# Patient Record
Sex: FEMALE | Race: BLACK OR AFRICAN AMERICAN | NOT HISPANIC OR LATINO | Employment: OTHER | URBAN - METROPOLITAN AREA
[De-identification: names, ages, dates, MRNs, and addresses within clinical notes are randomized per-mention and may not be internally consistent; named-entity substitution may affect disease eponyms.]

---

## 2017-01-13 DIAGNOSIS — I10 ESSENTIAL HYPERTENSION: ICD-10-CM

## 2017-01-13 RX ORDER — CARVEDILOL 12.5 MG/1
TABLET ORAL
Qty: 60 TAB | Refills: 6 | Status: SHIPPED | OUTPATIENT
Start: 2017-01-13 | End: 2017-07-17 | Stop reason: SDUPTHER

## 2017-01-16 ENCOUNTER — OFFICE VISIT (OUTPATIENT)
Dept: MEDICAL GROUP | Facility: CLINIC | Age: 82
End: 2017-01-16
Payer: MEDICARE

## 2017-01-16 VITALS
HEIGHT: 67 IN | HEART RATE: 68 BPM | OXYGEN SATURATION: 98 % | BODY MASS INDEX: 24.01 KG/M2 | DIASTOLIC BLOOD PRESSURE: 70 MMHG | TEMPERATURE: 98.2 F | WEIGHT: 153 LBS | RESPIRATION RATE: 20 BRPM | SYSTOLIC BLOOD PRESSURE: 108 MMHG

## 2017-01-16 DIAGNOSIS — Z79.891 CHRONIC USE OF OPIATE FOR THERAPEUTIC PURPOSE: ICD-10-CM

## 2017-01-16 DIAGNOSIS — M47.817 LUMBAR AND SACRAL ARTHRITIS: ICD-10-CM

## 2017-01-16 DIAGNOSIS — M19.021 ARTHRITIS OF RIGHT ELBOW: ICD-10-CM

## 2017-01-16 PROCEDURE — 99213 OFFICE O/P EST LOW 20 MIN: CPT | Performed by: FAMILY MEDICINE

## 2017-01-16 RX ORDER — HYDROCODONE BITARTRATE AND ACETAMINOPHEN 7.5; 325 MG/1; MG/1
1 TABLET ORAL EVERY 6 HOURS PRN
Qty: 100 TAB | Refills: 0 | Status: SHIPPED | OUTPATIENT
Start: 2017-01-16 | End: 2017-01-16 | Stop reason: SDUPTHER

## 2017-01-16 RX ORDER — HYDROCODONE BITARTRATE AND ACETAMINOPHEN 7.5; 325 MG/1; MG/1
1 TABLET ORAL EVERY 6 HOURS PRN
Qty: 100 TAB | Refills: 0 | Status: SHIPPED | OUTPATIENT
Start: 2017-01-16 | End: 2017-03-17 | Stop reason: SDUPTHER

## 2017-01-16 NOTE — PROGRESS NOTES
CC: Chronic back pain, arthritis     HPI:   Ricky presents today with the following.    1. Lumbar and sacral arthritis  She has chronic lower and sacral back pain. This is under better control overall with her current medication regimen and the assistance of someone who comes to help cleaning. She states the hydrocodone continues to be helpful without somnolence or confusion. She is trying to take no more than 3 per day. She states this takes the pain from 5 or 6 down to 3 which allows her to complete her ADLs if she paces herself.    2. Arthritis of right elbow  The right elbow arthritis has also been under control with this regimen. She cannot take anti-inflammatory medication due to her hypertension    3. Chronic use of opiate for therapeutic purpose  No aberrant behavior with the medications or addictive behavior has been noted. She has actually been using less than as prescribed.      Patient Active Problem List    Diagnosis Date Noted   • Pulmonary hypertension (CMS-HCC) 09/20/2014     Priority: Medium   • Right renal mass 06/12/2014     Priority: Medium   • Bipolar depression (CMS-HCC) 06/08/2014     Priority: Medium   • Idiopathic hypothyroidism 02/22/2013     Priority: Medium   • Essential hypertension 03/28/2012     Priority: Medium   • Anemia, iron deficiency 04/06/2013     Priority: Low   • Chronic use of opiate for therapeutic purpose 09/14/2016   • Cervical spine arthritis (CMS-HCC) 07/24/2015   • Osteoarthritis of left knee    • MEDICAL HOME 02/10/2015   • Hyponatremia 02/05/2015   • Insomnia 12/08/2014   • Low HDL (under 40)    • Lumbar and sacral arthritis        Current Outpatient Prescriptions   Medication Sig Dispense Refill   • hydrocodone-acetaminophen (NORCO) 7.5-325 MG per tablet Take 1 Tab by mouth every 6 hours as needed (arthritis pain, max four per day). Seen 1/16/17, renewal of chronic medication, do not fill until 1/26/17 100 Tab 0   • carvedilol (COREG) 12.5 MG Tab TAKE ONE [1] TABLET  "BY MOUTH TWO [2] TIMES A DAY. WITH MEALS  60 Tab 6   • SYNTHROID 175 MCG Tab Take 1 Tab by mouth Every morning on an empty stomach. 30 Tab 6   • zolpidem (AMBIEN) 5 MG Tab Take 1 Tab by mouth every bedtime. 30 Tab 3   • divalproex (DEPAKOTE) 500 MG Tablet Delayed Response Take 1 Tab by mouth every bedtime. 90 Tab 3   • losartan (COZAAR) 100 MG Tab TAKE ONE [1] TABLET BY MOUTH EVERY DAY FOR BLOOD PRESSURE 30 Tab 11   • quetiapine (SEROQUEL) 100 MG Tab TAKE ONE TABLET BY MOUTH EVERY NIGHT AT BEDTIME.  30 Tab 11   • polyethylene glycol 3350 (MIRALAX) Powder Take 17 g by mouth every day. 527 g 3     No current facility-administered medications for this visit.         Allergies as of 01/16/2017   • (No Known Allergies)        ROS: As per HPI.    /70 mmHg  Pulse 68  Temp(Src) 36.8 °C (98.2 °F)  Resp 20  Ht 1.702 m (5' 7.01\")  Wt 69.4 kg (153 lb)  BMI 23.96 kg/m2  SpO2 98%  Breastfeeding? No    Physical Exam:  Gen:         Alert and oriented, No apparent distress.  Neck:        No Lymphadenopathy or Bruits.  Lungs:     Clear to auscultation bilaterally  CV:          Regular rate and rhythm. No murmurs, rubs or gallops.               Ext:          No clubbing, cyanosis, edema.      Assessment and Plan.   85 y.o. female with the following issues.    1. Lumbar and sacral arthritis  She is doing very well with the reduced quantity of her medications. The medication continues to be helpful and well tolerated and is renewed.  - hydrocodone-acetaminophen (NORCO) 7.5-325 MG per tablet; Take 1 Tab by mouth every 6 hours as needed (arthritis pain, max four per day). Seen 1/16/17, renewal of chronic medication, do not fill until 1/26/17  Dispense: 100 Tab; Refill: 0    2. Arthritis of right elbow  The medication regimen is helpful and well tolerated and is renewed.  - hydrocodone-acetaminophen (NORCO) 7.5-325 MG per tablet; Take 1 Tab by mouth every 6 hours as needed (arthritis pain, max four per day). Seen 1/16/17, " renewal of chronic medication, do not fill until 1/26/17  Dispense: 100 Tab; Refill: 0    3. Chronic use of opiate for therapeutic purpose  [facility] Board of pharmacy interface is without inconsistencies. This regimen has been helpful in controlling her arthritis pain from multiple areas.        Chronic pain recheck:   Last dose of controlled substance: this am,  drove her here  Chronic pain treated with hydrocodone taken 2-3 times a day  Current alcohol or substance use: no    Consequences of Chronic Opiate therapy:  (5 A's)  Analgesia:  improved  Activity:  improved  Adverse Events:  none noted  Aberrant Behaviors: no inappropriate refills requested, lost or stolen meds reported.   Affect/Mood: good grooming, full facial expressions, normal speech pattern and content, normal reasoning    Nonnarcotic treatments that are being used: cane, ice/heat to back    Pain management agreement initiated/updated and signed on: 9/2016.  Last UDS 2/2015, consistent with prescriptions    I have reviewed the medical records, the Prescription Monitoring Program and I have determined that controlled substance treatment is medically indicated.  [facility] board of pharmacy interface is without inconsistencies.  Her current average and active levels are within CDC guidelines.

## 2017-01-16 NOTE — MR AVS SNAPSHOT
"        Ricky GR Vernon   2017 11:20 AM   Office Visit   MRN: [id number]    Department:  [facility]   Dept Phone:  [phone]    Description:  Female : 1931   Provider:  Tatyana Escobar M.D.           Reason for Visit     Back Pain     Arthritis           Allergies as of 2017     No Known Allergies      You were diagnosed with     Lumbar and sacral arthritis   [627214]       Arthritis of right elbow   [708041]       Chronic use of opiate for therapeutic purpose   [2871577]         Vital Signs     Blood Pressure Pulse Temperature Respirations Height Weight    108/70 mmHg 68 36.8 °C (98.2 °F) 20 1.702 m (5' 7.01\") 69.4 kg (153 lb)    Body Mass Index Oxygen Saturation Breastfeeding? Smoking Status          23.96 kg/m2 98% No Never Smoker         Basic Information     Date Of Birth Sex Race Ethnicity Preferred Language    1931 Female Black or  Non- English      Your appointments     Mar 17, 2017 11:20 AM   Established Patient with Tatyana Escobar M.D.   [address] [id number] [phone]           You will be receiving a confirmation call a few days before your appointment from our automated call confirmation system.              Problem List              ICD-10-CM Priority Class Noted - Resolved    Essential hypertension I10 Medium  3/28/2012 - Present    Idiopathic hypothyroidism E03.9 Medium  2013 - Present    Anemia, iron deficiency D50.9 Low  2013 - Present    Bipolar depression (CMS-HCC) F31.30 Medium  2014 - Present    Right renal mass N28.89 Medium  2014 - Present    Pulmonary hypertension (CMS-[facility]) I27.2 Medium  2014 - Present    Low HDL (under 40) E78.6   Unknown - Present    Lumbar and sacral arthritis M48.9   Unknown - Present    Insomnia G47.00   2014 - Present    Hyponatremia E87.1   2015 - Present    MEDICAL HOME    2/10/2015 - Present    Osteoarthritis of " left knee M17.9   Unknown - Present    Cervical spine arthritis (CMS-[facility]) M46.92   7/24/2015 - Present    Chronic use of opiate for therapeutic purpose Z79.899   9/14/2016 - Present      Health Maintenance        Date Due Completion Dates    BONE DENSITY 6/8/1996 ---    IMM DTaP/Tdap/Td Vaccine (1 - Tdap) 11/7/2019 (Originally 6/8/1950) ---    IMM ZOSTER VACCINE 2/1/2020 (Originally 6/8/1991) ---    COLONOSCOPY 3/23/2017 3/23/2007 (Prv Comp)    Override on 3/23/2007: Previously completed ([facility], normal)            Current Immunizations     13-VALENT PCV PREVNAR 9/14/2016    Influenza TIV (IM) 10/11/2012    Influenza Vaccine Adult HD 11/16/2016, 11/30/2015, 9/21/2014  5:13 AM    Pneumococcal polysaccharide vaccine (PPSV-23) 2/25/2013      Below and/or attached are the medications your provider expects you to take. Review all of your home medications and newly ordered medications with your provider and/or pharmacist. Follow medication instructions as directed by your provider and/or pharmacist. Please keep your medication list with you and share with your provider. Update the information when medications are discontinued, doses are changed, or new medications (including over-the-counter products) are added; and carry medication information at all times in the event of emergency situations     Allergies:  No Known Allergies          Medications  Valid as of: January 16, 2017 - 12:05 PM    Generic Name Brand Name Tablet Size Instructions for use    Carvedilol (Tab) COREG 12.5 MG TAKE ONE [1] TABLET BY MOUTH TWO [2] TIMES A DAY. WITH MEALS         Divalproex Sodium (Tablet Delayed Response) DEPAKOTE 500 MG Take 1 Tab by mouth every bedtime.        Hydrocodone-Acetaminophen (Tab) NORCO 7.5-325 MG Take 1 Tab by mouth every 6 hours as needed (arthritis pain, max four per day). Seen 1/16/17, renewal of chronic medication, do not fill until 2/24/17        Levothyroxine Sodium (Tab) SYNTHROID 175 MCG Take 1 Tab by  mouth Every morning on an empty stomach.        Losartan Potassium (Tab) COZAAR 100 MG TAKE ONE [1] TABLET BY MOUTH EVERY DAY FOR BLOOD PRESSURE        Polyethylene Glycol 3350 (Powder) MIRALAX  Take 17 g by mouth every day.        QUEtiapine Fumarate (Tab) SEROQUEL 100 MG TAKE ONE TABLET BY MOUTH EVERY NIGHT AT BEDTIME.         Zolpidem Tartrate (Tab) AMBIEN 5 MG Take 1 Tab by mouth every bedtime.        .                 Medicines prescribed today were sent to:     [id number] - [place], [place] - [address] [facility]    [address] [place] [address]    Phone: [phone] Fax: [phone]    Open 24 Hours?: No      Medication refill instructions:       If your prescription bottle indicates you have medication refills left, it is not necessary to call your provider’s office. Please contact your pharmacy and they will refill your medication.    If your prescription bottle indicates you do not have any refills left, you may request refills at any time through one of the following ways: The online [employer] system (except Urgent Care), by calling your provider’s office, or by asking your pharmacy to contact your provider’s office with a refill request. Medication refills are processed only during regular business hours and may not be available until the next business day. Your provider may request additional information or to have a follow-up visit with you prior to refilling your medication.   *Please Note: Medication refills are assigned a new Rx number when refilled electronically. Your pharmacy may indicate that no refills were authorized even though a new prescription for the same medication is available at the pharmacy. Please request the medicine by name with the pharmacy before contacting your provider for a refill.        Other Notes About Your Plan     Patient is enrolled in Medical Home with Dr. Escobar.  EPS Case Open: Teri Fung [phone]           Process [employer] Access Code: [email or web address]  Expires: 2/15/2017  11:55 AM    [employer]  A secure, online tool to manage your health information     [employer]’s [employer]® is a secure, online tool that connects you to your personalized health information from the privacy of your home -- day or night - making it very easy for you to manage your healthcare. Once the activation process is completed, you can even access your medical information using the [employer] lauren, which is available for free in the Apple Lauren store or Google Play store.     [employer] provides the following levels of access (as shown below):   My Chart Features   [facility] Doctor [facility]  Specialists [facility]  Urgent  Care Non-[facility]  Primary Care  Doctor   Email your healthcare team securely and privately 24/7 X X X    Manage appointments: schedule your next appointment; view details of past/upcoming appointments X      Request prescription refills. X      View recent personal medical records, including lab and immunizations X X X X   View health record, including health history, allergies, medications X X X X   Read reports about your outpatient visits, procedures, consult and ER notes X X X X   See your discharge summary, which is a recap of your hospital and/or ER visit that includes your diagnosis, lab results, and care plan. X X       How to register for [employer]:  1. Go to  [email or web address].  2. Click on the Sign Up Now box, which takes you to the New Member Sign Up page. You will need to provide the following information:  a. Enter your [employer] Access Code exactly as it appears at the top of this page. (You will not need to use this code after you’ve completed the sign-up process. If you do not sign up before the expiration date, you must request a new code.)   b. Enter your date of birth.   c. Enter your home email address.   d. Click Submit, and follow the next screen’s instructions.  3. Create a [employer] ID. This will be your [employer] login ID and cannot be changed, so think of one that is  secure and easy to remember.  4. Create a [employer] password. You can change your password at any time.  5. Enter your Password Reset Question and Answer. This can be used at a later time if you forget your password.   6. Enter your e-mail address. This allows you to receive e-mail notifications when new information is available in [employer].  7. Click Sign Up. You can now view your health information.    For assistance activating your [employer] account, call [phone]

## 2017-03-17 ENCOUNTER — OFFICE VISIT (OUTPATIENT)
Dept: MEDICAL GROUP | Facility: CLINIC | Age: 82
End: 2017-03-17
Payer: MEDICARE

## 2017-03-17 VITALS
SYSTOLIC BLOOD PRESSURE: 112 MMHG | WEIGHT: 153 LBS | BODY MASS INDEX: 24.01 KG/M2 | HEART RATE: 60 BPM | DIASTOLIC BLOOD PRESSURE: 78 MMHG | TEMPERATURE: 96.8 F | OXYGEN SATURATION: 97 % | HEIGHT: 67 IN | RESPIRATION RATE: 16 BRPM

## 2017-03-17 DIAGNOSIS — K59.01 SLOW TRANSIT CONSTIPATION: ICD-10-CM

## 2017-03-17 DIAGNOSIS — F51.01 PRIMARY INSOMNIA: ICD-10-CM

## 2017-03-17 DIAGNOSIS — M47.817 LUMBAR AND SACRAL ARTHRITIS: ICD-10-CM

## 2017-03-17 DIAGNOSIS — M19.021 ARTHRITIS OF RIGHT ELBOW: ICD-10-CM

## 2017-03-17 DIAGNOSIS — F31.9 BIPOLAR DEPRESSION (HCC): ICD-10-CM

## 2017-03-17 DIAGNOSIS — Z79.891 CHRONIC USE OF OPIATE FOR THERAPEUTIC PURPOSE: ICD-10-CM

## 2017-03-17 DIAGNOSIS — M17.12 PRIMARY OSTEOARTHRITIS OF LEFT KNEE: ICD-10-CM

## 2017-03-17 DIAGNOSIS — M47.812 CERVICAL SPINE ARTHRITIS: ICD-10-CM

## 2017-03-17 PROCEDURE — 1101F PT FALLS ASSESS-DOCD LE1/YR: CPT | Performed by: FAMILY MEDICINE

## 2017-03-17 PROCEDURE — G8432 DEP SCR NOT DOC, RNG: HCPCS | Performed by: FAMILY MEDICINE

## 2017-03-17 PROCEDURE — 99213 OFFICE O/P EST LOW 20 MIN: CPT | Performed by: FAMILY MEDICINE

## 2017-03-17 PROCEDURE — G8482 FLU IMMUNIZE ORDER/ADMIN: HCPCS | Performed by: FAMILY MEDICINE

## 2017-03-17 PROCEDURE — 1036F TOBACCO NON-USER: CPT | Performed by: FAMILY MEDICINE

## 2017-03-17 PROCEDURE — G8420 CALC BMI NORM PARAMETERS: HCPCS | Performed by: FAMILY MEDICINE

## 2017-03-17 PROCEDURE — 4040F PNEUMOC VAC/ADMIN/RCVD: CPT | Performed by: FAMILY MEDICINE

## 2017-03-17 RX ORDER — ZOLPIDEM TARTRATE 5 MG/1
5 TABLET ORAL
Qty: 30 TAB | Refills: 4 | Status: SHIPPED | OUTPATIENT
Start: 2017-03-17 | End: 2017-07-17 | Stop reason: SDUPTHER

## 2017-03-17 RX ORDER — HYDROCODONE BITARTRATE AND ACETAMINOPHEN 7.5; 325 MG/1; MG/1
1 TABLET ORAL EVERY 6 HOURS PRN
Qty: 100 TAB | Refills: 0 | Status: SHIPPED | OUTPATIENT
Start: 2017-03-17 | End: 2017-03-17 | Stop reason: SDUPTHER

## 2017-03-17 RX ORDER — POLYETHYLENE GLYCOL 3350 17 G/17G
17 POWDER, FOR SOLUTION ORAL DAILY
Qty: 527 G | Refills: 3 | Status: SHIPPED | OUTPATIENT
Start: 2017-03-17 | End: 2017-10-04 | Stop reason: SDUPTHER

## 2017-03-17 RX ORDER — DIVALPROEX SODIUM 500 MG/1
500 TABLET, DELAYED RELEASE ORAL
Qty: 90 TAB | Refills: 3 | Status: SHIPPED | OUTPATIENT
Start: 2017-03-17 | End: 2018-02-21 | Stop reason: SDUPTHER

## 2017-03-17 RX ORDER — HYDROCODONE BITARTRATE AND ACETAMINOPHEN 7.5; 325 MG/1; MG/1
1 TABLET ORAL EVERY 6 HOURS PRN
Qty: 100 TAB | Refills: 0 | Status: SHIPPED | OUTPATIENT
Start: 2017-03-17 | End: 2017-05-19 | Stop reason: SDUPTHER

## 2017-03-17 ASSESSMENT — ENCOUNTER SYMPTOMS: DEPRESSION: 1

## 2017-03-17 ASSESSMENT — LIFESTYLE VARIABLES: HISTORY_ALCOHOL_USE: 0

## 2017-03-17 NOTE — PROGRESS NOTES
CC: Chronic arthritis, medication renewal, constipation, depression and insomnia    HPI:   Ricky presents today with the following.    1. Primary osteoarthritis of left knee/cervical spine arthritis (EMS-HCC)/lumbar and sacral arthritis/arthritis of right elbow  With the cold and wet weather we have had recently she has had increased joint pain. She denies any swelling. She denies any erythema or heat. She has felt much more achy.  She feels the hydrocodone continues to be helpful. She is using 2-3 per day. She denies any somnolence, increased difficulty with balance or confusion. She feels the regimen keeps her overall pain level around a 4 or 5.  She cannot use anti-inflammatory medications due to a history of chronic blood loss anemia from prior use. She does use topical agents on the knee and elbow in particular with some help.    2. Chronic use of opiate for therapeutic purpose  No aberrant or addictive use of medications has been observed.  Patient counseled to not add Tylenol to current regimen.  Patient counseled to keep medications locked door under personal control.    3. Slow transit constipation  This is a chronic problem which she controls well with MiraLAX. She takes this daily and states she has a daily stool and no further issues. Denies visible blood or mucus in the stool or abdominal pain.    4. Bipolar depression (CMS-[facility])  This is a long-standing serious problem with history of darin. She feels she has been doing well on Depakote. She denies any problems with the medication. Her weight has remained very stable on this medication. She states with this dose of medication she is able to sleep nightly. She takes the Seroquel as well and feels the combination has been very helpful. Denies any agitation, suicidal ideation or thoughts of self-harm. Denies any persistent or intrusive thoughts.    5. Primary insomnia  The Seroquel continues to be very helpful for this along with lower dose Ambien. Denies  any sleepwalking or sleep eating.  She is careful to take the medication right as she is going to bed.      Patient Active Problem List    Diagnosis Date Noted   • Pulmonary hypertension (CMS-HCC) 09/20/2014     Priority: Medium   • Right renal mass 06/12/2014     Priority: Medium   • Bipolar depression (CMS-HCC) 06/08/2014     Priority: Medium   • Idiopathic hypothyroidism 02/22/2013     Priority: Medium   • Essential hypertension 03/28/2012     Priority: Medium   • Anemia, iron deficiency 04/06/2013     Priority: Low   • Chronic use of opiate for therapeutic purpose 09/14/2016   • Cervical spine arthritis (CMS-HCC) 07/24/2015   • Osteoarthritis of left knee    • MEDICAL HOME 02/10/2015   • Hyponatremia 02/05/2015   • Insomnia 12/08/2014   • Low HDL (under 40)    • Lumbar and sacral arthritis        Current Outpatient Prescriptions   Medication Sig Dispense Refill   • polyethylene glycol 3350 (MIRALAX) Powder Take 17 g by mouth every day. 527 g 3   • zolpidem (AMBIEN) 5 MG Tab Take 1 Tab by mouth every bedtime. 30 Tab 4   • hydrocodone-acetaminophen (NORCO) 7.5-325 MG per tablet Take 1 Tab by mouth every 6 hours as needed (arthritis pain, max four per day). Seen 3/17/17, renewal of chronic medication, do not fill until 3/21/17 100 Tab 0   • divalproex (DEPAKOTE) 500 MG Tablet Delayed Response Take 1 Tab by mouth every bedtime. 90 Tab 3   • carvedilol (COREG) 12.5 MG Tab TAKE ONE [1] TABLET BY MOUTH TWO [2] TIMES A DAY. WITH MEALS  60 Tab 6   • SYNTHROID 175 MCG Tab Take 1 Tab by mouth Every morning on an empty stomach. 30 Tab 6   • losartan (COZAAR) 100 MG Tab TAKE ONE [1] TABLET BY MOUTH EVERY DAY FOR BLOOD PRESSURE 30 Tab 11   • quetiapine (SEROQUEL) 100 MG Tab TAKE ONE TABLET BY MOUTH EVERY NIGHT AT BEDTIME.  30 Tab 11     No current facility-administered medications for this visit.         Allergies as of 03/17/2017   • (No Known Allergies)        ROS: As per HPI.    /78 mmHg  Pulse 60  Temp(Src) 36 °C  "(96.8 °F)  Resp 16  Ht 1.702 m (5' 7.01\")  Wt 69.4 kg (153 lb)  BMI 23.96 kg/m2  SpO2 97%    Physical Exam:  Gen:         Alert and oriented, No apparent distress.  Lucid and fluent.  HEENT:   EOMI, PERRLA.   Oropharynx is well hydrated with normal soft palate motion. No exudate or ulcerations noted.   Neck:       Full range of motion, moderate posterior cervical spasm. No JVD or carotid bruits appreciated. No cervical adenopathy appreciated. No thyromegaly or neck masses appreciated.  No retractions appreciated.   Lungs      clear to auscultation A&P with good air movement. .  CV:          Regular rate and rhythm. No murmurs, rubs or gallops.               Ext:          No clubbing, cyanosis, edema.  Left knee mild effusion with crepitus, no erythema or heat appreciated.  Skin:        shows no rashes, pigmented lesions or ulcerations.      Assessment and Plan.   85 y.o. female with the following issues.    1. Primary osteoarthritis of left knee  The regimen is helpful and well tolerated and is renewed. Discussed using BenGay or salon pas patches. She will also continue the heating pad which she feels is helpful.     3. Lumbar and sacral arthritis/cervical spine arthritis  The medication is helpful and well tolerated and is renewed  - hydrocodone-acetaminophen (NORCO) 7.5-325 MG per tablet; Take 1 Tab by mouth every 6 hours as needed (arthritis pain, max four per day). Seen 3/17/17, renewal of chronic medication, do not fill until 3/21/17  Dispense: 100 Tab; Refill: 0    4. Arthritis of right elbow  Medication is helpful and well-tolerated and is renewed. Discussed that hopefully with the warmer weather she will start to feel a little better.  - hydrocodone-acetaminophen (NORCO) 7.5-325 MG per tablet; Take 1 Tab by mouth every 6 hours as needed (arthritis pain, max four per day). Seen 3/17/17, renewal of chronic medication, do not fill until 3/21/17  Dispense: 100 Tab; Refill: 0    5. Chronic use of opiate for " therapeutic purpose  [facility] board of pharmacy interface is reviewed.  No inconsistencies are found.  her average MME is 26, active is 30, max active is 30.  This is within CDC guideline for chronic pain prescribing by primary care.  Drug screen using buccal swab collected today.  - [facility] PAIN MANAGEMENT SCREEN; Future    6. Slow transit constipation  The medication is helpful and well-tolerated and is noted  - polyethylene glycol 3350 (MIRALAX) Powder; Take 17 g by mouth every day.  Dispense: 527 g; Refill: 3    7. Bipolar depression (CMS-HCC)  The medication is helpful and well-tolerated and is renewed. She will continue the regimen. No increased depressive symptoms or darin observed.  - divalproex (DEPAKOTE) 500 MG Tablet Delayed Response; Take 1 Tab by mouth every bedtime.  Dispense: 90 Tab; Refill: 3    8. Primary insomnia  The medication is helpful and well-tolerated and is renewed.  - zolpidem (AMBIEN) 5 MG Tab; Take 1 Tab by mouth every bedtime.  Dispense: 30 Tab; Refill: 4        Chronic pain recheck:   Last dose of controlled substance: Last p.m.  Chronic pain treated with hydrocodone/APAP taken 3-4 times a day    She  reports that she does not drink alcohol.  She  reports that she does not use illicit drugs.    Consequences of Chronic Opiate therapy:  (5 A's)  Analgesia: Compared to no treatment or prior treatment, pain is currently improved  Activity: not changed  More arthritis aching  Adverse Events: She denies dry mouth, itchy skin, nausea, sedation and has chronic treated constipation  Aberrant Behaviors: She reports she is taking medication as prescribed and is not veering from agreed treatment regimen. There have been no inappropriate refills or lost/stolen meds reported.   Affect/Mood: Pain is not impacting patient's mood.  Patient reports chronic stable depression/anxiety.    Nonnarcotic treatments that are being used: uses OTC topicals and heating pad.   Treatment goals discussed.    Opioid  Risk Score: 3    Interpretation of Opioid Risk Score   Score 0-3 = Low risk of abuse. Do UDS at least once per year.  Score 4-7 = Moderate risk of abuse. Do UDS 1-4 times per year.  Score 8+ = High risk of abuse. Refer to specialist.    Last order of CONTROLLED SUBSTANCE TREATMENT AGREEMENT was found on 9/14/2016 from Office Visit on 9/14/2016     UDS Summary                URINE DRUG SCREEN Overdue 2/4/2016      Done 2/9/2015 URINE DRUG SCREEN     Patient has more history with this topic...        Most recent UDS reviewed today and is consistent with prescribed medications.  Collected today.    I have reviewed the medical records, the Prescription Monitoring Program and I have determined that controlled substance treatment is medically indicated.

## 2017-03-17 NOTE — MR AVS SNAPSHOT
"        Ricky GR Vernon   3/17/2017 11:20 AM   Office Visit   MRN: [id number]    Department:  [facility]   Dept Phone:  [phone]    Description:  Female : 1931   Provider:  Tatyana Escobar M.D.           Reason for Visit     Arthritis     Back Pain     Knee Pain           Allergies as of 3/17/2017     No Known Allergies      You were diagnosed with     Primary osteoarthritis of left knee   [041214]       Cervical spine arthritis (CMS-HCC)   [220039]       Lumbar and sacral arthritis   [774396]       Arthritis of right elbow   [941637]       Chronic use of opiate for therapeutic purpose   [4087212]       Slow transit constipation   [564.01.ICD-9-CM]       Bipolar depression (CMS-HCC)   [136998]       Primary insomnia   [576296]         Vital Signs     Blood Pressure Pulse Temperature Respirations Height Weight    112/78 mmHg 60 36 °C (96.8 °F) 16 1.702 m (5' 7.01\") 69.4 kg (153 lb)    Body Mass Index Oxygen Saturation Smoking Status             23.96 kg/m2 97% Never Smoker          Basic Information     Date Of Birth Sex Race Ethnicity Preferred Language    1931 Female Black or  Non- English      Your appointments     May 19, 2017 10:00 AM   Established Patient with Tatyana Escobar M.D.   [address] [id number] [phone]           You will be receiving a confirmation call a few days before your appointment from our automated call confirmation system.              Problem List              ICD-10-CM Priority Class Noted - Resolved    Essential hypertension I10 Medium  3/28/2012 - Present    Idiopathic hypothyroidism E03.9 Medium  2013 - Present    Anemia, iron deficiency D50.9 Low  2013 - Present    Bipolar depression (CMS-HCC) F31.30 Medium  2014 - Present    Right renal mass N28.89 Medium  2014 - Present    Pulmonary hypertension (CMS-HCC) I27.2 Medium  2014 - Present    Low HDL " (under 40) E78.6   Unknown - Present    Lumbar and sacral arthritis M48.9   Unknown - Present    Insomnia G47.00   12/8/2014 - Present    Hyponatremia E87.1   2/5/2015 - Present    MEDICAL HOME    2/10/2015 - Present    Osteoarthritis of left knee M17.9   Unknown - Present    Cervical spine arthritis (CMS-HCC) M46.92   7/24/2015 - Present    Chronic use of opiate for therapeutic purpose Z79.899   9/14/2016 - Present      Health Maintenance        Date Due Completion Dates    IMM DTaP/Tdap/Td Vaccine (1 - Tdap) 11/7/2019 (Originally 6/8/1950) ---    IMM ZOSTER VACCINE 2/1/2020 (Originally 6/8/1991) ---    COLONOSCOPY 3/23/2017 3/23/2007 (Prv Comp)    Override on 3/23/2007: Previously completed ([facility], normal)            Current Immunizations     13-VALENT PCV PREVNAR 9/14/2016    Influenza TIV (IM) 10/11/2012    Influenza Vaccine Adult HD 11/16/2016, 11/30/2015, 9/21/2014  5:13 AM    Pneumococcal polysaccharide vaccine (PPSV-23) 2/25/2013      Below and/or attached are the medications your provider expects you to take. Review all of your home medications and newly ordered medications with your provider and/or pharmacist. Follow medication instructions as directed by your provider and/or pharmacist. Please keep your medication list with you and share with your provider. Update the information when medications are discontinued, doses are changed, or new medications (including over-the-counter products) are added; and carry medication information at all times in the event of emergency situations     Allergies:  No Known Allergies          Medications  Valid as of: March 17, 2017 - 12:17 PM    Generic Name Brand Name Tablet Size Instructions for use    Carvedilol (Tab) COREG 12.5 MG TAKE ONE [1] TABLET BY MOUTH TWO [2] TIMES A DAY. WITH MEALS         Divalproex Sodium (Tablet Delayed Response) DEPAKOTE 500 MG Take 1 Tab by mouth every bedtime.        Hydrocodone-Acetaminophen (Tab) NORCO 7.5-325 MG Take 1 Tab  by mouth every 6 hours as needed (arthritis pain, max four per day). Seen 3/17/17, renewal of chronic medication, do not fill until 4/19/17        Levothyroxine Sodium (Tab) SYNTHROID 175 MCG Take 1 Tab by mouth Every morning on an empty stomach.        Losartan Potassium (Tab) COZAAR 100 MG TAKE ONE [1] TABLET BY MOUTH EVERY DAY FOR BLOOD PRESSURE        Polyethylene Glycol 3350 (Powder) MIRALAX  Take 17 g by mouth every day.        QUEtiapine Fumarate (Tab) SEROQUEL 100 MG TAKE ONE TABLET BY MOUTH EVERY NIGHT AT BEDTIME.         Zolpidem Tartrate (Tab) AMBIEN 5 MG Take 1 Tab by mouth every bedtime.        .                 Medicines prescribed today were sent to:     [id number] - [place], [place] - [address]    [address] [address]    Phone: [phone] Fax: [phone]    Open 24 Hours?: No      Medication refill instructions:       If your prescription bottle indicates you have medication refills left, it is not necessary to call your provider’s office. Please contact your pharmacy and they will refill your medication.    If your prescription bottle indicates you do not have any refills left, you may request refills at any time through one of the following ways: The online [employer] system (except Urgent Care), by calling your provider’s office, or by asking your pharmacy to contact your provider’s office with a refill request. Medication refills are processed only during regular business hours and may not be available until the next business day. Your provider may request additional information or to have a follow-up visit with you prior to refilling your medication.   *Please Note: Medication refills are assigned a new Rx number when refilled electronically. Your pharmacy may indicate that no refills were authorized even though a new prescription for the same medication is available at the pharmacy. Please request the medicine by name with the pharmacy before contacting your provider for a  refill.        Your To Do List     Future Labs/Procedures Complete By Expires    [facility]  As directed 3/17/2018    Comments:    Current Meds (name, sig, last dose):   Current outpatient prescriptions:   •  hydrocodone-acetaminophen, 1 Tab, Oral, Q6HRS PRN  •  carvedilol, TAKE ONE [1] TABLET BY MOUTH TWO [2] TIMES A DAY. WITH MEALS   •  SYNTHROID, 175 mcg, Oral, AM ES  •  zolpidem, 5 mg, Oral, QHS  •  divalproex, 500 mg, Oral, QHS  •  losartan, TAKE ONE [1] TABLET BY MOUTH EVERY DAY FOR BLOOD PRESSURE  •  quetiapine, TAKE ONE TABLET BY MOUTH EVERY NIGHT AT BEDTIME.   •  polyethylene glycol 3350, 17 g, Oral, DAILY      Other Notes About Your Plan     Patient is enrolled in Medical Home with Dr. Escobar.  EPS Case Open: Teri Tourelli [phone]           [facility] Status: Patient Declined

## 2017-05-19 ENCOUNTER — OFFICE VISIT (OUTPATIENT)
Dept: MEDICAL GROUP | Facility: CLINIC | Age: 82
End: 2017-05-19
Payer: MEDICARE

## 2017-05-19 VITALS
SYSTOLIC BLOOD PRESSURE: 120 MMHG | WEIGHT: 153 LBS | TEMPERATURE: 97.2 F | BODY MASS INDEX: 24.01 KG/M2 | RESPIRATION RATE: 16 BRPM | OXYGEN SATURATION: 92 % | HEIGHT: 67 IN | HEART RATE: 62 BPM | DIASTOLIC BLOOD PRESSURE: 70 MMHG

## 2017-05-19 DIAGNOSIS — M17.12 PRIMARY OSTEOARTHRITIS OF LEFT KNEE: ICD-10-CM

## 2017-05-19 DIAGNOSIS — Z79.891 CHRONIC USE OF OPIATE FOR THERAPEUTIC PURPOSE: ICD-10-CM

## 2017-05-19 DIAGNOSIS — M19.021 ARTHRITIS OF RIGHT ELBOW: ICD-10-CM

## 2017-05-19 DIAGNOSIS — M47.817 LUMBAR AND SACRAL ARTHRITIS: ICD-10-CM

## 2017-05-19 PROCEDURE — 1036F TOBACCO NON-USER: CPT | Performed by: FAMILY MEDICINE

## 2017-05-19 PROCEDURE — G8432 DEP SCR NOT DOC, RNG: HCPCS | Performed by: FAMILY MEDICINE

## 2017-05-19 PROCEDURE — 4040F PNEUMOC VAC/ADMIN/RCVD: CPT | Performed by: FAMILY MEDICINE

## 2017-05-19 PROCEDURE — 99213 OFFICE O/P EST LOW 20 MIN: CPT | Performed by: FAMILY MEDICINE

## 2017-05-19 PROCEDURE — 1101F PT FALLS ASSESS-DOCD LE1/YR: CPT | Performed by: FAMILY MEDICINE

## 2017-05-19 PROCEDURE — G8420 CALC BMI NORM PARAMETERS: HCPCS | Performed by: FAMILY MEDICINE

## 2017-05-19 RX ORDER — HYDROCODONE BITARTRATE AND ACETAMINOPHEN 7.5; 325 MG/1; MG/1
1 TABLET ORAL EVERY 6 HOURS PRN
Qty: 100 TAB | Refills: 0 | Status: SHIPPED | OUTPATIENT
Start: 2017-05-19 | End: 2017-05-19 | Stop reason: SDUPTHER

## 2017-05-19 RX ORDER — HYDROCODONE BITARTRATE AND ACETAMINOPHEN 7.5; 325 MG/1; MG/1
1 TABLET ORAL EVERY 6 HOURS PRN
Qty: 100 TAB | Refills: 0 | Status: SHIPPED | OUTPATIENT
Start: 2017-05-19 | End: 2017-07-17 | Stop reason: SDUPTHER

## 2017-05-19 NOTE — MR AVS SNAPSHOT
"        Ricky Junior   2017 10:00 AM   Office Visit   MRN: [id number]    Department:  [facility]   Dept Phone:  [phone]    Description:  Female : 1931   Provider:  Tatyana Escobar M.D.           Reason for Visit     Back Pain     Knee Pain           Allergies as of 2017     No Known Allergies      You were diagnosed with     Lumbar and sacral arthritis   [099847]       Arthritis of right elbow   [088035]       Primary osteoarthritis of left knee   [088743]       Chronic use of opiate for therapeutic purpose   [9365731]         Vital Signs     Blood Pressure Pulse Temperature Respirations Height Weight    120/70 mmHg 62 36.2 °C (97.2 °F) 16 1.702 m (5' 7.01\") 69.4 kg (153 lb)    Body Mass Index Oxygen Saturation Smoking Status             23.96 kg/m2 92% Never Smoker          Basic Information     Date Of Birth Sex Race Ethnicity Preferred Language    1931 Female Black or  Non- English      Problem List              ICD-10-CM Priority Class Noted - Resolved    Essential hypertension I10 Medium  3/28/2012 - Present    Idiopathic hypothyroidism E03.9 Medium  2013 - Present    Anemia, iron deficiency D50.9 Low  2013 - Present    Bipolar depression (CMS-HCC) F31.30 Medium  2014 - Present    Right renal mass N28.89 Medium  2014 - Present    Pulmonary hypertension (CMS-[facility]) I27.2 Medium  2014 - Present    Low HDL (under 40) E78.6   Unknown - Present    Lumbar and sacral arthritis M48.9   Unknown - Present    Insomnia G47.00   2014 - Present    Hyponatremia E87.1   2015 - Present    MEDICAL HOME    2/10/2015 - Present    Osteoarthritis of left knee M17.12   Unknown - Present    Cervical spine arthritis M46.92   2015 - Present    Chronic use of opiate for therapeutic purpose Z79.891   2016 - Present      Health Maintenance        Date Due Completion Dates    IMM DTaP/Tdap/Td Vaccine (1 - Tdap) 2019 (Originally 1950) " ---    IMM ZOSTER VACCINE 2/1/2020 (Originally 6/8/1991) ---            Current Immunizations     13-VALENT PCV PREVNAR 9/14/2016    Influenza TIV (IM) 10/11/2012    Influenza Vaccine Adult HD 11/16/2016, 11/30/2015, 9/21/2014  5:13 AM    Pneumococcal polysaccharide vaccine (PPSV-23) 2/25/2013      Below and/or attached are the medications your provider expects you to take. Review all of your home medications and newly ordered medications with your provider and/or pharmacist. Follow medication instructions as directed by your provider and/or pharmacist. Please keep your medication list with you and share with your provider. Update the information when medications are discontinued, doses are changed, or new medications (including over-the-counter products) are added; and carry medication information at all times in the event of emergency situations     Allergies:  No Known Allergies          Medications  Valid as of: May 19, 2017 - 10:24 AM    Generic Name Brand Name Tablet Size Instructions for use    Carvedilol (Tab) COREG 12.5 MG TAKE ONE [1] TABLET BY MOUTH TWO [2] TIMES A DAY. WITH MEALS         Divalproex Sodium (Tablet Delayed Response) DEPAKOTE 500 MG Take 1 Tab by mouth every bedtime.        Hydrocodone-Acetaminophen (Tab) NORCO 7.5-325 MG Take 1 Tab by mouth every 6 hours as needed (arthritis pain, max four per day). Seen 5/19/17, renewal of chronic medication, do not fill until 6/17/17        Levothyroxine Sodium (Tab) SYNTHROID 175 MCG Take 1 Tab by mouth Every morning on an empty stomach.        Losartan Potassium (Tab) COZAAR 100 MG TAKE ONE [1] TABLET BY MOUTH EVERY DAY FOR BLOOD PRESSURE        Polyethylene Glycol 3350 (Powder) MIRALAX  Take 17 g by mouth every day.        QUEtiapine Fumarate (Tab) SEROQUEL 100 MG TAKE ONE TABLET BY MOUTH EVERY NIGHT AT BEDTIME.         Zolpidem Tartrate (Tab) AMBIEN 5 MG Take 1 Tab by mouth every bedtime.        .                 Medicines prescribed today were sent  to:     [id number] - [place], [place] - [address]    [address] [place] [address]    Phone: [phone] Fax: [phone]    Open 24 Hours?: No      Medication refill instructions:       If your prescription bottle indicates you have medication refills left, it is not necessary to call your provider’s office. Please contact your pharmacy and they will refill your medication.    If your prescription bottle indicates you do not have any refills left, you may request refills at any time through one of the following ways: The online [employer] system (except Urgent Care), by calling your provider’s office, or by asking your pharmacy to contact your provider’s office with a refill request. Medication refills are processed only during regular business hours and may not be available until the next business day. Your provider may request additional information or to have a follow-up visit with you prior to refilling your medication.   *Please Note: Medication refills are assigned a new Rx number when refilled electronically. Your pharmacy may indicate that no refills were authorized even though a new prescription for the same medication is available at the pharmacy. Please request the medicine by name with the pharmacy before contacting your provider for a refill.        Other Notes About Your Plan     Patient is enrolled in Medical Home with Dr. Escobar.  EPS Case Open: Teri Fung [phone]           [facility] Status: Patient Declined

## 2017-05-19 NOTE — PROGRESS NOTES
CC: chronic back pain, elbow pain and knee pain    HPI:   Ricky presents today with the following.    1. Lumbar and sacral arthritis  She has arthritis in multiple levels in the lumbar spine and sacral region. She finds the pain medication helpful bringing the pain from a 7 or 8 down to 3 or 4. She denies balance problems, somnolence or confusion from the medication.    2. Arthritis of right elbow  She continues to have arthritic pain and stiffness in the right elbow. However, she found an old tube of diclofenac gel which is working very well in the elbow region. She tried it in the knee and on the back as well and is not noticing any significant difference there. The narcotic pain medication does help when the arthritis of the elbow is too severe.    3. Primary osteoarthritis of left knee  She continues to have left knee osteoarthritis which is quite advanced. She is not a good candidate for surgery or knee replacement.  She does use a walker at home. Discussed pros and cons of knee injection. She would prefer not to do that at this time. She cannot use much of the anti-inflammatories due to her history of anemia and her hypertension.    4. Chronic use of opiate for therapeutic purpose  No aberrant or addictive use of medications has been observed.  Patient counseled to not add Tylenol to current regimen.  Patient counseled to keep medications locked up or under personal control.      Patient Active Problem List    Diagnosis Date Noted   • Pulmonary hypertension (CMS-HCC) 09/20/2014     Priority: Medium   • Right renal mass 06/12/2014     Priority: Medium   • Bipolar depression (CMS-HCC) 06/08/2014     Priority: Medium   • Idiopathic hypothyroidism 02/22/2013     Priority: Medium   • Essential hypertension 03/28/2012     Priority: Medium   • Anemia, iron deficiency 04/06/2013     Priority: Low   • Chronic use of opiate for therapeutic purpose 09/14/2016   • Cervical spine arthritis 07/24/2015   • Osteoarthritis of  "left knee    • MEDICAL HOME 02/10/2015   • Hyponatremia 02/05/2015   • Insomnia 12/08/2014   • Low HDL (under 40)    • Lumbar and sacral arthritis        Current Outpatient Prescriptions   Medication Sig Dispense Refill   • hydrocodone-acetaminophen (NORCO) 7.5-325 MG per tablet Take 1 Tab by mouth every 6 hours as needed (arthritis pain, max four per day). Seen 5/19/17, renewal of chronic medication, do not fill until 6/17/17 100 Tab 0   • polyethylene glycol 3350 (MIRALAX) Powder Take 17 g by mouth every day. 527 g 3   • zolpidem (AMBIEN) 5 MG Tab Take 1 Tab by mouth every bedtime. 30 Tab 4   • divalproex (DEPAKOTE) 500 MG Tablet Delayed Response Take 1 Tab by mouth every bedtime. 90 Tab 3   • carvedilol (COREG) 12.5 MG Tab TAKE ONE [1] TABLET BY MOUTH TWO [2] TIMES A DAY. WITH MEALS  60 Tab 6   • SYNTHROID 175 MCG Tab Take 1 Tab by mouth Every morning on an empty stomach. 30 Tab 6   • losartan (COZAAR) 100 MG Tab TAKE ONE [1] TABLET BY MOUTH EVERY DAY FOR BLOOD PRESSURE 30 Tab 11   • quetiapine (SEROQUEL) 100 MG Tab TAKE ONE TABLET BY MOUTH EVERY NIGHT AT BEDTIME.  30 Tab 11     No current facility-administered medications for this visit.         Allergies as of 05/19/2017   • (No Known Allergies)        ROS: As per HPI.    /70 mmHg  Pulse 62  Temp(Src) 36.2 °C (97.2 °F)  Resp 16  Ht 1.702 m (5' 7.01\")  Wt 69.4 kg (153 lb)  BMI 23.96 kg/m2  SpO2 92%    Physical Exam:  Gen:         Alert and oriented, No apparent distress.  Neck:        No Lymphadenopathy or Bruits.  Lungs:     Clear to auscultation bilaterally  CV:          Regular rate and rhythm. No murmurs, rubs or gallops.               Ext:          No clubbing, cyanosis, edema.      Assessment and Plan.   85 y.o. female with the following issues.    1. Lumbar and sacral arthritis  The medication is helpful and well tolerated and is renewed  - hydrocodone-acetaminophen (NORCO) 7.5-325 MG per tablet; Take 1 Tab by mouth every 6 hours as needed " (arthritis pain, max four per day). Seen 5/19/17, renewal of chronic medication, do not fill until 6/17/17  Dispense: 100 Tab; Refill: 0    2. Arthritis of right elbow  The medication is helpful and well tolerated and is renewed. The topical diclofenac gel seems to be very helpful for the elbow and she will continue this.  - hydrocodone-acetaminophen (NORCO) 7.5-325 MG per tablet; Take 1 Tab by mouth every 6 hours as needed (arthritis pain, max four per day). Seen 5/19/17, renewal of chronic medication, do not fill until 6/17/17  Dispense: 100 Tab; Refill: 0    3. Primary osteoarthritis of left knee  The medication is helpful and well tolerated and is renewed. Unfortunately the diclofenac gel is not helping for this but the pain medication is.  - hydrocodone-acetaminophen (NORCO) 7.5-325 MG per tablet; Take 1 Tab by mouth every 6 hours as needed (arthritis pain, max four per day). Seen 5/19/17, renewal of chronic medication, do not fill until 6/17/17  Dispense: 100 Tab; Refill: 0    4. Chronic use of opiate for therapeutic purpose  [facility] board of pharmacy interface is reviewed.  No inconsistencies are found.  Her average MME is 32, active is 30, max active is 30.  This is within CDC guideline for chronic pain prescribing by primary care.      Chronic pain recheck:   Last dose of controlled substance: this am,  drives  Chronic pain treated with hydrocodone/apap taken 3-4 times a day    She  reports that she does not drink alcohol.  She  reports that she does not use illicit drugs.    Consequences of Chronic Opiate therapy:  (5 A's)  Analgesia: Compared to no treatment or prior treatment, pain is currently improved  Activity: improved  Adverse Events: She denies constipation, itchy skin, nausea and sedation  Aberrant Behaviors: She reports she is taking medication as prescribed and is not veering from agreed treatment regimen. There have been no inappropriate refills or lost/stolen meds reported.   Affect/Mood:  Pain is impacting patient's mood.  Patient reports stable depression/anxiety.    Nonnarcotic treatments that are being used: cannot take NSAIDS due to cardiovascular history.   Treatment goals discussed.    Opioid Risk Score: 3    Interpretation of Opioid Risk Score   Score 0-3 = Low risk of abuse. Do UDS at least once per year.  Score 4-7 = Moderate risk of abuse. Do UDS 1-4 times per year.  Score 8+ = High risk of abuse. Refer to specialist.    Last order of CONTROLLED SUBSTANCE TREATMENT AGREEMENT was found on 9/14/2016 from Office Visit on 9/14/2016     UDS Summary                URINE DRUG SCREEN Next Due 3/17/2018      Done 3/22/2017 [employer] [facility] PAIN MANAGEMENT SCREEN     Patient has more history with this topic...        Most recent UDS reviewed today and is consistent with prescribed medications.    I have reviewed the medical records, the Prescription Monitoring Program and I have determined that controlled substance treatment is medically indicated.

## 2017-05-26 ENCOUNTER — PATIENT OUTREACH (OUTPATIENT)
Dept: HEALTH INFORMATION MANAGEMENT | Facility: OTHER | Age: 82
End: 2017-05-26

## 2017-05-26 NOTE — PROGRESS NOTES
Attempt #:1    WebIZ Checked & Epic Updated: yes  HealthConnect Verified: no  Verify PCP: yes    Communication Preference Obtained: yes     Review Care Team: yes    Annual Wellness Visit Scheduling  1. Scheduling Status:Scheduled       Care Gap Scheduling (Attempt to Schedule EACH Overdue Care Gap!)     Health Maintenance Due   Topic Date Due   • Annual Wellness Visit  Scheduled          MyChart Activation: declined  Y[employer]orner[employer] Lauren: no  Virtual Visits: no  Opt In to Text Messages: no

## 2017-07-06 RX ORDER — LEVOTHYROXINE SODIUM 175 MCG
TABLET ORAL
Qty: 30 TAB | Refills: 5 | Status: SHIPPED | OUTPATIENT
Start: 2017-07-06 | End: 2018-01-05 | Stop reason: SDUPTHER

## 2017-07-17 ENCOUNTER — OFFICE VISIT (OUTPATIENT)
Dept: MEDICAL GROUP | Facility: CLINIC | Age: 82
End: 2017-07-17
Payer: MEDICARE

## 2017-07-17 VITALS
HEART RATE: 60 BPM | SYSTOLIC BLOOD PRESSURE: 120 MMHG | RESPIRATION RATE: 16 BRPM | WEIGHT: 153 LBS | BODY MASS INDEX: 24.01 KG/M2 | DIASTOLIC BLOOD PRESSURE: 66 MMHG | HEIGHT: 67 IN | OXYGEN SATURATION: 96 % | TEMPERATURE: 97.9 F

## 2017-07-17 DIAGNOSIS — M17.12 PRIMARY OSTEOARTHRITIS OF LEFT KNEE: ICD-10-CM

## 2017-07-17 DIAGNOSIS — F51.01 PRIMARY INSOMNIA: ICD-10-CM

## 2017-07-17 DIAGNOSIS — M19.021 ARTHRITIS OF RIGHT ELBOW: ICD-10-CM

## 2017-07-17 DIAGNOSIS — I10 ESSENTIAL HYPERTENSION: ICD-10-CM

## 2017-07-17 DIAGNOSIS — Z79.891 CHRONIC USE OF OPIATE FOR THERAPEUTIC PURPOSE: ICD-10-CM

## 2017-07-17 DIAGNOSIS — M47.817 LUMBAR AND SACRAL ARTHRITIS: ICD-10-CM

## 2017-07-17 PROCEDURE — 99214 OFFICE O/P EST MOD 30 MIN: CPT | Performed by: FAMILY MEDICINE

## 2017-07-17 RX ORDER — HYDROCODONE BITARTRATE AND ACETAMINOPHEN 7.5; 325 MG/1; MG/1
1 TABLET ORAL EVERY 6 HOURS PRN
Qty: 100 TAB | Refills: 0 | Status: SHIPPED | OUTPATIENT
Start: 2017-07-17 | End: 2017-09-05 | Stop reason: SDUPTHER

## 2017-07-17 RX ORDER — HYDROCODONE BITARTRATE AND ACETAMINOPHEN 7.5; 325 MG/1; MG/1
1 TABLET ORAL EVERY 6 HOURS PRN
Qty: 100 TAB | Refills: 0 | Status: SHIPPED | OUTPATIENT
Start: 2017-07-17 | End: 2017-07-17 | Stop reason: SDUPTHER

## 2017-07-17 RX ORDER — CARVEDILOL 12.5 MG/1
12.5 TABLET ORAL 2 TIMES DAILY WITH MEALS
Qty: 60 TAB | Refills: 6 | Status: SHIPPED | OUTPATIENT
Start: 2017-07-17 | End: 2018-02-21 | Stop reason: SDUPTHER

## 2017-07-17 RX ORDER — ZOLPIDEM TARTRATE 5 MG/1
5 TABLET ORAL
Qty: 30 TAB | Refills: 5 | Status: SHIPPED | OUTPATIENT
Start: 2017-07-17 | End: 2018-02-02 | Stop reason: SDUPTHER

## 2017-07-17 ASSESSMENT — ENCOUNTER SYMPTOMS
BACK PAIN: 1
HALLUCINATIONS: 0
INSOMNIA: 1
HEARTBURN: 0
MYALGIAS: 1
BLURRED VISION: 0
DIZZINESS: 1
SPUTUM PRODUCTION: 0
DOUBLE VISION: 0
SHORTNESS OF BREATH: 0
DIAPHORESIS: 0
HEADACHES: 0
WHEEZING: 0
LOSS OF CONSCIOUSNESS: 0
BRUISES/BLEEDS EASILY: 0
NERVOUS/ANXIOUS: 1
FEVER: 0
FOCAL WEAKNESS: 0
CHILLS: 0
ABDOMINAL PAIN: 0
SEIZURES: 0
COUGH: 1
DEPRESSION: 1

## 2017-07-17 ASSESSMENT — LIFESTYLE VARIABLES: SUBSTANCE_ABUSE: 0

## 2017-07-17 NOTE — MR AVS SNAPSHOT
"        Ricky GR Vernon   2017 11:00 AM   Office Visit   MRN: [id number]    Department:  [facility]   Dept Phone:  [phone]    Description:  Female : 1931   Provider:  Tatyana Ecsobar M.D.           Reason for Visit     Back Pain     Dizziness     Cough           Allergies as of 2017     No Known Allergies      You were diagnosed with     Lumbar and sacral arthritis   [885268]       Arthritis of right elbow   [452497]       Primary osteoarthritis of left knee   [899452]       Chronic use of opiate for therapeutic purpose   [1075780]       Essential hypertension   [2186586]       Primary insomnia   [111500]         Vital Signs     Blood Pressure Pulse Temperature Respirations Height Weight    120/66 mmHg 60 36.6 °C (97.9 °F) 16 1.702 m (5' 7.01\") 69.4 kg (153 lb)    Body Mass Index Oxygen Saturation Smoking Status             23.96 kg/m2 96% Never Smoker          Basic Information     Date Of Birth Sex Race Ethnicity Preferred Language    1931 Female Black or  Non- English      Your appointments     Sep 18, 2017 11:00 AM   Established Patient with Tatyana Escobar M.D.   [address]  [facility] [id number] [phone]           You will be receiving a confirmation call a few days before your appointment from our automated call confirmation system.            2017 11:00 AM   Established Patient with Tatyana Escobar M.D.   [address]  [facility] [id number] [phone]           You will be receiving a confirmation call a few days before your appointment from our automated call confirmation system.              Problem List              ICD-10-CM Priority Class Noted - Resolved    Essential hypertension I10 Medium  3/28/2012 - Present    Idiopathic hypothyroidism E03.9 Medium  2013 - Present    Anemia, iron deficiency D50.9 Low  2013 - Present   " Bipolar depression (CMS-HCC) F31.30 Medium  6/8/2014 - Present    Right renal mass N28.89 Medium  6/12/2014 - Present    Pulmonary hypertension (CMS-HCC) I27.2 Medium  9/20/2014 - Present    Low HDL (under 40) E78.6   Unknown - Present    Lumbar and sacral arthritis M48.9   Unknown - Present    Insomnia G47.00   12/8/2014 - Present    Hyponatremia E87.1   2/5/2015 - Present    MEDICAL HOME    2/10/2015 - Present    Osteoarthritis of left knee M17.12   Unknown - Present    Cervical spine arthritis M46.92   7/24/2015 - Present    Chronic use of opiate for therapeutic purpose Z79.891   9/14/2016 - Present      Health Maintenance        Date Due Completion Dates    IMM DTaP/Tdap/Td Vaccine (1 - Tdap) 11/7/2019 (Originally 6/8/1950) ---    IMM ZOSTER VACCINE 2/1/2020 (Originally 6/8/1991) ---    IMM INFLUENZA (1) 9/1/2017 11/16/2016, 11/30/2015, 9/21/2014, 10/11/2012            Current Immunizations     13-VALENT PCV PREVNAR 9/14/2016    Influenza TIV (IM) 10/11/2012    Influenza Vaccine Adult HD 11/16/2016, 11/30/2015, 9/21/2014  5:13 AM    Pneumococcal polysaccharide vaccine (PPSV-23) 2/25/2013      Below and/or attached are the medications your provider expects you to take. Review all of your home medications and newly ordered medications with your provider and/or pharmacist. Follow medication instructions as directed by your provider and/or pharmacist. Please keep your medication list with you and share with your provider. Update the information when medications are discontinued, doses are changed, or new medications (including over-the-counter products) are added; and carry medication information at all times in the event of emergency situations     Allergies:  No Known Allergies          Medications  Valid as of: July 17, 2017 -  1:24 PM    Generic Name Brand Name Tablet Size Instructions for use    Carvedilol (Tab) COREG 12.5 MG Take 1 Tab by mouth 2 times a day, with meals.        Divalproex Sodium (Tablet  Delayed Response) DEPAKOTE 500 MG Take 1 Tab by mouth every bedtime.        Hydrocodone-Acetaminophen (Tab) NORCO 7.5-325 MG Take 1 Tab by mouth every 6 hours as needed (arthritis pain, max four per day).        Levothyroxine Sodium (Tab) SYNTHROID 175 MCG TAKE ONE (1) TABLET BY MOUTH EVERY MORNING         Losartan Potassium (Tab) COZAAR 100 MG TAKE ONE [1] TABLET BY MOUTH EVERY DAY FOR BLOOD PRESSURE        Polyethylene Glycol 3350 (Powder) MIRALAX  Take 17 g by mouth every day.        QUEtiapine Fumarate (Tab) SEROQUEL 100 MG TAKE ONE TABLET BY MOUTH EVERY NIGHT AT BEDTIME.         Zolpidem Tartrate (Tab) AMBIEN 5 MG Take 1 Tab by mouth every bedtime.        .                 Medicines prescribed today were sent to:     [id number] - [place], [place] - [address]    [address] [address]    Phone: [phone] Fax: [phone]    Open 24 Hours?: No      Medication refill instructions:       If your prescription bottle indicates you have medication refills left, it is not necessary to call your provider’s office. Please contact your pharmacy and they will refill your medication.    If your prescription bottle indicates you do not have any refills left, you may request refills at any time through one of the following ways: The online [employer] system (except Urgent Care), by calling your provider’s office, or by asking your pharmacy to contact your provider’s office with a refill request. Medication refills are processed only during regular business hours and may not be available until the next business day. Your provider may request additional information or to have a follow-up visit with you prior to refilling your medication.   *Please Note: Medication refills are assigned a new Rx number when refilled electronically. Your pharmacy may indicate that no refills were authorized even though a new prescription for the same medication is available at the pharmacy. Please request the medicine by name with the  pharmacy before contacting your provider for a refill.        Other Notes About Your Plan     Patient is enrolled in Medical Home with Dr. Escobar.  EPS Case Open: Teri Fung [phone]           MyChart Status: Patient Declined

## 2017-07-17 NOTE — PROGRESS NOTES
"Subjective:      Ricky Junior is a 86 y.o. female who presents with Back Pain; Dizziness; and Cough        Had a severe cold.  Feeling better, feels it is going but still hoarse.  She was dizzy due to her cold, she feels.  She does use a cane.  Here she uses a wheelchair.    Back Pain  Pertinent negatives include no abdominal pain, fever or headaches.   Dizziness  Associated symptoms include coughing and myalgias. Pertinent negatives include no abdominal pain, chills, diaphoresis, fever or headaches.   Cough  Associated symptoms include myalgias. Pertinent negatives include no chills, fever, headaches, heartburn, shortness of breath or wheezing. There is no history of environmental allergies.       Review of Systems   Constitutional: Negative for fever, chills, malaise/fatigue and diaphoresis.   HENT: Negative for hearing loss.    Eyes: Negative for blurred vision and double vision.   Respiratory: Positive for cough. Negative for sputum production, shortness of breath and wheezing.    Gastrointestinal: Negative for heartburn and abdominal pain.   Musculoskeletal: Positive for myalgias, back pain and joint pain.   Neurological: Positive for dizziness. Negative for focal weakness, seizures, loss of consciousness and headaches.   Endo/Heme/Allergies: Negative for environmental allergies. Does not bruise/bleed easily.   Psychiatric/Behavioral: Positive for depression. Negative for suicidal ideas, hallucinations and substance abuse. The patient is nervous/anxious and has insomnia.         Chronic medication changes          Objective:     /66 mmHg  Pulse 60  Temp(Src) 36.6 °C (97.9 °F)  Resp 16  Ht 1.702 m (5' 7.01\")  Wt 69.4 kg (153 lb)  BMI 23.96 kg/m2  SpO2 96%     Physical Exam   Constitutional: She is oriented to person, place, and time. She appears well-developed and well-nourished.   In wheelchair   HENT:   Head: Normocephalic and atraumatic.   Mouth/Throat: Oropharynx is clear and moist.   Eyes: " EOM are normal. Pupils are equal, round, and reactive to light. Left eye exhibits no discharge.   Neck: Normal range of motion. Neck supple. No JVD present. No thyromegaly present.   Cardiovascular: Normal rate, regular rhythm and normal heart sounds.    No murmur heard.  Pulmonary/Chest: Effort normal and breath sounds normal. No respiratory distress. She has no wheezes. She has no rales.   Abdominal: Soft. Bowel sounds are normal. She exhibits no distension. There is no tenderness.   Musculoskeletal: Normal range of motion. She exhibits no edema.   Lymphadenopathy:     She has no cervical adenopathy.   Neurological: She is alert and oriented to person, place, and time. Coordination normal.   Skin: Skin is warm and dry. No rash noted. No erythema.   Psychiatric: She has a normal mood and affect. Her behavior is normal.   Vitals reviewed.              Assessment/Plan:     1. Lumbar and sacral arthritis  The medication continues to be helpful and well-tolerated. She denies somnolence or balance problems from the medication. No recent falls. Her  assists her with keeping track of the medication. She fills a pill reminder every Tuesday and he double checks for her. Since she started that she has had no further hospitalizations.  - hydrocodone-acetaminophen (NORCO) 7.5-325 MG per tablet; Take 1 Tab by mouth every 6 hours as needed (arthritis pain, max four per day).  Dispense: 100 Tab; Refill: 0    2. Arthritis of right elbow  The medication continues to keep the pain around a 4 or 5 sometimes better. She has noted no increased swelling or heat from the elbow.  - hydrocodone-acetaminophen (NORCO) 7.5-325 MG per tablet; Take 1 Tab by mouth every 6 hours as needed (arthritis pain, max four per day).  Dispense: 100 Tab; Refill: 0    3. Primary osteoarthritis of left knee  Her knee does feel tight and stiff a fair amount of time but she feels the pain medication brings the pain down to around a 4 and allows her to  walk at home with a cane or walker.  - hydrocodone-acetaminophen (NORCO) 7.5-325 MG per tablet; Take 1 Tab by mouth every 6 hours as needed (arthritis pain, max four per day).  Dispense: 100 Tab; Refill: 0    4. Chronic use of opiate for therapeutic purpose  No aberrant or addictive use of medications has been observed.  Patient counseled to not add Tylenol to current regimen.  Patient counseled to keep medications locked up or under personal control.  [facility] pharmacy interface is reviewed.  No inconsistencies are found.  Her average MME is 32, active is 40, max active is 40.  This is within CDC guideline for chronic pain prescribing by primary care.    5. Essential hypertension  Patient reports she is compliant with blood pressure medication. The medication is carvedilol. Reports she monitors Blood pressures only at the doctor's office. She has results of 120s over 70s. Very good control. Patient denies shortness of breath, depression or cough from the medications. Denies symptoms of chest pain or pressure, shortness of breath, orthopnea or lower extremity edema. Pertinent information as follows:  Exercise very limited, working in the home with assistance.  Alcohol consumption zero drinks per week.    - carvedilol (COREG) 12.5 MG Tab; Take 1 Tab by mouth 2 times a day, with meals.  Dispense: 60 Tab; Refill: 6    6. Primary insomnia  The medication continues to be helpful and well-tolerated. She cannot sleep without it she states. No sleepwalking has been observed. She denies any aberrant behavior.  - zolpidem (AMBIEN) 5 MG Tab; Take 1 Tab by mouth every bedtime.  Dispense: 30 Tab; Refill: 5      Chronic pain recheck:   Last dose of controlled substance: this am  Chronic pain treated with hydrocodone/apap taken 3-4 times a day    She  reports that she does not drink alcohol.  She  reports that she does not use illicit drugs.    Consequences of Chronic Opiate therapy:  (5 A's)  Analgesia: Compared to no  treatment or prior treatment, pain is currently improved  Activity: improved  Adverse Events: She denies constipation, itchy skin, nausea and sedation  Aberrant Behaviors: She reports she is taking medication as prescribed and is not veering from agreed treatment regimen. There have been no inappropriate refills or lost/stolen meds reported.   Affect/Mood: Pain is not impacting patient's mood.  Patient reports stable depression/anxiety.    Nonnarcotic treatments that are being used: topical agents and stretching and ice and heat.   Treatment goals discussed.    Opioid Risk Score: 3    Interpretation of Opioid Risk Score   Score 0-3 = Low risk of abuse. Do UDS at least once per year.  Score 4-7 = Moderate risk of abuse. Do UDS 1-4 times per year.  Score 8+ = High risk of abuse. Refer to specialist.    Last order of CONTROLLED SUBSTANCE TREATMENT AGREEMENT was found on 9/14/2016 from Office Visit on 9/14/2016     UDS Summary                URINE DRUG SCREEN Next Due 3/17/2018      Done 3/22/2017 [facility] PAIN MANAGEMENT SCREEN     Patient has more history with this topic...        Most recent UDS reviewed today and is consistent with prescribed medications.    I have reviewed the medical records, the Prescription Monitoring Program and I have determined that controlled substance treatment is medically indicated.

## 2017-09-05 ENCOUNTER — TELEPHONE (OUTPATIENT)
Dept: MEDICAL GROUP | Facility: CLINIC | Age: 82
End: 2017-09-05

## 2017-09-05 DIAGNOSIS — M19.021 ARTHRITIS OF RIGHT ELBOW: ICD-10-CM

## 2017-09-05 DIAGNOSIS — M47.817 LUMBAR AND SACRAL ARTHRITIS: ICD-10-CM

## 2017-09-05 DIAGNOSIS — M17.12 PRIMARY OSTEOARTHRITIS OF LEFT KNEE: ICD-10-CM

## 2017-09-05 RX ORDER — HYDROCODONE BITARTRATE AND ACETAMINOPHEN 7.5; 325 MG/1; MG/1
1 TABLET ORAL EVERY 6 HOURS PRN
Qty: 52 TAB | Refills: 0 | Status: SHIPPED | OUTPATIENT
Start: 2017-09-05 | End: 2017-09-18 | Stop reason: SDUPTHER

## 2017-09-05 NOTE — TELEPHONE ENCOUNTER
Ricky Junior [phone] (home)     Pt requesting a refill of her Norco, advised she must be seen in office to receive rx.     Ricky states that her appt is scheduled but not until 9/18/17, hoping  will fill rx, please advise

## 2017-09-05 NOTE — TELEPHONE ENCOUNTER
Please find out from her how many she has left. She should still have enough to last until the appointment.

## 2017-09-18 ENCOUNTER — OFFICE VISIT (OUTPATIENT)
Dept: MEDICAL GROUP | Facility: CLINIC | Age: 82
End: 2017-09-18
Payer: MEDICARE

## 2017-09-18 VITALS
DIASTOLIC BLOOD PRESSURE: 60 MMHG | RESPIRATION RATE: 16 BRPM | TEMPERATURE: 97.2 F | WEIGHT: 153 LBS | HEIGHT: 67 IN | BODY MASS INDEX: 24.01 KG/M2 | SYSTOLIC BLOOD PRESSURE: 120 MMHG | HEART RATE: 60 BPM | OXYGEN SATURATION: 99 %

## 2017-09-18 DIAGNOSIS — M17.12 PRIMARY OSTEOARTHRITIS OF LEFT KNEE: ICD-10-CM

## 2017-09-18 DIAGNOSIS — Z23 NEED FOR IMMUNIZATION AGAINST INFLUENZA: ICD-10-CM

## 2017-09-18 DIAGNOSIS — I10 ESSENTIAL HYPERTENSION: ICD-10-CM

## 2017-09-18 DIAGNOSIS — Z79.891 CHRONIC USE OF OPIATE FOR THERAPEUTIC PURPOSE: ICD-10-CM

## 2017-09-18 DIAGNOSIS — F31.9 BIPOLAR DEPRESSION (HCC): ICD-10-CM

## 2017-09-18 DIAGNOSIS — M19.021 ARTHRITIS OF RIGHT ELBOW: ICD-10-CM

## 2017-09-18 DIAGNOSIS — M47.817 LUMBAR AND SACRAL ARTHRITIS: ICD-10-CM

## 2017-09-18 DIAGNOSIS — I27.20 PULMONARY HYPERTENSION (HCC): ICD-10-CM

## 2017-09-18 PROCEDURE — G0008 ADMIN INFLUENZA VIRUS VAC: HCPCS | Performed by: FAMILY MEDICINE

## 2017-09-18 PROCEDURE — 99214 OFFICE O/P EST MOD 30 MIN: CPT | Mod: 25 | Performed by: FAMILY MEDICINE

## 2017-09-18 PROCEDURE — 90662 IIV NO PRSV INCREASED AG IM: CPT | Performed by: FAMILY MEDICINE

## 2017-09-18 RX ORDER — HYDROCODONE BITARTRATE AND ACETAMINOPHEN 7.5; 325 MG/1; MG/1
1 TABLET ORAL EVERY 6 HOURS PRN
Qty: 100 TAB | Refills: 0 | Status: SHIPPED | OUTPATIENT
Start: 2017-09-18 | End: 2017-11-20 | Stop reason: SDUPTHER

## 2017-09-18 RX ORDER — QUETIAPINE FUMARATE 100 MG/1
100 TABLET, FILM COATED ORAL
Qty: 30 TAB | Refills: 11 | Status: SHIPPED | OUTPATIENT
Start: 2017-09-18 | End: 2018-12-03 | Stop reason: SDUPTHER

## 2017-09-18 RX ORDER — LOSARTAN POTASSIUM 100 MG/1
100 TABLET ORAL DAILY
Qty: 30 TAB | Refills: 11 | Status: SHIPPED | OUTPATIENT
Start: 2017-09-18 | End: 2018-12-03 | Stop reason: SDUPTHER

## 2017-09-18 RX ORDER — HYDROCODONE BITARTRATE AND ACETAMINOPHEN 7.5; 325 MG/1; MG/1
1 TABLET ORAL EVERY 6 HOURS PRN
Qty: 100 TAB | Refills: 0 | Status: SHIPPED | OUTPATIENT
Start: 2017-09-18 | End: 2017-09-18 | Stop reason: SDUPTHER

## 2017-09-18 NOTE — PROGRESS NOTES
Chief Complaint   Patient presents with   • Knee Pain   • Arm Pain       Subjective:     HPI:   Ricky Junior presents today with the followin. Essential hypertension  HTN - Chronic condition stable. Currently taking all meds as directed.   She is taking baby aspirin daily.   She is not monitoring BP at home.   Denies symptoms low BP: light-headed, tunnel-vision, unusual fatigue.   Denies symptoms high BP:pounding headache, visual changes, palpitations, flushed face.   Denies medicine side effects: unusual fatigue, slow heartbeat, foot/leg swelling, cough.    2. Bipolar depression (CMS-[facility])  Here for: bipolar disorder.    Current symptoms include: none, none, patient states she is feeling fine on her current regimen  Since last OV, symptoms are better, very stable  She is taking medicine daily as directed. Side effects: Dry mouth.  Mood currently does not affect: work, relationships, social activities.  Denies agoraphobia, panic attacks, SI/HI. (Denies wishing to be dead, thinking about death, intent to commit suicide, previous suicide attempt)     Review Of Systems  Taking supplements to help mood or symptoms: no  Using alcohol or other substances to help mood or symptoms: no  No polydipsia, polyuria, temperature intolerance, bowel changes  No visual or auditory hallucinations. Denies symptoms of darin: grandiosity, euphoria, need for little or no sleep, rapid pressured speech, spending sprees, reckless or risky behavior, hypersexual behavior.   Pertinent  ROS findings as above. All other systems reviewed and are negative.    3. Lumbar and sacral arthritis/arthritis of right elbow/primary osteoarthritis of left knee  She has multiple areas of arthritis as a source of chronic pain. With the weather change this last month she had to take the medication a little more frequently. Denies somnolence or confusion medication. Ambulates in her house with a walker. Uses the wheelchair when she has to walk farther.  Discussed avoidance of alcohol. She states she strictly avoids.    4. Need for immunization against influenza  She is due, administered today    5. Chronic use of opiate for therapeutic purpose  No aberrant or addictive use of medications has been observed.  Patient counseled to not add Tylenol to current regimen.  Patient counseled to keep medications locked up or under personal control. [facility] board of pharmacy interface is reviewed.  No inconsistencies are found.  Her average MME is 54, active is 30, max active is 30.  This is within CDC guideline for chronic pain prescribing by primary care.    6. Pulmonary hypertension (CMS-HCC)  This is not clinically been a problem. Her oxygen levels during the day are doing well. She denies any chest pain or chest pressure. Denies any history of sleep apnea. Denies any daytime somnolence.        Patient Active Problem List    Diagnosis Date Noted   • Pulmonary hypertension (CMS-HCC) 09/20/2014     Priority: Medium   • Right renal mass 06/12/2014     Priority: Medium   • Bipolar depression (CMS-HCC) 06/08/2014     Priority: Medium   • Idiopathic hypothyroidism 02/22/2013     Priority: Medium   • Essential hypertension 03/28/2012     Priority: Medium   • Anemia, iron deficiency 04/06/2013     Priority: Low   • Chronic use of opiate for therapeutic purpose 09/14/2016   • Cervical spine arthritis 07/24/2015   • Osteoarthritis of left knee    • MEDICAL HOME 02/10/2015   • Hyponatremia 02/05/2015   • Insomnia 12/08/2014   • Low HDL (under 40)    • Lumbar and sacral arthritis        Current medicines (including changes today)  Current Outpatient Prescriptions   Medication Sig Dispense Refill   • losartan (COZAAR) 100 MG Tab Take 1 Tab by mouth every day. 30 Tab 11   • quetiapine (SEROQUEL) 100 MG Tab Take 1 Tab by mouth every bedtime. 30 Tab 11   • aspirin EC (ECOTRIN) 81 MG Tablet Delayed Response Take 1 Tab by mouth every day. 30 Tab 0   • hydrocodone-acetaminophen (NORCO) 7.5-325  "MG per tablet Take 1 Tab by mouth every 6 hours as needed (arthritis pain, max four per day). 100 Tab 0   • carvedilol (COREG) 12.5 MG Tab Take 1 Tab by mouth 2 times a day, with meals. 60 Tab 6   • zolpidem (AMBIEN) 5 MG Tab Take 1 Tab by mouth every bedtime. 30 Tab 5   • SYNTHROID 175 MCG Tab TAKE ONE (1) TABLET BY MOUTH EVERY MORNING  30 Tab 5   • polyethylene glycol 3350 (MIRALAX) Powder Take 17 g by mouth every day. 527 g 3   • divalproex (DEPAKOTE) 500 MG Tablet Delayed Response Take 1 Tab by mouth every bedtime. 90 Tab 3     No current facility-administered medications for this visit.        No Known Allergies    ROS: As per HPI       Objective:     Blood pressure 120/60, pulse 60, temperature 36.2 °C (97.2 °F), resp. rate 16, height 1.702 m (5' 7.01\"), weight 69.4 kg (153 lb), SpO2 99 %. Body mass index is 23.96 kg/m².    Physical Exam:  Constitutional: Well-developed and well-nourished. Not diaphoretic. No distress. Lucid and fluent.  Skin: Skin is warm and dry. No rash noted.  Head: Atraumatic without lesions.  Eyes: Conjunctivae and extraocular motions are normal. Pupils are equal, round, and reactive to light. No scleral icterus.   Nose: Nares patent. Mucosa without edema or erythema. No discharge. No facial tenderness.  Mouth/Throat: Tongue normal. Oropharynx is clear and moist. Posterior pharynx without erythema or exudates.  Neck: Supple, trachea midline. No thyromegaly present. No cervical or supraclavicular lymphadenopathy. No JVD or carotid bruits appreciated  Cardiovascular: Regular rate and rhythm.  Normal S1, S2 without murmur appreciated.  Chest: Effort normal. Clear to auscultation throughout. No adventitious sounds.   Abdomen: Soft, non tender, and without distention. Active bowel sounds in all four quadrants. No rebound, guarding, masses or hepatosplenomegaly.  Extremities: No cyanosis, clubbing, erythema, nor edema.   Neurological: Alert and oriented x 3. DTRs 2+/3 and symmetric. No cranial " nerve deficit. She is in a wheelchair, gait and balance are not assessed  Psychiatric:  Behavior, mood, and affect are appropriate.       Assessment and Plan:     86 y.o. female with the following issues:    1. Essential hypertension  losartan (COZAAR) 100 MG Tab    aspirin EC (ECOTRIN) 81 MG Tablet Delayed Response   2. Bipolar depression (CMS-[facility])  quetiapine (SEROQUEL) 100 MG Tab   3. Lumbar and sacral arthritis  hydrocodone-acetaminophen (NORCO) 7.5-325 MG per tablet    DISCONTINUED: hydrocodone-acetaminophen (NORCO) 7.5-325 MG per tablet   4. Arthritis of right elbow  hydrocodone-acetaminophen (NORCO) 7.5-325 MG per tablet    DISCONTINUED: hydrocodone-acetaminophen (NORCO) 7.5-325 MG per tablet   5. Primary osteoarthritis of left knee  hydrocodone-acetaminophen (NORCO) 7.5-325 MG per tablet    DISCONTINUED: hydrocodone-acetaminophen (NORCO) 7.5-325 MG per tablet   6. Need for immunization against influenza  INFLUENZA VACCINE, HIGH DOSE (65+ ONLY)   7. Chronic use of opiate for therapeutic purpose  CONTROLLED SUBSTANCE TREATMENT AGREEMENT   8. Pulmonary hypertension (CMS-[facility])           Followup: Return in about 6 weeks (around 10/30/2017), or if symptoms worsen or fail to improve.

## 2017-10-04 DIAGNOSIS — K59.01 SLOW TRANSIT CONSTIPATION: ICD-10-CM

## 2017-10-04 RX ORDER — POLYETHYLENE GLYCOL 3350 17 G/17G
POWDER, FOR SOLUTION ORAL
Qty: 527 G | Refills: 2 | Status: SHIPPED | OUTPATIENT
Start: 2017-10-04 | End: 2018-05-23 | Stop reason: SDUPTHER

## 2017-11-20 ENCOUNTER — OFFICE VISIT (OUTPATIENT)
Dept: MEDICAL GROUP | Facility: CLINIC | Age: 82
End: 2017-11-20
Payer: MEDICARE

## 2017-11-20 VITALS
RESPIRATION RATE: 14 BRPM | HEART RATE: 68 BPM | OXYGEN SATURATION: 100 % | BODY MASS INDEX: 23.81 KG/M2 | SYSTOLIC BLOOD PRESSURE: 116 MMHG | TEMPERATURE: 97.7 F | HEIGHT: 67 IN | WEIGHT: 151.7 LBS | DIASTOLIC BLOOD PRESSURE: 68 MMHG

## 2017-11-20 DIAGNOSIS — M47.817 LUMBAR AND SACRAL ARTHRITIS: ICD-10-CM

## 2017-11-20 DIAGNOSIS — M17.12 PRIMARY OSTEOARTHRITIS OF LEFT KNEE: ICD-10-CM

## 2017-11-20 DIAGNOSIS — Z79.891 CHRONIC USE OF OPIATE FOR THERAPEUTIC PURPOSE: ICD-10-CM

## 2017-11-20 DIAGNOSIS — M19.021 ARTHRITIS OF RIGHT ELBOW: ICD-10-CM

## 2017-11-20 PROCEDURE — 99213 OFFICE O/P EST LOW 20 MIN: CPT | Performed by: FAMILY MEDICINE

## 2017-11-20 RX ORDER — HYDROCODONE BITARTRATE AND ACETAMINOPHEN 7.5; 325 MG/1; MG/1
1 TABLET ORAL EVERY 6 HOURS PRN
Qty: 100 TAB | Refills: 0 | Status: SHIPPED | OUTPATIENT
Start: 2017-11-20 | End: 2017-11-20 | Stop reason: SDUPTHER

## 2017-11-20 RX ORDER — HYDROCODONE BITARTRATE AND ACETAMINOPHEN 7.5; 325 MG/1; MG/1
1 TABLET ORAL EVERY 6 HOURS PRN
Qty: 100 TAB | Refills: 0 | Status: SHIPPED | OUTPATIENT
Start: 2017-11-20 | End: 2018-02-21 | Stop reason: SDUPTHER

## 2017-11-20 ASSESSMENT — PATIENT HEALTH QUESTIONNAIRE - PHQ9: CLINICAL INTERPRETATION OF PHQ2 SCORE: 0

## 2017-11-20 NOTE — PROGRESS NOTES
Chief Complaint   Patient presents with   • Arthritis       Subjective:     HPI:   Ricky Junior presents today with the followin. Lumbar and sacral arthritis  She has multiple level lumbar and sacral arthritis. At home she uses a cane or walker. She likes using her walker because she can transport things on it as it is a walker with a seat. Denies confusion from the medication. States it brings her pain from an 8 to around a 5. States that this allows her to complete her ADLs with assistance.  She is unable to take anti-inflammatories due to her history of iron deficiency anemia. She has history consistent with GI bleed.    2. Arthritis of right elbow  She feels currently her right elbow arthritis is doing well on this regimen. Her overall medication regimen is complex. She is on multiple psychiatric medications.  relates no significant confusion or difficulty with the medication regimen. He assists her with the regimen.    3. Primary osteoarthritis of left knee  She has tricompartment arthritis of left knee. This is osteoarthritis. She is a poor candidate for surgery as she becomes disoriented and paranoid at the hospital. We are trying to avoid that.  She is also at increased risk for anesthesia due to age.    4. Chronic use of opiate for therapeutic purpose  No aberrant or addictive use of medications has been observed.  Patient counseled to not add Tylenol to current regimen.  Patient counseled to keep medications locked up or under personal control.  [facility] board of pharmacy interface is reviewed.  No inconsistencies are found.  Her average MME is 20, active is 0, max active is 30.  This is within CDC guideline for chronic pain prescribing by primary care.        Patient Active Problem List    Diagnosis Date Noted   • Pulmonary hypertension 2014     Priority: Medium   • Right renal mass 2014     Priority: Medium   • Bipolar depression (CMS-[facility]) 2014     Priority: Medium   •  "Idiopathic hypothyroidism 02/22/2013     Priority: Medium   • Essential hypertension 03/28/2012     Priority: Medium   • Anemia, iron deficiency 04/06/2013     Priority: Low   • Chronic use of opiate for therapeutic purpose 09/14/2016   • Cervical spine arthritis (CMS-HCC) 07/24/2015   • Osteoarthritis of left knee    • MEDICAL HOME 02/10/2015   • Hyponatremia 02/05/2015   • Insomnia 12/08/2014   • Low HDL (under 40)    • Lumbar and sacral arthritis        Current medicines (including changes today)  Current Outpatient Prescriptions   Medication Sig Dispense Refill   • hydrocodone-acetaminophen (NORCO) 7.5-325 MG per tablet Take 1 Tab by mouth every 6 hours as needed (arthritis pain, max four per day). 100 Tab 0   • polyethylene glycol 3350 (MIRALAX) Powder MIX 17 GRAMS WITH 8 OUNCES OF WATER AND DRINK BY MOUTH DAILY 527 g 2   • losartan (COZAAR) 100 MG Tab Take 1 Tab by mouth every day. 30 Tab 11   • quetiapine (SEROQUEL) 100 MG Tab Take 1 Tab by mouth every bedtime. 30 Tab 11   • aspirin EC (ECOTRIN) 81 MG Tablet Delayed Response Take 1 Tab by mouth every day. 30 Tab 0   • carvedilol (COREG) 12.5 MG Tab Take 1 Tab by mouth 2 times a day, with meals. 60 Tab 6   • zolpidem (AMBIEN) 5 MG Tab Take 1 Tab by mouth every bedtime. 30 Tab 5   • SYNTHROID 175 MCG Tab TAKE ONE (1) TABLET BY MOUTH EVERY MORNING  30 Tab 5   • divalproex (DEPAKOTE) 500 MG Tablet Delayed Response Take 1 Tab by mouth every bedtime. 90 Tab 3     No current facility-administered medications for this visit.        No Known Allergies    ROS: As per HPI       Objective:     Blood pressure 116/68, pulse 68, temperature 36.5 °C (97.7 °F), resp. rate 14, height 1.702 m (5' 7\"), weight 68.8 kg (151 lb 11.2 oz), SpO2 100 %. Body mass index is 23.76 kg/m².    Physical Exam:  Constitutional: Well-developed and well-nourished. Not diaphoretic. No distress. Lucid and fluent.  In wheelchair but commonly uses a cane.  In chair here due to long corridor.  Skin: " Skin is warm and dry. No rash noted.  Head: Atraumatic without lesions.  Eyes: Conjunctivae and extraocular motions are normal. Pupils are equal, round, and reactive to light. No scleral icterus.   Ears:  External ears unremarkable. Tympanic membranes clear and intact.  Nose: Nares patent. Mucosa without edema or erythema. No discharge. No facial tenderness.  Mouth/Throat: Tongue normal. Oropharynx is clear and moist. Posterior pharynx without erythema or exudates.  Neck: Supple, trachea midline. No thyromegaly present. No cervical or supraclavicular lymphadenopathy. No JVD or carotid bruits appreciated  Cardiovascular: Regular rate and rhythm.  Normal S1, S2 without murmur appreciated.  Chest: Effort normal. Clear to auscultation throughout. No adventitious sounds.   Abdomen: Soft, non tender, and without distention. Active bowel sounds in all four quadrants. No rebound, guarding, masses or hepatosplenomegaly.  Extremities: No cyanosis, clubbing, erythema, nor edema.   Neurological: Alert and oriented x 3.  No cranial nerve deficit.   Psychiatric:  Behavior, mood, and affect are appropriate.       Assessment and Plan:     86 y.o. female with the following issues:    1. Lumbar and sacral arthritis  hydrocodone-acetaminophen (NORCO) 7.5-325 MG per tablet    DISCONTINUED: hydrocodone-acetaminophen (NORCO) 7.5-325 MG per tablet    DISCONTINUED: hydrocodone-acetaminophen (NORCO) 7.5-325 MG per tablet   2. Arthritis of right elbow  hydrocodone-acetaminophen (NORCO) 7.5-325 MG per tablet    DISCONTINUED: hydrocodone-acetaminophen (NORCO) 7.5-325 MG per tablet    DISCONTINUED: hydrocodone-acetaminophen (NORCO) 7.5-325 MG per tablet   3. Primary osteoarthritis of left knee  hydrocodone-acetaminophen (NORCO) 7.5-325 MG per tablet    DISCONTINUED: hydrocodone-acetaminophen (NORCO) 7.5-325 MG per tablet    DISCONTINUED: hydrocodone-acetaminophen (NORCO) 7.5-325 MG per tablet   4. Chronic use of opiate for therapeutic purpose        Chronic pain recheck:   Last dose of controlled substance: yesterday am  Chronic pain treated with hydrocodone/apap taken 3-4 times a day    She  reports that she does not drink alcohol.  She  reports that she does not use drugs.    Consequences of Chronic Opiate therapy:  (5 A's)  Analgesia: Compared to no treatment or prior treatment, pain is currently improved  Activity: improved  Adverse Events: She denies constipation, itchy skin, nausea and sedation  Aberrant Behaviors: She reports she is taking medication as prescribed and is not veering from agreed treatment regimen. There have been no inappropriate refills or lost/stolen meds reported.   Affect/Mood: Pain is not impacting patient's mood.  Patient reports stable depression/anxiety.    Nonnarcotic treatments that are being used: SSRI/SNRI.   Treatment goals discussed.    Opioid Risk Score: 3    Interpretation of Opioid Risk Score   Score 0-3 = Low risk of abuse. Do UDS at least once per year.  Score 4-7 = Moderate risk of abuse. Do UDS 1-4 times per year.  Score 8+ = High risk of abuse. Refer to specialist.    Last order of CONTROLLED SUBSTANCE TREATMENT AGREEMENT was found on 9/18/2017 from Office Visit on 9/18/2017     UDS Summary                URINE DRUG SCREEN Next Due 3/17/2018      Done 3/22/2017 [facility] PAIN MANAGEMENT SCREEN     Patient has more history with this topic...        Most recent UDS reviewed today and is consistent with prescribed medications.    I have reviewed the medical records, the Prescription Monitoring Program and I have determined that controlled substance treatment is medically indicated.        Followup: Return in about 3 months (around 2/20/2018), or if symptoms worsen or fail to improve.

## 2017-12-05 ENCOUNTER — TELEPHONE (OUTPATIENT)
Dept: MEDICAL GROUP | Facility: CLINIC | Age: 82
End: 2017-12-05

## 2017-12-05 NOTE — TELEPHONE ENCOUNTER
Was the patient seen in the last year in this department? Yes     Does patient have an active prescription for medications requested? Yes     Received Request Via: Pharmacy     Pharmacy states patient is 30 tabs short and the refill request they have says not to fill until 12/14/17.  Pharmacy is asking if we can refill the medication early.  Thank You.

## 2018-01-05 RX ORDER — LEVOTHYROXINE SODIUM 175 MCG
TABLET ORAL
Qty: 30 TAB | Refills: 4 | Status: SHIPPED | OUTPATIENT
Start: 2018-01-05 | End: 2018-05-05

## 2018-02-02 DIAGNOSIS — F51.01 PRIMARY INSOMNIA: ICD-10-CM

## 2018-02-02 RX ORDER — ZOLPIDEM TARTRATE 5 MG/1
5 TABLET ORAL
Qty: 30 TAB | Refills: 0 | Status: SHIPPED
Start: 2018-02-02 | End: 2018-02-21 | Stop reason: SDUPTHER

## 2018-02-02 RX ORDER — ZOLPIDEM TARTRATE 5 MG/1
TABLET ORAL
Refills: 4 | OUTPATIENT
Start: 2018-02-02

## 2018-02-03 DIAGNOSIS — F51.01 PRIMARY INSOMNIA: ICD-10-CM

## 2018-02-03 RX ORDER — ZOLPIDEM TARTRATE 5 MG/1
TABLET ORAL
Refills: 4 | OUTPATIENT
Start: 2018-02-03

## 2018-02-21 ENCOUNTER — OFFICE VISIT (OUTPATIENT)
Dept: MEDICAL GROUP | Facility: CLINIC | Age: 83
End: 2018-02-21
Payer: MEDICARE

## 2018-02-21 VITALS
RESPIRATION RATE: 16 BRPM | WEIGHT: 151 LBS | OXYGEN SATURATION: 98 % | BODY MASS INDEX: 23.7 KG/M2 | HEIGHT: 67 IN | SYSTOLIC BLOOD PRESSURE: 114 MMHG | DIASTOLIC BLOOD PRESSURE: 62 MMHG | HEART RATE: 53 BPM | TEMPERATURE: 96.8 F

## 2018-02-21 DIAGNOSIS — F31.9 BIPOLAR DEPRESSION (HCC): ICD-10-CM

## 2018-02-21 DIAGNOSIS — M47.817 LUMBAR AND SACRAL ARTHRITIS: ICD-10-CM

## 2018-02-21 DIAGNOSIS — Z79.891 CHRONIC USE OF OPIATE FOR THERAPEUTIC PURPOSE: ICD-10-CM

## 2018-02-21 DIAGNOSIS — M19.021 ARTHRITIS OF RIGHT ELBOW: ICD-10-CM

## 2018-02-21 DIAGNOSIS — I10 ESSENTIAL HYPERTENSION: ICD-10-CM

## 2018-02-21 DIAGNOSIS — M17.12 PRIMARY OSTEOARTHRITIS OF LEFT KNEE: ICD-10-CM

## 2018-02-21 DIAGNOSIS — I27.20 PULMONARY HYPERTENSION (HCC): ICD-10-CM

## 2018-02-21 DIAGNOSIS — F51.01 PRIMARY INSOMNIA: ICD-10-CM

## 2018-02-21 DIAGNOSIS — M47.812 CERVICAL SPINE ARTHRITIS: ICD-10-CM

## 2018-02-21 PROCEDURE — 99213 OFFICE O/P EST LOW 20 MIN: CPT | Performed by: FAMILY MEDICINE

## 2018-02-21 RX ORDER — CARVEDILOL 12.5 MG/1
12.5 TABLET ORAL 2 TIMES DAILY WITH MEALS
Qty: 60 TAB | Refills: 6 | Status: SHIPPED | OUTPATIENT
Start: 2018-02-21 | End: 2018-10-05 | Stop reason: SDUPTHER

## 2018-02-21 RX ORDER — HYDROCODONE BITARTRATE AND ACETAMINOPHEN 7.5; 325 MG/1; MG/1
1 TABLET ORAL EVERY 6 HOURS PRN
Qty: 100 TAB | Refills: 0 | Status: SHIPPED | OUTPATIENT
Start: 2018-03-23 | End: 2018-02-21 | Stop reason: SDUPTHER

## 2018-02-21 RX ORDER — HYDROCODONE BITARTRATE AND ACETAMINOPHEN 7.5; 325 MG/1; MG/1
1 TABLET ORAL EVERY 6 HOURS PRN
Qty: 100 TAB | Refills: 0 | Status: SHIPPED | OUTPATIENT
Start: 2018-04-22 | End: 2018-05-23 | Stop reason: SDUPTHER

## 2018-02-21 RX ORDER — ZOLPIDEM TARTRATE 5 MG/1
5 TABLET ORAL
Qty: 30 TAB | Refills: 2 | Status: SHIPPED | OUTPATIENT
Start: 2018-02-21 | End: 2018-05-23 | Stop reason: SDUPTHER

## 2018-02-21 RX ORDER — HYDROCODONE BITARTRATE AND ACETAMINOPHEN 7.5; 325 MG/1; MG/1
1 TABLET ORAL EVERY 6 HOURS PRN
Qty: 100 TAB | Refills: 0 | Status: SHIPPED | OUTPATIENT
Start: 2018-02-21 | End: 2018-02-21 | Stop reason: SDUPTHER

## 2018-02-21 RX ORDER — DIVALPROEX SODIUM 500 MG/1
500 TABLET, DELAYED RELEASE ORAL
Qty: 90 TAB | Refills: 3 | Status: SHIPPED | OUTPATIENT
Start: 2018-02-21 | End: 2019-03-04 | Stop reason: SDUPTHER

## 2018-02-21 ASSESSMENT — PAIN SCALES - GENERAL: PAINLEVEL: 8=MODERATE-SEVERE PAIN
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
## 2018-04-28 ENCOUNTER — HOSPITAL ENCOUNTER (EMERGENCY)
Facility: MEDICAL CENTER | Age: 83
End: 2018-04-28
Attending: EMERGENCY MEDICINE
Payer: MEDICARE

## 2018-04-28 ENCOUNTER — APPOINTMENT (OUTPATIENT)
Dept: RADIOLOGY | Facility: MEDICAL CENTER | Age: 83
End: 2018-04-28
Attending: EMERGENCY MEDICINE
Payer: MEDICARE

## 2018-04-28 VITALS
OXYGEN SATURATION: 95 % | TEMPERATURE: 97.5 F | BODY MASS INDEX: 23.86 KG/M2 | RESPIRATION RATE: 18 BRPM | HEART RATE: 55 BPM | DIASTOLIC BLOOD PRESSURE: 63 MMHG | HEIGHT: 67 IN | SYSTOLIC BLOOD PRESSURE: 143 MMHG | WEIGHT: 152 LBS

## 2018-04-28 DIAGNOSIS — M25.511 ACUTE PAIN OF RIGHT SHOULDER: ICD-10-CM

## 2018-04-28 DIAGNOSIS — H61.23: ICD-10-CM

## 2018-04-28 LAB
ALBUMIN SERPL BCP-MCNC: 3.7 G/DL (ref 3.2–4.9)
ALBUMIN/GLOB SERPL: 1.1 G/DL
ALP SERPL-CCNC: 59 U/L (ref 30–99)
ALT SERPL-CCNC: 8 U/L (ref 2–50)
ANION GAP SERPL CALC-SCNC: 4 MMOL/L (ref 0–11.9)
APTT PPP: 30.2 SEC (ref 24.7–36)
AST SERPL-CCNC: 16 U/L (ref 12–45)
BASOPHILS # BLD AUTO: 0.5 % (ref 0–1.8)
BASOPHILS # BLD: 0.03 K/UL (ref 0–0.12)
BILIRUB SERPL-MCNC: 0.4 MG/DL (ref 0.1–1.5)
BNP SERPL-MCNC: 83 PG/ML (ref 0–100)
BUN SERPL-MCNC: 16 MG/DL (ref 8–22)
CALCIUM SERPL-MCNC: 9.1 MG/DL (ref 8.5–10.5)
CHLORIDE SERPL-SCNC: 102 MMOL/L (ref 96–112)
CO2 SERPL-SCNC: 26 MMOL/L (ref 20–33)
CREAT SERPL-MCNC: 0.86 MG/DL (ref 0.5–1.4)
EKG IMPRESSION: NORMAL
EOSINOPHIL # BLD AUTO: 0.15 K/UL (ref 0–0.51)
EOSINOPHIL NFR BLD: 2.7 % (ref 0–6.9)
ERYTHROCYTE [DISTWIDTH] IN BLOOD BY AUTOMATED COUNT: 40.6 FL (ref 35.9–50)
GLOBULIN SER CALC-MCNC: 3.5 G/DL (ref 1.9–3.5)
GLUCOSE SERPL-MCNC: 79 MG/DL (ref 65–99)
HCT VFR BLD AUTO: 37.8 % (ref 37–47)
HGB BLD-MCNC: 12.2 G/DL (ref 12–16)
IMM GRANULOCYTES # BLD AUTO: 0.02 K/UL (ref 0–0.11)
IMM GRANULOCYTES NFR BLD AUTO: 0.4 % (ref 0–0.9)
INR PPP: 1.22 (ref 0.87–1.13)
LYMPHOCYTES # BLD AUTO: 1.47 K/UL (ref 1–4.8)
LYMPHOCYTES NFR BLD: 26.8 % (ref 22–41)
MCH RBC QN AUTO: 31.1 PG (ref 27–33)
MCHC RBC AUTO-ENTMCNC: 32.3 G/DL (ref 33.6–35)
MCV RBC AUTO: 96.4 FL (ref 81.4–97.8)
MONOCYTES # BLD AUTO: 0.7 K/UL (ref 0–0.85)
MONOCYTES NFR BLD AUTO: 12.8 % (ref 0–13.4)
NEUTROPHILS # BLD AUTO: 3.11 K/UL (ref 2–7.15)
NEUTROPHILS NFR BLD: 56.8 % (ref 44–72)
NRBC # BLD AUTO: 0 K/UL
NRBC BLD-RTO: 0 /100 WBC
PLATELET # BLD AUTO: 177 K/UL (ref 164–446)
PMV BLD AUTO: 11.4 FL (ref 9–12.9)
POTASSIUM SERPL-SCNC: 4.6 MMOL/L (ref 3.6–5.5)
PROT SERPL-MCNC: 7.2 G/DL (ref 6–8.2)
PROTHROMBIN TIME: 15.1 SEC (ref 12–14.6)
RBC # BLD AUTO: 3.92 M/UL (ref 4.2–5.4)
SODIUM SERPL-SCNC: 132 MMOL/L (ref 135–145)
TROPONIN I SERPL-MCNC: <0.01 NG/ML (ref 0–0.04)
WBC # BLD AUTO: 5.5 K/UL (ref 4.8–10.8)

## 2018-04-28 PROCEDURE — 93005 ELECTROCARDIOGRAM TRACING: CPT | Performed by: EMERGENCY MEDICINE

## 2018-04-28 PROCEDURE — 73030 X-RAY EXAM OF SHOULDER: CPT | Mod: RT

## 2018-04-28 PROCEDURE — 96374 THER/PROPH/DIAG INJ IV PUSH: CPT

## 2018-04-28 PROCEDURE — 85025 COMPLETE CBC W/AUTO DIFF WBC: CPT

## 2018-04-28 PROCEDURE — 85610 PROTHROMBIN TIME: CPT

## 2018-04-28 PROCEDURE — 99284 EMERGENCY DEPT VISIT MOD MDM: CPT

## 2018-04-28 PROCEDURE — 700111 HCHG RX REV CODE 636 W/ 250 OVERRIDE (IP): Performed by: EMERGENCY MEDICINE

## 2018-04-28 PROCEDURE — 71045 X-RAY EXAM CHEST 1 VIEW: CPT

## 2018-04-28 PROCEDURE — 85730 THROMBOPLASTIN TIME PARTIAL: CPT

## 2018-04-28 PROCEDURE — 700112 HCHG RX REV CODE 229: Performed by: EMERGENCY MEDICINE

## 2018-04-28 PROCEDURE — A9270 NON-COVERED ITEM OR SERVICE: HCPCS | Performed by: EMERGENCY MEDICINE

## 2018-04-28 PROCEDURE — 83880 ASSAY OF NATRIURETIC PEPTIDE: CPT

## 2018-04-28 PROCEDURE — 94760 N-INVAS EAR/PLS OXIMETRY 1: CPT

## 2018-04-28 PROCEDURE — 84484 ASSAY OF TROPONIN QUANT: CPT

## 2018-04-28 PROCEDURE — 80053 COMPREHEN METABOLIC PANEL: CPT

## 2018-04-28 PROCEDURE — 96375 TX/PRO/DX INJ NEW DRUG ADDON: CPT

## 2018-04-28 RX ORDER — MORPHINE SULFATE 4 MG/ML
4 INJECTION, SOLUTION INTRAMUSCULAR; INTRAVENOUS ONCE
Status: COMPLETED | OUTPATIENT
Start: 2018-04-28 | End: 2018-04-28

## 2018-04-28 RX ORDER — ONDANSETRON 2 MG/ML
4 INJECTION INTRAMUSCULAR; INTRAVENOUS ONCE
Status: COMPLETED | OUTPATIENT
Start: 2018-04-28 | End: 2018-04-28

## 2018-04-28 RX ORDER — DOCUSATE SODIUM 50 MG/5ML
100 LIQUID ORAL ONCE
Status: COMPLETED | OUTPATIENT
Start: 2018-04-28 | End: 2018-04-28

## 2018-04-28 RX ADMIN — ONDANSETRON 4 MG: 2 INJECTION, SOLUTION INTRAMUSCULAR; INTRAVENOUS at 11:20

## 2018-04-28 RX ADMIN — MORPHINE SULFATE 4 MG: 4 INJECTION INTRAVENOUS at 11:20

## 2018-04-28 RX ADMIN — DOCUSATE SODIUM 100 MG: 50 LIQUID ORAL at 14:15

## 2018-04-28 ASSESSMENT — PAIN SCALES - GENERAL: PAINLEVEL_OUTOF10: 9

## 2018-04-28 NOTE — ED NOTES
[place] 1 Fall Risk Assessment Tool    Present to ED because of fall  NO  (Syncope, seizure, or ALOC)  Age>70   YES  Altered Mental Status:  (Intoxicated with Alcohol or Substance Confusion,  Inability to follow instructions, disorientation)   NO  Impaired Mobility:  Ambulates or transfers with assistive devices or assist  Ambulates with unsteady gait and no assistance  Unable to ambulate or transfer   NO  Nursing Judgement  (Bowel or bladder incontinence, diarrhea, urinary   frequency or urgency, leg weakness, orthostatic   hypotension, dizziness or vertigo, narcotic use).   NO    Fall Risk Interventions    Move the patient closer to the nurse's station  Familiarize the patient with environment.  Place call light within reach and demonstrate call light use.  Keep patient's personal possessions within patient safe reach  (if appropriate.)  Place stretcher in low position and brakes locked  Place yellow socks and armband on patient  Place green triangle on patient's door  Keep floor surfaces clean and dry.  Keep patient care areas uncluttered.  Assess patient hourly for: Pain, Personal Needs, Position change, and call   light access
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
## 2018-04-28 NOTE — ED PROVIDER NOTES
ED Provider Note    CHIEF COMPLAINT  Chief Complaint   Patient presents with   • Shoulder Pain       HPI  Ricky Junior is a 86 y.o. female with a history of pulmonary hypertension, renal cell cancer, osteoarthritis, hypothyroidism, hypertension, congestive heart failure, who presents by EMS with complains of worsening right shoulder pain. The patient says the pain started about 3 weeks ago and has progressively worsened. She says the pain is worse when she has a lift or use the arm. She points to the right superior and posterior portions of the shoulder as the area of her pain. She denies any radiation of pain down her arm, or any numbness or tingling to the extremity. She denies any pain to her right elbow, forearm, wrist, hand. She denies any chest pain, back pain, or abdominal pain, denies shortness breath, lightheadedness, dizziness, nausea, or diaphoresis.  She is also complaining of decreased hearing for the past several months which has progressed he worsen. She says everything sounds muffled or distant.    REVIEW OF SYSTEMS  See HPI for further details. All other systems are negative.     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    treated with 3 weekly injections at [facility]-records in media       FAMILY HISTORY  Family History   Problem Relation Age of Onset   • Other Neg Hx        SOCIAL HISTORY  Social History     Social History   • Marital status:      Spouse name: N/A   • Number of children: N/A   • Years of education: N/A     Social History Main Topics   • Smoking status: Never Smoker   •  "Smokeless tobacco: Never Used   • Alcohol use No   • Drug use: No   • Sexual activity: Not Currently     Other Topics Concern   • Not on file     Social History Narrative   • No narrative on file       SURGICAL HISTORY  Past Surgical History:   Procedure Laterality Date   • COLONOSCOPY  2007   • PB EXCIS UTERINE FIBROID,VAG APPBC         CURRENT MEDICATIONS  Home Medications    **Home medications have not yet been reviewed for this encounter**         ALLERGIES  No Known Allergies    PHYSICAL EXAM  VITAL SIGNS: BP (!) 178/66   Pulse (!) 50   Temp 36.4 °C (97.5 °F)   Resp 18   Ht 1.702 m (5' 7\")   Wt 68.9 kg (152 lb)   SpO2 96%   BMI 23.81 kg/m²   Constitutional: Awake, alert, in no acute distress, Non-toxic appearance.   HENT: Atraumatic. Bilateral external ears normal, TMs could not be visualized secondary to bilateral cerumen impactions, mucous membranes moist, throat nonerythematous without exudates, nose is normal.  Eyes: PERRL, EOMI, conjunctiva moist, noninjected.  Neck: Nontender, Normal range of motion, No nuchal rigidity, No stridor.   Lymphatic: No lymphadenopathy noted.   Cardiovascular: Regular rate and rhythm, no murmurs, rubs, gallops.  Thorax & Lungs:  Good breath sounds bilaterally, no wheezes, rales, or retractions.  No chest tenderness.  Abdomen: Bowel sounds normal, Soft, nontender, nondistended, no rebound, guarding, masses.  Back: No CVA or spinal tenderness.  Extremities: Intact distal pulses, No edema, there is tenderness along the right superior and posterior shoulder, and tenderness along the right trapezius margin.  There is increased pain with both passive and active movement of the right shoulder. There is no tenderness to the mid to distal upper arm, elbow, forearm, wrist, hand. Extremity. Neurovascular intact with a good radial pulse, good sensation to the fingertips, good capillary refill. No tenderness to the left upper extremity or to the lower extremities.  Skin: Warm, Dry, " No rashes.   Musculoskeletal: There is tenderness in the right shoulder as noted above. No other joint swelling or tenderness.  Neurologic: Alert & oriented x 3, sensory and motor function normal. No focal deficits.   Psychiatric: Affect normal, Judgment normal, Mood normal.     EKG  12-lead EKG shows sinus bradycardic, rate of 49, normal intervals, normal axis, no acute ST wave elevations or ST depressions, no pathologic Q waves, no evidence of an acute injury or ischemic pattern on my reading, in comparison to previous EKG from September, 2014, there are no acute changes.        RADIOLOGY/PROCEDURES  DX-SHOULDER 2+ RIGHT   Final Result      Degenerative change of the right shoulder. No evidence of fracture.      DX-CHEST-PORTABLE (1 VIEW)   Final Result      Cardiomegaly.            COURSE & MEDICAL DECISION MAKING  Pertinent Labs & Imaging studies reviewed. (See chart for details)  The patient presented above complaints. She received a dose of morphine 2 mg by EMS, but continues complaining of pain. Her pain appears to be musculoskeletal in etiology. She has no cardiac symptoms such as chest pain, shortness breath, nausea, or diaphoresis. She was given a dose of morphine 4 mg, Zofran 4 mg IV. EKG was unremarkable. Chest x-ray shows clear megaly, no acute infiltrate. X-rays of the right shoulder shows degenerative changes. CBC is normal, chemistry shows a sodium 132, otherwise normal, troponin less than 0.01, BNP 83, INR 1.22. On recheck she reported good relief of her pain. On examination she does appear to have bilateral cerumen impactions. Liquid Colace was placed in the right ear, and after 15 minutes, the ear was irrigated with warm water. A large cerumen plug was removed and the patient reported much improved hearing. On examination her TM appears with no erythema, good landmarks. This was repeated to the left ear, again with removal of a large cerumen plug. On examination the TM appears clear with good  landmarks. The patient reported much improved hearing. This time the patient will be discharged home. She was given a sling for comfort. She is receiving monthly pain medication from her primary care physician Dr. Escobar.  She was told to get any further pain medications from her PCP. She is referred to Dr. Hayes orthopedist for follow-up of her shoulder pain. She is to return to the ER for worsening pain, swelling, fever, discoloration, or any other problems.    FINAL IMPRESSION  1. Right shoulder pain  2. Removal of bilateral cerumen impaction  3.         Electronically signed by: Dhaval Ham, 4/28/2018 11:43 AM

## 2018-04-29 ENCOUNTER — PATIENT OUTREACH (OUTPATIENT)
Dept: HEALTH INFORMATION MANAGEMENT | Facility: OTHER | Age: 83
End: 2018-04-29

## 2018-05-01 ENCOUNTER — PATIENT OUTREACH (OUTPATIENT)
Dept: HEALTH INFORMATION MANAGEMENT | Facility: OTHER | Age: 83
End: 2018-05-01

## 2018-05-01 NOTE — PROGRESS NOTES
1. Attempt #: Final    2. [facility] Verified: no    3. Verify PCP: yes    4. Care Team Updated:       •   DME Company (gait device, O2, CPAP, etc.): NO       •   Other Specialists (eye doctor, derm, GYN, cardiology, endo, etc): NO // Pt doesn't remember.    5.  Reviewed/Updated the following with patient:       •   Communication Preference Obtained? YES       •   Preferred Pharmacy? YES       •   Preferred Lab? YES       •   Family History (document living status of immediate family members and if + hx of cancer, diabetes, hypertension, hyperlipidemia, heart attack, stroke) YES. Was Abstract Encounter opened and chart updated? YES    6. [employer] Activation: declined    7. [employer] Lauren: no    8. Annual Wellness Visit Scheduling  Scheduling Status:Scheduled      9. Care Gap Scheduling (Attempt to Schedule EACH Overdue Care Gap!)     Health Maintenance Due   Topic Date Due   • Annual Wellness Visit  11/30/2016   • URINE DRUG SCREEN  03/17/2018        Scheduled patient for Annual Wellness Visit    10. Patient was advised: “This is a free wellness visit. The provider will screen for medical conditions to help you stay healthy. If you have other concerns to address you may be asked to discuss these at a separate visit or there may be an additional fee.”     11. Patient was informed to arrive 15 min prior to their scheduled appointment and bring in their medication bottles.   yes

## 2018-05-02 ENCOUNTER — HOSPITAL ENCOUNTER (OUTPATIENT)
Dept: LAB | Facility: MEDICAL CENTER | Age: 83
End: 2018-05-02
Attending: FAMILY MEDICINE
Payer: MEDICARE

## 2018-05-02 ENCOUNTER — OFFICE VISIT (OUTPATIENT)
Dept: MEDICAL GROUP | Facility: CLINIC | Age: 83
End: 2018-05-02
Payer: MEDICARE

## 2018-05-02 VITALS
TEMPERATURE: 96.4 F | WEIGHT: 152 LBS | BODY MASS INDEX: 23.86 KG/M2 | SYSTOLIC BLOOD PRESSURE: 110 MMHG | HEIGHT: 67 IN | HEART RATE: 69 BPM | OXYGEN SATURATION: 99 % | DIASTOLIC BLOOD PRESSURE: 76 MMHG | RESPIRATION RATE: 14 BRPM

## 2018-05-02 DIAGNOSIS — M19.011 PRIMARY OSTEOARTHRITIS OF RIGHT SHOULDER: ICD-10-CM

## 2018-05-02 DIAGNOSIS — E03.9 IDIOPATHIC HYPOTHYROIDISM: ICD-10-CM

## 2018-05-02 DIAGNOSIS — M17.12 PRIMARY OSTEOARTHRITIS OF LEFT KNEE: ICD-10-CM

## 2018-05-02 LAB
T4 FREE SERPL-MCNC: 1.14 NG/DL (ref 0.53–1.43)
TSH SERPL DL<=0.005 MIU/L-ACNC: 0.33 UIU/ML (ref 0.38–5.33)

## 2018-05-02 PROCEDURE — 84439 ASSAY OF FREE THYROXINE: CPT

## 2018-05-02 PROCEDURE — 99213 OFFICE O/P EST LOW 20 MIN: CPT | Performed by: FAMILY MEDICINE

## 2018-05-02 PROCEDURE — 36415 COLL VENOUS BLD VENIPUNCTURE: CPT

## 2018-05-02 PROCEDURE — 84443 ASSAY THYROID STIM HORMONE: CPT

## 2018-05-02 ASSESSMENT — PAIN SCALES - GENERAL: PAINLEVEL: 9=SEVERE PAIN

## 2018-05-02 NOTE — PROGRESS NOTES
Chief Complaint   Patient presents with   • Shoulder Pain     right   • Ear Fullness   • Knee Pain       Subjective:     HPI:   Ricky Junior presents today with the followin. Primary osteoarthritis of right shoulder  She was seen in the ER with right shoulder pain. X-ray showed advanced arthritis. She may benefit from evaluation with orthopedics and possible injection.  She is referred to Dr. Hayes.     2. Primary osteoarthritis of left knee  Left knee arthritis pain has greatly increased.  This is making walking very difficult. Examination is also consistent with advanced OA.  Discussed referral to Dr. Hayes.  Referral is placed.    3. Idiopathic hypothyroidism  Patient reports good energy level on the medication. Patient denies insomnia, tremor or change in appetite.  Patient is taking the medication on an empty stomach in the morning and waiting at least 30 minutes before eating.  Last TSH in 206 was at [facility].  She is overdue for lab testing.  Order discussed and placed.    Ears are now doing well.        Patient Active Problem List    Diagnosis Date Noted   • Pulmonary hypertension (HCC) 2014     Priority: Medium   • Bipolar depression (HCC) 2014     Priority: Medium   • Idiopathic hypothyroidism 2013     Priority: Medium   • Essential hypertension 2012     Priority: Medium   • History of iron deficiency anemia 2013     Priority: Low   • Primary osteoarthritis of right shoulder 2018   • Severe concentric left ventricular hypertrophy    • Chronic use of opiate for therapeutic purpose 2016   • Cervical spine arthritis (HCC) 2015   • Osteoarthritis of left knee    • MEDICAL HOME 02/10/2015   • Hyponatremia 2015   • Insomnia 2014   • Low HDL (under 40)    • Lumbar and sacral arthritis        Current medicines (including changes today)  Current Outpatient Prescriptions   Medication Sig Dispense Refill   • divalproex (DEPAKOTE) 500 MG  "Tablet Delayed Response Take 1 Tab by mouth every bedtime. 90 Tab 3   • zolpidem (AMBIEN) 5 MG Tab Take 1 Tab by mouth every bedtime for 90 days. 30 Tab 2   • carvedilol (COREG) 12.5 MG Tab Take 1 Tab by mouth 2 times a day, with meals. 60 Tab 6   • HYDROcodone-acetaminophen (NORCO) 7.5-325 MG per tablet Take 1 Tab by mouth every 6 hours as needed for Moderate Pain or Severe Pain (arthritis pain, max four per day) for up to 30 days. 100 Tab 0   • SYNTHROID 175 MCG Tab TAKE ONE (1) TABLET BY MOUTH EVERY MORNING  30 Tab 4   • polyethylene glycol 3350 (MIRALAX) Powder MIX 17 GRAMS WITH 8 OUNCES OF WATER AND DRINK BY MOUTH DAILY 527 g 2   • losartan (COZAAR) 100 MG Tab Take 1 Tab by mouth every day. 30 Tab 11   • quetiapine (SEROQUEL) 100 MG Tab Take 1 Tab by mouth every bedtime. 30 Tab 11   • aspirin EC (ECOTRIN) 81 MG Tablet Delayed Response Take 1 Tab by mouth every day. 30 Tab 0     No current facility-administered medications for this visit.        No Known Allergies    ROS: As per HPI       Objective:     Blood pressure 110/76, pulse 69, temperature (!) 35.8 °C (96.4 °F), resp. rate 14, height 1.702 m (5' 7\"), weight 68.9 kg (152 lb), SpO2 99 %, not currently breastfeeding. Body mass index is 23.81 kg/m².    Physical Exam:  Constitutional: Well-developed and well-nourished. Not diaphoretic. No distress. Lucid and fluent.  Skin: Skin is warm and dry. No rash noted.  Head: Atraumatic without lesions.  Eyes: Conjunctivae and extraocular motions are normal. Pupils are equal, round, and reactive to light. No scleral icterus.   Ears:  External ears unremarkable. Tympanic membranes clear and intact.  No visible cerumen in canal.  Nose: Nares patent. Mucosa without edema or erythema. No discharge. No facial tenderness.  Mouth/Throat: Tongue normal. Oropharynx is clear and moist. Posterior pharynx without erythema or exudates.  Neck: Supple, trachea midline. No thyromegaly present. No cervical or supraclavicular " lymphadenopathy. No JVD or carotid bruits appreciated  Cardiovascular: Regular rate and rhythm.  Normal S1, S2 without murmur appreciated.  Chest: Effort normal. Clear to auscultation throughout. No adventitious sounds.   Extremities: No cyanosis, clubbing, erythema, nor edema.  Left knee slight effusion.  No erythema or heat.  Marked crepitus.  Right shoulder reduced ROM with anterior and AC joint region tenderness.  No edema or erythema or heat.  Neurological: Alert and oriented x 3.   Psychiatric:  Behavior, mood, and affect are appropriate.       Assessment and Plan:     86 y.o. female with the following issues:    1. Primary osteoarthritis of right shoulder  REFERRAL TO ORTHOPEDICS   2. Primary osteoarthritis of left knee  REFERRAL TO ORTHOPEDICS   3. Idiopathic hypothyroidism  TSH WITH REFLEX TO FT4         Followup: Return in about 5 days (around 5/7/2018), or if symptoms worsen or fail to improve.

## 2018-05-05 DIAGNOSIS — E03.9 IDIOPATHIC HYPOTHYROIDISM: ICD-10-CM

## 2018-05-05 RX ORDER — LEVOTHYROXINE SODIUM 150 MCG
150 TABLET ORAL
Qty: 30 TAB | Refills: 6 | OUTPATIENT
Start: 2018-05-05 | End: 2018-08-29

## 2018-05-07 ENCOUNTER — OFFICE VISIT (OUTPATIENT)
Dept: MEDICAL GROUP | Facility: CLINIC | Age: 83
End: 2018-05-07
Payer: MEDICARE

## 2018-05-07 VITALS
RESPIRATION RATE: 18 BRPM | OXYGEN SATURATION: 97 % | WEIGHT: 149 LBS | TEMPERATURE: 98.8 F | SYSTOLIC BLOOD PRESSURE: 102 MMHG | HEIGHT: 67 IN | DIASTOLIC BLOOD PRESSURE: 70 MMHG | HEART RATE: 58 BPM | BODY MASS INDEX: 23.39 KG/M2

## 2018-05-07 DIAGNOSIS — M17.12 PRIMARY OSTEOARTHRITIS OF LEFT KNEE: ICD-10-CM

## 2018-05-07 DIAGNOSIS — Z86.2 HISTORY OF IRON DEFICIENCY ANEMIA: ICD-10-CM

## 2018-05-07 DIAGNOSIS — E03.9 IDIOPATHIC HYPOTHYROIDISM: ICD-10-CM

## 2018-05-07 DIAGNOSIS — M19.011 PRIMARY OSTEOARTHRITIS OF RIGHT SHOULDER: ICD-10-CM

## 2018-05-07 DIAGNOSIS — I27.20 PULMONARY HYPERTENSION (HCC): ICD-10-CM

## 2018-05-07 DIAGNOSIS — F31.9 BIPOLAR DEPRESSION (HCC): ICD-10-CM

## 2018-05-07 DIAGNOSIS — M47.817 LUMBAR AND SACRAL ARTHRITIS: ICD-10-CM

## 2018-05-07 DIAGNOSIS — E78.6 LOW HDL (UNDER 40): ICD-10-CM

## 2018-05-07 DIAGNOSIS — M47.812 CERVICAL SPINE ARTHRITIS: ICD-10-CM

## 2018-05-07 DIAGNOSIS — Z00.00 MEDICARE ANNUAL WELLNESS VISIT, SUBSEQUENT: ICD-10-CM

## 2018-05-07 DIAGNOSIS — I10 ESSENTIAL HYPERTENSION: ICD-10-CM

## 2018-05-07 DIAGNOSIS — Z79.891 CHRONIC USE OF OPIATE FOR THERAPEUTIC PURPOSE: ICD-10-CM

## 2018-05-07 DIAGNOSIS — M19.021 ARTHRITIS OF RIGHT ELBOW: ICD-10-CM

## 2018-05-07 DIAGNOSIS — I51.7 SEVERE CONCENTRIC LEFT VENTRICULAR HYPERTROPHY: ICD-10-CM

## 2018-05-07 PROCEDURE — G0439 PPPS, SUBSEQ VISIT: HCPCS | Performed by: FAMILY MEDICINE

## 2018-05-07 RX ORDER — LEVOTHYROXINE SODIUM 0.15 MG/1
150 TABLET ORAL
Qty: 30 TAB | Refills: 11 | Status: SHIPPED | OUTPATIENT
Start: 2018-05-07 | End: 2018-05-23

## 2018-05-07 RX ORDER — LEVOTHYROXINE SODIUM 175 UG/1
175 TABLET ORAL
COMMUNITY
End: 2018-05-07 | Stop reason: SDUPTHER

## 2018-05-07 ASSESSMENT — ACTIVITIES OF DAILY LIVING (ADL): BATHING_REQUIRES_ASSISTANCE: 0

## 2018-05-07 ASSESSMENT — PAIN SCALES - GENERAL: PAINLEVEL: 5=MODERATE PAIN

## 2018-05-07 ASSESSMENT — PATIENT HEALTH QUESTIONNAIRE - PHQ9: CLINICAL INTERPRETATION OF PHQ2 SCORE: 0

## 2018-05-07 NOTE — PROGRESS NOTES
Chief Complaint   Patient presents with   • Annual Wellness Visit         HPI:  Ricky is a 86 y.o. here for Medicare Annual Wellness Visit    She is here for her general medical examination.    Patient Active Problem List    Diagnosis Date Noted   • Pulmonary hypertension (HCC) 09/20/2014     Priority: Medium   • Bipolar depression (HCC) 06/08/2014     Priority: Medium   • Idiopathic hypothyroidism 02/22/2013     Priority: Medium   • Essential hypertension 03/28/2012     Priority: Medium   • History of iron deficiency anemia 04/06/2013     Priority: Low   • Primary osteoarthritis of right shoulder 05/02/2018   • Severe concentric left ventricular hypertrophy    • Chronic use of opiate for therapeutic purpose 09/14/2016   • Cervical spine arthritis (HCC) 07/24/2015   • Osteoarthritis of left knee    • MEDICAL HOME 02/10/2015   • Hyponatremia 02/05/2015   • Insomnia 12/08/2014   • Low HDL (under 40)    • Lumbar and sacral arthritis        Current Outpatient Prescriptions   Medication Sig Dispense Refill   • levothyroxine (SYNTHROID) 150 MCG Tab Take 1 Tab by mouth Every morning on an empty stomach. 30 Tab 11   • divalproex (DEPAKOTE) 500 MG Tablet Delayed Response Take 1 Tab by mouth every bedtime. 90 Tab 3   • zolpidem (AMBIEN) 5 MG Tab Take 1 Tab by mouth every bedtime for 90 days. 30 Tab 2   • carvedilol (COREG) 12.5 MG Tab Take 1 Tab by mouth 2 times a day, with meals. 60 Tab 6   • HYDROcodone-acetaminophen (NORCO) 7.5-325 MG per tablet Take 1 Tab by mouth every 6 hours as needed for Moderate Pain or Severe Pain (arthritis pain, max four per day) for up to 30 days. 100 Tab 0   • polyethylene glycol 3350 (MIRALAX) Powder MIX 17 GRAMS WITH 8 OUNCES OF WATER AND DRINK BY MOUTH DAILY 527 g 2   • losartan (COZAAR) 100 MG Tab Take 1 Tab by mouth every day. 30 Tab 11   • quetiapine (SEROQUEL) 100 MG Tab Take 1 Tab by mouth every bedtime. 30 Tab 11   • aspirin EC (ECOTRIN) 81 MG Tablet Delayed Response Take 1 Tab by  mouth every day. 30 Tab 0   • SYNTHROID 150 MCG Tab Take 1 Tab by mouth Every morning on an empty stomach. (Patient taking differently: Take 175 mcg by mouth Every morning on an empty stomach.) 30 Tab 6     No current facility-administered medications for this visit.         Patient is taking medications as noted in medication list.  Current supplements as per medication list.     Allergies: Patient has no known allergies.    Current social contact/activities: pt states she spends time with    Has home caregiver that helps with cleaning.  Patient's perception of their health: poor    Is patient current with immunizations? Yes.    She  reports that she has never smoked. She has never used smokeless tobacco. She reports that she does not drink alcohol or use drugs.  Counseling given: Yes        DPA/Advanced directive: Patient does not have an Advanced Directive.  A packet and workshop information was given on Advanced Directives.    ROS:    Gait: Uses a cane  Uses walker and cane at home.  Ostomy: No   Other tubes: No   Amputations: No   Chronic oxygen use: No   Last eye exam: 2018   Wears hearing aids: No   : Denies any urinary leakage during the last 6 months  Denies chest pressure, pain, palpitations. Denies exertional SOB but her movements are limited.  Denies abdominal pain, pressure, change in stool pattern or visible blood in stool or urine.  Left knee gives out at time so does not walk without an assistive device.  Uses wheelchair for long distances or ramps.    Screening:  DEPRESSION     Is patient seeing a counselor, psychiatrist or other healthcare provider regarding their mental health? No, and not interested.     Depression Screen (PHQ-2/PHQ-9) 11/16/2016 11/20/2017 5/7/2018   PHQ-2 Total Score - - -   PHQ-2 Total Score 2 0 0       Interpretation of PHQ-9 Total Score   Score Severity   1-4 No Depression   5-9 Mild Depression   10-14 Moderate Depression   15-19 Moderately Severe Depression   20-27  Severe Depression  Depression Screening  Little interest or pleasure in doing things?  0 - not at all  Feeling down, depressed, or hopeless? 0 - not at all  Patient Health Questionnaire Score: 0    If depressive symptoms identified deferred to follow up visit unless specifically addressed in assessment and plan.    Interpretation of PHQ-9 Total Score   Score Severity   1-4 No Depression   5-9 Mild Depression   10-14 Moderate Depression   15-19 Moderately Severe Depression   20-27 Severe Depression    Screening for Cognitive Impairment  Three Minute Recall (apple, watch, amol)  2/3 village, kitchen, baby  Draw clock face with all 12 numbers set to the hand to show 10 minutes past 11 o'clock  0 2/4 340  If cognitive concerns identified, deferred for follow up unless specifically addressed in assessment and plan.    Fall Risk Assessment  Has the patient had two or more falls in the last year or any fall with injury in the last year?  No  If fall risk identified, deferred for follow up unless specifically addressed in assessment and plan.    Safety Assessment  Throw rugs on floor.  No  Handrails on all stairs.  Yes  Good lighting in all hallways.  Yes  Difficulty hearing.  No  Patient counseled about all safety risks that were identified.    Functional Assessment ADLs  Are there any barriers preventing you from cooking for yourself or meeting nutritional needs?  No.    Are there any barriers preventing you from driving safely or obtaining transportation?  No.    Are there any barriers preventing you from using a telephone or calling for help?  No.    Are there any barriers preventing you from shopping?  No.    Are there any barriers preventing you from taking care of your own finances?  No.    Are there any barriers preventing you from managing your medications?  Yes.  helps with medication   Are there any barriers preventing you from showering, bathing or dressing yourself?  No.    Are you currently engaging  "any exercise or physical activity?  Yes.  Exercises couple times a week     Health Maintenance Summary                Annual Wellness Visit Overdue 11/30/2016      Done 11/30/2015 Visit Dx: Medicare annual wellness visit, subsequent    URINE DRUG SCREEN Overdue 3/17/2018      Done 3/22/2017 [facility] PAIN MANAGEMENT SCREEN     Patient has more history with this topic...    IMM DTaP/Tdap/Td Vaccine Postponed 11/7/2019 Originally 6/8/1950. Insurance/Financial          Patient Care Team:  Luke Escobar M.D. as PCP - General (Family Medicine)  Tate Claudio M.D. as Consulting Physician (Urology)  Kandy Harris R.N. as Medical Home Care Manager  Hernan Irizarry M.D. as Consulting Physician (Psychiatry)  Austin Hayes M.D. 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   Past Surgical History:   Procedure Laterality Date   • COLONOSCOPY  2007   • PB EXCIS UTERINE FIBROID,VAG APPBCH           Exam:   Blood pressure 102/70, pulse (!) 58, temperature 37.1 °C (98.8 °F), resp. rate 18, height 1.702 m (5' 7\"), weight 67.6 kg (149 lb), SpO2 97 %. Body mass index is 23.34 kg/m².    Hearing good.    Dentition upper and lower dentures  Alert, oriented in no acute distress.  Eye contact is good, speech goal directed, affect calm  HEENT:   EOMI, PERRLA.   Oropharynx is well " hydrated with normal soft palate motion. No exudate or ulcerations noted. Ear canals are patent, normal cerumen, TMs are pearly grey and quiet in appearance.  Neck:       Full range of motion, no posterior cervical spasm. There is some upper thoracic kyphosis with change in head position to forward. No JVD or carotid bruits appreciated. No cervical adenopathy appreciated. No thyromegaly or neck masses appreciated.  No retractions appreciated.  Lungs:     Clear to auscultation A&P with good air movement.   Heart:      Regular rate and rhythm normal S1 and S2 without murmur appreciated.  Strong and symmetric radial and DP pulses.  Abd:        Soft, bowel sounds positive, nontender. No bloating noted. No hepatosplenomegaly or mass appreciated. No pulsatile mass appreciated.  Ext:         Extremities show symmetric and full range of motion with normal strength. No cyanosis or clubbing appreciated. No peripheral edema appreciated.  Neuro:    Gait is not assessed, unsteady on trying to stand. Patient is lucid, fluent and appropriate. No significant tremor appreciated.  Skin:       Exhibit no rashes, pigmented lesions or ulcerations.      Assessment and Plan. The following treatment and monitoring plan is recommended:      1. Medicare annual wellness visit, subsequent  Her medical problems are generally stable    2. Pulmonary hypertension (HCC)  This was moderate on her last echocardiogram. She is due for a repeat echocardiogram next year. Denies any increase in her exertional shortness of breath. Denies any chest pressure. Denies snoring or trouble breathing at night.     3. Bipolar depression (HCC)  Here for: bipolar disorder.    Current symptoms include: None, states currently no symptoms, doing fine.  Since last OV, symptoms are unchanged  She is taking medicine daily as directed. Side effects: Notes no side effect. Did have some weight gain from the Depakote in the past but has managed her diet.  Mood currently does  not affect: work, relationships, social activities.  Denies agoraphobia, panic attacks, SI/HI. (Denies wishing to be dead, thinking about death, intent to commit suicide, previous suicide attempt)     Review Of Systems  Taking supplements to help mood or symptoms: no  Using alcohol or other substances to help mood or symptoms: no  No polydipsia, polyuria, temperature intolerance, bowel changes  No visual or auditory hallucinations. Denies symptoms of darin: grandiosity, euphoria, need for little or no sleep, rapid pressured speech, spending sprees, reckless or risky behavior, hypersexual behavior.   Pertinent  ROS findings as above. All other systems reviewed and are negative.    4. Cervical spine arthritis (HCC)/Primary osteoarthritis of left knee/Lumbar and sacral arthritis/Arthritis of right elbow/Primary osteoarthritis of right shoulder  Patient has osteoarthritis of multiple joints. Currently she is having quite a bit of trouble with the right shoulder joint. Denies any heat or redness, none observed. She has an appointment with orthopedics for evaluation in a couple of weeks.  Patient uses the hydrocodone APAP for control of this pain. She states use improves her movement and she can accomplish more. She uses low-dose aspirin with this as well, occasionally taking a higher dose aspirin if the pain is not well controlled with the hydrocodone.  She cannot take nonsteroidal anti-inflammatory drugs due to her heart problems. She has a history of bleeding and iron deficiency anemia. This limits her use of aspirin as well.    5. Chronic use of opiate for therapeutic purpose  Patient has been very stable. Review of  in the past shows no inconsistencies. She will have a follow-up appointment with me for the medications in a few weeks.No aberrant or addictive use of medications has been observed.  Patient counseled to not add Tylenol to current regimen.  Patient counseled to keep medications locked up or under  personal control.  She voices awareness. Patient has had no adverse events related to these medications, clinically stable.    6. Essential hypertension  HTN - Chronic condition stable. Currently taking all meds as directed.   She is taking baby aspirin daily.   She is not monitoring BP at home.   Denies symptoms low BP: light-headed, tunnel-vision, unusual fatigue.   Denies symptoms high BP:pounding headache, visual changes, palpitations, flushed face.   Denies medicine side effects: unusual fatigue, slow heartbeat, foot/leg swelling, cough.    7. Idiopathic hypothyroidism  Patient reports good energy level on the medication. Patient denies insomnia, tremor or change in appetite.  Patient is taking the medication on an empty stomach in the morning and waiting at least 30 minutes before eating.  Last TSH in May 2 was suppressed and medication was adjusted. Overall clinically stable.     8. History of iron deficiency anemia  Follow-up blood count on April 28 showed no anemia. Patient no longer takes iron supplementation. Denies visible blood in stool or urine. Denies nosebleeds. Clinically stable.     9. Low HDL (under 40)  Patient had difficult time remembering to fast for blood tests. Last lipid test was in 2014. At that time LDL was less than 100. HDL 38. Triglycerides normal.  No history of coronary artery disease or significant atherosclerosis.  Patient is a never smoker. He has agreed to try again for a fasting test next year. Clinically stable.    10. Severe concentric left ventricular hypertrophy  She is due for a follow-up echocardiogram next year. Patient denies any chest pain or pressure on ambulation, soaking sweats or unusual shortness of breath. Her movements are quite restricted by her musculoskeletal problems. Blood pressure remains in good control. Clinically stable.      Services suggested: No services needed at this time  Health Care Screening: Age-appropriate preventive services recommended by  USPTF and ACIP covered by Medicare were discussed today. Services ordered if indicated and agreed upon by the patient.  Referrals offered: PT/OT/Nutrition counseling/Behavioral Health/Smoking cessation as per orders if indicated.    Discussion today about general wellness and lifestyle habits:  Discussed   · Prevent falls and reduce trip hazards; Cautioned about securing or removing rugs.  · Have a working fire alarm and carbon monoxide detector;  Has, states on federal housing.  · Engage in regular physical activity and social activities     Follow-up: Return in about 2 weeks (around 5/23/2018), or if symptoms worsen or fail to improve.

## 2018-05-23 ENCOUNTER — TELEPHONE (OUTPATIENT)
Dept: MEDICAL GROUP | Facility: CLINIC | Age: 83
End: 2018-05-23

## 2018-05-23 ENCOUNTER — OFFICE VISIT (OUTPATIENT)
Dept: MEDICAL GROUP | Facility: CLINIC | Age: 83
End: 2018-05-23
Payer: MEDICARE

## 2018-05-23 VITALS
SYSTOLIC BLOOD PRESSURE: 112 MMHG | OXYGEN SATURATION: 99 % | BODY MASS INDEX: 23.07 KG/M2 | RESPIRATION RATE: 12 BRPM | DIASTOLIC BLOOD PRESSURE: 66 MMHG | WEIGHT: 147 LBS | HEIGHT: 67 IN | TEMPERATURE: 96.3 F | HEART RATE: 56 BPM

## 2018-05-23 DIAGNOSIS — Z79.891 CHRONIC USE OF OPIATE FOR THERAPEUTIC PURPOSE: ICD-10-CM

## 2018-05-23 DIAGNOSIS — I27.20 PULMONARY HYPERTENSION (HCC): ICD-10-CM

## 2018-05-23 DIAGNOSIS — M19.021 ARTHRITIS OF RIGHT ELBOW: ICD-10-CM

## 2018-05-23 DIAGNOSIS — M47.817 LUMBAR AND SACRAL ARTHRITIS: ICD-10-CM

## 2018-05-23 DIAGNOSIS — M17.12 PRIMARY OSTEOARTHRITIS OF LEFT KNEE: ICD-10-CM

## 2018-05-23 DIAGNOSIS — M47.812 CERVICAL SPINE ARTHRITIS: ICD-10-CM

## 2018-05-23 DIAGNOSIS — E03.9 IDIOPATHIC HYPOTHYROIDISM: ICD-10-CM

## 2018-05-23 DIAGNOSIS — K59.01 SLOW TRANSIT CONSTIPATION: ICD-10-CM

## 2018-05-23 DIAGNOSIS — F51.01 PRIMARY INSOMNIA: ICD-10-CM

## 2018-05-23 PROCEDURE — 99213 OFFICE O/P EST LOW 20 MIN: CPT | Performed by: FAMILY MEDICINE

## 2018-05-23 RX ORDER — HYDROCODONE BITARTRATE AND ACETAMINOPHEN 7.5; 325 MG/1; MG/1
1 TABLET ORAL EVERY 6 HOURS PRN
Qty: 100 TAB | Refills: 0 | Status: SHIPPED | OUTPATIENT
Start: 2018-06-23 | End: 2018-05-23 | Stop reason: SDUPTHER

## 2018-05-23 RX ORDER — ZOLPIDEM TARTRATE 5 MG/1
5 TABLET ORAL
Qty: 30 TAB | Refills: 2 | Status: SHIPPED | OUTPATIENT
Start: 2018-05-23 | End: 2018-08-29 | Stop reason: SDUPTHER

## 2018-05-23 RX ORDER — HYDROCODONE BITARTRATE AND ACETAMINOPHEN 7.5; 325 MG/1; MG/1
1 TABLET ORAL EVERY 6 HOURS PRN
Qty: 100 TAB | Refills: 0 | Status: SHIPPED | OUTPATIENT
Start: 2018-05-23 | End: 2018-05-23 | Stop reason: SDUPTHER

## 2018-05-23 RX ORDER — POLYETHYLENE GLYCOL 3350 17 G/17G
17 POWDER, FOR SOLUTION ORAL DAILY
Qty: 527 G | Refills: 2 | Status: SHIPPED | OUTPATIENT
Start: 2018-05-23 | End: 2018-08-31

## 2018-05-23 RX ORDER — LEVOTHYROXINE SODIUM 150 MCG
150 TABLET ORAL
Qty: 30 TAB | Refills: 11 | Status: SHIPPED | OUTPATIENT
Start: 2018-05-23 | End: 2019-04-29 | Stop reason: SDUPTHER

## 2018-05-23 RX ORDER — HYDROCODONE BITARTRATE AND ACETAMINOPHEN 7.5; 325 MG/1; MG/1
1 TABLET ORAL EVERY 6 HOURS PRN
Qty: 100 TAB | Refills: 0 | Status: SHIPPED | OUTPATIENT
Start: 2018-07-23 | End: 2018-08-29 | Stop reason: SDUPTHER

## 2018-05-23 NOTE — TELEPHONE ENCOUNTER
Informed patient the that the medication has been changed to synthroid and was sent to [facility] pharmacy.

## 2018-05-23 NOTE — TELEPHONE ENCOUNTER
1. Caller Name: Ricky Junior                                           Call Back Number: [phone] (home)         Patient approves a detailed voicemail message: yes    Patient called in with a problen concerning her thyroid medication she states she can not take the levothyroxine that she must take the synthyroid.

## 2018-05-23 NOTE — PROGRESS NOTES
Chief Complaint   Patient presents with   • Arthritis   • Knee Pain   • Elbow Pain       Subjective:     HPI:   Ricky Junior presents today with the followin. Lumbar and sacral arthritis  She has persistent pain from lumbar and sacral arthritis. This is degenerative in nature. Patient states the pain medication regimen of hydrocodone APAP brings her pain from an 8 or 9 down to a 6 and sometimes even better. Denies balance problems, somnolence or confusion from the medication. She is walking with her cane today, states she is feeling somewhat stronger.  She is unable to take daily anti-inflammatories due to a history of iron deficiency anemia due to blood loss.    2. Arthritis of right elbow  The right elbow arthritis continues to be a problem that she is using exercises using soup cans as shown to her by physical therapy. This is improving range of motion and she feels it is decreasing pain. She will continue to do this. She is also using an occasional Naprosyn when the pain is quite bad. She cannot use more than 2 And she knows this. She has congestive heart failure as well as a history of probable GI bleeding.    3. Primary osteoarthritis of left knee  The left knee continues to be problematic, once again she uses the Naprosyn as needed but generally she is using the hydrocodone APAP to help the pain. She states it brings that pain down from an 8-6 and sometimes 5.    4. Cervical spine arthritis (HCC)  She has multiple level cervical spine arthritis which is degenerative. There is disc space narrowing as well. Patient denies arm weakness or radiating pain down the arms. Patient states the current regimen helps bring that pain from a 7 or an 8 down to around 5. This allows her to complete her ADLs.    5. Chronic use of opiate for therapeutic purpose  No aberrant or addictive use of medications has been observed.  Patient counseled to not add Tylenol to current regimen.  Patient counseled to keep  medications locked up or under personal control.  [facility] of pharmacy interface is reviewed.  No inconsistencies are found.  Her average MME is 23.3, active is 0, max active is 25.  This is within CDC guideline for chronic pain prescribing by primary care.    6. Slow transit constipation  She is using polyethylene glycol successfully for her slow transit constipation and has been doing that for quite. She needs a renewal on that medication. Denies any blood in stool or change in bowel pattern.    7. Primary insomnia  She continues to use the low-dose Ambien for her chronic insomnia. She has been on this several years now and is stable. There have been no adverse events noted.    8. Pulmonary hypertension (HCC)  Echocardiogram in the past showed moderate pulmonary hypertension.  Patient denies daytime SOB or exertional SOB.  She is using her cane today.        Patient Active Problem List    Diagnosis Date Noted   • Pulmonary hypertension (HCC) 09/20/2014     Priority: Medium   • Bipolar depression (HCC) 06/08/2014     Priority: Medium   • Idiopathic hypothyroidism 02/22/2013     Priority: Medium   • Essential hypertension 03/28/2012     Priority: Medium   • History of iron deficiency anemia 04/06/2013     Priority: Low   • Primary osteoarthritis of right shoulder 05/02/2018   • Severe concentric left ventricular hypertrophy    • Chronic use of opiate for therapeutic purpose 09/14/2016   • Cervical spine arthritis (HCC) 07/24/2015   • Osteoarthritis of left knee    • MEDICAL HOME 02/10/2015   • Hyponatremia 02/05/2015   • Insomnia 12/08/2014   • Low HDL (under 40)    • Lumbar and sacral arthritis        Current medicines (including changes today)  Current Outpatient Prescriptions   Medication Sig Dispense Refill   • polyethylene glycol 3350 (MIRALAX) Powder Take 17 g by mouth every day. 527 g 2   • zolpidem (AMBIEN) 5 MG Tab Take 1 Tab by mouth every bedtime for 90 days. 30 Tab 2   • [START ON 7/23/2018]  "HYDROcodone-acetaminophen (NORCO) 7.5-325 MG per tablet Take 1 Tab by mouth every 6 hours as needed for Moderate Pain or Severe Pain (arthritis pain, max four per day) for up to 30 days. 100 Tab 0   • SYNTHROID 150 MCG Tab Take 1 Tab by mouth Every morning on an empty stomach. (Patient taking differently: Take 175 mcg by mouth Every morning on an empty stomach.) 30 Tab 6   • divalproex (DEPAKOTE) 500 MG Tablet Delayed Response Take 1 Tab by mouth every bedtime. 90 Tab 3   • carvedilol (COREG) 12.5 MG Tab Take 1 Tab by mouth 2 times a day, with meals. 60 Tab 6   • losartan (COZAAR) 100 MG Tab Take 1 Tab by mouth every day. 30 Tab 11   • quetiapine (SEROQUEL) 100 MG Tab Take 1 Tab by mouth every bedtime. 30 Tab 11   • aspirin EC (ECOTRIN) 81 MG Tablet Delayed Response Take 1 Tab by mouth every day. 30 Tab 0   • SYNTHROID 150 MCG Tab Take 1 Tab by mouth Every morning on an empty stomach. 30 Tab 11     No current facility-administered medications for this visit.        No Known Allergies    ROS: As per HPI       Objective:     Blood pressure 112/66, pulse (!) 56, temperature (!) 35.7 °C (96.3 °F), resp. rate 12, height 1.702 m (5' 7\"), weight 66.7 kg (147 lb), SpO2 99 %. Body mass index is 23.02 kg/m².    Physical Exam:  Constitutional: Well-developed and well-nourished. Not diaphoretic. No distress. Lucid and fluent.  Skin: Skin is warm and dry. No rash noted.  Head: Atraumatic without lesions.  Eyes: Conjunctivae and extraocular motions are normal. Pupils are equal, round, and reactive to light. No scleral icterus.   Ears:  External ears unremarkable. Tympanic membranes clear and intact.  Nose: Nares patent. Mucosa without edema or erythema. No discharge. No facial tenderness.  Mouth/Throat: Tongue normal. Oropharynx is clear and moist. Posterior pharynx without erythema or exudates.  Neck: Supple, trachea midline. No thyromegaly present. No cervical or supraclavicular lymphadenopathy. No JVD or carotid bruits " appreciated  Cardiovascular: Regular rate and rhythm.  Normal S1, S2 without murmur appreciated.  Chest: Effort normal. Clear to auscultation throughout. No adventitious sounds.   Abdomen: Soft, non tender, and without distention. Active bowel sounds in all four quadrants. No rebound, guarding, masses or hepatosplenomegaly.  Extremities: No cyanosis, clubbing, erythema, nor edema.   Neurological: Alert and oriented x 3. Gait remains mildly unsteady. She uses her cane. The unsteadiness seems to be primarily from that left knee.  Psychiatric:  Behavior, mood, and affect are appropriate.       Assessment and Plan:     86 y.o. female with the following issues:    1. Lumbar and sacral arthritis  HYDROcodone-acetaminophen (NORCO) 7.5-325 MG per tablet    DISCONTINUED: HYDROcodone-acetaminophen (NORCO) 7.5-325 MG per tablet    DISCONTINUED: HYDROcodone-acetaminophen (NORCO) 7.5-325 MG per tablet   2. Arthritis of right elbow  HYDROcodone-acetaminophen (NORCO) 7.5-325 MG per tablet    DISCONTINUED: HYDROcodone-acetaminophen (NORCO) 7.5-325 MG per tablet    DISCONTINUED: HYDROcodone-acetaminophen (NORCO) 7.5-325 MG per tablet   3. Primary osteoarthritis of left knee  HYDROcodone-acetaminophen (NORCO) 7.5-325 MG per tablet    DISCONTINUED: HYDROcodone-acetaminophen (NORCO) 7.5-325 MG per tablet    DISCONTINUED: HYDROcodone-acetaminophen (NORCO) 7.5-325 MG per tablet   4. Cervical spine arthritis (HCC)  HYDROcodone-acetaminophen (NORCO) 7.5-325 MG per tablet    DISCONTINUED: HYDROcodone-acetaminophen (NORCO) 7.5-325 MG per tablet    DISCONTINUED: HYDROcodone-acetaminophen (NORCO) 7.5-325 MG per tablet   5. Chronic use of opiate for therapeutic purpose  [facility]IN MANAGEMENT SCREEN   6. Slow transit constipation  polyethylene glycol 3350 (MIRALAX) Powder   7. Primary insomnia  zolpidem (AMBIEN) 5 MG Tab   8. Pulmonary hypertension (HCC)       Chronic pain recheck:   Last dose of controlled substance: Last p.m.  Chronic  pain treated with hydrocodone/APAP taken 3-4 times a day    She  reports that she does not drink alcohol.  She  reports that she does not use drugs.    Consequences of Chronic Opiate therapy:  (5 A's)  Analgesia: Compared to no treatment or prior treatment, pain is currently improved  Activity: improved  Adverse Events: She denies constipation, itchy skin, nausea and sedation she actually does have her chronic constipation but that is controlled.   Aberrant Behaviors: She reports she is taking medication as prescribed and is not veering from agreed treatment regimen. There have been no inappropriate refills or lost/stolen meds reported.   Affect/Mood: Pain is not impacting patient's mood.  Patient reports controlled depression/anxiety.    Nonnarcotic treatments that are being used: ice, heat and her physical therapy exercises and rare over-the-counter anti-inflammatories. Use of tricyclics is contraindicated for her due to her other psychiatric regimen..   Treatment goals discussed.    Opioid Risk Score: 3    Interpretation of Opioid Risk Score   Score 0-3 = Low risk of abuse. Do UDS at least once per year.  Score 4-7 = Moderate risk of abuse. Do UDS 1-4 times per year.  Score 8+ = High risk of abuse. Refer to specialist.    Last order of CONTROLLED SUBSTANCE TREATMENT AGREEMENT was found on 9/18/2017 from Office Visit on 9/18/2017     UDS Summary                URINE DRUG SCREEN Overdue 3/17/2018      Done 3/22/2017 [facility] PAIN MANAGEMENT SCREEN     Patient has more history with this topic...        Most recent UDS reviewed today and is consistent with prescribed medications. Obtaining urine screen today.    I have reviewed the medical records, the Prescription Monitoring Program and I have determined that controlled substance treatment is medically indicated.        Followup: Return in about 3 months (around 8/23/2018), or if symptoms worsen or fail to improve.

## 2018-05-23 NOTE — TELEPHONE ENCOUNTER
Please let the patient know that I apologize, that was my error. I have changed this to brand Synthroid 150 µg and have sent that prescription to [facility] pharmacy.

## 2018-08-29 ENCOUNTER — OFFICE VISIT (OUTPATIENT)
Dept: MEDICAL GROUP | Facility: MEDICAL CENTER | Age: 83
End: 2018-08-29
Payer: MEDICARE

## 2018-08-29 VITALS
HEART RATE: 55 BPM | TEMPERATURE: 99.2 F | SYSTOLIC BLOOD PRESSURE: 122 MMHG | OXYGEN SATURATION: 98 % | RESPIRATION RATE: 14 BRPM | DIASTOLIC BLOOD PRESSURE: 70 MMHG | HEIGHT: 67 IN | BODY MASS INDEX: 24.27 KG/M2 | WEIGHT: 154.6 LBS

## 2018-08-29 DIAGNOSIS — M19.021 ARTHRITIS OF RIGHT ELBOW: ICD-10-CM

## 2018-08-29 DIAGNOSIS — K59.01 SLOW TRANSIT CONSTIPATION: ICD-10-CM

## 2018-08-29 DIAGNOSIS — M17.12 PRIMARY OSTEOARTHRITIS OF LEFT KNEE: ICD-10-CM

## 2018-08-29 DIAGNOSIS — F51.01 PRIMARY INSOMNIA: ICD-10-CM

## 2018-08-29 DIAGNOSIS — M47.812 CERVICAL SPINE ARTHRITIS: ICD-10-CM

## 2018-08-29 DIAGNOSIS — Z79.891 CHRONIC USE OF OPIATE FOR THERAPEUTIC PURPOSE: ICD-10-CM

## 2018-08-29 DIAGNOSIS — M47.817 LUMBAR AND SACRAL ARTHRITIS: ICD-10-CM

## 2018-08-29 PROCEDURE — 99213 OFFICE O/P EST LOW 20 MIN: CPT | Performed by: FAMILY MEDICINE

## 2018-08-29 RX ORDER — HYDROCODONE BITARTRATE AND ACETAMINOPHEN 7.5; 325 MG/1; MG/1
1 TABLET ORAL EVERY 6 HOURS PRN
Qty: 100 TAB | Refills: 0 | Status: SHIPPED | OUTPATIENT
Start: 2018-09-01 | End: 2018-08-29 | Stop reason: SDUPTHER

## 2018-08-29 RX ORDER — HYDROCODONE BITARTRATE AND ACETAMINOPHEN 7.5; 325 MG/1; MG/1
1 TABLET ORAL EVERY 6 HOURS PRN
Qty: 100 TAB | Refills: 0 | Status: SHIPPED | OUTPATIENT
Start: 2018-10-01 | End: 2018-08-29 | Stop reason: SDUPTHER

## 2018-08-29 RX ORDER — POLYETHYLENE GLYCOL 3350 17 G/17G
17 POWDER, FOR SOLUTION ORAL DAILY
Qty: 527 G | Refills: 3 | Status: SHIPPED | OUTPATIENT
Start: 2018-08-29 | End: 2018-08-31

## 2018-08-29 RX ORDER — HYDROCODONE BITARTRATE AND ACETAMINOPHEN 7.5; 325 MG/1; MG/1
1 TABLET ORAL EVERY 6 HOURS PRN
Qty: 100 TAB | Refills: 0 | Status: SHIPPED | OUTPATIENT
Start: 2018-10-31 | End: 2018-12-03 | Stop reason: SDUPTHER

## 2018-08-29 RX ORDER — ZOLPIDEM TARTRATE 5 MG/1
5 TABLET ORAL
Qty: 30 TAB | Refills: 2 | Status: SHIPPED | OUTPATIENT
Start: 2018-08-29 | End: 2018-09-11 | Stop reason: SDUPTHER

## 2018-08-29 NOTE — PROGRESS NOTES
Chief Complaint   Patient presents with   • Arthritis   • Insomnia       Subjective:     HPI:   Ricky Junior presents today with the followin. Lumbar and sacral arthritis  Patient has continued pain from lumbar and sacral arthritis.  This is present at several levels.  Patient is unable to take nonsteroidal anti-inflammatory drugs due to her severe concentric left ventricular hypertrophy.  Patient states the hydrocodone/APAP brings the pain from an 8 or 9 down to a 5 or 6.  This allows her to complete her ADLs.    2. Arthritis of right elbow  This is a chronic source of pain.  She does stretching exercises every morning and uses hot packs to reduce the elbow stiffness and pain.  States that the pain regimen does bring the pain overall down to around a 6 most of the time.  Denies falls or new injuries.    3. Primary osteoarthritis of left knee  Patient has left knee osteoarthritis which is a source of chronic pain.  Patient is a poor surgical candidate due to age and cardiac status.  She is also a poor candidate due to her psychiatric disorder.  She is currently stable but when she has changes in her environment the disc does cause instability.  She is using a cane to take pressure off the knee.    4. Cervical spine arthritis (HCC)  She has multiple level cervical spine arthritis and degenerative change.  This is often a source of pain and stiffness.  States that the pain medication works quite well for rescue.  Denies somnolence or confusion from the medication regimen.  Denies falls or instability.    5. Chronic use of opiate for therapeutic purpose   interface not working well.  Her current bottle is with her and shows that she filled on August 3, 2018.  There are still two days worth in the bottle.  Prescriptions are rewritten.  No aberrant or addictive use of medications has been observed.  Her max MME is 30, this is within CDC guidelines for primary care prescribing for non cancer chronic pain.   No adverse events have been reported.  Patient counseled to not add Tylenol to current regimen.  Patient counseled to keep medications locked up or under personal control.    6. Primary insomnia  Patient has long-standing primary insomnia.  She was placed on zolpidem 5 mg by psychiatry many years ago and this is a stable part of her regimen along with the Seroquel and Depakote.  In the past attempts have been made to change the regimen which have resulted in many ER visits and sometimes hospitalizations.  Patient denies rebound insomnia from the medication.  Patient denies depression from the medication.        Patient Active Problem List    Diagnosis Date Noted   • Pulmonary hypertension ([facility]) 09/20/2014     Priority: Medium   • Bipolar depression ([facility]) 06/08/2014     Priority: Medium   • Idiopathic hypothyroidism 02/22/2013     Priority: Medium   • Essential hypertension 03/28/2012     Priority: Medium   • History of iron deficiency anemia 04/06/2013     Priority: Low   • Primary osteoarthritis of right shoulder 05/02/2018   • Severe concentric left ventricular hypertrophy    • Chronic use of opiate for therapeutic purpose 09/14/2016   • Cervical spine arthritis ([facility]) 07/24/2015   • Osteoarthritis of left knee    • MEDICAL HOME 02/10/2015   • Hyponatremia 02/05/2015   • Insomnia 12/08/2014   • Low HDL (under 40)    • Lumbar and sacral arthritis        Current medicines (including changes today)  Current Outpatient Prescriptions   Medication Sig Dispense Refill   • zolpidem (AMBIEN) 5 MG Tab Take 1 Tab by mouth every bedtime for 90 days. 30 Tab 2   • [START ON 10/31/2018] HYDROcodone-acetaminophen (NORCO) 7.5-325 MG per tablet Take 1 Tab by mouth every 6 hours as needed for Moderate Pain or Severe Pain (arthritis pain, max four per day) for up to 30 days. 100 Tab 0   • polyethylene glycol 3350 (MIRALAX) Powder Take 17 g by mouth every day. 527 g 3   • polyethylene glycol 3350 (MIRALAX) Powder Take 17 g by mouth  "every day. 527 g 2   • SYNTHROID 150 MCG Tab Take 1 Tab by mouth Every morning on an empty stomach. 30 Tab 11   • divalproex (DEPAKOTE) 500 MG Tablet Delayed Response Take 1 Tab by mouth every bedtime. 90 Tab 3   • carvedilol (COREG) 12.5 MG Tab Take 1 Tab by mouth 2 times a day, with meals. 60 Tab 6   • losartan (COZAAR) 100 MG Tab Take 1 Tab by mouth every day. 30 Tab 11   • quetiapine (SEROQUEL) 100 MG Tab Take 1 Tab by mouth every bedtime. 30 Tab 11   • aspirin EC (ECOTRIN) 81 MG Tablet Delayed Response Take 1 Tab by mouth every day. 30 Tab 0     No current facility-administered medications for this visit.        No Known Allergies    ROS: As per HPI       Objective:     Blood pressure 122/70, pulse (!) 55, temperature 37.3 °C (99.2 °F), resp. rate 14, height 1.702 m (5' 7\"), weight 70.1 kg (154 lb 9.6 oz), SpO2 98 %, not currently breastfeeding. Body mass index is 24.21 kg/m².    Physical Exam:  Constitutional: Well-developed and well-nourished. Not diaphoretic. No distress. Lucid and fluent.  Skin: Skin is warm and dry. No rash noted.  Head: Atraumatic without lesions.  Eyes: Conjunctivae and extraocular motions are normal. Pupils are equal, round, and reactive to light. No scleral icterus.   Ears:  External ears unremarkable. Tympanic membranes clear and intact.  Nose: Nares patent. Mucosa without edema or erythema. No discharge. No facial tenderness.  Mouth/Throat: Tongue normal. Oropharynx is clear and moist. Posterior pharynx without erythema or exudates.  Neck: Supple, trachea midline. No thyromegaly present. No cervical or supraclavicular lymphadenopathy. No JVD or carotid bruits appreciated  Cardiovascular: Regular rate and rhythm.  Normal S1, S2 without murmur appreciated.  Chest: Effort normal. Clear to auscultation throughout. No adventitious sounds.   Abdomen: Soft, non tender, and without distention. Active bowel sounds in all four quadrants. No rebound, guarding, masses or " hepatosplenomegaly.  Extremities: No cyanosis, clubbing, erythema, nor edema.   Neurological: Alert and oriented x 3. No tremor.  Gait is mildly unsteady, using a cane today.    Psychiatric:  Behavior, mood, and affect are appropriate.       Assessment and Plan:     87 y.o. female with the following issues:    1. Lumbar and sacral arthritis  HYDROcodone-acetaminophen (NORCO) 7.5-325 MG per tablet    DISCONTINUED: HYDROcodone-acetaminophen (NORCO) 7.5-325 MG per tablet    DISCONTINUED: HYDROcodone-acetaminophen (NORCO) 7.5-325 MG per tablet   2. Arthritis of right elbow  HYDROcodone-acetaminophen (NORCO) 7.5-325 MG per tablet    DISCONTINUED: HYDROcodone-acetaminophen (NORCO) 7.5-325 MG per tablet    DISCONTINUED: HYDROcodone-acetaminophen (NORCO) 7.5-325 MG per tablet   3. Primary osteoarthritis of left knee     4. Cervical spine arthritis (HCC)  HYDROcodone-acetaminophen (NORCO) 7.5-325 MG per tablet    DISCONTINUED: HYDROcodone-acetaminophen (NORCO) 7.5-325 MG per tablet    DISCONTINUED: HYDROcodone-acetaminophen (NORCO) 7.5-325 MG per tablet   5. Chronic use of opiate for therapeutic purpose  CONSENT FOR OPIATE PRESCRIPTION    Controlled Substance Treatment Agreement   6. Primary insomnia  zolpidem (AMBIEN) 5 MG Tab   7. Slow transit constipation  polyethylene glycol 3350 (MIRALAX) Powder     Chronic pain recheck:   Last dose of controlled substance: This morning  Chronic pain treated with hydrocodone APAP taken 3-4 times a day    She  reports that she does not drink alcohol.  She  reports that she does not use drugs.    Consequences of Chronic Opiate therapy:  (5 A's)  Analgesia: Compared to no treatment or prior treatment, pain is currently improved  Activity: improved  Adverse Events: She denies constipation, itchy skin, nausea and sedation  Aberrant Behaviors: She reports she is taking medication as prescribed and is not veering from agreed treatment regimen. There have been no inappropriate refills or  lost/stolen meds reported.   Affect/Mood: Pain is impacting patient's mood at times she says.  Patient denies depression/anxiety.    Nonnarcotic treatments that are being used: ice, heat and cannot take NSAIDS due to history of pulmonary hypertension.   Treatment goals discussed.    Opioid Risk Score: 3    Interpretation of Opioid Risk Score   Score 0-3 = Low risk of abuse. Do UDS at least once per year.  Score 4-7 = Moderate risk of abuse. Do UDS 1-4 times per year.  Score 8+ = High risk of abuse. Refer to specialist.    Last order of CONTROLLED SUBSTANCE TREATMENT AGREEMENT was found on 9/18/2017 from Office Visit on 9/18/2017   Renewed today    UDS Summary                URINE DRUG SCREEN Next Due 5/18/2019      Done 5/23/2018 [facility] PAIN MANAGEMENT SCREEN     Patient has more history with this topic...        Most recent UDS reviewed today and is consistent with prescribed medications.    I have reviewed the medical records, the Prescription Monitoring Program and I have determined that controlled substance treatment is medically indicated.        Followup: Return in about 3 months (around 11/29/2018), or if symptoms worsen or fail to improve.

## 2018-08-31 RX ORDER — POLYETHYLENE GLYCOL 3350 17 G/17G
POWDER, FOR SOLUTION ORAL
Qty: 527 G | Refills: 2 | Status: SHIPPED | OUTPATIENT
Start: 2018-08-31 | End: 2019-02-05 | Stop reason: SDUPTHER

## 2018-09-11 DIAGNOSIS — F51.01 PRIMARY INSOMNIA: ICD-10-CM

## 2018-09-11 RX ORDER — ZOLPIDEM TARTRATE 5 MG/1
5 TABLET ORAL
Qty: 30 TAB | Refills: 2 | Status: SHIPPED
Start: 2018-09-11 | End: 2018-12-24 | Stop reason: SDUPTHER

## 2018-09-11 RX ORDER — ZOLPIDEM TARTRATE 5 MG/1
5 TABLET ORAL
Qty: 30 TAB | Refills: 2 | Status: CANCELLED | OUTPATIENT
Start: 2018-09-11 | End: 2018-12-10

## 2018-10-05 DIAGNOSIS — I10 ESSENTIAL HYPERTENSION: ICD-10-CM

## 2018-10-05 RX ORDER — CARVEDILOL 12.5 MG/1
12.5 TABLET ORAL 2 TIMES DAILY WITH MEALS
Qty: 60 TAB | Refills: 6 | Status: SHIPPED | OUTPATIENT
Start: 2018-10-05 | End: 2019-04-26 | Stop reason: SDUPTHER

## 2018-12-03 ENCOUNTER — OFFICE VISIT (OUTPATIENT)
Dept: MEDICAL GROUP | Facility: MEDICAL CENTER | Age: 83
End: 2018-12-03
Payer: MEDICARE

## 2018-12-03 VITALS
OXYGEN SATURATION: 98 % | WEIGHT: 154 LBS | HEART RATE: 60 BPM | DIASTOLIC BLOOD PRESSURE: 60 MMHG | RESPIRATION RATE: 12 BRPM | BODY MASS INDEX: 24.17 KG/M2 | SYSTOLIC BLOOD PRESSURE: 102 MMHG | HEIGHT: 67 IN | TEMPERATURE: 98.5 F

## 2018-12-03 DIAGNOSIS — F31.9 BIPOLAR DEPRESSION (HCC): ICD-10-CM

## 2018-12-03 DIAGNOSIS — I27.20 PULMONARY HYPERTENSION, MODERATE TO SEVERE (HCC): ICD-10-CM

## 2018-12-03 DIAGNOSIS — Z79.891 CHRONIC USE OF OPIATE FOR THERAPEUTIC PURPOSE: ICD-10-CM

## 2018-12-03 DIAGNOSIS — Z23 NEED FOR IMMUNIZATION AGAINST INFLUENZA: ICD-10-CM

## 2018-12-03 DIAGNOSIS — M47.812 CERVICAL SPINE ARTHRITIS: ICD-10-CM

## 2018-12-03 DIAGNOSIS — M19.021 ARTHRITIS OF RIGHT ELBOW: ICD-10-CM

## 2018-12-03 DIAGNOSIS — I10 ESSENTIAL HYPERTENSION: ICD-10-CM

## 2018-12-03 DIAGNOSIS — M47.817 LUMBAR AND SACRAL ARTHRITIS: ICD-10-CM

## 2018-12-03 PROCEDURE — 90662 IIV NO PRSV INCREASED AG IM: CPT | Performed by: FAMILY MEDICINE

## 2018-12-03 PROCEDURE — G0008 ADMIN INFLUENZA VIRUS VAC: HCPCS | Performed by: FAMILY MEDICINE

## 2018-12-03 PROCEDURE — 99213 OFFICE O/P EST LOW 20 MIN: CPT | Mod: 25 | Performed by: FAMILY MEDICINE

## 2018-12-03 RX ORDER — HYDROCODONE BITARTRATE AND ACETAMINOPHEN 7.5; 325 MG/1; MG/1
1 TABLET ORAL EVERY 6 HOURS PRN
Qty: 100 TAB | Refills: 0 | Status: SHIPPED | OUTPATIENT
Start: 2018-12-03 | End: 2018-12-03 | Stop reason: SDUPTHER

## 2018-12-03 RX ORDER — HYDROCODONE BITARTRATE AND ACETAMINOPHEN 7.5; 325 MG/1; MG/1
1 TABLET ORAL EVERY 6 HOURS PRN
Qty: 100 TAB | Refills: 0 | Status: SHIPPED | OUTPATIENT
Start: 2019-02-01 | End: 2019-03-04 | Stop reason: SDUPTHER

## 2018-12-03 RX ORDER — HYDROCODONE BITARTRATE AND ACETAMINOPHEN 7.5; 325 MG/1; MG/1
1 TABLET ORAL EVERY 6 HOURS PRN
Qty: 100 TAB | Refills: 0 | Status: SHIPPED | OUTPATIENT
Start: 2019-01-02 | End: 2018-12-03 | Stop reason: SDUPTHER

## 2018-12-03 RX ORDER — QUETIAPINE FUMARATE 100 MG/1
100 TABLET, FILM COATED ORAL
Qty: 30 TAB | Refills: 11 | Status: SHIPPED | OUTPATIENT
Start: 2018-12-03 | End: 2019-08-13

## 2018-12-03 RX ORDER — LOSARTAN POTASSIUM 100 MG/1
100 TABLET ORAL DAILY
Qty: 30 TAB | Refills: 11 | Status: SHIPPED | OUTPATIENT
Start: 2018-12-03 | End: 2019-08-13 | Stop reason: SDUPTHER

## 2018-12-03 NOTE — PROGRESS NOTES
Chief Complaint   Patient presents with   • Arthritis   • Neck Pain   • Back Pain   • Hypertension   • Depression       Subjective:     HPI:   Ricky Junior presents today with the followin. Lumbar and sacral arthritis  Patient has long-standing lumbar and sacral S.  She continues on a regimen of as needed.  Patient is filling appropriately per  review.  Her max active MME is 30 and her average is around 21.  These are well within current CDC guidelines.    2. Arthritis of right elbow  Patient has long-standing arthritis of the right elbow and also has some left elbow or wrist symptomatic.  Patient has increased pain with repetitive use.  Patient states the hydrocodone APAP brings the pain from an 8 or 9 down to around a 6 where it is bearable and she can complete her ADLs.    3. Cervical spine arthritis  Patient has multiple level degenerative change including arthritis of the cervical spine.    4. Chronic use of opiate for therapeutic purpose  No aberrant or addictive use of medications has been observed.  No adverse events have been reported.  Patient counseled to not add Tylenol to current regimen.  Patient counseled to keep medications locked up or under personal control.  [facility] board of pharmacy interface is reviewed.  No inconsistencies are found.  Her max active is 30.  This is within CDC guideline for chronic pain prescribing by primary care.    5. Bipolar depression (HCC)  She feels she is doing quite well on her medication regimen.  Denies any problems from the medication.  Denies any episodes of darin.  Denies any serious confusion.  Denies suicidal ideation or plan.  Denies any hallucinations.  Renewal of her medications as necessary and that is accomplished.    6. Essential hypertension  HTN - Chronic condition stable. Currently taking all meds as directed.   She is taking baby aspirin daily.   She is monitoring BP at home.  States her pressures at home are generally between 100-115 over  70s.  Denies symptoms low BP: light-headed, tunnel-vision, unusual fatigue.   Denies symptoms high BP:pounding headache, visual changes, palpitations, flushed face.   Denies medicine side effects: unusual fatigue, slow heartbeat, foot/leg swelling, cough.    7. Pulmonary hypertension, moderate to severe (HCC)  Patient does have pulmonary hypertension on x-ray.  Denies chest pain with exertion or shortness of breath.  Current oxygen levels daytime are very good.  Clinically stable at this time.    8. Need for immunization against influenza  Need flu vaccine, HD administered here today  - Influenza Vaccine, High Dose (65+ Only)            Patient Active Problem List    Diagnosis Date Noted   • Pulmonary hypertension (HCC) 09/20/2014     Priority: Medium   • Bipolar depression (HCC) 06/08/2014     Priority: Medium   • Idiopathic hypothyroidism 02/22/2013     Priority: Medium   • Essential hypertension 03/28/2012     Priority: Medium   • History of iron deficiency anemia 04/06/2013     Priority: Low   • Primary osteoarthritis of right shoulder 05/02/2018   • Severe concentric left ventricular hypertrophy    • Chronic use of opiate for therapeutic purpose 09/14/2016   • Cervical spine arthritis 07/24/2015   • Osteoarthritis of left knee    • MEDICAL HOME 02/10/2015   • Hyponatremia 02/05/2015   • Insomnia 12/08/2014   • Low HDL (under 40)    • Lumbar and sacral arthritis        Current medicines (including changes today)  Current Outpatient Prescriptions   Medication Sig Dispense Refill   • QUEtiapine (SEROQUEL) 100 MG Tab Take 1 Tab by mouth every bedtime. 30 Tab 11   • losartan (COZAAR) 100 MG Tab Take 1 Tab by mouth every day. 30 Tab 11   • [START ON 2/1/2019] HYDROcodone-acetaminophen (NORCO) 7.5-325 MG per tablet Take 1 Tab by mouth every 6 hours as needed for Moderate Pain or Severe Pain (arthritis pain, max four per day) for up to 30 days. 100 Tab 0   • carvedilol (COREG) 12.5 MG Tab Take 1 Tab by mouth 2 times a  "day, with meals. 60 Tab 6   • zolpidem (AMBIEN) 5 MG Tab Take 1 Tab by mouth every bedtime for 90 days. 30 Tab 2   • polyethylene glycol 3350 (MIRALAX) Powder MIX 17 GRAMS WITH 8 OZ OF WATER AND DRINK DAILY. 527 g 2   • SYNTHROID 150 MCG Tab Take 1 Tab by mouth Every morning on an empty stomach. 30 Tab 11   • divalproex (DEPAKOTE) 500 MG Tablet Delayed Response Take 1 Tab by mouth every bedtime. 90 Tab 3   • aspirin EC (ECOTRIN) 81 MG Tablet Delayed Response Take 1 Tab by mouth every day. 30 Tab 0     No current facility-administered medications for this visit.        No Known Allergies    ROS: As per HPI       Objective:     Blood pressure 102/60, pulse 60, temperature 36.9 °C (98.5 °F), resp. rate 12, height 1.702 m (5' 7\"), weight 69.9 kg (154 lb), SpO2 98 %, not currently breastfeeding. Body mass index is 24.12 kg/m².    Physical Exam:  Constitutional: Well-developed and well-nourished. Not diaphoretic. No distress. Lucid and fluent.  Skin: Skin is warm and dry. No rash noted.  Head: Atraumatic without lesions.  Eyes: Conjunctivae and extraocular motions are normal. Pupils are equal, round, and reactive to light. No scleral icterus.   Nose: Nares patent. Mucosa without edema or erythema. No discharge. No facial tenderness.  Mouth/Throat: Tongue normal. Oropharynx is clear and moist. Posterior pharynx without erythema or exudates.  Neck: moderate kyphosis, limitation to extension.  Mild spinous process tenderness.  Mild posterior neck spasm. No thyromegaly present. No cervical or supraclavicular lymphadenopathy. No JVD or carotid bruits appreciated  Cardiovascular: Regular rate and rhythm.  Normal S1, S2 without murmur appreciated.  Chest: Effort normal. Clear to auscultation throughout. No adventitious sounds.   Extremities: No cyanosis, clubbing, erythema, nor edema.   Neurological: Alert and oriented x 3. No tenderness.  Psychiatric:  Behavior, mood, and affect are appropriate.       Assessment and Plan: "     87 y.o. female with the following issues:    1. Lumbar and sacral arthritis  HYDROcodone-acetaminophen (NORCO) 7.5-325 MG per tablet    DISCONTINUED: HYDROcodone-acetaminophen (NORCO) 7.5-325 MG per tablet    DISCONTINUED: HYDROcodone-acetaminophen (NORCO) 7.5-325 MG per tablet   2. Arthritis of right elbow  HYDROcodone-acetaminophen (NORCO) 7.5-325 MG per tablet    DISCONTINUED: HYDROcodone-acetaminophen (NORCO) 7.5-325 MG per tablet    DISCONTINUED: HYDROcodone-acetaminophen (NORCO) 7.5-325 MG per tablet   3. Cervical spine arthritis  HYDROcodone-acetaminophen (NORCO) 7.5-325 MG per tablet    DISCONTINUED: HYDROcodone-acetaminophen (NORCO) 7.5-325 MG per tablet    DISCONTINUED: HYDROcodone-acetaminophen (NORCO) 7.5-325 MG per tablet   4. Chronic use of opiate for therapeutic purpose     5. Bipolar depression (HCC)  QUEtiapine (SEROQUEL) 100 MG Tab   6. Essential hypertension  losartan (COZAAR) 100 MG Tab   7. Pulmonary hypertension, moderate to severe (HCC)     8. Need for immunization against influenza  Influenza Vaccine, High Dose (65+ Only)     Chronic pain recheck for: arthritis pain  Last dose of controlled substance: early this am,  drove  Chronic pain treated with hydrocodone/apap taken 3-4 times a day    She  reports that she does not drink alcohol.  She  reports that she does not use drugs.    Interval history: feels she has been doing well, no falls.  Uses her cane.  Any major change in health since last appointment? No    Consequences of Chronic Opiate therapy:  (5 A's)  Analgesia: Compared to no treatment or prior treatment, pain is currently improved  Activity: improved  Adverse Events:CAPHE@ denies constipation, itchy skin, nausea and sedation  Aberrant Behaviors: She reports she is taking medication as prescribed and is not veering from agreed treatment regimen or provider recommendations. There have been no inappropriate refills or lost/stolen meds reported.  Affect/Mood: Pain is  impacting patient's mood.  Patient reports stable depression/anxiety.    Nonnarcotic treatments that are being used: topical agents and heat.     Last imagin2018    Opioid Risk Score: 3    Interpretation of Opioid Risk Score   Score 0-3 = Low risk of abuse. Do UDS at least once per year.  Score 4-7 = Moderate risk of abuse. Do UDS 1-4 times per year.  Score 8+ = High risk of abuse. Refer to specialist.    Last order of CONTROLLED SUBSTANCE TREATMENT AGREEMENT was found on 2018 from Office Visit on 2018     UDS Summary                URINE DRUG SCREEN Next Due 2019      Done 2018 [facility] PAIN MANAGEMENT SCREEN     Patient has more history with this topic...        Most recent UDS reviewed today and is consistent with prescribed medications.     I have reviewed the medical records, the Prescription Monitoring Program and I have determined that controlled substance treatment is medically indicated.       Followup: Return in about 3 months (around 3/3/2019), or if symptoms worsen or fail to improve.

## 2018-12-24 DIAGNOSIS — F51.01 PRIMARY INSOMNIA: ICD-10-CM

## 2018-12-24 RX ORDER — ZOLPIDEM TARTRATE 5 MG/1
5 TABLET ORAL
Qty: 30 TAB | Refills: 2 | Status: SHIPPED
Start: 2018-12-24 | End: 2019-03-04 | Stop reason: SDUPTHER

## 2019-02-05 RX ORDER — POLYETHYLENE GLYCOL 3350 17 G/17G
POWDER, FOR SOLUTION ORAL
Qty: 527 G | Refills: 2 | Status: SHIPPED | OUTPATIENT
Start: 2019-02-05 | End: 2019-09-12 | Stop reason: SDUPTHER

## 2019-03-04 ENCOUNTER — OFFICE VISIT (OUTPATIENT)
Dept: MEDICAL GROUP | Facility: MEDICAL CENTER | Age: 84
End: 2019-03-04
Payer: MEDICARE

## 2019-03-04 VITALS
SYSTOLIC BLOOD PRESSURE: 116 MMHG | TEMPERATURE: 98.7 F | HEART RATE: 64 BPM | OXYGEN SATURATION: 100 % | RESPIRATION RATE: 12 BRPM | DIASTOLIC BLOOD PRESSURE: 62 MMHG | HEIGHT: 67 IN | BODY MASS INDEX: 24.17 KG/M2 | WEIGHT: 154 LBS

## 2019-03-04 DIAGNOSIS — M47.812 CERVICAL SPINE ARTHRITIS: ICD-10-CM

## 2019-03-04 DIAGNOSIS — F51.01 PRIMARY INSOMNIA: ICD-10-CM

## 2019-03-04 DIAGNOSIS — M47.817 LUMBAR AND SACRAL ARTHRITIS: ICD-10-CM

## 2019-03-04 DIAGNOSIS — M19.021 ARTHRITIS OF RIGHT ELBOW: ICD-10-CM

## 2019-03-04 DIAGNOSIS — M17.12 PRIMARY OSTEOARTHRITIS OF LEFT KNEE: ICD-10-CM

## 2019-03-04 DIAGNOSIS — Z79.891 CHRONIC USE OF OPIATE FOR THERAPEUTIC PURPOSE: ICD-10-CM

## 2019-03-04 DIAGNOSIS — F31.9 BIPOLAR DEPRESSION (HCC): ICD-10-CM

## 2019-03-04 PROCEDURE — 99213 OFFICE O/P EST LOW 20 MIN: CPT | Performed by: FAMILY MEDICINE

## 2019-03-04 RX ORDER — ZOLPIDEM TARTRATE 5 MG/1
5 TABLET ORAL
Qty: 30 TAB | Refills: 2 | Status: SHIPPED | OUTPATIENT
Start: 2019-03-04 | End: 2019-04-08 | Stop reason: SDUPTHER

## 2019-03-04 RX ORDER — HYDROCODONE BITARTRATE AND ACETAMINOPHEN 7.5; 325 MG/1; MG/1
1 TABLET ORAL EVERY 6 HOURS PRN
Qty: 100 TAB | Refills: 0 | Status: SHIPPED | OUTPATIENT
Start: 2019-05-03 | End: 2019-06-03 | Stop reason: SDUPTHER

## 2019-03-04 RX ORDER — HYDROCODONE BITARTRATE AND ACETAMINOPHEN 7.5; 325 MG/1; MG/1
1 TABLET ORAL EVERY 6 HOURS PRN
Qty: 100 TAB | Refills: 0 | Status: SHIPPED | OUTPATIENT
Start: 2019-04-03 | End: 2019-03-04 | Stop reason: SDUPTHER

## 2019-03-04 RX ORDER — HYDROCODONE BITARTRATE AND ACETAMINOPHEN 7.5; 325 MG/1; MG/1
1 TABLET ORAL EVERY 6 HOURS PRN
Qty: 100 TAB | Refills: 0 | Status: SHIPPED | OUTPATIENT
Start: 2019-03-04 | End: 2019-03-04 | Stop reason: SDUPTHER

## 2019-03-04 RX ORDER — DIVALPROEX SODIUM 500 MG/1
500 TABLET, DELAYED RELEASE ORAL
Qty: 90 TAB | Refills: 3 | Status: SHIPPED | OUTPATIENT
Start: 2019-03-04 | End: 2019-11-15 | Stop reason: SDUPTHER

## 2019-03-04 NOTE — PROGRESS NOTES
Chief Complaint   Patient presents with   • Back Pain   • Arthritis   • Insomnia   • Depression       Subjective:     HPI:   Ricky Junior presents today with the followin. Lumbar and sacral arthritis  Patient has multiple level arthritis throughout the spine.  It is relatively severe in the lumbosacral junction.  Patient uses topical rubs and heat.  States that the hydrocodone/APAP brings the pain from an 8 down to a 5 or 6.  This allows her to sleep and allows her to complete her ADLs.    2. Arthritis of right elbow/arthritis left knee  These are sources of chronic pain.  Patient has been informed by orthopedics she is not a surgical candidate for total knee replacement.  Her impression is that this was because of her pulmonary hypertension.  Patient will be seeing orthopedics again soon to see if an injection in the knee will be helpful.  Patient states currently her right elbow arthritis is in good control.  Patient states the current pain regimen brings the pain from an 8 down to a 6 or a 5.  This allows her to complete her ADLs with assistance.    3. Cervical spine arthritis  Patient does have multiple level cervical spine arthritis.  She states this gives her significant stiffness at times.  She uses topical agents.  She also uses hydrocodone/APAP for rescue.    4. Chronic use of opiate for therapeutic purpose  No aberrant or addictive use of medications has been observed.  No adverse events have been reported.  Patient counseled to not add Tylenol to current regimen.  Patient counseled to keep medications locked up or under personal control.  [facility] board of pharmacy interface is reviewed.  No inconsistencies are found.  Her max MME is 30.  This is within CDC guideline for chronic pain prescribing by primary care.    5. Bipolar depression (HCC)  She states she has been very stable on her regimen.  Her  assists her to keep track of these.  The depakote is renewed.      6. Primary  insomnia  Patient has been stable on Ambien 5 mg for several years.  She states she tolerates this well and that it gives her 5-6 hours of sleep nightly and helps her wake refreshed.  Denies rebound insomnia.        Patient Active Problem List    Diagnosis Date Noted   • Pulmonary hypertension (HCC) 09/20/2014     Priority: Medium   • Bipolar depression (HCC) 06/08/2014     Priority: Medium   • Idiopathic hypothyroidism 02/22/2013     Priority: Medium   • Essential hypertension 03/28/2012     Priority: Medium   • History of iron deficiency anemia 04/06/2013     Priority: Low   • Primary osteoarthritis of right shoulder 05/02/2018   • Severe concentric left ventricular hypertrophy    • Chronic use of opiate for therapeutic purpose 09/14/2016   • Cervical spine arthritis 07/24/2015   • Osteoarthritis of left knee    • MEDICAL HOME 02/10/2015   • Hyponatremia 02/05/2015   • Insomnia 12/08/2014   • Low HDL (under 40)    • Lumbar and sacral arthritis        Current medicines (including changes today)  Current Outpatient Prescriptions   Medication Sig Dispense Refill   • divalproex (DEPAKOTE) 500 MG Tablet Delayed Response Take 1 Tab by mouth every bedtime. 90 Tab 3   • zolpidem (AMBIEN) 5 MG Tab Take 1 Tab by mouth every bedtime for 90 days. 30 Tab 2   • [START ON 5/3/2019] HYDROcodone-acetaminophen (NORCO) 7.5-325 MG per tablet Take 1 Tab by mouth every 6 hours as needed for Moderate Pain or Severe Pain (arthritis pain, max four per day) for up to 30 days. 100 Tab 0   • polyethylene glycol 3350 (MIRALAX) Powder MIX 17 GRAMS WITH 8 OZ OF WATER AND DRINK DAILY. 527 g 2   • QUEtiapine (SEROQUEL) 100 MG Tab Take 1 Tab by mouth every bedtime. 30 Tab 11   • losartan (COZAAR) 100 MG Tab Take 1 Tab by mouth every day. 30 Tab 11   • carvedilol (COREG) 12.5 MG Tab Take 1 Tab by mouth 2 times a day, with meals. 60 Tab 6   • SYNTHROID 150 MCG Tab Take 1 Tab by mouth Every morning on an empty stomach. 30 Tab 11   • aspirin EC  "(ECOTRIN) 81 MG Tablet Delayed Response Take 1 Tab by mouth every day. 30 Tab 0     No current facility-administered medications for this visit.        No Known Allergies    ROS: As per HPI       Objective:     Blood pressure 116/62, pulse 64, temperature 37.1 °C (98.7 °F), resp. rate 12, height 1.702 m (5' 7\"), weight 69.9 kg (154 lb), SpO2 100 %, not currently breastfeeding. Body mass index is 24.12 kg/m².    Physical Exam:  Constitutional: Well-developed and well-nourished. Not diaphoretic. No distress. Lucid and fluent.  Skin: Skin is warm and dry. No rash noted.  Head: Atraumatic without lesions.  Eyes: Conjunctivae and extraocular motions are normal. Pupils are equal, round, and reactive to light. No scleral icterus.   Mouth/Throat: Tongue normal. Oropharynx is clear and moist. Posterior pharynx without erythema or exudates.  Neck: Supple, trachea midline. No thyromegaly present. No cervical or supraclavicular lymphadenopathy. No JVD or carotid bruits appreciated  Cardiovascular: Regular rate and rhythm.  Normal S1, S2 without murmur appreciated.  Chest: Effort normal. Clear to auscultation throughout. No adventitious sounds.   Back: bilateral sacral tenderness, SI joints.  Moderate lumbosacral junctions tenderness.  Extremities: No cyanosis, clubbing, erythema, nor edema. Elbow ROM is good.  Left knee crepitus and small effusion, no heat.  Neurological: Alert and oriented x 3. No tremor appreciated.  Using her cane, generally gait is steady with her cane, though slow.  Psychiatric:  Behavior, mood, and affect are appropriate.       Assessment and Plan:     87 y.o. female with the following issues:    1. Lumbar and sacral arthritis  HYDROcodone-acetaminophen (NORCO) 7.5-325 MG per tablet    DISCONTINUED: HYDROcodone-acetaminophen (NORCO) 7.5-325 MG per tablet    DISCONTINUED: HYDROcodone-acetaminophen (NORCO) 7.5-325 MG per tablet   2. Arthritis of right elbow  HYDROcodone-acetaminophen (NORCO) 7.5-325 MG per " tablet    DISCONTINUED: HYDROcodone-acetaminophen (NORCO) 7.5-325 MG per tablet    DISCONTINUED: HYDROcodone-acetaminophen (NORCO) 7.5-325 MG per tablet   3. Primary osteoarthritis of left knee     4. Cervical spine arthritis  HYDROcodone-acetaminophen (NORCO) 7.5-325 MG per tablet    DISCONTINUED: HYDROcodone-acetaminophen (NORCO) 7.5-325 MG per tablet    DISCONTINUED: HYDROcodone-acetaminophen (NORCO) 7.5-325 MG per tablet   5. Chronic use of opiate for therapeutic purpose     6. Bipolar depression (HCC)  divalproex (DEPAKOTE) 500 MG Tablet Delayed Response   7. Primary insomnia  zolpidem (AMBIEN) 5 MG Tab     Chronic pain recheck for: Chronic arthritic and degenerative pain in the lumbar spine, cervical spine, shoulder, elbow, knees.  Last dose of controlled substance: This morning  Chronic pain treated with hydrocodone APAP taken 2 to 4 times a day    She  reports that she does not drink alcohol.  She  reports that she does not use drugs.    Interval history: Patient has had no recent falls.  She feels she is generally doing better as she can use her walker at home.  Any major change in health since last appointment? No    Consequences of Chronic Opiate therapy:  (5 A's)  Analgesia: Compared to no treatment or prior treatment, pain is currently improved  Activity: improved  Adverse Events:CAPHE@ denies constipation, itchy skin, nausea and sedation  Aberrant Behaviors: She reports she is taking medication as prescribed and is not veering from agreed treatment regimen or provider recommendations. There have been no inappropriate refills or lost/stolen meds reported.  Affect/Mood: Pain is not impacting patient's mood.  Patient reports stable complex depression/anxiety.    Nonnarcotic treatments that are being used: NSAIDs/PEREZ-2, ice, heat and Uses over-the-counter ibuprofen intermittently..     Last imaging: April last year and February 2015.    Opioid Risk Score: 3    Interpretation of Opioid Risk Score   Score  0-3 = Low risk of abuse. Do UDS at least once per year.  Score 4-7 = Moderate risk of abuse. Do UDS 1-4 times per year.  Score 8+ = High risk of abuse. Refer to specialist.    Last order of CONTROLLED SUBSTANCE TREATMENT AGREEMENT was found on 8/29/2018 from Office Visit on 8/29/2018     UDS Summary                URINE DRUG SCREEN Next Due 5/18/2019      Done 5/23/2018 [facility] PAIN MANAGEMENT SCREEN     Patient has more history with this topic...        Most recent UDS reviewed today and is consistent with prescribed medications.     I have reviewed the medical records, the Prescription Monitoring Program and I have determined that controlled substance treatment is medically indicated.       Followup: Return in about 3 months (around 6/4/2019), or if symptoms worsen or fail to improve.

## 2019-04-08 DIAGNOSIS — F51.01 PRIMARY INSOMNIA: ICD-10-CM

## 2019-04-08 NOTE — DISCHARGE INSTRUCTIONS
Shoulder Pain  Many things can cause shoulder pain, including:  · An injury to the area.  · Overuse of the shoulder.  · Arthritis.  The source of the pain can be:  · Inflammation.  · An injury to the shoulder joint.  · An injury to a tendon, ligament, or bone.  Follow these instructions at home:  Take these actions to help with your pain:  · Squeeze a soft ball or a foam pad as much as possible. This helps to keep the shoulder from swelling. It also helps to strengthen the arm.  · Take over-the-counter and prescription medicines only as told by your health care provider.  · If directed, apply ice to the area:  ¨ Put ice in a plastic bag.  ¨ Place a towel between your skin and the bag.  ¨ Leave the ice on for 20 minutes, 2-3 times per day. Stop applying ice if it does not help with the pain.  · If you were given a shoulder sling or immobilizer:  ¨ Wear it as told.  ¨ Remove it to shower or bathe.  ¨ Move your arm as little as possible, but keep your hand moving to prevent swelling.  Contact a health care provider if:  · Your pain gets worse.  · Your pain is not relieved with medicines.  · New pain develops in your arm, hand, or fingers.  Get help right away if:  · Your arm, hand, or fingers:  ¨ Tingle.  ¨ Become numb.  ¨ Become swollen.  ¨ Become painful.  ¨ Turn white or blue.  This information is not intended to replace advice given to you by your health care provider. Make sure you discuss any questions you have with your health care provider.  Document Released: 09/27/2006 Document Revised: 08/13/2017 Document Reviewed: 04/11/2016  [employer] Patient Education © 2017 [employer]      Earwax Buildup  Your ears make a substance called earwax. It may also be called cerumen. Sometimes, too much earwax builds up in your ear canal. This can cause ear pain and make it harder for you to hear.  CAUSES  This condition is caused by too much earwax production or buildup.  RISK FACTORS  The following factors may  Pt walked to room 2. Pt here with complaints of a vomiting, sweats, productive cough, headache, weakness. Started Saturday. make you more likely to develop this condition:  · Cleaning your ears often with swabs.  · Having narrow ear canals.  · Having earwax that is overly thick or sticky.  · Having eczema.  · Being dehydrated.  SYMPTOMS  Symptoms of this condition include:  · Reduced hearing.  · Ear drainage.  · Ear pain.  · Ear itch.  · A feeling of fullness in the ear or feeling that the ear is plugged.  · Ringing in the ear.  · Coughing.  DIAGNOSIS  Your health care provider can diagnose this condition based on your symptoms and medical history. Your health care provider will also do an ear exam to look inside your ear with a scope (otoscope). You may also have a hearing test.  TREATMENT  Treatment for this condition includes:  · Over-the-counter or prescription ear drops to soften the earwax.  · Earwax removal by a health care provider. This may be done:  ¨ By flushing the ear with body-temperature water.  ¨ With a medical instrument that has a loop at the end (earwax curette).  ¨ With a suction device.  HOME CARE INSTRUCTIONS  · Take over-the-counter and prescription medicines only as told by your health care provider.  · Do not put any objects, including an ear swab, into your ear. You can clean the opening of your ear canal with a washcloth.  · Drink enough water to keep your urine clear or pale yellow.  · If you have frequent earwax buildup or you use hearing aids, consider seeing your health care provider every 6-12 months for routine preventive ear cleanings. Keep all follow-up visits as told by your health care provider.  SEEK MEDICAL CARE IF:  · You have ear pain.  · Your condition does not improve with treatment.  · You have hearing loss.  · You have blood, pus, or other fluid coming from your ear.  This information is not intended to replace advice given to you by your health care provider. Make sure you discuss any questions you have with your health care provider.  Document Released: 01/25/2006 Document Revised:  04/10/2017 Document Reviewed: 08/03/2016  [employer] Interactive Patient Education © 2017 [employer]

## 2019-04-09 RX ORDER — ZOLPIDEM TARTRATE 5 MG/1
5 TABLET ORAL
Qty: 30 TAB | Refills: 2 | Status: SHIPPED
Start: 2019-04-09 | End: 2019-04-12 | Stop reason: SDUPTHER

## 2019-04-12 DIAGNOSIS — F51.01 PRIMARY INSOMNIA: ICD-10-CM

## 2019-04-13 RX ORDER — ZOLPIDEM TARTRATE 5 MG/1
5 TABLET ORAL
Qty: 30 TAB | Refills: 2 | Status: SHIPPED
Start: 2019-04-13 | End: 2019-06-03 | Stop reason: SDUPTHER

## 2019-04-26 DIAGNOSIS — I10 ESSENTIAL HYPERTENSION: ICD-10-CM

## 2019-04-26 RX ORDER — CARVEDILOL 12.5 MG/1
12.5 TABLET ORAL 2 TIMES DAILY WITH MEALS
Qty: 60 TAB | Refills: 6 | Status: SHIPPED | OUTPATIENT
Start: 2019-04-26 | End: 2019-08-13 | Stop reason: SDUPTHER

## 2019-04-29 DIAGNOSIS — E03.9 IDIOPATHIC HYPOTHYROIDISM: ICD-10-CM

## 2019-04-29 RX ORDER — LEVOTHYROXINE SODIUM 150 MCG
TABLET ORAL
Qty: 30 TAB | Refills: 11 | Status: SHIPPED | OUTPATIENT
Start: 2019-04-29 | End: 2019-11-15 | Stop reason: SDUPTHER

## 2019-06-03 ENCOUNTER — OFFICE VISIT (OUTPATIENT)
Dept: MEDICAL GROUP | Facility: MEDICAL CENTER | Age: 84
End: 2019-06-03
Payer: MEDICARE

## 2019-06-03 VITALS
WEIGHT: 152 LBS | HEART RATE: 64 BPM | BODY MASS INDEX: 23.86 KG/M2 | SYSTOLIC BLOOD PRESSURE: 108 MMHG | RESPIRATION RATE: 12 BRPM | DIASTOLIC BLOOD PRESSURE: 62 MMHG | TEMPERATURE: 97.1 F | HEIGHT: 67 IN | OXYGEN SATURATION: 97 %

## 2019-06-03 DIAGNOSIS — M46.92 INFLAMMATORY SPONDYLOPATHY OF CERVICAL REGION (HCC): ICD-10-CM

## 2019-06-03 DIAGNOSIS — M47.812 CERVICAL SPINE ARTHRITIS: ICD-10-CM

## 2019-06-03 DIAGNOSIS — M47.817 LUMBAR AND SACRAL ARTHRITIS: ICD-10-CM

## 2019-06-03 DIAGNOSIS — Z79.891 CHRONIC USE OF OPIATE FOR THERAPEUTIC PURPOSE: ICD-10-CM

## 2019-06-03 DIAGNOSIS — F51.01 PRIMARY INSOMNIA: ICD-10-CM

## 2019-06-03 DIAGNOSIS — M19.021 ARTHRITIS OF RIGHT ELBOW: ICD-10-CM

## 2019-06-03 PROCEDURE — 99213 OFFICE O/P EST LOW 20 MIN: CPT | Performed by: FAMILY MEDICINE

## 2019-06-03 RX ORDER — ZOLPIDEM TARTRATE 5 MG/1
5 TABLET ORAL
Qty: 30 TAB | Refills: 2 | Status: SHIPPED | OUTPATIENT
Start: 2019-06-03 | End: 2019-07-08 | Stop reason: SDUPTHER

## 2019-06-03 RX ORDER — HYDROCODONE BITARTRATE AND ACETAMINOPHEN 7.5; 325 MG/1; MG/1
1 TABLET ORAL EVERY 6 HOURS PRN
Qty: 100 TAB | Refills: 0 | Status: SHIPPED | OUTPATIENT
Start: 2019-06-03 | End: 2019-06-03 | Stop reason: SDUPTHER

## 2019-06-03 RX ORDER — HYDROCODONE BITARTRATE AND ACETAMINOPHEN 7.5; 325 MG/1; MG/1
1 TABLET ORAL EVERY 6 HOURS PRN
Qty: 100 TAB | Refills: 0 | Status: SHIPPED | OUTPATIENT
Start: 2019-08-02 | End: 2019-08-13 | Stop reason: SDUPTHER

## 2019-06-03 RX ORDER — HYDROCODONE BITARTRATE AND ACETAMINOPHEN 7.5; 325 MG/1; MG/1
1 TABLET ORAL EVERY 6 HOURS PRN
Qty: 100 TAB | Refills: 0 | Status: SHIPPED | OUTPATIENT
Start: 2019-07-03 | End: 2019-06-03 | Stop reason: SDUPTHER

## 2019-06-03 NOTE — PROGRESS NOTES
Chief Complaint   Patient presents with   • Back Pain   • Neck Pain   • Arthritis       Subjective:     HPI:   Ricky Junior presents today with the followin. Lumbar and sacral arthritis  Patient has long-standing lumbar and sacral arthritis.  She may still have a prescription on file at the pharmacy.  She last filled per  printout in early April.  She has her bottle with her today and it does have a few pills left.  States she is usually using 3/day so the prescription is lasting comfortably a month and sometimes a few days beyond.  She is able per the prescription to take for a day but finds she only needs this occasionally.  Denies somnolence or confusion from the regimen.     2. Arthritis of right elbow  States the right elbow arthritis is about the same as it was.  Uses topical creams and heat to the area with some success.  She cannot take nonsteroidal anti-inflammatory drugs due to her medication regimen and her kidney lesion.    3. 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  She continues her physical therapy exercises.  Denies weakness in the arms, trouble breathing or trouble swallowing.  Patient states that with heat and stretching and 3 of the hydrocodone a day she keeps the pain had a 5 or 6 which allows her to complete her ADLs, dress and shower herself and do some food prep.    4. Chronic use of opiate for therapeutic purpose  No aberrant or addictive use of medications has been observed.  No adverse events have been reported.  Patient counseled to not add Tylenol to current regimen.  Patient counseled to keep medications locked up or under personal control.  [facility] board of pharmacy interface is reviewed.  No inconsistencies are found.  Her maximum MME is 30.  This is within CDC guideline for chronic pain prescribing by primary care.    Patient was recently seen at [facility].  We have lab results  and a patient visit summary though not a true discharge summary.  Discussed findings with the patient.  She does have history of slowly enlarging renal mass.  This was first described in 2004.  Her last imaging was in 2014.  Patient's health has generally been stable, denies pain in her back or flank.  Denies visible blood in the urine.  Patient had been advised by [facility] to follow-up with urology.  She did speak with them but decided she did not want to do this.  Discussed that it could be a renal cell cancer.  Patient states she feels it is not and even if it was she does not want to do anything about it unless she feels badly.  Discussed with her that we can revisit this when she comes back in 3 months.  Apparently per patient her blood pressure was somewhat high at [facility] so they added 5 mg amlodipine to her regimen.  She took 2 doses, felt lightheaded and stopped.  This was over a month ago.  Her blood pressure here is extremely well controlled and I have asked her to stop the amlodipine completely.      Patient Active Problem List    Diagnosis Date Noted   • Pulmonary hypertension (HCC) 09/20/2014     Priority: Medium   • Bipolar depression (HCC) 06/08/2014     Priority: Medium   • Idiopathic hypothyroidism 02/22/2013     Priority: Medium   • Essential hypertension 03/28/2012     Priority: Medium   • History of iron deficiency anemia 04/06/2013     Priority: Low   • Primary osteoarthritis of right shoulder 05/02/2018   • Severe concentric left ventricular hypertrophy    • Chronic use of opiate for therapeutic purpose 09/14/2016   • Cervical spine arthritis 07/24/2015   • Osteoarthritis of left knee    • MEDICAL HOME 02/10/2015   • Hyponatremia 02/05/2015   • Insomnia 12/08/2014   • Low HDL (under 40)    • Lumbar and sacral arthritis        Current medicines (including changes today)  Current Outpatient Prescriptions   Medication Sig Dispense Refill   • [START ON 8/2/2019] HYDROcodone-acetaminophen  "(NORCO) 7.5-325 MG per tablet Take 1 Tab by mouth every 6 hours as needed for Moderate Pain or Severe Pain (arthritis pain, max four per day) for up to 30 days. 100 Tab 0   • zolpidem (AMBIEN) 5 MG Tab Take 1 Tab by mouth every bedtime for 90 days. 30 Tab 2   • SYNTHROID 150 MCG Tab TAKE ONE TABLET BY MOUTH IN THE MORNING ON AN EMPTY STOMACH 30 Tab 11   • carvedilol (COREG) 12.5 MG Tab Take 1 Tab by mouth 2 times a day, with meals. 60 Tab 6   • divalproex (DEPAKOTE) 500 MG Tablet Delayed Response Take 1 Tab by mouth every bedtime. 90 Tab 3   • polyethylene glycol 3350 (MIRALAX) Powder MIX 17 GRAMS WITH 8 OZ OF WATER AND DRINK DAILY. 527 g 2   • QUEtiapine (SEROQUEL) 100 MG Tab Take 1 Tab by mouth every bedtime. 30 Tab 11   • losartan (COZAAR) 100 MG Tab Take 1 Tab by mouth every day. 30 Tab 11   • aspirin EC (ECOTRIN) 81 MG Tablet Delayed Response Take 1 Tab by mouth every day. 30 Tab 0     No current facility-administered medications for this visit.        No Known Allergies    ROS: As per HPI       Objective:     /62   Pulse 64   Temp 36.2 °C (97.1 °F)   Resp 12   Ht 1.702 m (5' 7\")   Wt 68.9 kg (152 lb)   SpO2 97%  Body mass index is 23.81 kg/m².    Physical Exam:  Constitutional: Well-developed and well-nourished. Not diaphoretic. No distress. Lucid and fluent.  Skin: Skin is warm and dry. No rash noted.  Head: Atraumatic without lesions.  Eyes: Conjunctivae and extraocular motions are normal. Pupils are equal, round, and reactive to light. No scleral icterus.   Mouth/Throat: Tongue normal. Oropharynx is clear and moist. Posterior pharynx without erythema or exudates.  Neck: Supple, trachea midline. No thyromegaly present. No cervical or supraclavicular lymphadenopathy. No JVD or carotid bruits appreciated  Cardiovascular: Regular rate and rhythm.  Normal S1, S2 without murmur appreciated.  Chest: Effort normal. Clear to auscultation throughout. No adventitious sounds.   Back: Examination is " unchanged, some increased lumbar lordosis and lumbosacral region tenderness to palpation.  Extremities: No cyanosis, clubbing, erythema, nor edema.  Both elbows fairly good range of motion with moderate stiffness.  Neurological: Alert and oriented x 3.  Uses a cane.  Gait is a little unsteady however doing quite well with the cane..  Psychiatric:  Behavior, mood, and affect are appropriate.    Lab results from [facility] reviewed.  Patient states they are going to forward a copy of the CT scan to her urologist.     Assessment and Plan:     87 y.o. female with the following issues:    1. Lumbar and sacral arthritis  HYDROcodone-acetaminophen (NORCO) 7.5-325 MG per tablet    DISCONTINUED: HYDROcodone-acetaminophen (NORCO) 7.5-325 MG per tablet    DISCONTINUED: HYDROcodone-acetaminophen (NORCO) 7.5-325 MG per tablet   2. Arthritis of right elbow  HYDROcodone-acetaminophen (NORCO) 7.5-325 MG per tablet    DISCONTINUED: HYDROcodone-acetaminophen (NORCO) 7.5-325 MG per tablet    DISCONTINUED: HYDROcodone-acetaminophen (NORCO) 7.5-325 MG per tablet   3. Inflammatory spondylopathy of cervical region (HCC)     4. Cervical spine arthritis  HYDROcodone-acetaminophen (NORCO) 7.5-325 MG per tablet    DISCONTINUED: HYDROcodone-acetaminophen (NORCO) 7.5-325 MG per tablet    DISCONTINUED: HYDROcodone-acetaminophen (NORCO) 7.5-325 MG per tablet   5. Chronic use of opiate for therapeutic purpose  Pain Management Screen   6. Primary insomnia  zolpidem (AMBIEN) 5 MG Tab     Chronic pain recheck for: Chronic lumbar and cervical degenerative changes, primarily arthritic.  Elbow arthritis actually bilateral, worse on the right.  Last dose of controlled substance: Yesterday p.m. per patient  Chronic pain treated with hydrocodone APAP taken 3-4 times a day    She  reports that she does not drink alcohol.  She  reports that she does not use drugs.    Interval history: Patient denies falls.  Had episode of high blood pressure and severe flank  pain and was seen at [facility].  Patient was told there was no definitive diagnosis and she needed to start amlodipine.  Patient took 2 doses and then stopped as it made her feel strange.  She was also instructed to follow-up with urology and has been thinking about this and discussing it with urology.  Any major change in health since last appointment?  Yes, see discussion of the problems as above.  However, the flank pain has completely resolved.  The blood pressure problem seems to have resolved as well.    Consequences of Chronic Opiate therapy:  (5 A's)  Analgesia: Compared to no treatment or prior treatment, pain is currently improved  Activity: improved  Adverse Events: She denies constipation, itchy skin, nausea and sedation  Aberrant Behaviors: She reports she is taking medication as prescribed and is not veering from agreed treatment regimen or provider recommendations. There have been no inappropriate refills or lost/stolen meds reported.  Affect/Mood: Pain is impacting patient's mood at times.  Patient reports stable depression/anxiety.    Nonnarcotic treatments that are being used: topical agents and using cane.  cannot take NSAIDS due to kidney lesions.     Last imaging: [facility] in [facility].  c-spine 2015    Opioid Risk Score: 3    Interpretation of Opioid Risk Score   Score 0-3 = Low risk of abuse. Do UDS at least once per year.  Score 4-7 = Moderate risk of abuse. Do UDS 1-4 times per year.  Score 8+ = High risk of abuse. Refer to specialist.    Last order of CONTROLLED SUBSTANCE TREATMENT AGREEMENT was found on 8/29/2018 from Office Visit on 8/29/2018     UDS Summary                URINE DRUG SCREEN Overdue 5/18/2019      Done 5/23/2018 [facility] PAIN MANAGEMENT SCREEN     Patient has more history with this topic...        Most recent UDS reviewed today and is consistent with prescribed medications.  UDS obtained today.    I have reviewed the medical records, the Prescription  Monitoring Program and I have determined that controlled substance treatment is medically indicated.       Followup: Return in about 3 months (around 9/3/2019), or if symptoms worsen or fail to improve.

## 2019-07-08 DIAGNOSIS — F51.01 PRIMARY INSOMNIA: ICD-10-CM

## 2019-07-08 DIAGNOSIS — E03.9 IDIOPATHIC HYPOTHYROIDISM: ICD-10-CM

## 2019-07-08 RX ORDER — ZOLPIDEM TARTRATE 5 MG/1
5 TABLET ORAL
Qty: 30 TAB | Refills: 0 | Status: SHIPPED
Start: 2019-07-10 | End: 2019-08-13 | Stop reason: SDUPTHER

## 2019-07-09 DIAGNOSIS — M47.812 CERVICAL SPINE ARTHRITIS: ICD-10-CM

## 2019-07-09 DIAGNOSIS — M19.021 ARTHRITIS OF RIGHT ELBOW: ICD-10-CM

## 2019-07-09 DIAGNOSIS — M47.817 LUMBAR AND SACRAL ARTHRITIS: ICD-10-CM

## 2019-08-13 ENCOUNTER — OFFICE VISIT (OUTPATIENT)
Dept: MEDICAL GROUP | Facility: MEDICAL CENTER | Age: 84
End: 2019-08-13
Payer: MEDICARE

## 2019-08-13 VITALS
TEMPERATURE: 98.3 F | WEIGHT: 150 LBS | HEIGHT: 67 IN | HEART RATE: 66 BPM | BODY MASS INDEX: 23.54 KG/M2 | SYSTOLIC BLOOD PRESSURE: 118 MMHG | DIASTOLIC BLOOD PRESSURE: 60 MMHG | OXYGEN SATURATION: 97 % | RESPIRATION RATE: 12 BRPM

## 2019-08-13 DIAGNOSIS — Z79.891 CHRONIC USE OF OPIATE FOR THERAPEUTIC PURPOSE: ICD-10-CM

## 2019-08-13 DIAGNOSIS — F31.9 BIPOLAR DEPRESSION (HCC): ICD-10-CM

## 2019-08-13 DIAGNOSIS — F51.01 PRIMARY INSOMNIA: ICD-10-CM

## 2019-08-13 DIAGNOSIS — M47.812 CERVICAL SPINE ARTHRITIS: ICD-10-CM

## 2019-08-13 DIAGNOSIS — M47.817 LUMBAR AND SACRAL ARTHRITIS: ICD-10-CM

## 2019-08-13 DIAGNOSIS — M19.021 ARTHRITIS OF RIGHT ELBOW: ICD-10-CM

## 2019-08-13 DIAGNOSIS — I10 ESSENTIAL HYPERTENSION: ICD-10-CM

## 2019-08-13 PROCEDURE — 99213 OFFICE O/P EST LOW 20 MIN: CPT | Performed by: FAMILY MEDICINE

## 2019-08-13 RX ORDER — LOSARTAN POTASSIUM 100 MG/1
100 TABLET ORAL DAILY
Qty: 90 TAB | Refills: 3 | Status: SHIPPED | OUTPATIENT
Start: 2019-08-13 | End: 2019-11-15 | Stop reason: SDUPTHER

## 2019-08-13 RX ORDER — QUETIAPINE FUMARATE 100 MG/1
100 TABLET, FILM COATED ORAL
Qty: 90 TAB | Refills: 3 | Status: SHIPPED | OUTPATIENT
Start: 2019-08-13 | End: 2019-09-12

## 2019-08-13 RX ORDER — ZOLPIDEM TARTRATE 5 MG/1
5 TABLET ORAL
Qty: 30 TAB | Refills: 2 | Status: SHIPPED | OUTPATIENT
Start: 2019-08-13 | End: 2019-11-15 | Stop reason: SDUPTHER

## 2019-08-13 RX ORDER — HYDROCODONE BITARTRATE AND ACETAMINOPHEN 7.5; 325 MG/1; MG/1
1 TABLET ORAL EVERY 6 HOURS PRN
Qty: 100 TAB | Refills: 0 | Status: SHIPPED | OUTPATIENT
Start: 2019-09-12 | End: 2019-08-13 | Stop reason: SDUPTHER

## 2019-08-13 RX ORDER — HYDROCODONE BITARTRATE AND ACETAMINOPHEN 7.5; 325 MG/1; MG/1
1 TABLET ORAL EVERY 6 HOURS PRN
Qty: 100 TAB | Refills: 0 | Status: SHIPPED | OUTPATIENT
Start: 2019-10-12 | End: 2019-11-15 | Stop reason: SDUPTHER

## 2019-08-13 RX ORDER — CARVEDILOL 12.5 MG/1
12.5 TABLET ORAL 2 TIMES DAILY WITH MEALS
Qty: 180 TAB | Refills: 3 | Status: SHIPPED | OUTPATIENT
Start: 2019-08-13 | End: 2019-11-15 | Stop reason: SDUPTHER

## 2019-08-13 RX ORDER — HYDROCODONE BITARTRATE AND ACETAMINOPHEN 7.5; 325 MG/1; MG/1
1 TABLET ORAL EVERY 6 HOURS PRN
Qty: 100 TAB | Refills: 0 | Status: SHIPPED | OUTPATIENT
Start: 2019-08-13 | End: 2019-08-13 | Stop reason: SDUPTHER

## 2019-08-13 ASSESSMENT — PATIENT HEALTH QUESTIONNAIRE - PHQ9: CLINICAL INTERPRETATION OF PHQ2 SCORE: 0

## 2019-08-13 NOTE — PROGRESS NOTES
Chief Complaint   Patient presents with   • Arthritis   • Back Pain   • Elbow Pain   • Shoulder Pain   • Hypertension       Subjective:     HPI:   Ricky Junior presents today with the followin. Lumbar and sacral arthritis  Patient has persistent progressive degenerative lumbar sacral arthritis.  This is a source of pain.  Patient is unable to take nonsteroidal anti-inflammatory drugs due to hypertension and left concentric ventricular hypertrophy, chronic cardiac problem.    2. Arthritis of right elbow  Patient does have bilateral elbow arthritis, more bothersome on the right.  She states the hydrocodone is helpful and allows her to complete some of her ADLs.  Denies somnolence or confusion from the regimen.  Denies falls or balance problems.    3. Cervical spine arthritis  Patient has multiple level arthritis of the cervical spine.  She states that she has frequent neck stiffness.  She uses a special pillow at night which does help.  Cannot take nonsteroidal anti-inflammatories as explained above.  Patient states the regimen continues to be very helpful controlling her pain and allowing her to participate in her ADLs.    4. Essential hypertension  HTN - Chronic condition stable. Currently taking all meds as directed.   She is taking baby aspirin daily.   She is not monitoring BP at home.  Her pressures have been so stable that she and her  have stopped testing.  Pressure control has been excellent here.  Denies symptoms low BP: light-headed, tunnel-vision, unusual fatigue.   Denies symptoms high BP:pounding headache, visual changes, palpitations, flushed face.   Denies medicine side effects: unusual fatigue, slow heartbeat, foot/leg swelling, cough.    5. Primary insomnia  Patient continues Ambien 5 mg nightly.  She states sometimes this does not last fully through the night but understands that I do not want to increase the dose.  Denies rebound insomnia or abnormal behavior with the medication.   Denies sleepwalking or sleep eating.  Weight has been quite stable.  Patient states with the heat she does need quite as much and she is used to losing a few pounds over the summer.    6. Bipolar depression (HCC)  Patient has been very stable on the Seroquel and Depakote with no further manic episodes.  Her  assists her in filling her pill minder weekly.    7. Chronic use of opiate for therapeutic purpose  No aberrant or addictive use of medications has been observed.  No adverse events have been reported.  Patient counseled to not add Tylenol to current regimen.  Patient counseled to keep medications locked up or under personal control.  [facility] of pharmacy interface is reviewed.  No inconsistencies are found.  The patient's maximum MME is 30.  This is within CDC guideline for chronic pain prescribing by primary care.  Actually patient is typically taking only 3/day.      Patient Active Problem List    Diagnosis Date Noted   • Pulmonary hypertension (HCC) 09/20/2014     Priority: Medium   • Bipolar depression ([facility]) 06/08/2014     Priority: Medium   • Idiopathic hypothyroidism 02/22/2013     Priority: Medium   • Essential hypertension 03/28/2012     Priority: Medium   • History of iron deficiency anemia 04/06/2013     Priority: Low   • Primary osteoarthritis of right shoulder 05/02/2018   • Severe concentric left ventricular hypertrophy    • Chronic use of opiate for therapeutic purpose 09/14/2016   • Cervical spine arthritis 07/24/2015   • Osteoarthritis of left knee    • MEDICAL HOME 02/10/2015   • Hyponatremia 02/05/2015   • Insomnia 12/08/2014   • Low HDL (under 40)    • Lumbar and sacral arthritis        Current medicines (including changes today)  Current Outpatient Medications   Medication Sig Dispense Refill   • losartan (COZAAR) 100 MG Tab Take 1 Tab by mouth every day. 90 Tab 3   • zolpidem (AMBIEN) 5 MG Tab Take 1 Tab by mouth every bedtime for 90 days. 30 Tab 2   • carvedilol (COREG) 12.5 MG  "Tab Take 1 Tab by mouth 2 times a day, with meals. 180 Tab 3   • QUEtiapine (SEROQUEL) 100 MG Tab Take 1 Tab by mouth every bedtime. 90 Tab 3   • [START ON 10/12/2019] HYDROcodone-acetaminophen (NORCO) 7.5-325 MG per tablet Take 1 Tab by mouth every 6 hours as needed for Moderate Pain or Severe Pain (arthritis pain, max four per day) for up to 30 days. 100 Tab 0   • SYNTHROID 150 MCG Tab TAKE ONE TABLET BY MOUTH IN THE MORNING ON AN EMPTY STOMACH 30 Tab 11   • divalproex (DEPAKOTE) 500 MG Tablet Delayed Response Take 1 Tab by mouth every bedtime. 90 Tab 3   • polyethylene glycol 3350 (MIRALAX) Powder MIX 17 GRAMS WITH 8 OZ OF WATER AND DRINK DAILY. 527 g 2   • aspirin EC (ECOTRIN) 81 MG Tablet Delayed Response Take 1 Tab by mouth every day. 30 Tab 0     No current facility-administered medications for this visit.        No Known Allergies    ROS: As per HPI       Objective:     /60   Pulse 66   Temp 36.8 °C (98.3 °F)   Resp 12   Ht 1.702 m (5' 7\")   Wt 68 kg (150 lb)   SpO2 97%  Body mass index is 23.49 kg/m².    Physical Exam:  Constitutional: Well-developed and well-nourished. Not diaphoretic. No distress. Lucid and fluent.  Skin: Skin is warm and dry. No rash noted.  Head: Atraumatic without lesions.  Eyes: Conjunctivae and extraocular motions are normal. Pupils are equal, round, and reactive to light. No scleral icterus.   Ears:  External ears unremarkable. Tympanic membranes clear and intact.  Nose: Nares patent. Mucosa without edema or erythema. No discharge. No facial tenderness.  Mouth/Throat: Tongue normal. Oropharynx is clear and moist. Posterior pharynx without erythema or exudates.  Neck: Supple, trachea midline. No thyromegaly present. No cervical or supraclavicular lymphadenopathy. No JVD or carotid bruits appreciated  Cardiovascular: Regular rate and rhythm.  Normal S1, S2 without murmur appreciated.  Chest: Effort normal. Clear to auscultation throughout. No adventitious sounds. "   Abdomen: Soft, non tender, and without distention. Active bowel sounds in all four quadrants. No rebound, guarding, masses or hepatosplenomegaly.  Extremities: No cyanosis, clubbing, erythema, nor edema.   Neurological: Alert and oriented x 3. No tremor appreciated.  Psychiatric:  Behavior, mood, and affect are appropriate.       Assessment and Plan:     88 y.o. female with the following issues:    1. Lumbar and sacral arthritis  HYDROcodone-acetaminophen (NORCO) 7.5-325 MG per tablet    DISCONTINUED: HYDROcodone-acetaminophen (NORCO) 7.5-325 MG per tablet    DISCONTINUED: HYDROcodone-acetaminophen (NORCO) 7.5-325 MG per tablet   2. Arthritis of right elbow  HYDROcodone-acetaminophen (NORCO) 7.5-325 MG per tablet    DISCONTINUED: HYDROcodone-acetaminophen (NORCO) 7.5-325 MG per tablet    DISCONTINUED: HYDROcodone-acetaminophen (NORCO) 7.5-325 MG per tablet   3. Cervical spine arthritis  HYDROcodone-acetaminophen (NORCO) 7.5-325 MG per tablet    DISCONTINUED: HYDROcodone-acetaminophen (NORCO) 7.5-325 MG per tablet    DISCONTINUED: HYDROcodone-acetaminophen (NORCO) 7.5-325 MG per tablet   4. Chronic use of opiate for therapeutic purpose  Controlled Substance Treatment Agreement   5. Essential hypertension  losartan (COZAAR) 100 MG Tab    carvedilol (COREG) 12.5 MG Tab   6. Primary insomnia  zolpidem (AMBIEN) 5 MG Tab   7. Bipolar depression (HCC)  QUEtiapine (SEROQUEL) 100 MG Tab     Chronic pain recheck for: Chronic arthritic pain  Last dose of controlled substance: This morning, her  drove  Chronic pain treated with hydrocodone APAP taken 3-4 times a day    She  reports that she does not drink alcohol.  She  reports that she does not use drugs.    Interval history: Patient states she has been doing well overall but there was a confusion with medication with the pharmacy.  It appears that this has been sorted out.  Any major change in health since last appointment? No    Consequences of Chronic Opiate  therapy:  (5 A's)  Analgesia: Compared to no treatment or prior treatment, pain is currently improved  Activity: improved  Adverse Events: She denies constipation, itchy skin, nausea and sedation  Aberrant Behaviors: She reports she is taking medication as prescribed and is not veering from agreed treatment regimen or provider recommendations. There have been no inappropriate refills or lost/stolen meds reported.  Affect/Mood: Pain is not impacting patient's mood.  Patient reports stable depression/anxiety.    Nonnarcotic treatments that are being used: topical agents, ice, heat and Using her cane.  Cannot take nonsteroidals due to complex medical issues including hypertension and pulmonary hypertension.     Last imaging: April last year    Opioid Risk Score: 1    Interpretation of Opioid Risk Score   Score 0-3 = Low risk of abuse. Do UDS at least once per year.  Score 4-7 = Moderate risk of abuse. Do UDS 1-4 times per year.  Score 8+ = High risk of abuse. Refer to specialist.    Last order of CONTROLLED SUBSTANCE TREATMENT AGREEMENT was found on 8/29/2018 from Office Visit on 8/29/2018     Renewed today    UDS Summary                URINE DRUG SCREEN Next Due 5/28/2020      Done 6/3/2019 PAIN MANAGEMENT SCREEN     Patient has more history with this topic...        Most recent UDS reviewed today and is consistent with prescribed medications.     I have reviewed the medical records, the Prescription Monitoring Program and I have determined that controlled substance treatment is medically indicated.       Followup: Return in about 3 months (around 11/13/2019), or if symptoms worsen or fail to improve.

## 2019-09-12 ENCOUNTER — HOSPITAL ENCOUNTER (OUTPATIENT)
Dept: LAB | Facility: MEDICAL CENTER | Age: 84
End: 2019-09-12
Attending: FAMILY MEDICINE
Payer: MEDICARE

## 2019-09-12 ENCOUNTER — OFFICE VISIT (OUTPATIENT)
Dept: MEDICAL GROUP | Facility: MEDICAL CENTER | Age: 84
End: 2019-09-12
Payer: MEDICARE

## 2019-09-12 VITALS
RESPIRATION RATE: 12 BRPM | HEART RATE: 64 BPM | BODY MASS INDEX: 24.48 KG/M2 | TEMPERATURE: 98.3 F | SYSTOLIC BLOOD PRESSURE: 122 MMHG | HEIGHT: 67 IN | OXYGEN SATURATION: 92 % | WEIGHT: 156 LBS | DIASTOLIC BLOOD PRESSURE: 62 MMHG

## 2019-09-12 DIAGNOSIS — E03.9 IDIOPATHIC HYPOTHYROIDISM: ICD-10-CM

## 2019-09-12 DIAGNOSIS — K59.01 SLOW TRANSIT CONSTIPATION: ICD-10-CM

## 2019-09-12 DIAGNOSIS — F51.05 INSOMNIA DUE TO MENTAL DISORDER: ICD-10-CM

## 2019-09-12 DIAGNOSIS — F31.9 BIPOLAR DEPRESSION (HCC): ICD-10-CM

## 2019-09-12 LAB — TSH SERPL DL<=0.005 MIU/L-ACNC: 8.65 UIU/ML (ref 0.38–5.33)

## 2019-09-12 PROCEDURE — 99213 OFFICE O/P EST LOW 20 MIN: CPT | Performed by: FAMILY MEDICINE

## 2019-09-12 PROCEDURE — 84443 ASSAY THYROID STIM HORMONE: CPT

## 2019-09-12 PROCEDURE — 36415 COLL VENOUS BLD VENIPUNCTURE: CPT

## 2019-09-12 RX ORDER — POLYETHYLENE GLYCOL 3350 17 G/17G
17 POWDER, FOR SOLUTION ORAL DAILY
Qty: 527 G | Refills: 3 | Status: SHIPPED | OUTPATIENT
Start: 2019-09-12 | End: 2020-03-27 | Stop reason: SDUPTHER

## 2019-09-12 RX ORDER — QUETIAPINE FUMARATE 200 MG/1
200 TABLET, FILM COATED ORAL
Qty: 30 TAB | Refills: 11 | Status: SHIPPED | OUTPATIENT
Start: 2019-09-12 | End: 2019-11-15 | Stop reason: SDUPTHER

## 2019-09-12 NOTE — PROGRESS NOTES
Chief Complaint   Patient presents with   • Neck Pain   • Abdominal Pain   • Constipation   • Insomnia   • Manic Behavior   • Edema       Subjective:     HPI:   Ricky Junior presents today with the followin. Slow transit constipation  Patient does have history of constipation.  She has been doing very well on MiraLAX.  She ran out of the MiraLAX about a month ago and now has significant constipation and abdominal pain.  She and her  have been frustrated by the fact that the insurance is no longer covering this medication since it went over-the-counter.  They will price it and see if they can afford it.  This is what has worked for her in the past.    2. Bipolar depression (HCC)  Patient and her  have multiple renewals on medication and are getting confused.  It appears that her pharmacy is feeling duplicates.  They are feeling prescriptions from Dr. nicholson which are duplicates of mine.  We went over the medications.  Patient has been unable to sleep and is feeling some darin.  I have increased the Seroquel generic to 200 mg nightly.  I have asked her to continue taking the Depakote even though it is a new generic.      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  I have asked her to continue the Ambien at the current dose.  I do not want to increase this medication.  Patient has plenty of renewals, she had 3 bottles with her today.  She and her  are convinced that they are filling the pillbox appropriately.  I see that very often the medication is being filled the day before or day after my same prescription with now Dr. nicholson's name on it.  Since he has retired that is inappropriate.  We will discuss this with the pharmacy.    4. Idiopathic hypothyroidism  Patient reports good energy level on the medication. Patient denies insomnia, tremor or change in appetite.  Patient is  taking the medication on an empty stomach in the morning and waiting at least 30 minutes before eating.  Last TSH in May last year was somewhat suppressed.    She is due for follow-up lab work, discussed and placed.        Patient Active Problem List    Diagnosis Date Noted   • Pulmonary hypertension (HCC) 09/20/2014     Priority: Medium   • Bipolar depression (HCC) 06/08/2014     Priority: Medium   • Idiopathic hypothyroidism 02/22/2013     Priority: Medium   • Essential hypertension 03/28/2012     Priority: Medium   • History of iron deficiency anemia 04/06/2013     Priority: Low   • Primary osteoarthritis of right shoulder 05/02/2018   • Severe concentric left ventricular hypertrophy    • Chronic use of opiate for therapeutic purpose 09/14/2016   • Cervical spine arthritis 07/24/2015   • Osteoarthritis of left knee    • MEDICAL HOME 02/10/2015   • Hyponatremia 02/05/2015   • Insomnia 12/08/2014   • Low HDL (under 40)    • Lumbar and sacral arthritis        Current medicines (including changes today)  Current Outpatient Medications   Medication Sig Dispense Refill   • polyethylene glycol 3350 (MIRALAX) Powder Take 17 g by mouth every day. 527 g 3   • QUEtiapine (SEROQUEL) 200 MG Tab Take 1 Tab by mouth every bedtime. 30 Tab 11   • losartan (COZAAR) 100 MG Tab Take 1 Tab by mouth every day. 90 Tab 3   • zolpidem (AMBIEN) 5 MG Tab Take 1 Tab by mouth every bedtime for 90 days. 30 Tab 2   • carvedilol (COREG) 12.5 MG Tab Take 1 Tab by mouth 2 times a day, with meals. 180 Tab 3   • [START ON 10/12/2019] HYDROcodone-acetaminophen (NORCO) 7.5-325 MG per tablet Take 1 Tab by mouth every 6 hours as needed for Moderate Pain or Severe Pain (arthritis pain, max four per day) for up to 30 days. 100 Tab 0   • SYNTHROID 150 MCG Tab TAKE ONE TABLET BY MOUTH IN THE MORNING ON AN EMPTY STOMACH 30 Tab 11   • divalproex (DEPAKOTE) 500 MG Tablet Delayed Response Take 1 Tab by mouth every bedtime. 90 Tab 3   • aspirin EC (ECOTRIN) 81  "MG Tablet Delayed Response Take 1 Tab by mouth every day. 30 Tab 0     No current facility-administered medications for this visit.        No Known Allergies    ROS: As per HPI       Objective:     /62   Pulse 64   Temp 36.8 °C (98.3 °F)   Resp 12   Ht 1.702 m (5' 7\")   Wt 70.8 kg (156 lb)   SpO2 92%  Body mass index is 24.43 kg/m².    Physical Exam:  Constitutional: Well-developed and well-nourished. Not diaphoretic. No distress. Lucid and fluent.  Skin: Skin is warm and dry. No rash noted.  Head: Atraumatic without lesions.  Eyes: Conjunctivae and extraocular motions are normal. Pupils are equal, round, and reactive to light. No scleral icterus.   Mouth/Throat: Tongue normal. Oropharynx is clear and moist. Posterior pharynx without erythema or exudates.  Neck: Supple, trachea midline. No thyromegaly present. No cervical or supraclavicular lymphadenopathy. No JVD or carotid bruits appreciated  Cardiovascular: Regular rate and rhythm.  Normal S1, S2 without murmur appreciated.  Chest: Effort normal. Clear to auscultation throughout. No adventitious sounds.   Abdomen: Soft, non tender, and with moderate distention. Mild discomfort left colic gutter.  Active bowel sounds in all four quadrants. No rebound, guarding, masses or hepatosplenomegaly.  Extremities: No cyanosis, clubbing, erythema, nor edema.   Neurological: Alert and oriented x 3.   Psychiatric:  Behavior, mood, and affect are appropriate.       Assessment and Plan:     88 y.o. female with the following issues:    1. Slow transit constipation  polyethylene glycol 3350 (MIRALAX) Powder   2. Bipolar depression (HCC)  QUEtiapine (SEROQUEL) 200 MG Tab   3. Insomnia due to mental disorder  QUEtiapine (SEROQUEL) 200 MG Tab   4. Idiopathic hypothyroidism  TSH         Followup: Return in about 2 months (around 11/13/2019), or if symptoms worsen or fail to improve.  "

## 2019-09-17 ENCOUNTER — DOCUMENTATION (OUTPATIENT)
Dept: MEDICAL GROUP | Facility: MEDICAL CENTER | Age: 84
End: 2019-09-17

## 2019-09-18 NOTE — PROGRESS NOTES
Called Ricky to clarify her issue with the pharmacy. I called pharmacy, no issue at all. All 4 Rx's are ready for pickup. I let Rikcy know.

## 2019-11-15 ENCOUNTER — OFFICE VISIT (OUTPATIENT)
Dept: MEDICAL GROUP | Facility: MEDICAL CENTER | Age: 84
End: 2019-11-15
Payer: MEDICARE

## 2019-11-15 VITALS
RESPIRATION RATE: 14 BRPM | WEIGHT: 156 LBS | HEART RATE: 69 BPM | HEIGHT: 67 IN | DIASTOLIC BLOOD PRESSURE: 70 MMHG | BODY MASS INDEX: 24.48 KG/M2 | SYSTOLIC BLOOD PRESSURE: 122 MMHG | OXYGEN SATURATION: 98 % | TEMPERATURE: 98.5 F

## 2019-11-15 DIAGNOSIS — F31.9 BIPOLAR DEPRESSION (HCC): ICD-10-CM

## 2019-11-15 DIAGNOSIS — Z79.891 CHRONIC USE OF OPIATE FOR THERAPEUTIC PURPOSE: ICD-10-CM

## 2019-11-15 DIAGNOSIS — F51.01 PRIMARY INSOMNIA: ICD-10-CM

## 2019-11-15 DIAGNOSIS — E03.9 IDIOPATHIC HYPOTHYROIDISM: ICD-10-CM

## 2019-11-15 DIAGNOSIS — M47.812 CERVICAL SPINE ARTHRITIS: ICD-10-CM

## 2019-11-15 DIAGNOSIS — M47.817 LUMBAR AND SACRAL ARTHRITIS: ICD-10-CM

## 2019-11-15 DIAGNOSIS — F51.05 INSOMNIA DUE TO MENTAL DISORDER: ICD-10-CM

## 2019-11-15 DIAGNOSIS — M19.021 ARTHRITIS OF RIGHT ELBOW: ICD-10-CM

## 2019-11-15 DIAGNOSIS — I10 ESSENTIAL HYPERTENSION: ICD-10-CM

## 2019-11-15 DIAGNOSIS — Z23 NEED FOR IMMUNIZATION AGAINST INFLUENZA: ICD-10-CM

## 2019-11-15 PROCEDURE — 99213 OFFICE O/P EST LOW 20 MIN: CPT | Mod: 25 | Performed by: FAMILY MEDICINE

## 2019-11-15 PROCEDURE — 90662 IIV NO PRSV INCREASED AG IM: CPT | Performed by: FAMILY MEDICINE

## 2019-11-15 PROCEDURE — G0008 ADMIN INFLUENZA VIRUS VAC: HCPCS | Performed by: FAMILY MEDICINE

## 2019-11-15 RX ORDER — HYDROCODONE BITARTRATE AND ACETAMINOPHEN 7.5; 325 MG/1; MG/1
1 TABLET ORAL EVERY 6 HOURS PRN
Qty: 100 TAB | Refills: 0 | Status: SHIPPED | OUTPATIENT
Start: 2019-12-15 | End: 2019-11-15 | Stop reason: SDUPTHER

## 2019-11-15 RX ORDER — HYDROCODONE BITARTRATE AND ACETAMINOPHEN 7.5; 325 MG/1; MG/1
1 TABLET ORAL EVERY 6 HOURS PRN
Qty: 100 TAB | Refills: 0 | Status: SHIPPED | OUTPATIENT
Start: 2020-01-14 | End: 2020-02-14 | Stop reason: SDUPTHER

## 2019-11-15 RX ORDER — LEVOTHYROXINE SODIUM 150 MCG
150 TABLET ORAL
Qty: 90 TAB | Refills: 3 | Status: SHIPPED | OUTPATIENT
Start: 2019-11-15 | End: 2020-09-12

## 2019-11-15 RX ORDER — CARVEDILOL 12.5 MG/1
12.5 TABLET ORAL 2 TIMES DAILY WITH MEALS
Qty: 180 TAB | Refills: 3 | Status: SHIPPED | OUTPATIENT
Start: 2019-11-15 | End: 2020-09-16

## 2019-11-15 RX ORDER — HYDROCODONE BITARTRATE AND ACETAMINOPHEN 7.5; 325 MG/1; MG/1
1 TABLET ORAL EVERY 6 HOURS PRN
Qty: 100 TAB | Refills: 0 | Status: SHIPPED | OUTPATIENT
Start: 2019-11-15 | End: 2019-11-15 | Stop reason: SDUPTHER

## 2019-11-15 RX ORDER — ZOLPIDEM TARTRATE 5 MG/1
5 TABLET ORAL
Qty: 30 TAB | Refills: 2 | Status: SHIPPED | OUTPATIENT
Start: 2019-11-15 | End: 2020-02-14 | Stop reason: SDUPTHER

## 2019-11-15 RX ORDER — DIVALPROEX SODIUM 500 MG/1
500 TABLET, DELAYED RELEASE ORAL
Qty: 90 TAB | Refills: 3 | Status: SHIPPED | OUTPATIENT
Start: 2019-11-15 | End: 2020-11-11

## 2019-11-15 RX ORDER — QUETIAPINE FUMARATE 200 MG/1
200 TABLET, FILM COATED ORAL
Qty: 90 TAB | Refills: 3 | Status: SHIPPED | OUTPATIENT
Start: 2019-11-15 | End: 2020-11-18

## 2019-11-15 RX ORDER — LOSARTAN POTASSIUM 100 MG/1
100 TABLET ORAL DAILY
Qty: 90 TAB | Refills: 3 | Status: SHIPPED | OUTPATIENT
Start: 2019-11-15 | End: 2020-09-16

## 2019-11-15 NOTE — PROGRESS NOTES
Chief Complaint   Patient presents with   • Arthritis   • Hypothyroidism   • Hypertension   • Insomnia       Subjective:     HPI:   Ricky Junior presents today with the followin. Need for immunization against influenza  HD administered today    2. Bipolar depression (HCC)/Insomnia due to mental disorder  She states her depression and mood swings have been very stable on the current regimen.  Her medications are renewed as her previous pharmacy is closing.  The Seroquel has been helpful in controlling moods and also helping her to sleep at night.    3. Idiopathic hypothyroidism  Patient reports good energy level on the medication. Patient denies insomnia, tremor or change in appetite.  Patient is taking the medication on an empty stomach in the morning and waiting at least 30 minutes before eating.  Last TSH in September was far above target.  Patient had been mistakenly taking her medication with food.  She states she feels better on the current dose and taking the medication on an empty stomach in the morning.    4. Essential hypertension  HTN - Chronic condition stable. Currently taking all meds as directed.   She is taking baby aspirin daily.   She is not monitoring BP at home.   Denies symptoms low BP: light-headed, tunnel-vision, unusual fatigue.   Denies symptoms high BP:pounding headache, visual changes, palpitations, flushed face.   Denies medicine side effects: unusual fatigue, slow heartbeat, foot/leg swelling, cough.    5. Primary insomnia  Patient has been on zolpidem for many years.  She was previously on 10 mg but this was reduced to a year or so ago to 5 mg.  She feels this does not work quite as well.  She does understand that I do not want to increase her back to 10 mg.  I am not comfortable increasing the dosing due to age considerations and debility.    6. Lumbar and sacral arthritis/Arthritis of right elbow/Cervical spine arthritis  Patient has chronic arthritic pain from multiple  sources.  Patient has been stable on the hydrocodone.  She has been able to reduce to 3/day fairly consistently.  She still occasionally has to take 4 in a day but that is becoming more rare.  Patient still uses around 100 tablets/month.  I have rewritten her medications to reflect this.  Denies somnolence or instability from the regimen.    7. Chronic use of opiate for therapeutic purpose  No aberrant or addictive use of medications has been observed.  No adverse events have been reported.  Patient counseled to not add Tylenol to current regimen.  Patient counseled to keep medications locked up or under personal control.  [facility] board of pharmacy interface is reviewed.  No inconsistencies are found.  The patient's maximum MME is 30.  This is within CDC guideline for chronic pain prescribing by primary care.  She usually takes 3 per day.        Patient Active Problem List    Diagnosis Date Noted   • Pulmonary hypertension (HCC) 09/20/2014     Priority: Medium   • Bipolar depression (HCC) 06/08/2014     Priority: Medium   • Idiopathic hypothyroidism 02/22/2013     Priority: Medium   • Essential hypertension 03/28/2012     Priority: Medium   • History of iron deficiency anemia 04/06/2013     Priority: Low   • Primary osteoarthritis of right shoulder 05/02/2018   • Severe concentric left ventricular hypertrophy    • Chronic use of opiate for therapeutic purpose 09/14/2016   • Cervical spine arthritis 07/24/2015   • Osteoarthritis of left knee    • MEDICAL HOME 02/10/2015   • Hyponatremia 02/05/2015   • Insomnia 12/08/2014   • Low HDL (under 40)    • Lumbar and sacral arthritis        Current medicines (including changes today)  Current Outpatient Medications   Medication Sig Dispense Refill   • divalproex (DEPAKOTE) 500 MG Tablet Delayed Response Take 1 Tab by mouth every bedtime. 90 Tab 3   • QUEtiapine (SEROQUEL) 200 MG Tab Take 1 Tab by mouth every bedtime. 90 Tab 3   • SYNTHROID 150 MCG Tab Take 1 Tab by mouth Every  "morning on an empty stomach. 90 Tab 3   • carvedilol (COREG) 12.5 MG Tab Take 1 Tab by mouth 2 times a day, with meals. 180 Tab 3   • losartan (COZAAR) 100 MG Tab Take 1 Tab by mouth every day. 90 Tab 3   • zolpidem (AMBIEN) 5 MG Tab Take 1 Tab by mouth every bedtime for 90 days. 30 Tab 2   • [START ON 1/14/2020] HYDROcodone-acetaminophen (NORCO) 7.5-325 MG per tablet Take 1 Tab by mouth every 6 hours as needed for Moderate Pain or Severe Pain (arthritis pain, max four per day) for up to 30 days. 100 Tab 0   • polyethylene glycol 3350 (MIRALAX) Powder Take 17 g by mouth every day. 527 g 3   • aspirin EC (ECOTRIN) 81 MG Tablet Delayed Response Take 1 Tab by mouth every day. 30 Tab 0     No current facility-administered medications for this visit.        No Known Allergies    ROS: As per HPI       Objective:     /70   Pulse 69   Temp 36.9 °C (98.5 °F)   Resp 14   Ht 1.702 m (5' 7\")   Wt 70.8 kg (156 lb)   SpO2 98%  Body mass index is 24.43 kg/m².    Physical Exam:  Constitutional: Well-developed and well-nourished. Not diaphoretic. No distress. Lucid and fluent.  Skin: Skin is warm and dry. No rash noted.  Head: Atraumatic without lesions.  Eyes: Conjunctivae and extraocular motions are normal. Pupils are equal, round, and reactive to light. No scleral icterus.   Ears:  External ears unremarkable. Tympanic membranes clear and intact.  Nose: Nares patent. Mucosa without edema or erythema. No discharge. No facial tenderness.  Mouth/Throat: Tongue normal. Oropharynx is clear and moist. Posterior pharynx without erythema or exudates.  Neck: Supple, trachea midline. No thyromegaly present. No cervical or supraclavicular lymphadenopathy. No JVD or carotid bruits appreciated  Cardiovascular: Regular rate and rhythm.  Normal S1, S2 without murmur appreciated.  Chest: Effort normal. Clear to auscultation throughout. No adventitious sounds.   Abdomen: Soft, non tender, and without distention. Active bowel sounds in " all four quadrants. No rebound, guarding, masses or hepatosplenomegaly.  Extremities: No cyanosis, clubbing, erythema, nor edema.   Neurological: Alert and oriented x 3.  No tremor appreciated.  Psychiatric:  Behavior, mood, and affect are appropriate.       Assessment and Plan:     88 y.o. female with the following issues:    1. Need for immunization against influenza  INFLUENZA VACCINE, HIGH DOSE (65+ ONLY)   2. Bipolar depression (HCC)  divalproex (DEPAKOTE) 500 MG Tablet Delayed Response    QUEtiapine (SEROQUEL) 200 MG Tab   3. Insomnia due to mental disorder  QUEtiapine (SEROQUEL) 200 MG Tab   4. Idiopathic hypothyroidism  SYNTHROID 150 MCG Tab   5. Essential hypertension  carvedilol (COREG) 12.5 MG Tab    losartan (COZAAR) 100 MG Tab   6. Primary insomnia  zolpidem (AMBIEN) 5 MG Tab   7. Lumbar and sacral arthritis  HYDROcodone-acetaminophen (NORCO) 7.5-325 MG per tablet    DISCONTINUED: HYDROcodone-acetaminophen (NORCO) 7.5-325 MG per tablet    DISCONTINUED: HYDROcodone-acetaminophen (NORCO) 7.5-325 MG per tablet   8. Arthritis of right elbow  HYDROcodone-acetaminophen (NORCO) 7.5-325 MG per tablet    DISCONTINUED: HYDROcodone-acetaminophen (NORCO) 7.5-325 MG per tablet    DISCONTINUED: HYDROcodone-acetaminophen (NORCO) 7.5-325 MG per tablet   9. Cervical spine arthritis  HYDROcodone-acetaminophen (NORCO) 7.5-325 MG per tablet    DISCONTINUED: HYDROcodone-acetaminophen (NORCO) 7.5-325 MG per tablet    DISCONTINUED: HYDROcodone-acetaminophen (NORCO) 7.5-325 MG per tablet   10. Chronic use of opiate for therapeutic purpose       Chronic pain recheck for: chronic arthritic and degenerative pain multiple joints, especially elbow and low back   Last dose of controlled substance: today  Chronic pain treated with hydrocodone/apap taken 3-4 times a day.  This is usually 3 per day.    She  reports no history of alcohol use.  She  reports no history of drug use.    Interval history: has been doing well, no recent  falls  Any major change in health since last appointment? No    Consequences of Chronic Opiate therapy:  (5 A's)  Analgesia: Compared to no treatment or prior treatment, pain is currently improved  Activity: improved  Adverse Events: She denies constipation, itchy skin, nausea and sedation  Aberrant Behaviors: She reports she is taking medication as prescribed and is not veering from agreed treatment regimen or provider recommendations. There have been no inappropriate refills or lost/stolen meds reported.  Affect/Mood: Pain is only occasionally impacting patient's mood.  Patient reports stable depression/anxiety.    Nonnarcotic treatments that are being used: topical agents, heat and cannot take NSAIDS due to hypertension and pulmonary hypertension.     Last imagin2018    Opioid Risk Score: 1    Interpretation of Opioid Risk Score   Score 0-3 = Low risk of abuse. Do UDS at least once per year.  Score 4-7 = Moderate risk of abuse. Do UDS 1-4 times per year.  Score 8+ = High risk of abuse. Refer to specialist.    Last order of CONTROLLED SUBSTANCE TREATMENT AGREEMENT was found on 2019 from Office Visit on 2019     UDS Summary                URINE DRUG SCREEN Next Due 2020      Done 6/3/2019 PAIN MANAGEMENT SCREEN     Patient has more history with this topic...        Most recent UDS reviewed today and is consistent with prescribed medications.     I have reviewed the medical records, the Prescription Monitoring Program and I have determined that controlled substance treatment is medically indicated.       Followup: Return in about 3 months (around 2/15/2020), or if symptoms worsen or fail to improve.

## 2020-02-14 ENCOUNTER — OFFICE VISIT (OUTPATIENT)
Dept: MEDICAL GROUP | Facility: MEDICAL CENTER | Age: 85
End: 2020-02-14
Payer: MEDICARE

## 2020-02-14 VITALS
OXYGEN SATURATION: 99 % | DIASTOLIC BLOOD PRESSURE: 80 MMHG | WEIGHT: 153.2 LBS | BODY MASS INDEX: 24.04 KG/M2 | HEIGHT: 67 IN | SYSTOLIC BLOOD PRESSURE: 122 MMHG | RESPIRATION RATE: 13 BRPM | HEART RATE: 65 BPM | TEMPERATURE: 96.9 F

## 2020-02-14 DIAGNOSIS — M19.071 PRIMARY OSTEOARTHRITIS OF ANKLES, BILATERAL: ICD-10-CM

## 2020-02-14 DIAGNOSIS — M47.812 CERVICAL SPINE ARTHRITIS: ICD-10-CM

## 2020-02-14 DIAGNOSIS — M19.021 ARTHRITIS OF RIGHT ELBOW: ICD-10-CM

## 2020-02-14 DIAGNOSIS — Z79.891 CHRONIC USE OF OPIATE FOR THERAPEUTIC PURPOSE: ICD-10-CM

## 2020-02-14 DIAGNOSIS — F51.01 PRIMARY INSOMNIA: ICD-10-CM

## 2020-02-14 DIAGNOSIS — M47.817 LUMBAR AND SACRAL ARTHRITIS: ICD-10-CM

## 2020-02-14 DIAGNOSIS — M19.072 PRIMARY OSTEOARTHRITIS OF ANKLES, BILATERAL: ICD-10-CM

## 2020-02-14 PROCEDURE — 99213 OFFICE O/P EST LOW 20 MIN: CPT | Performed by: FAMILY MEDICINE

## 2020-02-14 RX ORDER — ZOLPIDEM TARTRATE 5 MG/1
5 TABLET ORAL
Qty: 30 TAB | Refills: 2 | Status: SHIPPED | OUTPATIENT
Start: 2020-02-14 | End: 2020-05-14 | Stop reason: SDUPTHER

## 2020-02-14 RX ORDER — HYDROCODONE BITARTRATE AND ACETAMINOPHEN 7.5; 325 MG/1; MG/1
1 TABLET ORAL EVERY 6 HOURS PRN
Qty: 100 TAB | Refills: 0 | Status: SHIPPED | OUTPATIENT
Start: 2020-04-14 | End: 2020-05-14 | Stop reason: SDUPTHER

## 2020-02-14 RX ORDER — HYDROCODONE BITARTRATE AND ACETAMINOPHEN 7.5; 325 MG/1; MG/1
1 TABLET ORAL EVERY 6 HOURS PRN
Qty: 100 TAB | Refills: 0 | Status: SHIPPED | OUTPATIENT
Start: 2020-03-15 | End: 2020-02-14 | Stop reason: SDUPTHER

## 2020-02-14 RX ORDER — HYDROCODONE BITARTRATE AND ACETAMINOPHEN 7.5; 325 MG/1; MG/1
1 TABLET ORAL EVERY 6 HOURS PRN
Qty: 100 TAB | Refills: 0 | Status: SHIPPED | OUTPATIENT
Start: 2020-02-14 | End: 2020-02-14 | Stop reason: SDUPTHER

## 2020-02-14 NOTE — PROGRESS NOTES
Chief Complaint   Patient presents with   • Back Pain   • Ankle Pain       Subjective:     HPI:   Ricky Junior presents today with the followin. Primary osteoarthritis of ankles, bilateral  Patient notes swelling and tenderness in both ankles.  Denies swelling in the legs.  On examination there is no pitting edema and no significant peripheral edema but there is increased swelling in both ankle joints.  There is no heat or erythema.  This appears to be an osteoarthritis flare.  Patient will simply continue her current regiment.    2. Lumbar and sacral arthritis  Patient has longstanding lumbar and sacral arthritis and degenerative disease.  Patient states the hydrocodone continues to be quite helpful in reducing the pain from 7 or 8 down to around a 5 which allows her to complete her ADLs.  Denies somnolence or confusion from the medication.  Denies any recent falls.  Denies increased problems with bowel or bladder continence.    3. Arthritis of right elbow  Patient has chronic pain from arthritis of the right elbow.  Occasionally the left elbow also is painful.  In both cases the pain is in the joint and consistent with the osteoarthritis that has been documented there in the past.  Patient states the hydrocodone continues to be quite helpful.  She is taking 3-4 a day.    4. Cervical spine arthritis  Patient has degenerative arthritis of the cervical spine.  There probably is a traumatic component from trauma several years ago.  Patient states the hydrocodone continues to be very helpful.  She cannot take nonsteroidal anti-inflammatory drugs due to her cardiovascular disease.   Patient states the medication regimen is helpful and allows her to complete her ADLs.    5. Chronic use of opiate for therapeutic purpose  No aberrant or addictive use of medications has been observed.  No adverse events have been reported.  Patient counseled to not add Tylenol to current regimen.  Patient counseled to keep  medications locked up or under personal control.  [facility] board of pharmacy interface is reviewed.  No inconsistencies are found.  The patient's maximum MME is 30.  This is within CDC guideline for chronic pain prescribing by primary care.    6. Primary insomnia  Patient has longstanding primary insomnia which is probably to large part involved with her psychiatric issues as well.  She has been stable on the low-dose zolpidem.  No adverse events have been reported or observed.  Denies sleepwalking, sleep eating.  Patient does not drive.  This medication is renewed.          Patient Active Problem List    Diagnosis Date Noted   • Pulmonary hypertension (HCC) 09/20/2014     Priority: Medium   • Bipolar depression (HCC) 06/08/2014     Priority: Medium   • Idiopathic hypothyroidism 02/22/2013     Priority: Medium   • Essential hypertension 03/28/2012     Priority: Medium   • History of iron deficiency anemia 04/06/2013     Priority: Low   • Primary osteoarthritis of right shoulder 05/02/2018   • Severe concentric left ventricular hypertrophy    • Chronic use of opiate for therapeutic purpose 09/14/2016   • Cervical spine arthritis 07/24/2015   • Osteoarthritis of left knee    • MEDICAL HOME 02/10/2015   • Hyponatremia 02/05/2015   • Insomnia 12/08/2014   • Low HDL (under 40)    • Lumbar and sacral arthritis        Current medicines (including changes today)  Current Outpatient Medications   Medication Sig Dispense Refill   • zolpidem (AMBIEN) 5 MG Tab Take 1 Tab by mouth every bedtime for 90 days. 30 Tab 2   • [START ON 4/14/2020] HYDROcodone-acetaminophen (NORCO) 7.5-325 MG per tablet Take 1 Tab by mouth every 6 hours as needed for Moderate Pain or Severe Pain (arthritis pain, max four per day) for up to 30 days. 100 Tab 0   • divalproex (DEPAKOTE) 500 MG Tablet Delayed Response Take 1 Tab by mouth every bedtime. 90 Tab 3   • QUEtiapine (SEROQUEL) 200 MG Tab Take 1 Tab by mouth every bedtime. 90 Tab 3   • SYNTHROID 150  "MCG Tab Take 1 Tab by mouth Every morning on an empty stomach. 90 Tab 3   • carvedilol (COREG) 12.5 MG Tab Take 1 Tab by mouth 2 times a day, with meals. 180 Tab 3   • losartan (COZAAR) 100 MG Tab Take 1 Tab by mouth every day. 90 Tab 3   • polyethylene glycol 3350 (MIRALAX) Powder Take 17 g by mouth every day. 527 g 3   • aspirin EC (ECOTRIN) 81 MG Tablet Delayed Response Take 1 Tab by mouth every day. 30 Tab 0     No current facility-administered medications for this visit.        No Known Allergies    ROS: As per HPI       Objective:     /80 (BP Location: Right arm, Patient Position: Sitting, BP Cuff Size: Adult)   Pulse 65   Temp 36.1 °C (96.9 °F) (Temporal)   Resp 13   Ht 1.702 m (5' 7\")   Wt 69.5 kg (153 lb 3.2 oz)   SpO2 99%  Body mass index is 23.99 kg/m².    Physical Exam:  Constitutional: Well-developed and well-nourished. Not diaphoretic. No distress. Lucid and fluent.  Skin: Skin is warm and dry. No rash noted.  Head: Atraumatic without lesions.  Eyes: Conjunctivae and extraocular motions are normal. Pupils are equal, round, and reactive to light. No scleral icterus.   Mouth/Throat: Tongue normal. Oropharynx is clear and moist. Posterior pharynx without erythema or exudates.  Neck: Range of motion is mildly reduced.  There is kyphosis.  There is limitation to extension. No thyromegaly present. No cervical or supraclavicular lymphadenopathy. No JVD appreciated  Cardiovascular: Regular rate and rhythm.  Normal S1, S2 without murmur appreciated.  Chest: Effort normal. Clear to auscultation throughout. No adventitious sounds.   Back: No change in exam, moderate kyphosis.  No erythema or heat.  Moderate spinous process tenderness.  Extremities: No cyanosis, clubbing, erythema, nor edema.  Both ankles have tender swelling without erythema or heat.  Location and exam is consistent with osteoarthritis  Neurological: Alert and oriented x 3.  Using her walker.  Walks quite well with the use of the " walker.  States she felt a little too unsteady today to use her cane.  Psychiatric:  Behavior, mood, and affect are appropriate.       Assessment and Plan:     88 y.o. female with the following issues:    1. Primary osteoarthritis of ankles, bilateral     2. Lumbar and sacral arthritis  HYDROcodone-acetaminophen (NORCO) 7.5-325 MG per tablet    DISCONTINUED: HYDROcodone-acetaminophen (NORCO) 7.5-325 MG per tablet    DISCONTINUED: HYDROcodone-acetaminophen (NORCO) 7.5-325 MG per tablet   3. Arthritis of right elbow  HYDROcodone-acetaminophen (NORCO) 7.5-325 MG per tablet    DISCONTINUED: HYDROcodone-acetaminophen (NORCO) 7.5-325 MG per tablet    DISCONTINUED: HYDROcodone-acetaminophen (NORCO) 7.5-325 MG per tablet   4. Cervical spine arthritis  HYDROcodone-acetaminophen (NORCO) 7.5-325 MG per tablet    DISCONTINUED: HYDROcodone-acetaminophen (NORCO) 7.5-325 MG per tablet    DISCONTINUED: HYDROcodone-acetaminophen (NORCO) 7.5-325 MG per tablet   5. Chronic use of opiate for therapeutic purpose     6. Primary insomnia  zolpidem (AMBIEN) 5 MG Tab     Chronic pain recheck for: Chronic degenerative pain particularly lumbar, elbows and now ankles.  The neck degenerative changes also a source of chronic pain.  Last dose of controlled substance: Today  Chronic pain treated with hydrocodone/APAP taken 3-4 times a day, usually 3    She  reports no history of alcohol use.  She  reports no history of drug use.    Interval history: Patient denies any falls or major changes  Any major change in health since last appointment? No    Consequences of Chronic Opiate therapy:  (5 A's)  Analgesia: Compared to no treatment or prior treatment, pain is currently improved  Activity: improved  Adverse Events: She denies constipation, itchy skin, nausea and sedation  Aberrant Behaviors: She reports she is taking medication as prescribed and is not veering from agreed treatment regimen or provider recommendations. There have been no  inappropriate refills or lost/stolen meds reported.  Affect/Mood: Pain is not impacting patient's mood.  Patient reports stable depression/anxiety.    Nonnarcotic treatments that are being used: topical agents, ice, heat and Cannot take nonsteroidal anti-inflammatory drugs due to age and cardiovascular disease..     Last imagin, ,     Opioid Risk Score: 1    Interpretation of Opioid Risk Score   Score 0-3 = Low risk of abuse. Do UDS at least once per year.  Score 4-7 = Moderate risk of abuse. Do UDS 1-4 times per year.  Score 8+ = High risk of abuse. Refer to specialist.    Last order of CONTROLLED SUBSTANCE TREATMENT AGREEMENT was found on 2019 from Office Visit on 2019     UDS Summary                URINE DRUG SCREEN Next Due 2020      Done 6/3/2019 PAIN MANAGEMENT SCREEN     Patient has more history with this topic...        Most recent UDS reviewed today and is consistent with prescribed medications.     I have reviewed the medical records, the Prescription Monitoring Program and I have determined that controlled substance treatment is medically indicated.       Followup: Return in about 3 months (around 2020), or if symptoms worsen or fail to improve.

## 2020-03-27 DIAGNOSIS — K59.01 SLOW TRANSIT CONSTIPATION: ICD-10-CM

## 2020-03-27 RX ORDER — POLYETHYLENE GLYCOL 3350 17 G/17G
17 POWDER, FOR SOLUTION ORAL DAILY
Qty: 527 G | Refills: 3 | Status: SHIPPED | OUTPATIENT
Start: 2020-03-27 | End: 2020-07-13

## 2020-05-14 ENCOUNTER — OFFICE VISIT (OUTPATIENT)
Dept: MEDICAL GROUP | Facility: MEDICAL CENTER | Age: 85
End: 2020-05-14
Payer: MEDICARE

## 2020-05-14 VITALS — WEIGHT: 140 LBS | BODY MASS INDEX: 21.97 KG/M2 | HEIGHT: 67 IN

## 2020-05-14 DIAGNOSIS — M19.021 ARTHRITIS OF RIGHT ELBOW: ICD-10-CM

## 2020-05-14 DIAGNOSIS — I27.20 PULMONARY HYPERTENSION, MODERATE TO SEVERE (HCC): ICD-10-CM

## 2020-05-14 DIAGNOSIS — M46.92 INFLAMMATORY SPONDYLOPATHY OF CERVICAL REGION (HCC): ICD-10-CM

## 2020-05-14 DIAGNOSIS — F51.01 PRIMARY INSOMNIA: ICD-10-CM

## 2020-05-14 DIAGNOSIS — Z79.891 CHRONIC USE OF OPIATE FOR THERAPEUTIC PURPOSE: ICD-10-CM

## 2020-05-14 DIAGNOSIS — F31.9 BIPOLAR DEPRESSION (HCC): ICD-10-CM

## 2020-05-14 DIAGNOSIS — M47.812 CERVICAL SPINE ARTHRITIS: ICD-10-CM

## 2020-05-14 DIAGNOSIS — M47.817 LUMBAR AND SACRAL ARTHRITIS: ICD-10-CM

## 2020-05-14 PROCEDURE — 99441 PR PHYSICIAN TELEPHONE EVALUATION 5-10 MIN: CPT | Mod: 95 | Performed by: FAMILY MEDICINE

## 2020-05-14 RX ORDER — ZOLPIDEM TARTRATE 5 MG/1
5 TABLET ORAL
Qty: 30 TAB | Refills: 2 | Status: SHIPPED | OUTPATIENT
Start: 2020-05-14 | End: 2020-08-12

## 2020-05-14 RX ORDER — HYDROCODONE BITARTRATE AND ACETAMINOPHEN 7.5; 325 MG/1; MG/1
1 TABLET ORAL EVERY 6 HOURS PRN
Qty: 100 TAB | Refills: 0 | Status: SHIPPED | OUTPATIENT
Start: 2020-06-22 | End: 2020-05-14 | Stop reason: SDUPTHER

## 2020-05-14 RX ORDER — HYDROCODONE BITARTRATE AND ACETAMINOPHEN 7.5; 325 MG/1; MG/1
1 TABLET ORAL EVERY 6 HOURS PRN
Qty: 100 TAB | Refills: 0 | Status: SHIPPED | OUTPATIENT
Start: 2020-07-22 | End: 2020-09-11 | Stop reason: SDUPTHER

## 2020-05-14 RX ORDER — HYDROCODONE BITARTRATE AND ACETAMINOPHEN 7.5; 325 MG/1; MG/1
1 TABLET ORAL EVERY 6 HOURS PRN
Qty: 100 TAB | Refills: 0 | Status: SHIPPED | OUTPATIENT
Start: 2020-05-23 | End: 2020-05-14 | Stop reason: SDUPTHER

## 2020-05-14 NOTE — PROGRESS NOTES
Telephone Appointment Visit   As a means of avoiding spread of COVID-19, this visit is being conducted by telephone. This telephone visit was initiated by the patient and they verbally consented.    Time at start of call: 1:58    Reason for Call:  Medication Follow-up and Symptom Follow-up    Patient Comments / History:     1. Lumbar and sacral arthritis  Patient has arthritis of several areas.  A chronic source of pain is her lumbar and sacral region osteoarthritis and degenerative changes.  Patient states the hydrocodone/apap continues to be very helpful in reducing her pain and allowing her to complete her ADLs with assistance from her .  She does use a cane.  Denies any recent falls.  Denies somnolence or confusion from the medication.  Patient cannot use chronic nonsteroidal anti-inflammatory drugs due to her history of CHF and hypertension     2. Arthritis of right elbow  Patient has elbow arthritis which bothers her intermittently, more prominent in the right elbow than the left.  She also has intermittent shoulder pain from osteoarthritis as well.  She states the regimen is very helpful, allowing her to complete her ADLs.    3. Cervical spine arthritis/Inflammatory spondylopathy of cervical region (HCC)  There may be a component of inflammatory arthritis.  However, her cervical spine changes appear to be primarily osteoarthritic and degenerative.  She has some remote trauma as well.    4. Chronic use of opiate for therapeutic purpose  No aberrant or addictive use of medications has been observed.  No adverse events have been reported.  Patient counseled to not add Tylenol to current regimen.  Patient counseled to keep medications locked up or under personal control.  [facility] board of pharmacy interface is reviewed.  No inconsistencies are found.  The patient's maximum MME is 30.  This is within CDC guideline for chronic pain prescribing by primary care. Patient's controlled substance treatment  "agreement is signed and on file.  Past urine drug screens have been without inconsistencies and have shown her prescribed medication.    5. Bipolar depression (HCC)  Patient states she is quite stable on her current regimen of Depakote and Seroquel.  Patient denies any current depression.  She does have some anxiety due to the pandemic situation but feels that is under control.  She states she has excellent support from her  and friends.  Denies any suicidal ideation, intrusive negative thoughts, hallucinations or confusion.    6. Pulmonary hypertension, moderate to severe (HCC)  Patient has been stable.  She continues to follow with cardiology.  Not using oxygen supplementation or CPAP treatment.    7. Primary insomnia  Patient continues to take Ambien for insomnia.  She states it is quite helpful.  It gives her 5 to 6 hours of restful sleep, sometimes 7 hours.  She states she wakes refreshed.  Denies any confusion, wandering or abnormal behavior.  Her  has made no comments to her of any concerns.  No adverse effects or events have been observed or reported.      Labs / Images Reviewed     Ht 1.702 m (5' 7\")   Wt 63.5 kg (140 lb)   BMI 21.93 kg/m²     Patient and her  have been staying home and are being very careful.  Their medications are delivered.  They are occasionally getting food delivery but mostly cooking at home.  Denies any current fever or chills, shortness of breath or cough.  States she is generally feeling well.    She is unable to check her blood pressure.  Her  bought a new cuff for himself and her a few months ago and they have not been able to figure out how it works.  When she comes in in person in August she will bring the cuff and we will show them how to use it.    Assessment and Plan:     1. Lumbar and sacral arthritis  - HYDROcodone-acetaminophen (NORCO) 7.5-325 MG per tablet; Take 1 Tab by mouth every 6 hours as needed for Moderate Pain or Severe Pain " (arthritis pain, max four per day) for up to 30 days.  Dispense: 100 Tab; Refill: 0    2. Arthritis of right elbow  - HYDROcodone-acetaminophen (NORCO) 7.5-325 MG per tablet; Take 1 Tab by mouth every 6 hours as needed for Moderate Pain or Severe Pain (arthritis pain, max four per day) for up to 30 days.  Dispense: 100 Tab; Refill: 0    3. Cervical spine arthritis  - HYDROcodone-acetaminophen (NORCO) 7.5-325 MG per tablet; Take 1 Tab by mouth every 6 hours as needed for Moderate Pain or Severe Pain (arthritis pain, max four per day) for up to 30 days.  Dispense: 100 Tab; Refill: 0    4. Inflammatory spondylopathy of cervical region (HCC)    5. Chronic use of opiate for therapeutic purpose    6. Bipolar depression (HCC)    7. Pulmonary hypertension, moderate to severe (HCC)    8. Primary insomnia  - zolpidem (AMBIEN) 5 MG Tab; Take 1 Tab by mouth every bedtime for 90 days.  Dispense: 30 Tab; Refill: 2    Chronic pain recheck for: Chronic pain from degenerative arthritic changes in the spine particularly the lower spine and sacral region and cervical spine.  Last dose of controlled substance: today  Chronic pain treated with hydrocodone/apap taken 2 to 4 times a day    She  reports no history of alcohol use.  She  reports no history of drug use.    Interval history: Patient states she has been doing well, no significant changes.  Any major change in health since last appointment? No    Consequences of Chronic Opiate therapy:  (5 A's)  Analgesia: Compared to no treatment or prior treatment, pain is currently improved  Activity: improved  Adverse Events: She denies constipation, itchy skin, nausea and sedation  Aberrant Behaviors: She reports she is taking medication as prescribed and is not veering from agreed treatment regimen or provider recommendations. There have been no inappropriate refills or lost/stolen meds reported.  Affect/Mood: Pain is occasionally impacting patient's mood.  Patient reports stable  depression/anxiety.    Nonnarcotic treatments that are being used: topical agents, ice, heat and Patient cannot take nonsteroidal anti-inflammatory drugs due to her heart issues and hypertension.  She cannot take SSRIs or SNRIs due to her bipolar disorder.  Patient cannot take and does take low-dose aspirin but cannot tolerate high dose due to GI issues..     Last imagin2018    Opioid Risk Score: 1    Interpretation of Opioid Risk Score   Score 0-3 = Low risk of abuse. Do UDS at least once per year.  Score 4-7 = Moderate risk of abuse. Do UDS 1-4 times per year.  Score 8+ = High risk of abuse. Refer to specialist.    Last order of CONTROLLED SUBSTANCE TREATMENT AGREEMENT was found on 2019 from Office Visit on 2019     UDS Summary                URINE DRUG SCREEN Postponed 2020 Originally 2020. Provider advises postponing for medical reasons     Done 6/3/2019 PAIN MANAGEMENT SCREEN     Patient has more history with this topic...        Most recent UDS reviewed today and is consistent with prescribed medications.     I have reviewed the medical records, the Prescription Monitoring Program and I have determined that controlled substance treatment is medically indicated.       Follow-up: Return in about 3 months (around 2020), or if symptoms worsen or fail to improve.    Time at end of call: 2:09  Total Time Spent: 5-10 minutes    Luke Escobar M.D.

## 2020-07-13 DIAGNOSIS — K59.01 SLOW TRANSIT CONSTIPATION: ICD-10-CM

## 2020-07-13 RX ORDER — POLYETHYLENE GLYCOL 3350 17 G/17G
POWDER, FOR SOLUTION ORAL
Qty: 510 G | Refills: 3 | Status: SHIPPED | OUTPATIENT
Start: 2020-07-13 | End: 2020-12-30 | Stop reason: SDUPTHER

## 2020-09-11 ENCOUNTER — HOSPITAL ENCOUNTER (OUTPATIENT)
Facility: MEDICAL CENTER | Age: 85
End: 2020-09-11
Attending: FAMILY MEDICINE
Payer: MEDICARE

## 2020-09-11 ENCOUNTER — HOSPITAL ENCOUNTER (OUTPATIENT)
Dept: LAB | Facility: MEDICAL CENTER | Age: 85
End: 2020-09-11
Attending: FAMILY MEDICINE
Payer: MEDICARE

## 2020-09-11 ENCOUNTER — OFFICE VISIT (OUTPATIENT)
Dept: MEDICAL GROUP | Facility: MEDICAL CENTER | Age: 85
End: 2020-09-11
Payer: MEDICARE

## 2020-09-11 VITALS
OXYGEN SATURATION: 96 % | BODY MASS INDEX: 23.7 KG/M2 | TEMPERATURE: 97.9 F | RESPIRATION RATE: 12 BRPM | HEART RATE: 69 BPM | WEIGHT: 151 LBS | DIASTOLIC BLOOD PRESSURE: 72 MMHG | SYSTOLIC BLOOD PRESSURE: 124 MMHG | HEIGHT: 67 IN

## 2020-09-11 DIAGNOSIS — I10 ESSENTIAL HYPERTENSION: ICD-10-CM

## 2020-09-11 DIAGNOSIS — F31.9 BIPOLAR DEPRESSION (HCC): ICD-10-CM

## 2020-09-11 DIAGNOSIS — R30.0 DYSURIA: ICD-10-CM

## 2020-09-11 DIAGNOSIS — K59.01 SLOW TRANSIT CONSTIPATION: ICD-10-CM

## 2020-09-11 DIAGNOSIS — E03.9 IDIOPATHIC HYPOTHYROIDISM: ICD-10-CM

## 2020-09-11 DIAGNOSIS — M19.021 ARTHRITIS OF RIGHT ELBOW: ICD-10-CM

## 2020-09-11 DIAGNOSIS — Z79.891 CHRONIC USE OF OPIATE FOR THERAPEUTIC PURPOSE: ICD-10-CM

## 2020-09-11 DIAGNOSIS — M47.817 LUMBAR AND SACRAL ARTHRITIS: ICD-10-CM

## 2020-09-11 DIAGNOSIS — M47.812 CERVICAL SPINE ARTHRITIS: ICD-10-CM

## 2020-09-11 LAB
ALBUMIN SERPL BCP-MCNC: 4.2 G/DL (ref 3.2–4.9)
ALBUMIN/GLOB SERPL: 1.3 G/DL
ALP SERPL-CCNC: 74 U/L (ref 30–99)
ALT SERPL-CCNC: 12 U/L (ref 2–50)
ANION GAP SERPL CALC-SCNC: 12 MMOL/L (ref 7–16)
APPEARANCE UR: NORMAL
AST SERPL-CCNC: 20 U/L (ref 12–45)
BILIRUB SERPL-MCNC: 0.2 MG/DL (ref 0.1–1.5)
BILIRUB UR STRIP-MCNC: NEGATIVE MG/DL
BUN SERPL-MCNC: 18 MG/DL (ref 8–22)
CALCIUM SERPL-MCNC: 9.1 MG/DL (ref 8.5–10.5)
CHLORIDE SERPL-SCNC: 99 MMOL/L (ref 96–112)
CO2 SERPL-SCNC: 25 MMOL/L (ref 20–33)
COLOR UR AUTO: NORMAL
CREAT SERPL-MCNC: 1.07 MG/DL (ref 0.5–1.4)
ERYTHROCYTE [DISTWIDTH] IN BLOOD BY AUTOMATED COUNT: 44.2 FL (ref 35.9–50)
GLOBULIN SER CALC-MCNC: 3.3 G/DL (ref 1.9–3.5)
GLUCOSE SERPL-MCNC: 83 MG/DL (ref 65–99)
GLUCOSE UR STRIP.AUTO-MCNC: NEGATIVE MG/DL
HCT VFR BLD AUTO: 36.4 % (ref 37–47)
HGB BLD-MCNC: 11.8 G/DL (ref 12–16)
KETONES UR STRIP.AUTO-MCNC: NEGATIVE MG/DL
LEUKOCYTE ESTERASE UR QL STRIP.AUTO: NORMAL
MCH RBC QN AUTO: 31.8 PG (ref 27–33)
MCHC RBC AUTO-ENTMCNC: 32.4 G/DL (ref 33.6–35)
MCV RBC AUTO: 98.1 FL (ref 81.4–97.8)
NITRITE UR QL STRIP.AUTO: NEGATIVE
PH UR STRIP.AUTO: 5.5 [PH] (ref 5–8)
PLATELET # BLD AUTO: 153 K/UL (ref 164–446)
PMV BLD AUTO: 12.1 FL (ref 9–12.9)
POTASSIUM SERPL-SCNC: 4.6 MMOL/L (ref 3.6–5.5)
PROT SERPL-MCNC: 7.5 G/DL (ref 6–8.2)
PROT UR QL STRIP: NEGATIVE MG/DL
RBC # BLD AUTO: 3.71 M/UL (ref 4.2–5.4)
RBC UR QL AUTO: NORMAL
SODIUM SERPL-SCNC: 136 MMOL/L (ref 135–145)
SP GR UR STRIP.AUTO: 1.01
TSH SERPL DL<=0.005 MIU/L-ACNC: 6.5 UIU/ML (ref 0.38–5.33)
UROBILINOGEN UR STRIP-MCNC: 0.2 MG/DL
WBC # BLD AUTO: 5.8 K/UL (ref 4.8–10.8)

## 2020-09-11 PROCEDURE — 85027 COMPLETE CBC AUTOMATED: CPT

## 2020-09-11 PROCEDURE — 99214 OFFICE O/P EST MOD 30 MIN: CPT | Performed by: FAMILY MEDICINE

## 2020-09-11 PROCEDURE — 36415 COLL VENOUS BLD VENIPUNCTURE: CPT

## 2020-09-11 PROCEDURE — 99000 SPECIMEN HANDLING OFFICE-LAB: CPT | Performed by: FAMILY MEDICINE

## 2020-09-11 PROCEDURE — 84443 ASSAY THYROID STIM HORMONE: CPT

## 2020-09-11 PROCEDURE — 87086 URINE CULTURE/COLONY COUNT: CPT

## 2020-09-11 PROCEDURE — 81002 URINALYSIS NONAUTO W/O SCOPE: CPT | Performed by: FAMILY MEDICINE

## 2020-09-11 PROCEDURE — 80053 COMPREHEN METABOLIC PANEL: CPT

## 2020-09-11 RX ORDER — HYDROCODONE BITARTRATE AND ACETAMINOPHEN 7.5; 325 MG/1; MG/1
1 TABLET ORAL EVERY 6 HOURS PRN
Qty: 100 TAB | Refills: 0 | Status: SHIPPED | OUTPATIENT
Start: 2020-09-13 | End: 2020-10-13

## 2020-09-11 RX ORDER — CEPHALEXIN 500 MG/1
500 CAPSULE ORAL 3 TIMES DAILY
Qty: 15 CAP | Refills: 0 | Status: SHIPPED | OUTPATIENT
Start: 2020-09-11 | End: 2020-09-16

## 2020-09-11 RX ORDER — HYDROCODONE BITARTRATE AND ACETAMINOPHEN 7.5; 325 MG/1; MG/1
1 TABLET ORAL EVERY 6 HOURS PRN
Qty: 100 TAB | Refills: 0 | Status: SHIPPED | OUTPATIENT
Start: 2020-10-13 | End: 2020-11-12

## 2020-09-11 RX ORDER — HYDROCODONE BITARTRATE AND ACETAMINOPHEN 7.5; 325 MG/1; MG/1
1 TABLET ORAL EVERY 6 HOURS PRN
Qty: 100 TAB | Refills: 0 | Status: SHIPPED | OUTPATIENT
Start: 2020-11-12 | End: 2020-11-23 | Stop reason: SDUPTHER

## 2020-09-11 NOTE — PROGRESS NOTES
Chief Complaint   Patient presents with   • Arthritis   • Back Pain   • Hypothyroidism   • Dysuria       Subjective:     HPI:   Ricky Junior presents today with the followin. Dysuria  Having 2 weeksof dysuria, particularly in the morning.  She is drinking water but it is getting worse.  POC U.A and urine culture ordered.  Patient's urinalysis is not dramatically abnormal.  It is a little cloudy and there is trace leukocyte and trace blood.  However, her symptoms are persistent.  Adequate urine for culture was obtained and this is also sent.  Will start presumptive treatment with cephalexin.    2. Idiopathic hypothyroidism  Patient reports good energy level on the medication. Patient denies insomnia, tremor or change in appetite.  Patient is taking the medication on an empty stomach in the morning and waiting at least 30 minutes before eating.  Last TSH in September last year was elevated.  Medication was adjusted.  Follow-up lab test has not yet been performed.  Order placed again today.  Patient will try and obtain either today or tomorrow.      3. Slow transit constipation  Patient has long history of slow transit constipation.  This is probably worsened slightly by her hydrocodone.  Patient states this is overall well controlled using over-the-counter products, particularly MiraLAX.  Patient denies any visible blood.  Patient states with the regimen she is having a stool at least every 3 days and denies discomfort.  Follow-up lab order discussed and placed.    4. Bipolar depression (HCC)  Patient has longstanding bipolar depression.  She has been extremely stable on current medication regimen of Depakote and Seroquel.  This will be renewed when it comes due in November.  Patient is using a pill minder and feels she is stable on this regimen.  She was able to stop the zolpidem and is taking the Seroquel only for sleep.  She states this is working.    5. Essential hypertension  HTN - Chronic condition  stable. Currently taking all meds as directed.   She is taking baby aspirin daily.   She is not monitoring BP at home.   Denies symptoms low BP: light-headed, tunnel-vision, unusual fatigue.   Denies symptoms high BP:pounding headache, visual changes, palpitations, flushed face.   Denies medicine side effects: unusual fatigue, slow heartbeat, foot/leg swelling, cough.  Follow-up lab orders discussed and placed.    6. Lumbar and sacral arthritis  Patient has lumbar and sacral arthritis with some scoliosis and disc disease.  Patient cannot take nonsteroidal anti-inflammatory drugs due to her history of hypertension and her advanced age.  Patient states the hydrocodone continues to be helpful.  She takes 3 to 4/day.  PDMP review shows that this is being renewed for 100 tablets about every 35 days.  So she is averaging 3/day.  However, she says there are occasionally days where she must take 4.  This has been stable.  No adverse events have been reported or observed.  She requires a renewal today and that is sent to the pharmacy.  Patient uses a walker for steadiness.    7. Arthritis of right elbow  Patient's right elbow arthritis prevents full extension and is a source of pain intermittently.  Patient states that hydrocodone allows her to complete her ADLs and continue her self-care.    8. Cervical spine arthritis  Patient does have cervical spine arthritis and kyphosis which limits extension and to a certain extent flexion.  Rotation is preserved.  Patient cannot take nonsteroidal anti-inflammatory drugs due to hypertension.  She can take low-dose aspirin but not high-dose.    9. Chronic use of opiate for therapeutic purpose  No aberrant or addictive use of medications has been observed.  No adverse events have been reported.  Patient counseled to not add Tylenol to current regimen.  Patient counseled to keep medications locked up or under personal control.  [facility] board of pharmacy interface is reviewed.  No  inconsistencies are found.  The patient's maximum MME is 30.  This is within CDC guideline for chronic pain prescribing by primary care.        Patient Active Problem List    Diagnosis Date Noted   • Pulmonary hypertension (HCC) 09/20/2014     Priority: Medium   • Bipolar depression (HCC) 06/08/2014     Priority: Medium   • Idiopathic hypothyroidism 02/22/2013     Priority: Medium   • Essential hypertension 03/28/2012     Priority: Medium   • History of iron deficiency anemia 04/06/2013     Priority: Low   • Primary osteoarthritis of right shoulder 05/02/2018   • Severe concentric left ventricular hypertrophy    • Chronic use of opiate for therapeutic purpose 09/14/2016   • Cervical spine arthritis 07/24/2015   • Osteoarthritis of left knee    • MEDICAL HOME 02/10/2015   • Hyponatremia 02/05/2015   • Insomnia 12/08/2014   • Low HDL (under 40)    • Lumbar and sacral arthritis        Current medicines (including changes today)  Current Outpatient Medications   Medication Sig Dispense Refill   • [START ON 9/13/2020] HYDROcodone-acetaminophen (NORCO) 7.5-325 MG per tablet Take 1 Tab by mouth every 6 hours as needed for Moderate Pain or Severe Pain (arthritis pain, max four per day) for up to 30 days. 100 Tab 0   • [START ON 10/13/2020] HYDROcodone-acetaminophen (NORCO) 7.5-325 MG per tablet Take 1 Tab by mouth every 6 hours as needed for Moderate Pain or Severe Pain (arthritis pain, max four per day) for up to 30 days. 100 Tab 0   • [START ON 11/12/2020] HYDROcodone-acetaminophen (NORCO) 7.5-325 MG per tablet Take 1 Tab by mouth every 6 hours as needed for Moderate Pain or Severe Pain (arthritis pain, max four per day) for up to 30 days. 100 Tab 0   • cephALEXin (KEFLEX) 500 MG Cap Take 1 Cap by mouth 3 times a day for 5 days. 15 Cap 0   • polyethylene glycol 3350 (MIRALAX) 17 GM/SCOOP Powder TAKE 17 G MIXED WITH LIQUID BY MOUTH EVERY DAY. 510 g 3   • divalproex (DEPAKOTE) 500 MG Tablet Delayed Response Take 1 Tab by  "mouth every bedtime. 90 Tab 3   • QUEtiapine (SEROQUEL) 200 MG Tab Take 1 Tab by mouth every bedtime. 90 Tab 3   • SYNTHROID 150 MCG Tab Take 1 Tab by mouth Every morning on an empty stomach. 90 Tab 3   • carvedilol (COREG) 12.5 MG Tab Take 1 Tab by mouth 2 times a day, with meals. 180 Tab 3   • losartan (COZAAR) 100 MG Tab Take 1 Tab by mouth every day. 90 Tab 3   • aspirin EC (ECOTRIN) 81 MG Tablet Delayed Response Take 1 Tab by mouth every day. 30 Tab 0     No current facility-administered medications for this visit.        No Known Allergies    ROS: As per HPI       Objective:     /72   Pulse 69   Temp 36.6 °C (97.9 °F)   Resp 12   Ht 1.702 m (5' 7\")   Wt 68.5 kg (151 lb)   SpO2 96%  Body mass index is 23.65 kg/m².    Physical Exam:  Constitutional: Well-developed and well-nourished. Not diaphoretic. No distress. Lucid and fluent.  Patient, student, physician and staff all wearing masks.  Skin: Skin is warm and dry. No rash noted.  Head: Atraumatic without lesions.  Eyes: Conjunctivae and extraocular motions are normal. Pupils are equal, round, and reactive to light. No scleral icterus.   Neck: Good range of motion except for full extension.  Moderate thoracic kyphosis. No thyromegaly present. No cervical or supraclavicular lymphadenopathy. No JVD or carotid bruits appreciated  Cardiovascular: Regular rate and rhythm.  Normal S1, S2 without murmur appreciated.  Chest: Effort normal. Clear to auscultation throughout. No adventitious sounds.   Extremities: No cyanosis, clubbing, erythema.  Patient has chronic bilateral nonpitting pedal and ankle edema.  No ulceration or skin issues noted.  Neurological: Alert and oriented x 3.  Gait is slow but stable while using her walker.  Psychiatric:  Behavior, mood, and affect are appropriate.    Lab Results   Component Value Date/Time    POCCOLOR LIGHT YELLOW 09/11/2020 04:38 PM    POCAPPEAR SLIGHTLY CLOUDY 09/11/2020 04:38 PM    POCLEUKEST TRACE 09/11/2020 " 04:38 PM    POCNITRITE NEGATIVE 09/11/2020 04:38 PM    POCUROBILIGE 0.2 09/11/2020 04:38 PM    POCPROTEIN NEGATIVE 09/11/2020 04:38 PM    POCURPH 5.5 09/11/2020 04:38 PM    POCBLOOD TRACE-INTACT 09/11/2020 04:38 PM    POCSPGRV 1.015 09/11/2020 04:38 PM    POCKETONES NEGATIVE 09/11/2020 04:38 PM    POCBILIRUBIN NEGATIVE 09/11/2020 04:38 PM    POCGLUCUA NEGATIVE 09/11/2020 04:38 PM           Assessment and Plan:     89 y.o. female with the following issues:    1. Dysuria  POCT Urinalysis    URINE CULTURE(NEW)    cephALEXin (KEFLEX) 500 MG Cap   2. Idiopathic hypothyroidism  TSH   3. Slow transit constipation  CBC WITHOUT DIFFERENTIAL   4. Bipolar depression (HCC)  TSH    Comp Metabolic Panel    CBC WITHOUT DIFFERENTIAL   5. Essential hypertension  Comp Metabolic Panel   6. Lumbar and sacral arthritis  HYDROcodone-acetaminophen (NORCO) 7.5-325 MG per tablet    HYDROcodone-acetaminophen (NORCO) 7.5-325 MG per tablet    HYDROcodone-acetaminophen (NORCO) 7.5-325 MG per tablet   7. Arthritis of right elbow  HYDROcodone-acetaminophen (NORCO) 7.5-325 MG per tablet    HYDROcodone-acetaminophen (NORCO) 7.5-325 MG per tablet    HYDROcodone-acetaminophen (NORCO) 7.5-325 MG per tablet   8. Cervical spine arthritis  HYDROcodone-acetaminophen (NORCO) 7.5-325 MG per tablet    HYDROcodone-acetaminophen (NORCO) 7.5-325 MG per tablet    HYDROcodone-acetaminophen (NORCO) 7.5-325 MG per tablet   9. Chronic use of opiate for therapeutic purpose  Controlled Substance Treatment Agreement    Pain Management Screen     Chronic pain recheck for: Chronic arthritic pain.  Degenerative neck pain.  Last dose of controlled substance: Today  Chronic pain treated with hydrocodone/APAP taken 3-4 times a day    She  reports no history of alcohol use.  She  reports no history of drug use.    Interval history: Patient states she has had a little more pain and does have some trouble breathing with the smoke when she leaves the house.  She generally stays  in and they are socially distancing.  Any major change in health since last appointment? No    Consequences of Chronic Opiate therapy:  (5 A's)  Analgesia: Compared to no treatment or prior treatment, pain is currently improved  Activity: improved  Adverse Events: She denies constipation, dry mouth, nausea and sedation  Aberrant Behaviors: She reports she is taking medication as prescribed and is not veering from agreed treatment regimen or provider recommendations. There have been no inappropriate refills or lost/stolen meds reported.  Affect/Mood: Pain is not impacting patient's mood.  Patient reports treated depression/anxiety.    Nonnarcotic treatments that are being used: topical agents, heat and cannot use NSAIDs chronically due to age and hypertension.     Last imagin    Opioid Risk Score: 1    Interpretation of Opioid Risk Score   Score 0-3 = Low risk of abuse. Do UDS at least once per year.  Score 4-7 = Moderate risk of abuse. Do UDS 1-4 times per year.  Score 8+ = High risk of abuse. Refer to specialist.    Last order of CONTROLLED SUBSTANCE TREATMENT AGREEMENT was found on 2020 from Office Visit on 2020     UDS Summary                URINE DRUG SCREEN Overdue 2020      Done 6/3/2019 PAIN MANAGEMENT SCREEN     Patient has more history with this topic...        Most recent UDS reviewed today and is consistent with prescribed medications.  UDS obtained today.    I have reviewed the medical records, the Prescription Monitoring Program and I have determined that controlled substance treatment is medically indicated.       Followup: Return in about 3 months (around 2020), or if symptoms worsen or fail to improve.

## 2020-09-12 DIAGNOSIS — R30.0 DYSURIA: ICD-10-CM

## 2020-09-12 DIAGNOSIS — E03.9 IDIOPATHIC HYPOTHYROIDISM: ICD-10-CM

## 2020-09-12 RX ORDER — LEVOTHYROXINE SODIUM 175 MCG
175 TABLET ORAL
Qty: 90 TAB | Refills: 3 | OUTPATIENT
Start: 2020-09-12 | End: 2021-06-14 | Stop reason: SDUPTHER

## 2020-09-14 LAB
BACTERIA UR CULT: NORMAL
SIGNIFICANT IND 70042: NORMAL
SITE SITE: NORMAL
SOURCE SOURCE: NORMAL

## 2020-09-16 DIAGNOSIS — I10 ESSENTIAL HYPERTENSION: ICD-10-CM

## 2020-09-16 RX ORDER — CARVEDILOL 12.5 MG/1
TABLET ORAL
Qty: 180 TAB | Refills: 3 | Status: SHIPPED | OUTPATIENT
Start: 2020-09-16 | End: 2021-06-02 | Stop reason: SDUPTHER

## 2020-09-16 RX ORDER — LOSARTAN POTASSIUM 100 MG/1
TABLET ORAL
Qty: 90 TAB | Refills: 3 | Status: SHIPPED | OUTPATIENT
Start: 2020-09-16 | End: 2021-05-17 | Stop reason: SDUPTHER

## 2020-09-22 ENCOUNTER — TELEPHONE (OUTPATIENT)
Dept: MEDICAL GROUP | Facility: MEDICAL CENTER | Age: 85
End: 2020-09-22

## 2020-09-22 NOTE — TELEPHONE ENCOUNTER
Patient left voicemail that [facility] needed approval for a medication. Called patient back and she states it was the synthroid with the new dose change, 175 mcg.    Detailed rx info left for [facility] pharmacy. Called patient back to notify her that this was done.

## 2020-10-16 ENCOUNTER — TELEPHONE (OUTPATIENT)
Dept: MEDICAL GROUP | Facility: MEDICAL CENTER | Age: 85
End: 2020-10-16

## 2020-10-16 NOTE — TELEPHONE ENCOUNTER
She is actually due today.  She should be able to fill today.  I have sent a voicemail message to [email or web address] to that effect.

## 2020-11-11 DIAGNOSIS — F31.9 BIPOLAR DEPRESSION (HCC): ICD-10-CM

## 2020-11-11 RX ORDER — DIVALPROEX SODIUM 500 MG/1
TABLET, DELAYED RELEASE ORAL
Qty: 90 TAB | Refills: 3 | Status: SHIPPED | OUTPATIENT
Start: 2020-11-11 | End: 2021-06-02 | Stop reason: SDUPTHER

## 2020-11-18 DIAGNOSIS — F51.05 INSOMNIA DUE TO MENTAL DISORDER: ICD-10-CM

## 2020-11-18 DIAGNOSIS — F31.9 BIPOLAR DEPRESSION (HCC): ICD-10-CM

## 2020-11-18 RX ORDER — QUETIAPINE FUMARATE 200 MG/1
TABLET, FILM COATED ORAL
Qty: 90 TAB | Refills: 3 | Status: SHIPPED | OUTPATIENT
Start: 2020-11-18 | End: 2021-02-11 | Stop reason: SDUPTHER

## 2020-11-18 RX ORDER — QUETIAPINE FUMARATE 200 MG/1
TABLET, FILM COATED ORAL
Qty: 90 TAB | Refills: 3 | Status: SHIPPED | OUTPATIENT
Start: 2020-11-18 | End: 2021-03-25

## 2020-11-23 DIAGNOSIS — M19.021 ARTHRITIS OF RIGHT ELBOW: ICD-10-CM

## 2020-11-23 DIAGNOSIS — M47.812 CERVICAL SPINE ARTHRITIS: ICD-10-CM

## 2020-11-23 DIAGNOSIS — M47.817 LUMBAR AND SACRAL ARTHRITIS: ICD-10-CM

## 2020-11-23 RX ORDER — HYDROCODONE BITARTRATE AND ACETAMINOPHEN 7.5; 325 MG/1; MG/1
1 TABLET ORAL EVERY 6 HOURS PRN
Qty: 100 TAB | Refills: 0 | Status: SHIPPED | OUTPATIENT
Start: 2020-11-23 | End: 2020-12-23 | Stop reason: SDUPTHER

## 2020-12-15 NOTE — TELEPHONE ENCOUNTER
Was the patient seen in the last year in this department? Yes     Does patient have an active prescription for medications requested? Yes     Received Request Via: Pharmacy  
[email or web address]

## 2020-12-23 DIAGNOSIS — M47.817 LUMBAR AND SACRAL ARTHRITIS: ICD-10-CM

## 2020-12-23 DIAGNOSIS — M19.021 ARTHRITIS OF RIGHT ELBOW: ICD-10-CM

## 2020-12-23 DIAGNOSIS — M47.812 CERVICAL SPINE ARTHRITIS: ICD-10-CM

## 2020-12-23 RX ORDER — HYDROCODONE BITARTRATE AND ACETAMINOPHEN 7.5; 325 MG/1; MG/1
1 TABLET ORAL EVERY 6 HOURS PRN
Qty: 100 TAB | Refills: 0 | Status: SHIPPED | OUTPATIENT
Start: 2020-12-23 | End: 2020-12-30 | Stop reason: SDUPTHER

## 2020-12-23 NOTE — TELEPHONE ENCOUNTER
Pt called and left vm stating that she is in need of her Rx Norco. She states she has finished it today. I called pt and informed pt that last visit with PCP, she was seen on 09/11/2020, was told to [facility] in 3 mths and didn't set an appt up. An appt was made for pt to be seen on 12/30/2020 with PCP. Please advise if appropriate at least a 7 days supply up until her appt.

## 2020-12-23 NOTE — TELEPHONE ENCOUNTER
she would indeed be due for a refill today.  I will send in one 1 month prescription in to [facility].  I will add the additional instructions at our visit on the 30th..

## 2020-12-30 ENCOUNTER — OFFICE VISIT (OUTPATIENT)
Dept: MEDICAL GROUP | Facility: MEDICAL CENTER | Age: 85
End: 2020-12-30
Payer: MEDICARE

## 2020-12-30 VITALS — BODY MASS INDEX: 23.7 KG/M2 | HEIGHT: 67 IN | WEIGHT: 151 LBS

## 2020-12-30 DIAGNOSIS — M19.021 ARTHRITIS OF RIGHT ELBOW: ICD-10-CM

## 2020-12-30 DIAGNOSIS — K59.01 SLOW TRANSIT CONSTIPATION: ICD-10-CM

## 2020-12-30 DIAGNOSIS — M47.812 CERVICAL SPINE ARTHRITIS: ICD-10-CM

## 2020-12-30 DIAGNOSIS — Z79.891 CHRONIC USE OF OPIATE FOR THERAPEUTIC PURPOSE: ICD-10-CM

## 2020-12-30 DIAGNOSIS — M47.817 LUMBAR AND SACRAL ARTHRITIS: ICD-10-CM

## 2020-12-30 PROCEDURE — 99441 PR PHYSICIAN TELEPHONE EVALUATION 5-10 MIN: CPT | Performed by: FAMILY MEDICINE

## 2020-12-30 RX ORDER — POLYETHYLENE GLYCOL 3350 17 G/17G
17 POWDER, FOR SOLUTION ORAL DAILY
Qty: 578 G | Refills: 3 | Status: SHIPPED | OUTPATIENT
Start: 2020-12-30 | End: 2021-03-04 | Stop reason: SDUPTHER

## 2020-12-30 RX ORDER — HYDROCODONE BITARTRATE AND ACETAMINOPHEN 7.5; 325 MG/1; MG/1
1 TABLET ORAL EVERY 6 HOURS PRN
Qty: 100 TAB | Refills: 0 | Status: SHIPPED | OUTPATIENT
Start: 2021-02-20 | End: 2021-03-03 | Stop reason: SDUPTHER

## 2020-12-30 RX ORDER — HYDROCODONE BITARTRATE AND ACETAMINOPHEN 7.5; 325 MG/1; MG/1
1 TABLET ORAL EVERY 6 HOURS PRN
Qty: 100 TAB | Refills: 0 | Status: SHIPPED | OUTPATIENT
Start: 2021-01-22 | End: 2021-02-21

## 2020-12-30 ASSESSMENT — FIBROSIS 4 INDEX: FIB4 SCORE: 3.36

## 2020-12-30 NOTE — PROGRESS NOTES
Telephone Appointment Visit   As a means of avoiding spread of COVID-19, this visit is being conducted by telephone. This telephone visit was initiated by the patient and they verbally consented.    Time at start of call: 356    Reason for Call:  Medication Follow-up and Symptom Follow-up    HPI:      1. Lumbar and sacral arthritis  Patient has long-standing multilevel mixed degenerative change including lumbar disc disease, arthritis, ligamentous thickening.  Patient is using heat and stretching to treat the radicular component.  Gabapentin was tried in the past but made her unstable and increased her fall risk.  The current regimen of hydrocodone/APAP 3 to 4/day is helping the pain bringing it from a level 7 or 8 to a level 5.  The regimen is improving activity. The regimen allows the patient to complete her ADLs with some assistance from her .  Patient denies somnolence, confusion, balance problems or severe constipation from the regimen. Patient notes side effect of dry mouth.  Patient denies new or worsening urine or stool incontinence. Patient denies leg weakness and balance problems.     2. Arthritis of right elbow  Patient has continued pain from right elbow arthritis.  Patient states this is under good control using heat, topical rubs and the hydrocodone.  Patient cannot use nonsteroidal anti-inflammatory drugs due to hypertension, cardiac hypertrophy and chronic lung disease.    3. Cervical spine arthritis  Patient has multiple level cervical spine arthritis.  She states the continued regimen of over-the-counter topical treatment, heat and hydrocodone continues to be quite helpful in controlling her pain bringing it from a 7 or 8 down to a 5 or 6 which allows her to complete her ADLs.    4. Chronic use of opiate for therapeutic purpose  No aberrant or addictive use of medications has been observed.  No adverse events have been reported.  Patient counseled to not add Tylenol to current regimen.   "Patient counseled to keep medications locked up or under personal control.  [facility] board of pharmacy interface is reviewed.  No inconsistencies are found.  The patient's maximum MME is 30.  This is within CDC guideline for chronic pain prescribing by primary care.  Controlled substance treatment agreement signed and on file.    5. Slow transit constipation  States this continues to be helpful.  She has no further refills on the bottle.  That is sent in.  - polyethylene glycol 3350 (MIRALAX) 17 GM/SCOOP Powder; Take 17 g by mouth every day.  Dispense: 578 g; Refill: 3      Labs / Images Reviewed:     Ht 1.702 m (5' 7\")   Wt 68.5 kg (151 lb)     Staying home, wearing her mask    Assessment and Plan:     1. Lumbar and sacral arthritis  - HYDROcodone-acetaminophen (NORCO) 7.5-325 MG per tablet; Take 1 Tab by mouth every 6 hours as needed for Moderate Pain or Severe Pain (arthritis pain, max four per day) for up to 30 days.  Dispense: 100 Tab; Refill: 0  - HYDROcodone-acetaminophen (NORCO) 7.5-325 MG per tablet; Take 1 Tab by mouth every 6 hours as needed for Moderate Pain or Severe Pain (arthritis pain, max four per day) for up to 30 days.  Dispense: 100 Tab; Refill: 0    2. Arthritis of right elbow  - HYDROcodone-acetaminophen (NORCO) 7.5-325 MG per tablet; Take 1 Tab by mouth every 6 hours as needed for Moderate Pain or Severe Pain (arthritis pain, max four per day) for up to 30 days.  Dispense: 100 Tab; Refill: 0  - HYDROcodone-acetaminophen (NORCO) 7.5-325 MG per tablet; Take 1 Tab by mouth every 6 hours as needed for Moderate Pain or Severe Pain (arthritis pain, max four per day) for up to 30 days.  Dispense: 100 Tab; Refill: 0    3. Cervical spine arthritis  - HYDROcodone-acetaminophen (NORCO) 7.5-325 MG per tablet; Take 1 Tab by mouth every 6 hours as needed for Moderate Pain or Severe Pain (arthritis pain, max four per day) for up to 30 days.  Dispense: 100 Tab; Refill: 0  - HYDROcodone-acetaminophen (NORCO) " 7.5-325 MG per tablet; Take 1 Tab by mouth every 6 hours as needed for Moderate Pain or Severe Pain (arthritis pain, max four per day) for up to 30 days.  Dispense: 100 Tab; Refill: 0    4. Chronic use of opiate for therapeutic purpose    5. Slow transit constipation  - polyethylene glycol 3350 (MIRALAX) 17 GM/SCOOP Powder; Take 17 g by mouth every day.  Dispense: 578 g; Refill: 3    Chronic pain recheck for: Chronic degenerative pain of the spine both lumbar and cervical spine and arthritis at numerous joints especially the right elbow  Last dose of controlled substance: Today  Chronic pain treated with hydrocodone/APAP taken 3-4 times a day    She  reports no history of alcohol use.  She  reports no history of drug use.    Interval history: Patient feels she has been stable.  She and her  are essentially sheltering in place trying to hide from this pandemic.  They always wear masks if they leave the house and they are doing constant handwashing  Any major change in health since last appointment? No    Consequences of Chronic Opiate therapy:  (5 A's)  Analgesia: Compared to no treatment or prior treatment, pain is currently improved  Activity: improved  Adverse Events: She denies constipation, itchy skin, nausea and sedation  Aberrant Behaviors: She reports she is taking medication as prescribed and is not veering from agreed treatment regimen or provider recommendations. There have been no inappropriate refills or lost/stolen meds reported.  Affect/Mood: Pain is occasionally impacting patient's mood.  Patient reports overall stable depression/anxiety.    Nonnarcotic treatments that are being used: topical agents, heat and Patient cannot take nonsteroidal anti-inflammatory drugs due to hypertension and concentric hypertrophy problems..     Last imagin and     Opioid Risk Score: 1    Interpretation of Opioid Risk Score   Score 0-3 = Low risk of abuse. Do UDS at least once per year.  Score 4-7 =  Moderate risk of abuse. Do UDS 1-4 times per year.  Score 8+ = High risk of abuse. Refer to specialist.    Last order of CONTROLLED SUBSTANCE TREATMENT AGREEMENT was found on 9/11/2020 from Office Visit on 9/11/2020     UDS Summary                URINE DRUG SCREEN Next Due 9/6/2021      Done 9/11/2020 PAIN MANAGEMENT SCREEN     Patient has more history with this topic...        Most recent UDS reviewed today and is consistent with prescribed medications.     I have reviewed the medical records, the Prescription Monitoring Program and I have determined that controlled substance treatment is medically indicated.     Follow-up: 3 months    Time at end of call: 4:05  Total Time Spent: 5-10 minutes    Luke Escobar M.D.

## 2021-01-11 DIAGNOSIS — Z23 NEED FOR VACCINATION: ICD-10-CM

## 2021-02-11 DIAGNOSIS — F31.9 BIPOLAR DEPRESSION (HCC): ICD-10-CM

## 2021-02-11 DIAGNOSIS — F51.05 INSOMNIA DUE TO MENTAL DISORDER: ICD-10-CM

## 2021-02-11 RX ORDER — QUETIAPINE FUMARATE 200 MG/1
200 TABLET, FILM COATED ORAL
Qty: 90 TABLET | Refills: 3 | Status: SHIPPED | OUTPATIENT
Start: 2021-02-11 | End: 2021-05-05 | Stop reason: SDUPTHER

## 2021-03-03 DIAGNOSIS — M47.812 CERVICAL SPINE ARTHRITIS: ICD-10-CM

## 2021-03-03 DIAGNOSIS — M19.021 ARTHRITIS OF RIGHT ELBOW: ICD-10-CM

## 2021-03-03 DIAGNOSIS — M47.817 LUMBAR AND SACRAL ARTHRITIS: ICD-10-CM

## 2021-03-03 RX ORDER — HYDROCODONE BITARTRATE AND ACETAMINOPHEN 7.5; 325 MG/1; MG/1
1 TABLET ORAL EVERY 6 HOURS PRN
Qty: 100 TABLET | Refills: 0 | Status: SHIPPED | OUTPATIENT
Start: 2021-03-03 | End: 2021-03-25 | Stop reason: SDUPTHER

## 2021-03-03 NOTE — TELEPHONE ENCOUNTER
Patient actually still has 1 refill from February which she had not filled.  It was more than 2 weeks since that so [facility] wanted a new prescription.  That has been sent.  She will need to see me for further refills at the end of this month.  If patient prefers that can be a telephone visit.  Please talk to the patient about getting her Covid vaccines.  I highly recommend that she do that.

## 2021-03-04 DIAGNOSIS — K59.01 SLOW TRANSIT CONSTIPATION: ICD-10-CM

## 2021-03-04 RX ORDER — POLYETHYLENE GLYCOL 3350 17 G/17G
17 POWDER, FOR SOLUTION ORAL DAILY
Qty: 578 G | Refills: 3 | Status: SHIPPED | OUTPATIENT
Start: 2021-03-04 | End: 2021-03-18

## 2021-03-08 NOTE — TELEPHONE ENCOUNTER
Phone Number Called: [phone]    Call outcome: Spoke to patient regarding message below.    Message: Called to inform patient that Dr Escobar sent a refill for her Hydrocodone-acetaminophen. Patient needs an appointment at the end of the month and Dr Escobar recommends the COVID vaccine.

## 2021-03-08 NOTE — TELEPHONE ENCOUNTER
Patient scheduled for 03/25/2021 at 11:00 AM. She will call [facility] to get the COVID vaccine. She will call us if she cannot get an appointment with them.

## 2021-03-18 DIAGNOSIS — K59.01 SLOW TRANSIT CONSTIPATION: ICD-10-CM

## 2021-03-19 RX ORDER — POLYETHYLENE GLYCOL 3350 17 G/17G
17 POWDER, FOR SOLUTION ORAL DAILY
Qty: 238 G | Refills: 3 | Status: SHIPPED | OUTPATIENT
Start: 2021-03-19 | End: 2021-03-25

## 2021-03-25 ENCOUNTER — OFFICE VISIT (OUTPATIENT)
Dept: MEDICAL GROUP | Facility: MEDICAL CENTER | Age: 86
End: 2021-03-25
Payer: MEDICARE

## 2021-03-25 ENCOUNTER — HOSPITAL ENCOUNTER (OUTPATIENT)
Dept: LAB | Facility: MEDICAL CENTER | Age: 86
End: 2021-03-25
Attending: FAMILY MEDICINE
Payer: MEDICARE

## 2021-03-25 VITALS
DIASTOLIC BLOOD PRESSURE: 70 MMHG | SYSTOLIC BLOOD PRESSURE: 122 MMHG | HEART RATE: 67 BPM | TEMPERATURE: 99.2 F | HEIGHT: 67 IN | OXYGEN SATURATION: 99 % | WEIGHT: 151 LBS | BODY MASS INDEX: 23.7 KG/M2

## 2021-03-25 DIAGNOSIS — K59.01 SLOW TRANSIT CONSTIPATION: ICD-10-CM

## 2021-03-25 DIAGNOSIS — D64.9 ANEMIA, UNSPECIFIED TYPE: ICD-10-CM

## 2021-03-25 DIAGNOSIS — M19.021 ARTHRITIS OF RIGHT ELBOW: ICD-10-CM

## 2021-03-25 DIAGNOSIS — E03.9 IDIOPATHIC HYPOTHYROIDISM: ICD-10-CM

## 2021-03-25 DIAGNOSIS — M47.812 CERVICAL SPINE ARTHRITIS: ICD-10-CM

## 2021-03-25 DIAGNOSIS — I10 ESSENTIAL HYPERTENSION: ICD-10-CM

## 2021-03-25 DIAGNOSIS — I27.20 PULMONARY HYPERTENSION, MODERATE TO SEVERE (HCC): ICD-10-CM

## 2021-03-25 DIAGNOSIS — Z79.891 CHRONIC USE OF OPIATE FOR THERAPEUTIC PURPOSE: ICD-10-CM

## 2021-03-25 DIAGNOSIS — M47.817 LUMBAR AND SACRAL ARTHRITIS: ICD-10-CM

## 2021-03-25 LAB
ALBUMIN SERPL BCP-MCNC: 4.1 G/DL (ref 3.2–4.9)
ALBUMIN/GLOB SERPL: 1.1 G/DL
ALP SERPL-CCNC: 72 U/L (ref 30–99)
ALT SERPL-CCNC: 10 U/L (ref 2–50)
ANION GAP SERPL CALC-SCNC: 8 MMOL/L (ref 7–16)
AST SERPL-CCNC: 21 U/L (ref 12–45)
BILIRUB SERPL-MCNC: 0.3 MG/DL (ref 0.1–1.5)
BUN SERPL-MCNC: 17 MG/DL (ref 8–22)
CALCIUM SERPL-MCNC: 9 MG/DL (ref 8.5–10.5)
CHLORIDE SERPL-SCNC: 103 MMOL/L (ref 96–112)
CO2 SERPL-SCNC: 25 MMOL/L (ref 20–33)
CREAT SERPL-MCNC: 1.07 MG/DL (ref 0.5–1.4)
ERYTHROCYTE [DISTWIDTH] IN BLOOD BY AUTOMATED COUNT: 44.5 FL (ref 35.9–50)
GLOBULIN SER CALC-MCNC: 3.7 G/DL (ref 1.9–3.5)
GLUCOSE SERPL-MCNC: 73 MG/DL (ref 65–99)
HCT VFR BLD AUTO: 39.5 % (ref 37–47)
HGB BLD-MCNC: 12.5 G/DL (ref 12–16)
MCH RBC QN AUTO: 31.4 PG (ref 27–33)
MCHC RBC AUTO-ENTMCNC: 31.6 G/DL (ref 33.6–35)
MCV RBC AUTO: 99.2 FL (ref 81.4–97.8)
PLATELET # BLD AUTO: 194 K/UL (ref 164–446)
PMV BLD AUTO: 11.8 FL (ref 9–12.9)
POTASSIUM SERPL-SCNC: 4.8 MMOL/L (ref 3.6–5.5)
PROT SERPL-MCNC: 7.8 G/DL (ref 6–8.2)
RBC # BLD AUTO: 3.98 M/UL (ref 4.2–5.4)
SODIUM SERPL-SCNC: 136 MMOL/L (ref 135–145)
TSH SERPL DL<=0.005 MIU/L-ACNC: 0.48 UIU/ML (ref 0.38–5.33)
WBC # BLD AUTO: 5.2 K/UL (ref 4.8–10.8)

## 2021-03-25 PROCEDURE — 99214 OFFICE O/P EST MOD 30 MIN: CPT | Performed by: FAMILY MEDICINE

## 2021-03-25 PROCEDURE — 84443 ASSAY THYROID STIM HORMONE: CPT

## 2021-03-25 PROCEDURE — 80053 COMPREHEN METABOLIC PANEL: CPT

## 2021-03-25 PROCEDURE — 85027 COMPLETE CBC AUTOMATED: CPT

## 2021-03-25 PROCEDURE — 36415 COLL VENOUS BLD VENIPUNCTURE: CPT

## 2021-03-25 RX ORDER — HYDROCODONE BITARTRATE AND ACETAMINOPHEN 7.5; 325 MG/1; MG/1
1 TABLET ORAL EVERY 6 HOURS PRN
Qty: 100 TABLET | Refills: 0 | Status: SHIPPED | OUTPATIENT
Start: 2021-04-02 | End: 2021-05-02

## 2021-03-25 RX ORDER — POLYETHYLENE GLYCOL 3350 17 G/17G
17 POWDER, FOR SOLUTION ORAL DAILY
Qty: 510 G | Refills: 3 | Status: SHIPPED | OUTPATIENT
Start: 2021-03-25 | End: 2022-03-30

## 2021-03-25 RX ORDER — HYDROCODONE BITARTRATE AND ACETAMINOPHEN 7.5; 325 MG/1; MG/1
1 TABLET ORAL EVERY 6 HOURS PRN
Qty: 100 TABLET | Refills: 0 | Status: SHIPPED | OUTPATIENT
Start: 2021-06-01 | End: 2021-06-30 | Stop reason: SDUPTHER

## 2021-03-25 RX ORDER — HYDROCODONE BITARTRATE AND ACETAMINOPHEN 7.5; 325 MG/1; MG/1
1 TABLET ORAL EVERY 6 HOURS PRN
Qty: 100 TABLET | Refills: 0 | Status: SHIPPED | OUTPATIENT
Start: 2021-05-02 | End: 2021-05-05 | Stop reason: SDUPTHER

## 2021-03-25 ASSESSMENT — FIBROSIS 4 INDEX: FIB4 SCORE: 3.36
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
## 2021-03-31 ENCOUNTER — DOCUMENTATION (OUTPATIENT)
Dept: MEDICAL GROUP | Facility: MEDICAL CENTER | Age: 86
End: 2021-03-31

## 2021-03-31 NOTE — PROGRESS NOTES
Left Vm stating that her 2 meds have been refilled at her [facility]. The Miralax and the Isom.

## 2021-05-05 DIAGNOSIS — F31.9 BIPOLAR DEPRESSION (HCC): ICD-10-CM

## 2021-05-05 DIAGNOSIS — M47.812 CERVICAL SPINE ARTHRITIS: ICD-10-CM

## 2021-05-05 DIAGNOSIS — M47.817 LUMBAR AND SACRAL ARTHRITIS: ICD-10-CM

## 2021-05-05 DIAGNOSIS — M19.021 ARTHRITIS OF RIGHT ELBOW: ICD-10-CM

## 2021-05-05 DIAGNOSIS — F51.05 INSOMNIA DUE TO MENTAL DISORDER: ICD-10-CM

## 2021-05-05 RX ORDER — HYDROCODONE BITARTRATE AND ACETAMINOPHEN 7.5; 325 MG/1; MG/1
1 TABLET ORAL EVERY 6 HOURS PRN
Qty: 100 TABLET | Refills: 0 | Status: SHIPPED | OUTPATIENT
Start: 2021-05-05 | End: 2021-06-04

## 2021-05-05 RX ORDER — QUETIAPINE FUMARATE 200 MG/1
200 TABLET, FILM COATED ORAL
Qty: 90 TABLET | Refills: 3 | Status: SHIPPED | OUTPATIENT
Start: 2021-05-05 | End: 2022-04-13 | Stop reason: SDUPTHER

## 2021-05-17 DIAGNOSIS — I10 ESSENTIAL HYPERTENSION: ICD-10-CM

## 2021-05-17 RX ORDER — LOSARTAN POTASSIUM 100 MG/1
100 TABLET ORAL
Qty: 90 TABLET | Refills: 3 | Status: SHIPPED | OUTPATIENT
Start: 2021-05-17 | End: 2022-04-13 | Stop reason: SDUPTHER

## 2021-05-26 RX ORDER — CEPHALEXIN 500 MG/1
500 CAPSULE ORAL 3 TIMES DAILY
Qty: 15 CAPSULE | Refills: 0 | Status: SHIPPED | OUTPATIENT
Start: 2021-05-26 | End: 2021-05-31

## 2021-05-26 RX ORDER — CEPHALEXIN 500 MG/1
500 CAPSULE ORAL 4 TIMES DAILY
COMMUNITY
End: 2021-05-26 | Stop reason: SDUPTHER

## 2021-06-02 DIAGNOSIS — I10 ESSENTIAL HYPERTENSION: ICD-10-CM

## 2021-06-02 DIAGNOSIS — F31.9 BIPOLAR DEPRESSION (HCC): ICD-10-CM

## 2021-06-02 RX ORDER — CARVEDILOL 12.5 MG/1
12.5 TABLET ORAL 2 TIMES DAILY WITH MEALS
Qty: 180 TABLET | Refills: 3 | Status: SHIPPED | OUTPATIENT
Start: 2021-06-02 | End: 2022-04-13 | Stop reason: SDUPTHER

## 2021-06-02 RX ORDER — DIVALPROEX SODIUM 500 MG/1
500 TABLET, DELAYED RELEASE ORAL
Qty: 90 TABLET | Refills: 3 | Status: SHIPPED | OUTPATIENT
Start: 2021-06-02 | End: 2022-04-13 | Stop reason: SDUPTHER

## 2021-06-14 DIAGNOSIS — E03.9 IDIOPATHIC HYPOTHYROIDISM: ICD-10-CM

## 2021-06-14 RX ORDER — LEVOTHYROXINE SODIUM 175 MCG
175 TABLET ORAL
Qty: 90 TABLET | Refills: 3 | Status: SHIPPED | OUTPATIENT
Start: 2021-06-14 | End: 2022-04-13 | Stop reason: SDUPTHER

## 2021-06-30 ENCOUNTER — OFFICE VISIT (OUTPATIENT)
Dept: MEDICAL GROUP | Facility: MEDICAL CENTER | Age: 86
End: 2021-06-30
Payer: MEDICARE

## 2021-06-30 ENCOUNTER — DOCUMENTATION (OUTPATIENT)
Dept: MEDICAL GROUP | Facility: MEDICAL CENTER | Age: 86
End: 2021-06-30

## 2021-06-30 VITALS
HEART RATE: 75 BPM | SYSTOLIC BLOOD PRESSURE: 110 MMHG | OXYGEN SATURATION: 98 % | TEMPERATURE: 98.5 F | WEIGHT: 145 LBS | HEIGHT: 67 IN | BODY MASS INDEX: 22.76 KG/M2 | DIASTOLIC BLOOD PRESSURE: 60 MMHG | RESPIRATION RATE: 16 BRPM

## 2021-06-30 DIAGNOSIS — M47.817 LUMBAR AND SACRAL ARTHRITIS: ICD-10-CM

## 2021-06-30 DIAGNOSIS — N18.31 STAGE 3A CHRONIC KIDNEY DISEASE: ICD-10-CM

## 2021-06-30 DIAGNOSIS — Z79.891 CHRONIC USE OF OPIATE FOR THERAPEUTIC PURPOSE: ICD-10-CM

## 2021-06-30 DIAGNOSIS — F31.9 BIPOLAR DEPRESSION (HCC): ICD-10-CM

## 2021-06-30 DIAGNOSIS — M19.021 ARTHRITIS OF RIGHT ELBOW: ICD-10-CM

## 2021-06-30 DIAGNOSIS — M47.812 CERVICAL SPINE ARTHRITIS: ICD-10-CM

## 2021-06-30 PROCEDURE — 99213 OFFICE O/P EST LOW 20 MIN: CPT | Performed by: FAMILY MEDICINE

## 2021-06-30 RX ORDER — HYDROCODONE BITARTRATE AND ACETAMINOPHEN 7.5; 325 MG/1; MG/1
1 TABLET ORAL EVERY 6 HOURS PRN
Qty: 100 TABLET | Refills: 0 | Status: SHIPPED | OUTPATIENT
Start: 2021-09-09 | End: 2021-10-06 | Stop reason: SDUPTHER

## 2021-06-30 RX ORDER — HYDROCODONE BITARTRATE AND ACETAMINOPHEN 7.5; 325 MG/1; MG/1
1 TABLET ORAL EVERY 6 HOURS PRN
Qty: 100 TABLET | Refills: 0 | Status: SHIPPED | OUTPATIENT
Start: 2021-07-11 | End: 2021-07-14 | Stop reason: SDUPTHER

## 2021-06-30 RX ORDER — HYDROCODONE BITARTRATE AND ACETAMINOPHEN 7.5; 325 MG/1; MG/1
1 TABLET ORAL EVERY 6 HOURS PRN
Qty: 100 TABLET | Refills: 0 | Status: SHIPPED | OUTPATIENT
Start: 2021-08-10 | End: 2021-09-09

## 2021-06-30 ASSESSMENT — FIBROSIS 4 INDEX: FIB4 SCORE: 3.08
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
## 2021-06-30 NOTE — PROGRESS NOTES
When I called the patient's name in our lobby, she couldn't stand up by herself. The seat was too low, I offered my arm and help her on her feet, while she used her cane for stability.

## 2021-07-14 DIAGNOSIS — M19.021 ARTHRITIS OF RIGHT ELBOW: ICD-10-CM

## 2021-07-14 DIAGNOSIS — M47.812 CERVICAL SPINE ARTHRITIS: ICD-10-CM

## 2021-07-14 DIAGNOSIS — M47.817 LUMBAR AND SACRAL ARTHRITIS: ICD-10-CM

## 2021-07-14 RX ORDER — HYDROCODONE BITARTRATE AND ACETAMINOPHEN 7.5; 325 MG/1; MG/1
1 TABLET ORAL EVERY 6 HOURS PRN
Qty: 100 TABLET | Refills: 0 | Status: SHIPPED | OUTPATIENT
Start: 2021-07-14 | End: 2021-08-13

## 2021-10-06 ENCOUNTER — OFFICE VISIT (OUTPATIENT)
Dept: MEDICAL GROUP | Facility: MEDICAL CENTER | Age: 86
End: 2021-10-06
Payer: MEDICARE

## 2021-10-06 ENCOUNTER — HOSPITAL ENCOUNTER (OUTPATIENT)
Facility: MEDICAL CENTER | Age: 86
End: 2021-10-06
Attending: FAMILY MEDICINE
Payer: MEDICARE

## 2021-10-06 VITALS
OXYGEN SATURATION: 100 % | BODY MASS INDEX: 22.46 KG/M2 | SYSTOLIC BLOOD PRESSURE: 152 MMHG | HEART RATE: 63 BPM | HEIGHT: 67 IN | WEIGHT: 143.08 LBS | TEMPERATURE: 98.2 F | DIASTOLIC BLOOD PRESSURE: 68 MMHG

## 2021-10-06 DIAGNOSIS — R30.0 DYSURIA: ICD-10-CM

## 2021-10-06 DIAGNOSIS — M47.812 CERVICAL SPINE ARTHRITIS: ICD-10-CM

## 2021-10-06 DIAGNOSIS — M19.021 ARTHRITIS OF RIGHT ELBOW: ICD-10-CM

## 2021-10-06 DIAGNOSIS — Z23 NEED FOR VACCINATION: ICD-10-CM

## 2021-10-06 DIAGNOSIS — Z79.891 CHRONIC USE OF OPIATE FOR THERAPEUTIC PURPOSE: ICD-10-CM

## 2021-10-06 DIAGNOSIS — M47.817 LUMBAR AND SACRAL ARTHRITIS: ICD-10-CM

## 2021-10-06 PROCEDURE — 87086 URINE CULTURE/COLONY COUNT: CPT

## 2021-10-06 PROCEDURE — 90662 IIV NO PRSV INCREASED AG IM: CPT | Performed by: FAMILY MEDICINE

## 2021-10-06 PROCEDURE — 99213 OFFICE O/P EST LOW 20 MIN: CPT | Mod: 25 | Performed by: FAMILY MEDICINE

## 2021-10-06 PROCEDURE — G0008 ADMIN INFLUENZA VIRUS VAC: HCPCS | Performed by: FAMILY MEDICINE

## 2021-10-06 RX ORDER — HYDROCODONE BITARTRATE AND ACETAMINOPHEN 7.5; 325 MG/1; MG/1
1 TABLET ORAL EVERY 6 HOURS PRN
Qty: 100 TABLET | Refills: 0 | Status: SHIPPED | OUTPATIENT
Start: 2021-12-14 | End: 2022-01-06 | Stop reason: SDUPTHER

## 2021-10-06 RX ORDER — HYDROCODONE BITARTRATE AND ACETAMINOPHEN 7.5; 325 MG/1; MG/1
1 TABLET ORAL EVERY 6 HOURS PRN
Qty: 100 TABLET | Refills: 0 | Status: SHIPPED | OUTPATIENT
Start: 2021-11-14 | End: 2021-12-14

## 2021-10-06 RX ORDER — HYDROCODONE BITARTRATE AND ACETAMINOPHEN 7.5; 325 MG/1; MG/1
1 TABLET ORAL EVERY 6 HOURS PRN
Qty: 100 TABLET | Refills: 0 | Status: SHIPPED | OUTPATIENT
Start: 2021-10-15 | End: 2021-11-14

## 2021-10-06 ASSESSMENT — FIBROSIS 4 INDEX: FIB4 SCORE: 3.08

## 2021-10-06 NOTE — PROGRESS NOTES
Chief Complaint   Patient presents with   • Arthritis     3 month FV   • Medication Follow-up   • Abdominal Pain     Lower    • Urinary Pain     It burns even when she isn't peeing       Subjective:     HPI:   Ricky Junior presents today with the followin. Dysuria  Has had dysuria again. Has been nearly a month.  Denies visible blood.  History of urinary tract infections.  Urine obtained for culture today. Tender hypogastrium.  Denies fever.    2. Lumbar and sacral arthritis  Patient has lumbar and sacral arthritis of several levels.  States it is currently stable.  Patient cannot take chronic nonsteroidal anti-inflammatory drugs due to history of iron deficiency anemia and GI bleeding.  Patient also has severe concentric left ventricular hypertrophy and stage IIIa chronic kidney disease which have a relative contraindication for nonsteroidal anti-inflammatory drugs.    3. Arthritis of right elbow  Having some increased pain and stiffness right elbow and right shoulder.  She will go to her orthopedist and see if an injection can be performed again.  States the hydrocodone continues to help quite a bit and allows her to have more motion.    4. Cervical spine arthritis  Patient has multiple level cervical spine arthritis.  This is a source of stiffness and pain.    5. Chronic use of opiate for therapeutic purpose  No aberrant or addictive use of medications has been observed.  No adverse events have been reported.  Patient counseled to not add Tylenol to current regimen.  Patient counseled to keep medications locked up or under personal control.  [facility] board of pharmacy interface is reviewed.  No inconsistencies are found.  The patient's maximum MME is 30.  This is within CDC guideline for chronic pain prescribing by primary care.    6. Need for vaccination  HD flu vaccine administered today.        Patient Active Problem List    Diagnosis Date Noted   • Stage 3a chronic kidney disease (HCC) 2021    • Arthritis of right elbow 06/30/2021   • Primary osteoarthritis of right shoulder 05/02/2018   • Severe concentric left ventricular hypertrophy    • Chronic use of opiate for therapeutic purpose 09/14/2016   • Cervical spine arthritis 07/24/2015   • Osteoarthritis of left knee    • MEDICAL HOME 02/10/2015   • Hyponatremia 02/05/2015   • Insomnia 12/08/2014   • Low HDL (under 40)    • Lumbar and sacral arthritis    • Pulmonary hypertension (HCC) 09/20/2014   • Bipolar depression ([facility]) 06/08/2014   • History of iron deficiency anemia 04/06/2013   • Idiopathic hypothyroidism 02/22/2013   • Essential hypertension 03/28/2012       Current medicines (including changes today)  Current Outpatient Medications   Medication Sig Dispense Refill   • [START ON 10/15/2021] HYDROcodone-acetaminophen (NORCO) 7.5-325 MG per tablet Take 1 Tablet by mouth every 6 hours as needed for Moderate Pain or Severe Pain (arthritis pain, max four per day) for up to 30 days. 100 Tablet 0   • [START ON 11/14/2021] HYDROcodone-acetaminophen (NORCO) 7.5-325 MG per tablet Take 1 Tablet by mouth every 6 hours as needed for Moderate Pain or Severe Pain (arthritis pain, max four per day) for up to 30 days. 100 Tablet 0   • [START ON 12/14/2021] HYDROcodone-acetaminophen (NORCO) 7.5-325 MG per tablet Take 1 Tablet by mouth every 6 hours as needed for Moderate Pain or Severe Pain (arthritis pain, max four per day) for up to 30 days. 100 Tablet 0   • SYNTHROID 175 MCG Tab Take 1 tablet by mouth every morning on an empty stomach. 90 tablet 3   • carvedilol (COREG) 12.5 MG Tab Take 1 tablet by mouth 2 times a day with meals. 180 tablet 3   • divalproex (DEPAKOTE) 500 MG Tablet Delayed Response Take 1 tablet by mouth at bedtime. 90 tablet 3   • losartan (COZAAR) 100 MG Tab Take 1 tablet by mouth every day. 90 tablet 3   • QUEtiapine (SEROQUEL) 200 MG Tab Take 1 tablet by mouth at bedtime. 90 tablet 3   • polyethylene glycol 3350 (MIRALAX) 17 GM/SCOOP  "Powder Take 17 g by mouth every day. 510 g 3   • aspirin EC (ECOTRIN) 81 MG Tablet Delayed Response Take 1 Tab by mouth every day. 30 Tab 0     No current facility-administered medications for this visit.       No Known Allergies    ROS: As per HPI       Objective:     /68 (BP Location: Right arm, Patient Position: Sitting, BP Cuff Size: Small adult)   Pulse 63   Temp 36.8 °C (98.2 °F) (Temporal)   Ht 1.702 m (5' 7\")   Wt 64.9 kg (143 lb 1.3 oz)   SpO2 100%  Body mass index is 22.41 kg/m².    Physical Exam:  Constitutional: Well-developed and well-nourished. Not diaphoretic. No distress. Lucid and fluent.  Patient, physician and staff all wearing masks.  Skin: Skin is warm and dry. No rash noted.  Head: Atraumatic without lesions.  Eyes: Conjunctivae and extraocular motions are normal. Pupils are equal, round, and reactive to light. No scleral icterus.   Ears:  External ears unremarkable.   Neck: Supple, trachea midline. No thyromegaly present. No cervical or supraclavicular lymphadenopathy. No JVD or carotid bruits appreciated  Cardiovascular: Regular rate and rhythm.  Normal S1, S2 without murmur appreciated.  Chest: Effort normal. Clear to auscultation throughout. No adventitious sounds.   Abdomen: Soft, hypogastrium tender, without distention or rebound  Extremities: No cyanosis, clubbing, erythema, nor edema.   Neurological: Alert and oriented x 3.  Using her cane, steady ambulation.  Psychiatric:  Behavior, mood, and affect are appropriate.       Assessment and Plan:     89 y.o. female with the following issues:    1. Dysuria  URINE CULTURE(NEW)   2. Lumbar and sacral arthritis  HYDROcodone-acetaminophen (NORCO) 7.5-325 MG per tablet    HYDROcodone-acetaminophen (NORCO) 7.5-325 MG per tablet    HYDROcodone-acetaminophen (NORCO) 7.5-325 MG per tablet   3. Arthritis of right elbow  HYDROcodone-acetaminophen (NORCO) 7.5-325 MG per tablet    HYDROcodone-acetaminophen (NORCO) 7.5-325 MG per tablet    " HYDROcodone-acetaminophen (NORCO) 7.5-325 MG per tablet   4. Cervical spine arthritis  HYDROcodone-acetaminophen (NORCO) 7.5-325 MG per tablet    HYDROcodone-acetaminophen (NORCO) 7.5-325 MG per tablet    HYDROcodone-acetaminophen (NORCO) 7.5-325 MG per tablet   5. Chronic use of opiate for therapeutic purpose  Pain Management Screen    Consent for Opiate Prescription    Controlled Substance Treatment Agreement   6. Need for vaccination  Influenza Vaccine, High Dose (65+ Only)     Chronic pain recheck for: Chronic multiple level arthritis in the lumbar and sacral region, cervical spine arthritis, right elbow and shoulder arthritis  Last dose of controlled substance: Today  Chronic pain treated with hydrocodone/APAP taken 3-4 times a day    She  reports no history of alcohol use.  She  reports no history of drug use.    Interval history: Patient has been stable and doing fairly well other than increased right shoulder pain.  She will be seeing the orthopedist again soon as an injection in the shoulder helped her quite a bit in the past.  Any major change in health since last appointment? No    Consequences of Chronic Opiate therapy:  (5 A's)  Analgesia: Compared to no treatment or prior treatment, pain is currently improved  Activity: improved  Adverse Events: She denies constipation, itchy skin, nausea and sedation  Aberrant Behaviors: She reports she is taking medication as prescribed and is not veering from agreed treatment regimen or provider recommendations. There have been no inappropriate refills or lost/stolen meds reported.  Affect/Mood: Pain is impacting patient's mood.  Patient reports stable depression/anxiety.    Nonnarcotic treatments that are being used: topical agents, ice, heat and Patient cannot take nonsteroidal anti-inflammatory drugs due to kidney, heart and past GI problems..     Last imagin    Opioid Risk Score: 1    Interpretation of Opioid Risk Score   Score 0-3 = Low risk of abuse. Do  UDS at least once per year.  Score 4-7 = Moderate risk of abuse. Do UDS 1-4 times per year.  Score 8+ = High risk of abuse. Refer to specialist.    Last order of CONTROLLED SUBSTANCE TREATMENT AGREEMENT was found on 10/6/2021 from Office Visit on 10/6/2021     UDS Summary                URINE DRUG SCREEN Done 9/11/2020 PAIN MANAGEMENT SCREEN     Patient has more history with this topic...        Most recent UDS reviewed today and is consistent with prescribed medications. UDS obtained today.     I have reviewed the medical records, the Prescription Monitoring Program and I have determined that controlled substance treatment is medically indicated.     Followup: Return in about 3 months (around 1/6/2022), or if symptoms worsen or fail to improve.

## 2021-10-09 LAB
BACTERIA UR CULT: NORMAL
SIGNIFICANT IND 70042: NORMAL
SITE SITE: NORMAL
SOURCE SOURCE: NORMAL

## 2021-10-11 ENCOUNTER — TELEPHONE (OUTPATIENT)
Dept: MEDICAL GROUP | Facility: MEDICAL CENTER | Age: 86
End: 2021-10-11

## 2021-10-11 NOTE — TELEPHONE ENCOUNTER
Phone Number Called: [phone]    Call outcome: Spoke to patient regarding message below.    Message: Called to inform patient of message below.                 ----- Message from Faith Bellamy M.D. sent at 10/11/2021 12:58 PM PDT -----  Please let pt know that his urine culture did not grow any significant bacteria.

## 2022-01-06 ENCOUNTER — HOSPITAL ENCOUNTER (OUTPATIENT)
Dept: LAB | Facility: MEDICAL CENTER | Age: 87
End: 2022-01-06
Attending: FAMILY MEDICINE
Payer: MEDICARE

## 2022-01-06 ENCOUNTER — OFFICE VISIT (OUTPATIENT)
Dept: MEDICAL GROUP | Facility: MEDICAL CENTER | Age: 87
End: 2022-01-06
Payer: MEDICARE

## 2022-01-06 VITALS
BODY MASS INDEX: 22.44 KG/M2 | HEART RATE: 97 BPM | HEIGHT: 67 IN | RESPIRATION RATE: 16 BRPM | DIASTOLIC BLOOD PRESSURE: 70 MMHG | WEIGHT: 143 LBS | SYSTOLIC BLOOD PRESSURE: 142 MMHG | TEMPERATURE: 97 F | OXYGEN SATURATION: 97 %

## 2022-01-06 DIAGNOSIS — M19.021 ARTHRITIS OF RIGHT ELBOW: ICD-10-CM

## 2022-01-06 DIAGNOSIS — I27.20 PULMONARY HYPERTENSION (HCC): ICD-10-CM

## 2022-01-06 DIAGNOSIS — R26.81 UNSTEADY GAIT WHEN WALKING: ICD-10-CM

## 2022-01-06 DIAGNOSIS — M47.817 LUMBAR AND SACRAL ARTHRITIS: ICD-10-CM

## 2022-01-06 DIAGNOSIS — Z79.891 CHRONIC USE OF OPIATE FOR THERAPEUTIC PURPOSE: ICD-10-CM

## 2022-01-06 DIAGNOSIS — E03.9 IDIOPATHIC HYPOTHYROIDISM: ICD-10-CM

## 2022-01-06 DIAGNOSIS — I51.7 SEVERE CONCENTRIC LEFT VENTRICULAR HYPERTROPHY: ICD-10-CM

## 2022-01-06 DIAGNOSIS — F31.9 BIPOLAR DEPRESSION (HCC): ICD-10-CM

## 2022-01-06 DIAGNOSIS — I10 ESSENTIAL HYPERTENSION: ICD-10-CM

## 2022-01-06 DIAGNOSIS — N18.31 STAGE 3A CHRONIC KIDNEY DISEASE: ICD-10-CM

## 2022-01-06 DIAGNOSIS — M17.12 PRIMARY OSTEOARTHRITIS OF LEFT KNEE: ICD-10-CM

## 2022-01-06 DIAGNOSIS — M47.812 CERVICAL SPINE ARTHRITIS: ICD-10-CM

## 2022-01-06 LAB
ALBUMIN SERPL BCP-MCNC: 4.2 G/DL (ref 3.2–4.9)
ALBUMIN/GLOB SERPL: 1.2 G/DL
ALP SERPL-CCNC: 67 U/L (ref 30–99)
ALT SERPL-CCNC: 7 U/L (ref 2–50)
ANION GAP SERPL CALC-SCNC: 13 MMOL/L (ref 7–16)
AST SERPL-CCNC: 17 U/L (ref 12–45)
BILIRUB SERPL-MCNC: 0.3 MG/DL (ref 0.1–1.5)
BUN SERPL-MCNC: 17 MG/DL (ref 8–22)
CALCIUM SERPL-MCNC: 9.4 MG/DL (ref 8.5–10.5)
CHLORIDE SERPL-SCNC: 101 MMOL/L (ref 96–112)
CO2 SERPL-SCNC: 21 MMOL/L (ref 20–33)
CREAT SERPL-MCNC: 0.99 MG/DL (ref 0.5–1.4)
ERYTHROCYTE [DISTWIDTH] IN BLOOD BY AUTOMATED COUNT: 44.1 FL (ref 35.9–50)
GLOBULIN SER CALC-MCNC: 3.4 G/DL (ref 1.9–3.5)
GLUCOSE SERPL-MCNC: 77 MG/DL (ref 65–99)
HCT VFR BLD AUTO: 36.7 % (ref 37–47)
HGB BLD-MCNC: 12 G/DL (ref 12–16)
MCH RBC QN AUTO: 31.7 PG (ref 27–33)
MCHC RBC AUTO-ENTMCNC: 32.7 G/DL (ref 33.6–35)
MCV RBC AUTO: 97.1 FL (ref 81.4–97.8)
PLATELET # BLD AUTO: 218 K/UL (ref 164–446)
PMV BLD AUTO: 11.1 FL (ref 9–12.9)
POTASSIUM SERPL-SCNC: 5.5 MMOL/L (ref 3.6–5.5)
PROT SERPL-MCNC: 7.6 G/DL (ref 6–8.2)
RBC # BLD AUTO: 3.78 M/UL (ref 4.2–5.4)
SODIUM SERPL-SCNC: 135 MMOL/L (ref 135–145)
TSH SERPL DL<=0.005 MIU/L-ACNC: 0.08 UIU/ML (ref 0.38–5.33)
WBC # BLD AUTO: 4.8 K/UL (ref 4.8–10.8)

## 2022-01-06 PROCEDURE — 85027 COMPLETE CBC AUTOMATED: CPT

## 2022-01-06 PROCEDURE — 80053 COMPREHEN METABOLIC PANEL: CPT

## 2022-01-06 PROCEDURE — 84443 ASSAY THYROID STIM HORMONE: CPT

## 2022-01-06 PROCEDURE — 99214 OFFICE O/P EST MOD 30 MIN: CPT | Performed by: FAMILY MEDICINE

## 2022-01-06 PROCEDURE — 36415 COLL VENOUS BLD VENIPUNCTURE: CPT

## 2022-01-06 RX ORDER — HYDROCODONE BITARTRATE AND ACETAMINOPHEN 7.5; 325 MG/1; MG/1
1 TABLET ORAL EVERY 6 HOURS PRN
Qty: 100 TABLET | Refills: 0 | Status: SHIPPED | OUTPATIENT
Start: 2022-03-23 | End: 2022-04-13 | Stop reason: SDUPTHER

## 2022-01-06 RX ORDER — HYDROCODONE BITARTRATE AND ACETAMINOPHEN 7.5; 325 MG/1; MG/1
1 TABLET ORAL EVERY 6 HOURS PRN
Qty: 100 TABLET | Refills: 0 | Status: SHIPPED | OUTPATIENT
Start: 2022-02-21 | End: 2022-03-23

## 2022-01-06 RX ORDER — HYDROCODONE BITARTRATE AND ACETAMINOPHEN 7.5; 325 MG/1; MG/1
1 TABLET ORAL EVERY 6 HOURS PRN
Qty: 100 TABLET | Refills: 0 | Status: SHIPPED | OUTPATIENT
Start: 2022-01-22 | End: 2022-02-21

## 2022-01-06 ASSESSMENT — FIBROSIS 4 INDEX: FIB4 SCORE: 3.08

## 2022-01-06 NOTE — PROGRESS NOTES
Chief Complaint   Patient presents with   • Back Pain     3 months   • Arthritis   • Neck Pain   • Unsteady Gait   • Chronic Kidney Disease       Subjective:     HPI:   Ricky Junior presents today with the followin. Lumbar and sacral arthritis  This elderly lady has longstanding arthritis of multiple joints particularly the lumbosacral region.  This is a source of chronic pain.  Patient has been using hydrocodone to help control this pain now for several years.  No adverse events have been reported or observed.  Patient states the regimen is helpful bringing the pain from a 7 or 8 down to 6 and often better.  This allows her to complete her ADLs with some assistance from her .  She denies somnolence or confusion from the regimen.  Patient avoids all alcohol.  Patient is on a very stable regimen.  Patient cannot take nonsteroidal anti-inflammatory drugs on a chronic basis due to chronic kidney disease and cardiac hypertrophy.  Patient does have history of iron deficiency anemia which may have been related to taking nonsteroidal anti-inflammatory drugs in the past.    2. Arthritis of right elbow  Patient has right elbow arthritis which is a common source of pain.  She states the pain regimen is very helpful for this as well.    3. Cervical spine arthritis  Patient has arthritis at multiple levels in the cervical spine.  She still retains good range of motion though there is some limitation to extension and tilting.  She states the hydrocodone regimen continues to be helpful.    4. Primary osteoarthritis of left knee  Patient has left knee osteoarthritis.  She is having much more difficulty walking today than usual.  She states this has been through the last several months.  Her family has commented on this.  Her gait is significantly less steady.  We have discussed this and she requests a 4 wheeled walker with a seat which I think is appropriate.  The medication regimen continues to be helpful and  well-tolerated and is renewed.    5. Chronic use of opiate for therapeutic purpose  No aberrant or addictive use of medications has been observed.  No adverse events have been reported.  Patient counseled to not add Tylenol to current regimen.  Patient counseled to keep medications locked up or under personal control. [facility] of pharmacy interface is reviewed.  No inconsistencies are found.  The patient's maximum MME is 30.  This is within CDC guideline for chronic pain prescribing by primary care.  Patient actually seems to be using around 20 MME daily.    6. Unsteady gait when walking  Patient's gait even using her cane is significantly hunched and it is clear that she is limping somewhat on the left knee as well.  Patient has significant back stiffness and was quite unsteady first getting up out of the chair.  I have requested a 4 wheeled walker with a seat.    7. Pulmonary hypertension (HCC)  Patient does have pulmonary hypertension documented on echocardiogram in the past.  This is not currently a major clinical problem.    8. Stage 3a chronic kidney disease (HCC)  Patient's last EGFR performed in March of last year showed moderate stable chronic kidney disease.  Follow-up lab orders are discussed and placed.    9. Severe concentric left ventricular hypertrophy  This has been stable.  Denies exertional chest pain or shortness of breath.  Patient's blood pressure is in reasonable control.    10. Bipolar depression (HCC)  She is stable on her current regimen and very compliant    11. Essential hypertension  HTN - Chronic condition stable. Currently taking all meds as directed.   She is taking baby aspirin daily.  There is some debate about her taking this but she has had some symptoms in the past that might have been TIAs and she is comfortable with this medication so we will continue it for now.  She is monitoring BP at home.  This is occasional but typically blood pressures in the 130s over 70s.  Denies  symptoms low BP: light-headed, tunnel-vision, unusual fatigue.   Denies symptoms high BP:pounding headache, visual changes, palpitations, flushed face.   Denies medicine side effects: unusual fatigue, slow heartbeat, foot/leg swelling, cough.  - Comp Metabolic Panel; Future    12. Idiopathic hypothyroidism  Patient reports good energy level on the medication. Patient denies insomnia, tremor or change in appetite.  Patient is taking the medication on an empty stomach in the morning and waiting at least 30 minutes before eating.  Last TSH in March 2021 was at target.  Follow-up lab ordered discussed and placed.  - TSH; Future    Has an appointment for her booster on Monday      Patient Active Problem List    Diagnosis Date Noted   • Stage 3a chronic kidney disease (HCC) 06/30/2021   • Arthritis of right elbow 06/30/2021   • Primary osteoarthritis of right shoulder 05/02/2018   • Severe concentric left ventricular hypertrophy    • Chronic use of opiate for therapeutic purpose 09/14/2016   • Cervical spine arthritis 07/24/2015   • Osteoarthritis of left knee    • MEDICAL HOME 02/10/2015   • Hyponatremia 02/05/2015   • Insomnia 12/08/2014   • Low HDL (under 40)    • Lumbar and sacral arthritis    • Pulmonary hypertension ([facility]) 09/20/2014   • Bipolar depression ([facility]) 06/08/2014   • History of iron deficiency anemia 04/06/2013   • Idiopathic hypothyroidism 02/22/2013   • Essential hypertension 03/28/2012       Current medicines (including changes today)  Current Outpatient Medications   Medication Sig Dispense Refill   • Misc. Devices Misc This is an order for a four wheeled walker with seat  Dx; unsteady gait, arthritis of lower back and knee  ICD-10 codes  M47.817, M17.12           CHEYENNE 99 months   NPI [id number] 1 Each 0   • [START ON 1/22/2022] HYDROcodone-acetaminophen (NORCO) 7.5-325 MG per tablet Take 1 Tablet by mouth every 6 hours as needed for Moderate Pain or Severe Pain (arthritis pain, max four per day) for up  "to 30 days. 100 Tablet 0   • [START ON 2/21/2022] HYDROcodone-acetaminophen (NORCO) 7.5-325 MG per tablet Take 1 Tablet by mouth every 6 hours as needed for Moderate Pain or Severe Pain (arthritis pain, max four per day) for up to 30 days. 100 Tablet 0   • [START ON 3/23/2022] HYDROcodone-acetaminophen (NORCO) 7.5-325 MG per tablet Take 1 Tablet by mouth every 6 hours as needed for Moderate Pain or Severe Pain (arthritis pain, max four per day) for up to 30 days. 100 Tablet 0   • SYNTHROID 175 MCG Tab Take 1 tablet by mouth every morning on an empty stomach. 90 tablet 3   • carvedilol (COREG) 12.5 MG Tab Take 1 tablet by mouth 2 times a day with meals. 180 tablet 3   • divalproex (DEPAKOTE) 500 MG Tablet Delayed Response Take 1 tablet by mouth at bedtime. 90 tablet 3   • losartan (COZAAR) 100 MG Tab Take 1 tablet by mouth every day. 90 tablet 3   • QUEtiapine (SEROQUEL) 200 MG Tab Take 1 tablet by mouth at bedtime. 90 tablet 3   • polyethylene glycol 3350 (MIRALAX) 17 GM/SCOOP Powder Take 17 g by mouth every day. 510 g 3   • aspirin EC (ECOTRIN) 81 MG Tablet Delayed Response Take 1 Tab by mouth every day. 30 Tab 0     No current facility-administered medications for this visit.       No Known Allergies    ROS: As per HPI       Objective:     /70 (BP Location: Right arm, Patient Position: Sitting, BP Cuff Size: Adult)   Pulse 97   Temp 36.1 °C (97 °F) (Temporal)   Resp 16   Ht 1.702 m (5' 7\")   Wt 64.9 kg (143 lb)   SpO2 97%  Body mass index is 22.4 kg/m².    Physical Exam:  Constitutional: Well-developed and well-nourished. Not diaphoretic. No distress. Lucid and fluent.  Patient, physician and staff all wearing masks.  Skin: Skin is warm and dry. No rash noted.  Head: Atraumatic without lesions.  Eyes: Conjunctivae and extraocular motions are normal. Pupils are equal, round, and reactive to light. No scleral icterus.   Ears:  External ears unremarkable.   Neck: Range of motion is somewhat limited in " extension and rotation as well as tilting.. No thyromegaly present. No cervical or supraclavicular lymphadenopathy. No JVD or carotid bruits appreciated  Cardiovascular: Regular rate and rhythm.  Normal S1, S2 without murmur appreciated.  Chest: Effort normal. Clear to auscultation throughout. No adventitious sounds.   Back: Moderate spasm, unchanged exam.  Extremities: No cyanosis, clubbing, erythema, nor edema.  Right elbow and shoulder moderately decreased range of motion due to discomfort.  Left knee no effusion but significant crepitus and limitation of motion due to pain.  Neurological: Alert and oriented x 3. Using her cane but is bent over and clearly not steady.  Painful gait.  Psychiatric:  Behavior, mood, and affect are appropriate.       Assessment and Plan:     89 y.o. female with the following issues:    1. Lumbar and sacral arthritis  Misc. Devices Misc    HYDROcodone-acetaminophen (NORCO) 7.5-325 MG per tablet    HYDROcodone-acetaminophen (NORCO) 7.5-325 MG per tablet    HYDROcodone-acetaminophen (NORCO) 7.5-325 MG per tablet   2. Arthritis of right elbow  HYDROcodone-acetaminophen (NORCO) 7.5-325 MG per tablet    HYDROcodone-acetaminophen (NORCO) 7.5-325 MG per tablet    HYDROcodone-acetaminophen (NORCO) 7.5-325 MG per tablet   3. Cervical spine arthritis  HYDROcodone-acetaminophen (NORCO) 7.5-325 MG per tablet    HYDROcodone-acetaminophen (NORCO) 7.5-325 MG per tablet    HYDROcodone-acetaminophen (NORCO) 7.5-325 MG per tablet   4. Primary osteoarthritis of left knee  Misc. Devices Misc   5. Chronic use of opiate for therapeutic purpose     6. Unsteady gait when walking     7. Pulmonary hypertension (HCC)  CBC WITHOUT DIFFERENTIAL   8. Stage 3a chronic kidney disease (HCC)  Comp Metabolic Panel    CBC WITHOUT DIFFERENTIAL   9. Severe concentric left ventricular hypertrophy     10. Bipolar depression (HCC)     11. Essential hypertension  Comp Metabolic Panel   12. Idiopathic hypothyroidism  TSH      Chronic pain recheck for: Pain from degenerative and arthritic changes in the spine, elbows and knees  Last dose of controlled substance: Today  Chronic pain treated with hydrocodone APAP taken 3-4 times a day.  Typically patient says she takes 3, on bad days she will take 4.    She  reports no history of alcohol use.  She  reports no history of drug use.    Interval history: Patient has been very stable overall except for progression in her difficulty with her knee and back affecting her walking  Any major change in health since last appointment? No    Consequences of Chronic Opiate therapy:  (5 A's)  Analgesia: Compared to no treatment or prior treatment, pain is currently improved  Activity: improved  Adverse Events: She denies constipation, itchy skin, nausea and sedation  Aberrant Behaviors: She reports she is taking medication as prescribed and is not veering from agreed treatment regimen or provider recommendations. There have been no inappropriate refills or lost/stolen meds reported.  Affect/Mood: Pain is not impacting patient's mood.  Patient reports stable depression/anxiety.    Nonnarcotic treatments that are being used: topical agents, ice, heat and Patient cannot take nonsteroidal anti-inflammatory drugs due to past history of iron deficiency anemia presumed to be from GI blood loss, ventricular hypertrophy of her heart and chronic kidney disease.     Last imaging: Shoulder x-ray 2018, CT scans of the spine 2015    Opioid Risk Score: 1    Interpretation of Opioid Risk Score   Score 0-3 = Low risk of abuse. Do UDS at least once per year.  Score 4-7 = Moderate risk of abuse. Do UDS 1-4 times per year.  Score 8+ = High risk of abuse. Refer to specialist.    Last order of CONTROLLED SUBSTANCE TREATMENT AGREEMENT was found on 10/6/2021 from Office Visit on 10/6/2021     UDS Summary          URINE DRUG SCREEN (Every 360 Days) Next due on 10/1/2022    10/06/2021  PAIN MANAGEMENT SCREEN    09/11/2020   Pain Management Screen    06/03/2019  Pain Management Screen    05/23/2018  [facility] PAIN MANAGEMENT SCREEN    03/22/2017  [facility] PAIN MANAGEMENT SCREEN    Only the first 5 history entries have been loaded, but more history exists.              Most recent UDS reviewed today and is consistent with prescribed medications.     I have reviewed the medical records, the Prescription Monitoring Program and I have determined that controlled substance treatment is medically indicated.       Followup: Return in about 3 months (around 4/6/2022), or if symptoms worsen or fail to improve.

## 2022-03-30 DIAGNOSIS — K59.01 SLOW TRANSIT CONSTIPATION: ICD-10-CM

## 2022-03-30 RX ORDER — POLYETHYLENE GLYCOL 3350 17 G/17G
POWDER, FOR SOLUTION ORAL
Qty: 510 G | Refills: 3 | Status: SHIPPED | OUTPATIENT
Start: 2022-03-30 | End: 2022-11-28

## 2022-04-13 ENCOUNTER — OFFICE VISIT (OUTPATIENT)
Dept: MEDICAL GROUP | Facility: MEDICAL CENTER | Age: 87
End: 2022-04-13
Payer: MEDICARE

## 2022-04-13 VITALS
WEIGHT: 143.52 LBS | BODY MASS INDEX: 22.53 KG/M2 | RESPIRATION RATE: 16 BRPM | HEIGHT: 67 IN | SYSTOLIC BLOOD PRESSURE: 158 MMHG | DIASTOLIC BLOOD PRESSURE: 74 MMHG | HEART RATE: 60 BPM | TEMPERATURE: 98.3 F | OXYGEN SATURATION: 99 %

## 2022-04-13 DIAGNOSIS — I70.0 AORTIC ATHEROSCLEROSIS (HCC): ICD-10-CM

## 2022-04-13 DIAGNOSIS — M47.812 CERVICAL SPINE ARTHRITIS: ICD-10-CM

## 2022-04-13 DIAGNOSIS — M19.021 ARTHRITIS OF RIGHT ELBOW: ICD-10-CM

## 2022-04-13 DIAGNOSIS — F51.05 INSOMNIA DUE TO MENTAL DISORDER: ICD-10-CM

## 2022-04-13 DIAGNOSIS — I51.7 SEVERE CONCENTRIC LEFT VENTRICULAR HYPERTROPHY: ICD-10-CM

## 2022-04-13 DIAGNOSIS — F31.9 BIPOLAR DEPRESSION (HCC): ICD-10-CM

## 2022-04-13 DIAGNOSIS — I27.20 PULMONARY HYPERTENSION (HCC): ICD-10-CM

## 2022-04-13 DIAGNOSIS — M47.817 LUMBAR AND SACRAL ARTHRITIS: ICD-10-CM

## 2022-04-13 DIAGNOSIS — E03.9 IDIOPATHIC HYPOTHYROIDISM: ICD-10-CM

## 2022-04-13 DIAGNOSIS — M19.011 PRIMARY OSTEOARTHRITIS OF RIGHT SHOULDER: ICD-10-CM

## 2022-04-13 DIAGNOSIS — Z86.2 HISTORY OF IRON DEFICIENCY ANEMIA: ICD-10-CM

## 2022-04-13 DIAGNOSIS — Z79.891 CHRONIC USE OF OPIATE FOR THERAPEUTIC PURPOSE: ICD-10-CM

## 2022-04-13 DIAGNOSIS — E78.6 LOW HDL (UNDER 40): ICD-10-CM

## 2022-04-13 DIAGNOSIS — M17.12 PRIMARY OSTEOARTHRITIS OF LEFT KNEE: ICD-10-CM

## 2022-04-13 DIAGNOSIS — Z00.00 MEDICARE ANNUAL WELLNESS VISIT, SUBSEQUENT: ICD-10-CM

## 2022-04-13 DIAGNOSIS — I10 ESSENTIAL HYPERTENSION: ICD-10-CM

## 2022-04-13 PROCEDURE — G0439 PPPS, SUBSEQ VISIT: HCPCS | Performed by: FAMILY MEDICINE

## 2022-04-13 RX ORDER — DIVALPROEX SODIUM 500 MG/1
500 TABLET, DELAYED RELEASE ORAL
Qty: 90 TABLET | Refills: 3 | Status: SHIPPED | OUTPATIENT
Start: 2022-04-13 | End: 2022-08-24

## 2022-04-13 RX ORDER — LOSARTAN POTASSIUM 100 MG/1
100 TABLET ORAL
Qty: 90 TABLET | Refills: 3 | Status: SHIPPED | OUTPATIENT
Start: 2022-04-13 | End: 2023-04-12 | Stop reason: SDUPTHER

## 2022-04-13 RX ORDER — HYDROCODONE BITARTRATE AND ACETAMINOPHEN 7.5; 325 MG/1; MG/1
1 TABLET ORAL EVERY 6 HOURS PRN
Qty: 100 TABLET | Refills: 0 | Status: SHIPPED | OUTPATIENT
Start: 2022-06-05 | End: 2022-07-05

## 2022-04-13 RX ORDER — QUETIAPINE FUMARATE 200 MG/1
200 TABLET, FILM COATED ORAL
Qty: 90 TABLET | Refills: 3 | Status: SHIPPED | OUTPATIENT
Start: 2022-04-13 | End: 2022-08-24

## 2022-04-13 RX ORDER — LEVOTHYROXINE SODIUM 175 MCG
175 TABLET ORAL
Qty: 90 TABLET | Refills: 3 | Status: SHIPPED | OUTPATIENT
Start: 2022-04-13 | End: 2022-08-24

## 2022-04-13 RX ORDER — HYDROCODONE BITARTRATE AND ACETAMINOPHEN 7.5; 325 MG/1; MG/1
1 TABLET ORAL EVERY 6 HOURS PRN
Qty: 100 TABLET | Refills: 0 | Status: SHIPPED | OUTPATIENT
Start: 2022-07-05 | End: 2022-07-13 | Stop reason: SDUPTHER

## 2022-04-13 RX ORDER — CARVEDILOL 12.5 MG/1
12.5 TABLET ORAL 2 TIMES DAILY WITH MEALS
Qty: 180 TABLET | Refills: 3 | Status: SHIPPED | OUTPATIENT
Start: 2022-04-13 | End: 2022-08-24

## 2022-04-13 RX ORDER — HYDROCODONE BITARTRATE AND ACETAMINOPHEN 7.5; 325 MG/1; MG/1
1 TABLET ORAL EVERY 6 HOURS PRN
Qty: 100 TABLET | Refills: 0 | Status: SHIPPED | OUTPATIENT
Start: 2022-05-06 | End: 2022-06-05

## 2022-04-13 ASSESSMENT — PATIENT HEALTH QUESTIONNAIRE - PHQ9: CLINICAL INTERPRETATION OF PHQ2 SCORE: 0

## 2022-04-13 ASSESSMENT — ENCOUNTER SYMPTOMS: GENERAL WELL-BEING: FAIR

## 2022-04-13 ASSESSMENT — FIBROSIS 4 INDEX: FIB4 SCORE: 2.65

## 2022-04-13 ASSESSMENT — ACTIVITIES OF DAILY LIVING (ADL): BATHING_REQUIRES_ASSISTANCE: 0

## 2022-04-13 NOTE — PROGRESS NOTES
Chief Complaint   Patient presents with   • Annual Wellness Visit         HPI:  Ricky is a 89 y.o. here for Medicare Annual Wellness Visit    She is generally doing well.     Patient Active Problem List    Diagnosis Date Noted   • Stage 3a chronic kidney disease (HCC) 06/30/2021   • Arthritis of right elbow 06/30/2021   • Primary osteoarthritis of right shoulder 05/02/2018   • Severe concentric left ventricular hypertrophy    • Chronic use of opiate for therapeutic purpose 09/14/2016   • Cervical spine arthritis 07/24/2015   • Osteoarthritis of left knee    • MEDICAL HOME 02/10/2015   • Insomnia due to mental disorder 12/08/2014   • Low HDL (under 40)    • Lumbar and sacral arthritis    • Pulmonary hypertension (formerly [facility]) 09/20/2014   • Bipolar depression (formerly [facility]) 06/08/2014   • History of iron deficiency anemia 04/06/2013   • Idiopathic hypothyroidism 02/22/2013   • Essential hypertension 03/28/2012       Current Outpatient Medications   Medication Sig Dispense Refill   • [START ON 5/6/2022] HYDROcodone-acetaminophen (NORCO) 7.5-325 MG tab Take 1 Tablet by mouth every 6 hours as needed for Moderate Pain or Severe Pain (arthritis pain, max four per day) for up to 30 days. 100 Tablet 0   • Misc. Devices Misc This is an order for a four wheeled walker with seat  Dx; unsteady gait, arthritis of lower back and knee  ICD-10 codes  M47.817, M17.12           CHEYENNE 99 months   NPI [id number] 1 Each 0   • [START ON 6/5/2022] HYDROcodone-acetaminophen (NORCO) 7.5-325 MG tab Take 1 Tablet by mouth every 6 hours as needed for Moderate Pain or Severe Pain (arthritis pain, max four per day) for up to 30 days. 100 Tablet 0   • [START ON 7/5/2022] HYDROcodone-acetaminophen (NORCO) 7.5-325 MG tab Take 1 Tablet by mouth every 6 hours as needed for Moderate Pain or Severe Pain (arthritis pain, max four per day) for up to 30 days. 100 Tablet 0   • SYNTHROID 175 MCG Tab Take 1 Tablet by mouth every morning on an empty stomach. 90 Tablet 3   •  QUEtiapine (SEROQUEL) 200 MG Tab Take 1 Tablet by mouth at bedtime. 90 Tablet 3   • losartan (COZAAR) 100 MG Tab Take 1 Tablet by mouth every day. 90 Tablet 3   • divalproex (DEPAKOTE) 500 MG Tablet Delayed Response Take 1 Tablet by mouth at bedtime. 90 Tablet 3   • carvedilol (COREG) 12.5 MG Tab Take 1 Tablet by mouth 2 times a day with meals. 180 Tablet 3   • polyethylene glycol 3350 (MIRALAX) 17 GM/SCOOP Powder MIX 1 CAPFUL(17GRAMS) WITH WATER AND DRINK DAILY 510 g 3   • aspirin EC (ECOTRIN) 81 MG Tablet Delayed Response Take 1 Tab by mouth every day. 30 Tab 0     No current facility-administered medications for this visit.        Patient is taking medications as noted in medication list.  Current supplements as per medication list.     Allergies: Patient has no known allergies.    Current social contact/activities: she is active with family, has a good support system.     Is patient current with immunizations? No, due for SHINGRIX (Shingles). Patient is interested in receiving NONE today.    She  reports that she has never smoked. She has never used smokeless tobacco. She reports that she does not drink alcohol and does not use drugs.  Counseling given: Yes        DPA/Advanced directive: Patient does not have an Advanced Directive.  A packet and workshop information was given on Advanced Directives.    ROS:    Gait: Uses a walker, needs a four wheeled walker with seat  Ostomy: No   Other tubes: No   Amputations: No   Chronic oxygen use No   Last eye exam been 2 years, discussed that she should schedule that again.  Wears hearing aids: No   : Reports urinary leakage during the last 6 months that has interfered a lot with their daily activities or sleep.      Screening:    Discussed, not due currently    Depression Screening  Little interest or pleasure in doing things?  0 - not at all  Feeling down, depressed, or hopeless? 0 - not at all  Patient Health Questionnaire Score: 0    If depressive symptoms  identified deferred to follow up visit unless specifically addressed in assessment and plan.    Interpretation of PHQ-9 Total Score   Score Severity   1-4 No Depression   5-9 Mild Depression   10-14 Moderate Depression   15-19 Moderately Severe Depression   20-27 Severe Depression    Screening for Cognitive Impairment  Three Minute Recall (daughter, heaven, evita)  2/3    González clock face with all 12 numbers and set the hands to show 10 past 11.  No 2/5    If cognitive concerns identified, deferred for follow up unless specifically addressed in assessment and plan.    Fall Risk Assessment  Has the patient had two or more falls in the last year or any fall with injury in the last year?  No  If fall risk identified, deferred for follow up unless specifically addressed in assessment and plan.    Safety Assessment  Throw rugs on floor.  Yes  Handrails on all stairs.  No  Good lighting in all hallways.  Yes  Difficulty hearing.  No  Patient counseled about all safety risks that were identified.    Functional Assessment ADLs  Are there any barriers preventing you from cooking for yourself or meeting nutritional needs?  No.    Are there any barriers preventing you from driving safely or obtaining transportation?  No.    Are there any barriers preventing you from using a telephone or calling for help?  No.    Are there any barriers preventing you from shopping?  No.    Are there any barriers preventing you from taking care of your own finances?  No.    Are there any barriers preventing you from managing your medications?  No.    Are there any barriers preventing you from showering, bathing or dressing yourself?  No.    Are you currently engaging in any exercise or physical activity?  Yes.     What is your perception of your health?  Fair.    Health Maintenance Summary          Postponed - IMM ZOSTER VACCINES (1 of 2) Postponed until 9/13/2022    No completion history exists for this topic.          Postponed - IMM  DTaP/Tdap/Td Vaccine (1 - Tdap) Postponed until 10/6/2022    No completion history exists for this topic.          URINE DRUG SCREEN (Every 360 Days) Next due on 10/1/2022    10/06/2021  PAIN MANAGEMENT SCREEN    09/11/2020  Pain Management Screen    06/03/2019  Pain Management Screen    05/23/2018  [facility] PAIN MANAGEMENT SCREEN    03/22/2017  [facility] PAIN MANAGEMENT SCREEN    Only the first 5 history entries have been loaded, but more history exists.          Annual Wellness Visit (Every 366 Days) Next due on 4/14/2023 04/13/2022  Visit Dx: Medicare annual wellness visit, subsequent    05/07/2018  Visit Dx: Medicare annual wellness visit, subsequent    11/30/2015  Visit Dx: Medicare annual wellness visit, subsequent          IMM PNEUMOCOCCAL VACCINE: 65+ Years (Series Information) Completed    09/14/2016  Imm Admin: Pneumococcal Conjugate Vaccine (Prevnar/PCV-13)    02/25/2013  Imm Admin: Pneumococcal polysaccharide vaccine (PPSV-23)          IMM INFLUENZA (Series Information) Completed    10/06/2021  Imm Admin: Influenza Vaccine Adult HD    01/08/2021  Imm Admin: Influenza Vaccine Adult HD    11/15/2019  Imm Admin: Influenza Vaccine Adult HD    12/03/2018  Imm Admin: Influenza Vaccine Adult HD    09/18/2017  Imm Admin: Influenza Vaccine Adult HD    Only the first 5 history entries have been loaded, but more history exists.          COVID-19 Vaccine (Series Information) Completed    01/10/2022  Imm Admin: Moderna SARS-CoV-2 Vaccine    04/09/2021  Imm Admin: Pfizer SARS-CoV-2 Vaccine 12+    03/19/2021  Imm Admin: Pfizer SARS-CoV-2 Vaccine          IMM HEP B VACCINE (Series Information) Aged Out    No completion history exists for this topic.          IMM MENINGOCOCCAL VACCINE (MCV4) (Series Information) Aged Out    No completion history exists for this topic.          Discontinued - BONE DENSITY  Discontinued    No completion history exists for this topic.          Discontinued - COLORECTAL CANCER  "SCREENING  Discontinued    03/23/2007  COLONOSCOPY (Previously completed - [facility], normal)                Patient Care Team:  Luke Escobar M.D. as PCP - General (Family Medicine)  Tate Claudio M.D. as Consulting Physician (Urology)  Kandy Harris R.N. as Medical Home Care Manager  Hernan Irizarry M.D. as Consulting Physician (Psychiatry & Neurology Psychiatry)  Austin Hayes M.D. as Consulting Physician (Orthopedic Surgery)    Social History     Tobacco Use   • Smoking status: Never Smoker   • Smokeless tobacco: Never Used   Vaping Use   • Vaping Use: Never used   Substance Use Topics   • Alcohol use: No     Alcohol/week: 0.0 oz   • Drug use: No     Family History   Problem Relation Age of Onset   • Hypertension Sister    • Hypertension Brother    • Other Neg Hx      She  has a past medical history of Congestive heart failure (HCC), Hypertension, Hypothyroidism (1990), Low HDL (under 40), Lumbar and sacral arthritis, Nocturnal hypoxia, Osteoarthritis of left knee, Psychiatric disorder, Pulmonary artery hypertension (HCC), Pulmonary hypertension (HCC), 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   Past Surgical History:   Procedure Laterality Date   • COLONOSCOPY  2007   • ME EXCIS UTERINE FIBROID,VAG APPRCH             Exam:     /74 (BP Location: Right arm, Patient Position: Sitting, BP Cuff Size: Adult)   Pulse 60   Temp 36.8 °C (98.3 °F) (Temporal)   Resp 16   Ht 1.702 m (5' 7\")   Wt 65.1 kg (143 lb 8.3 oz)   SpO2 99%  Body mass index is 22.48 kg/m².    Hearing good.    Dentition not examined as patient, physician and staff all wearing masks  Alert, oriented in no acute distress.  She is using her cane.  Eye contact is good, speech goal directed, affect calm  HEENT:   EOMI, PERRLA.     Neck:       Full range of motion, mild cervical kyphosis. No JVD or carotid bruits appreciated. No cervical " adenopathy appreciated. No thyromegaly or neck masses appreciated.  No retractions appreciated.  Lungs:     Clear to auscultation A&P with good air movement.   Heart:      Regular rate and rhythm normal S1 and S2 without murmur appreciated.  Strong and symmetric radial and DP pulses.  Abd:        Soft, bowel sounds positive, nontender. No bloating noted. No hepatosplenomegaly or mass appreciated. No pulsatile mass appreciated.  Ext:         Extremities show symmetric and full range of motion with normal strength. No cyanosis or clubbing appreciated. No peripheral edema appreciated.  Neuro:    Gait is fairly stable with use of her cane but pivoting and turning are more unsteady. Patient is lucid, fluent and appropriate. No significant tremor appreciated.  Skin:       Exhibit no rashes, pigmented lesions or ulcerations.  Blood salts, blood sugar, kidney function and liver enzymes are normal.    Lab results from January 6 this year reviewed and discussed.      Assessment and Plan. The following treatment and monitoring plan is recommended:      1. Medicare annual wellness visit, subsequent  Her medical problems are generally stable    2. Essential hypertension  HTN - Chronic condition stable. Currently taking all meds as directed.   She is taking baby aspirin daily.   She is not monitoring BP at home.   Denies symptoms low BP: light-headed, tunnel-vision, unusual fatigue.   Denies symptoms high BP:pounding headache, visual changes, palpitations, flushed face.   Denies medicine side effects: unusual fatigue, slow heartbeat, foot/leg swelling, cough.  - losartan (COZAAR) 100 MG Tab; Take 1 Tablet by mouth every day.  Dispense: 90 Tablet; Refill: 3  - carvedilol (COREG) 12.5 MG Tab; Take 1 Tablet by mouth 2 times a day with meals.  Dispense: 180 Tablet; Refill: 3    3. Low HDL (under 40)/aortic atherosclerosis(HCC)  Patient denies chest pain, chest pressure, palpitations or exertional shortness of breath. Patient is not  on lipid-lowering medication.  Her last profile was in 2013 and showed excellent LDL and slightly reduced HDL.  I do not feel that a 89-year-old patient with no history of significant cardiovascular disease is a statin candidate.  Patient is a never smoker. Patient takes 81 mg aspirin daily. Patient has no history of myocardial infarction or stroke.  She does have aortic atherosclerosis without aneurysm.  The problem is clinically stable.    4. Pulmonary hypertension (HCC)  Echocardiogram in 2014 did show moderate pulmonary hypertension.  Her past severe concentric LVH seems to have resolved.  Stable problem.    5. History of iron deficiency anemia  Patient does have a history of iron deficiency anemia.  The most recent lab test 3 months ago shows very slight reduction in hematocrit but normal hemoglobin.  Patient denies any increased fatigue.  Denies any visible bleeding.  We will recheck her lab tests in 3 months.  Stable problem    6. Idiopathic hypothyroidism  Patient reports good energy level on the medication. Patient denies insomnia, tremor or change in appetite.  Patient is taking the medication on an empty stomach in the morning and waiting at least 30 minutes before eating.  Last TSH in January was mildly suppressed.  Patient is feeling very good on this dose and I have elected not to change.  Stable problem.  - SYNTHROID 175 MCG Tab; Take 1 Tablet by mouth every morning on an empty stomach.  Dispense: 90 Tablet; Refill: 3    7. Cervical spine arthritis  Patient has osteoarthritis in most of her joints.  This has been highly symptomatic primarily in the neck.  Patient currently takes hydrocodone/APAP for this 3 to 4/day.  Typically she is taking 3 a day.  She and her  fill her pillbox with 3/day and if she does need an extra she will take it but she is trying to stick to 3 only.  She stays well with them the allowed 100 tablets/month, usually makes this last 5 weeks.  Denies somnolence or confusion  from the regimen.  Patient cannot take nonsteroidal anti-inflammatory drugs due to her hypertension cardiovascular problems.  Stable problem.  - HYDROcodone-acetaminophen (NORCO) 7.5-325 MG tab; Take 1 Tablet by mouth every 6 hours as needed for Moderate Pain or Severe Pain (arthritis pain, max four per day) for up to 30 days.  Dispense: 100 Tablet; Refill: 0  - HYDROcodone-acetaminophen (NORCO) 7.5-325 MG tab; Take 1 Tablet by mouth every 6 hours as needed for Moderate Pain or Severe Pain (arthritis pain, max four per day) for up to 30 days.  Dispense: 100 Tablet; Refill: 0  - HYDROcodone-acetaminophen (NORCO) 7.5-325 MG tab; Take 1 Tablet by mouth every 6 hours as needed for Moderate Pain or Severe Pain (arthritis pain, max four per day) for up to 30 days.  Dispense: 100 Tablet; Refill: 0    8. Arthritis of right elbow/Primary osteoarthritis of right shoulder  Patient has chronic arthritis of the right shoulder and elbow.  This is a source of pain.  Patient cannot take nonsteroidal anti-inflammatory drugs due to her hypertension and atherosclerosis.  Patient states that hydrocodone continues to be very helpful in controlling her symptoms.  This takes her pain from a 7 or 8 down to a 5 which allows her to complete her ADLs with assistance.  Denies somnolence or confusion.  - HYDROcodone-acetaminophen (NORCO) 7.5-325 MG tab; Take 1 Tablet by mouth every 6 hours as needed for Moderate Pain or Severe Pain (arthritis pain, max four per day) for up to 30 days.  Dispense: 100 Tablet; Refill: 0  - HYDROcodone-acetaminophen (NORCO) 7.5-325 MG tab; Take 1 Tablet by mouth every 6 hours as needed for Moderate Pain or Severe Pain (arthritis pain, max four per day) for up to 30 days.  Dispense: 100 Tablet; Refill: 0  - HYDROcodone-acetaminophen (NORCO) 7.5-325 MG tab; Take 1 Tablet by mouth every 6 hours as needed for Moderate Pain or Severe Pain (arthritis pain, max four per day) for up to 30 days.  Dispense: 100 Tablet;  Refill: 0    9. Lumbar and sacral arthritis  Patient has moderate lumbar and sacral arthritis with some significant lower spine degenerative changes not unusual at 89 years of age.  Patient states the hydrocodone continues to be very helpful.  She is taking generally 3/day x 2.  Patient denies somnolence from the medication regimen she has been very stable on this regimen now for several years with no adverse events reported or observed.  - HYDROcodone-acetaminophen (NORCO) 7.5-325 MG tab; Take 1 Tablet by mouth every 6 hours as needed for Moderate Pain or Severe Pain (arthritis pain, max four per day) for up to 30 days.  Dispense: 100 Tablet; Refill: 0  - Misc. Devices Misc; This is an order for a four wheeled walker with seat  Dx; unsteady gait, arthritis of lower back and knee  ICD-10 codes  M47.817, M17.12           CHEYENNE 99 months   NPI [id number]  Dispense: 1 Each; Refill: 0  - HYDROcodone-acetaminophen (NORCO) 7.5-325 MG tab; Take 1 Tablet by mouth every 6 hours as needed for Moderate Pain or Severe Pain (arthritis pain, max four per day) for up to 30 days.  Dispense: 100 Tablet; Refill: 0  - HYDROcodone-acetaminophen (NORCO) 7.5-325 MG tab; Take 1 Tablet by mouth every 6 hours as needed for Moderate Pain or Severe Pain (arthritis pain, max four per day) for up to 30 days.  Dispense: 100 Tablet; Refill: 0    10. Primary osteoarthritis of left knee  Patient has significant left knee osteoarthritis.  This is not only a source of pain but occasionally the left knee deysi on her.  Walking with a cane today and pivoting she was significantly unsteady.  While this is overall stable a walker is necessary for her security.  Stable problem.  - Misc. Devices Misc; This is an order for a four wheeled walker with seat  Dx; unsteady gait, arthritis of lower back and knee  ICD-10 codes  M47.817, M17.12           CHEYENNE 99 months   NPI [id number]  Dispense: 1 Each; Refill: 0    11. Chronic use of opiate for therapeutic  purpose  No aberrant or addictive use of medications has been observed.  No adverse events have been reported.  Patient counseled to not add Tylenol to current regimen.  Patient counseled to keep medications locked up or under personal control.  [facility] board of pharmacy interface is reviewed.  No inconsistencies are found.  The patient's maximum MME is 30, and actually use closer to 20.  This is within CDC guideline for chronic pain prescribing by primary care.  Controlled substance treatment agreement signed and on file.  Urine drug screen is up-to-date.  Chronic long-term stable use with good help to the patient and no adverse events reported or observed.  Stable problem.    12. Bipolar depression (HCC)/Insomnia due to mental disorder  Patient does have very stable bipolar disorder.  Feels she currently has very little depression.  Her  and she fill her medication trays and since they began that she has not had any further unintentional overdose.Patient's mood disorder has made it difficult for her to sleep.  The Seroquel works very well and allows her to sleep 6 to 8 hours nightly.  Stable problem.  - QUEtiapine (SEROQUEL) 200 MG Tab; Take 1 Tablet by mouth at bedtime.  Dispense: 90 Tablet; Refill: 3  - divalproex (DEPAKOTE) 500 MG Tablet Delayed Response; Take 1 Tablet by mouth at bedtime.  Dispense: 90 Tablet; Refill: 3        Services suggested: No services needed at this time  Health Care Screening recommendations as per orders if indicated.  Referrals offered: PT/OT/Nutrition counseling/Behavioral Health/Smoking cessation as per orders if indicated.    Discussion today about general wellness and lifestyle habits:  Discussed,  helping a lot  · Prevent falls and reduce trip hazards; Cautioned about securing or removing rugs.  · Have a working fire alarm and carbon monoxide detector; has  · Engage in regular physical activity and social activities       Follow-up: Return in about 3 months (around  7/13/2022), or if symptoms worsen or fail to improve.

## 2022-07-13 ENCOUNTER — OFFICE VISIT (OUTPATIENT)
Dept: MEDICAL GROUP | Facility: MEDICAL CENTER | Age: 87
End: 2022-07-13
Payer: MEDICARE

## 2022-07-13 VITALS
SYSTOLIC BLOOD PRESSURE: 140 MMHG | OXYGEN SATURATION: 92 % | TEMPERATURE: 97.3 F | BODY MASS INDEX: 22.1 KG/M2 | WEIGHT: 140.8 LBS | HEIGHT: 67 IN | HEART RATE: 62 BPM | DIASTOLIC BLOOD PRESSURE: 64 MMHG

## 2022-07-13 DIAGNOSIS — M19.021 ARTHRITIS OF RIGHT ELBOW: ICD-10-CM

## 2022-07-13 DIAGNOSIS — R30.9 URINARY PAIN: ICD-10-CM

## 2022-07-13 DIAGNOSIS — M47.812 CERVICAL SPINE ARTHRITIS: ICD-10-CM

## 2022-07-13 DIAGNOSIS — Z79.891 CHRONIC USE OF OPIATE FOR THERAPEUTIC PURPOSE: ICD-10-CM

## 2022-07-13 DIAGNOSIS — M47.817 LUMBAR AND SACRAL ARTHRITIS: ICD-10-CM

## 2022-07-13 LAB
APPEARANCE UR: CLEAR
BILIRUB UR STRIP-MCNC: NEGATIVE MG/DL
COLOR UR AUTO: YELLOW
GLUCOSE UR STRIP.AUTO-MCNC: NEGATIVE MG/DL
KETONES UR STRIP.AUTO-MCNC: NEGATIVE MG/DL
LEUKOCYTE ESTERASE UR QL STRIP.AUTO: NORMAL
NITRITE UR QL STRIP.AUTO: NEGATIVE
PH UR STRIP.AUTO: 6.5 [PH] (ref 5–8)
PROT UR QL STRIP: NEGATIVE MG/DL
RBC UR QL AUTO: NEGATIVE
SP GR UR STRIP.AUTO: 1.02
UROBILINOGEN UR STRIP-MCNC: NORMAL MG/DL

## 2022-07-13 PROCEDURE — 99213 OFFICE O/P EST LOW 20 MIN: CPT | Performed by: FAMILY MEDICINE

## 2022-07-13 PROCEDURE — 81002 URINALYSIS NONAUTO W/O SCOPE: CPT | Performed by: FAMILY MEDICINE

## 2022-07-13 RX ORDER — HYDROCODONE BITARTRATE AND ACETAMINOPHEN 7.5; 325 MG/1; MG/1
1 TABLET ORAL EVERY 6 HOURS PRN
Qty: 100 TABLET | Refills: 0 | Status: SHIPPED | OUTPATIENT
Start: 2022-09-12 | End: 2022-09-02 | Stop reason: SDUPTHER

## 2022-07-13 RX ORDER — CEPHALEXIN 500 MG/1
500 CAPSULE ORAL 3 TIMES DAILY
Qty: 15 CAPSULE | Refills: 0 | Status: SHIPPED | OUTPATIENT
Start: 2022-07-13 | End: 2022-09-02 | Stop reason: SDUPTHER

## 2022-07-13 RX ORDER — HYDROCODONE BITARTRATE AND ACETAMINOPHEN 7.5; 325 MG/1; MG/1
1 TABLET ORAL EVERY 6 HOURS PRN
Qty: 100 TABLET | Refills: 0 | Status: SHIPPED | OUTPATIENT
Start: 2022-07-13 | End: 2022-08-12

## 2022-07-13 RX ORDER — HYDROCODONE BITARTRATE AND ACETAMINOPHEN 7.5; 325 MG/1; MG/1
1 TABLET ORAL EVERY 6 HOURS PRN
Qty: 100 TABLET | Refills: 0 | Status: SHIPPED | OUTPATIENT
Start: 2022-08-12 | End: 2022-09-02

## 2022-07-13 ASSESSMENT — FIBROSIS 4 INDEX: FIB4 SCORE: 2.68

## 2022-07-13 NOTE — PROGRESS NOTES
Chief Complaint   Patient presents with   • Follow-Up     3 mfv   • Urinary Pain     Burning w/urination   • Back Pain       Subjective:     HPI:   Ricky Junior presents today with the followin. Urinary pain  Patient is noticing some dysuria, frequency and fatigue.  Urinalysis is done here today as a urine dip.  This is basically negative.  Patient is encouraged to hydrate more thoroughly.  However, since she is feeling a little unsteady and definitely has symptoms I am will treat preemptively with cephalexin for 5 days.    2. Lumbar and sacral arthritis  Patient has chronic pain from her lumbar and sacral arthritis.  She feels her stiffness and lower back pain has increased.  She denies flank pain per se.  Patient is chronically on hydrocodone for this problem.  She is typically taking 3 to 4/day.  Lately she has had to take 4/day more frequently.  She is using ice and heat and stretching.  She is able to walk slowly here today using a cane.  She also has her walker and is able to ambulate a little more steadily with that.  She is encouraged to use the walker.  We have avoided nonsteroidal anti-inflammatory drugs with this patient due to her advanced age, moderate chronic kidney disease and increased risks.  She does continue the baby aspirin.    3. Arthritis of right elbow/Cervical spine arthritis  Patient has osteoarthritis in most of her joints including both elbows, particularly on the right and her cervical spine and thoracic spine.  This is a source of chronic pain.  Patient is frail.  She has tolerated the hydrocodone well without increased falling.  She denies somnolence or confusion from the regimen.  She has tolerated this well for several years without adverse events reported or observed.  The medication is renewed.    4. Chronic use of opiate for therapeutic purpose  No aberrant or addictive use of medications has been observed.  No adverse events have been reported.  Patient counseled to  not add Tylenol to current regimen.  Patient counseled to keep medications locked up or under personal control.  [facility] board of pharmacy interface is reviewed.  No inconsistencies are found.  The patient's maximum MME is 30.  This is within CDC guideline for chronic pain prescribing by primary care.        Patient Active Problem List    Diagnosis Date Noted   • Aortic atherosclerosis (HCC) 04/13/2022   • Stage 3a chronic kidney disease (HCC) 06/30/2021   • Arthritis of right elbow 06/30/2021   • Primary osteoarthritis of right shoulder 05/02/2018   • Chronic use of opiate for therapeutic purpose 09/14/2016   • Cervical spine arthritis 07/24/2015   • Osteoarthritis of left knee    • MEDICAL HOME 02/10/2015   • Insomnia due to mental disorder 12/08/2014   • Low HDL (under 40)    • Lumbar and sacral arthritis    • Pulmonary hypertension ([facility]) 09/20/2014   • Bipolar depression ([facility]) 06/08/2014   • History of iron deficiency anemia 04/06/2013   • Idiopathic hypothyroidism 02/22/2013   • Essential hypertension 03/28/2012       Current medicines (including changes today)  Current Outpatient Medications   Medication Sig Dispense Refill   • HYDROcodone-acetaminophen (NORCO) 7.5-325 MG tab Take 1 Tablet by mouth every 6 hours as needed for Moderate Pain or Severe Pain (arthritis pain, max four per day) for up to 30 days. 100 Tablet 0   • [START ON 8/12/2022] HYDROcodone-acetaminophen (NORCO) 7.5-325 MG tab Take 1 Tablet by mouth every 6 hours as needed for Moderate Pain or Severe Pain (arthritis pain, max four per day) for up to 30 days. 100 Tablet 0   • [START ON 9/12/2022] HYDROcodone-acetaminophen (NORCO) 7.5-325 MG tab Take 1 Tablet by mouth every 6 hours as needed for Moderate Pain or Severe Pain (arthritis pain, max four per day) for up to 30 days. 100 Tablet 0   • cephALEXin (KEFLEX) 500 MG Cap Take 1 Capsule by mouth 3 times a day for 5 days. 15 Capsule 0   • Misc. Devices Misc This is an order for a four wheeled  "walker with seat  Dx; unsteady gait, arthritis of lower back and knee  ICD-10 codes  M47.817, M17.12           CHEYENNE 99 months   NPI [id number] 1 Each 0   • SYNTHROID 175 MCG Tab Take 1 Tablet by mouth every morning on an empty stomach. 90 Tablet 3   • QUEtiapine (SEROQUEL) 200 MG Tab Take 1 Tablet by mouth at bedtime. 90 Tablet 3   • losartan (COZAAR) 100 MG Tab Take 1 Tablet by mouth every day. 90 Tablet 3   • divalproex (DEPAKOTE) 500 MG Tablet Delayed Response Take 1 Tablet by mouth at bedtime. 90 Tablet 3   • carvedilol (COREG) 12.5 MG Tab Take 1 Tablet by mouth 2 times a day with meals. 180 Tablet 3   • polyethylene glycol 3350 (MIRALAX) 17 GM/SCOOP Powder MIX 1 CAPFUL(17GRAMS) WITH WATER AND DRINK DAILY 510 g 3   • aspirin EC (ECOTRIN) 81 MG Tablet Delayed Response Take 1 Tab by mouth every day. 30 Tab 0     No current facility-administered medications for this visit.       No Known Allergies    ROS: As per HPI       Objective:     BP (!) 140/64 (BP Location: Right arm, Patient Position: Sitting, BP Cuff Size: Adult)   Pulse 62   Temp 36.3 °C (97.3 °F) (Temporal)   Ht 1.702 m (5' 7\")   Wt 63.9 kg (140 lb 12.8 oz)   SpO2 92%  Body mass index is 22.05 kg/m².    Physical Exam:  Constitutional: Well-developed and well-nourished. Not diaphoretic. No distress. Lucid and fluent.  Patient, physician and staff all wearing masks.  Skin: Skin is warm and dry. No rash noted.  Head: Atraumatic without lesions.  Eyes: Conjunctivae and extraocular motions are normal. Pupils are equal, round, and reactive to light. No scleral icterus.   Ears:  External ears unremarkable.   Neck: Supple, trachea midline. No thyromegaly present. No cervical or supraclavicular lymphadenopathy. No JVD or carotid bruits appreciated  Cardiovascular: Regular rate and rhythm.  Normal S1, S2 without murmur appreciated.  Chest: Effort normal. Clear to auscultation throughout. No adventitious sounds.   Abdomen: Soft, non tender, and without " distention. Active bowel sounds in all four quadrants. No rebound, guarding, masses or hepatosplenomegaly.  Extremities: No cyanosis, clubbing, erythema, nor edema.   Neurological: Alert and oriented x 3. Walking bent over, using a cane.  Slow antalgic gait.  Also has a walker that she came in with.  Psychiatric:  Behavior, mood, and affect are appropriate.    Lab Results   Component Value Date/Time    POCCOLOR YELLOW 07/13/2022 11:40 AM    POCAPPEAR CLEAR 07/13/2022 11:40 AM    POCLEUKEST TRACE 07/13/2022 11:40 AM    POCNITRITE NEGATIVE 07/13/2022 11:40 AM    POCUROBILIGE 0.2 E.U./dL 07/13/2022 11:40 AM    POCPROTEIN NEGATIVE 07/13/2022 11:40 AM    POCURPH 6.5 07/13/2022 11:40 AM    POCBLOOD NEGATIVE 07/13/2022 11:40 AM    POCSPGRV 1.020 07/13/2022 11:40 AM    POCKETONES NEGATIVE 07/13/2022 11:40 AM    POCBILIRUBIN NEGATIVE 07/13/2022 11:40 AM    POCGLUCUA NEGATIVE 07/13/2022 11:40 AM           Assessment and Plan:     89 y.o. female with the following issues:    1. Urinary pain  POCT Urinalysis    cephALEXin (KEFLEX) 500 MG Cap   2. Lumbar and sacral arthritis  HYDROcodone-acetaminophen (NORCO) 7.5-325 MG tab    HYDROcodone-acetaminophen (NORCO) 7.5-325 MG tab    HYDROcodone-acetaminophen (NORCO) 7.5-325 MG tab   3. Arthritis of right elbow  HYDROcodone-acetaminophen (NORCO) 7.5-325 MG tab    HYDROcodone-acetaminophen (NORCO) 7.5-325 MG tab    HYDROcodone-acetaminophen (NORCO) 7.5-325 MG tab   4. Cervical spine arthritis  HYDROcodone-acetaminophen (NORCO) 7.5-325 MG tab    HYDROcodone-acetaminophen (NORCO) 7.5-325 MG tab    HYDROcodone-acetaminophen (NORCO) 7.5-325 MG tab   5. Chronic use of opiate for therapeutic purpose       Chronic pain recheck for: Chronic arthritic and degenerative pain throughout the spine and numerous other joints.  Last dose of controlled substance: Today  Chronic pain treated with hydrocodone APAP taken 3-4 times a day    She  reports no history of alcohol use.  She  reports no history  of drug use.    Interval history: Patient has had an increase in back pain.  She states the medication remains helpful.  Any major change in health since last appointment? Yes see above    Consequences of Chronic Opiate therapy:  (5 A's)  Analgesia: Compared to no treatment or prior treatment, pain is currently improved  Activity: improved  Adverse Events: She denies constipation, itchy skin, nausea and sedation  Aberrant Behaviors: She reports she is taking medication as prescribed and is not veering from agreed treatment regimen or provider recommendations. There have been no inappropriate refills or lost/stolen meds reported.  Affect/Mood: Pain is impacting patient's mood.  Patient reports stable depression/anxiety.    Nonnarcotic treatments that are being used: ice, heat and Patient must avoid nonsteroidal anti-inflammatory drugs due to her moderate chronic kidney disease but also her advanced age and debility..     Last imaging: Shoulder x-ray 2018 of the thoracic and cervical spine imaging 2015    Opioid Risk Score: 1    Interpretation of Opioid Risk Score   Score 0-3 = Low risk of abuse. Do UDS at least once per year.  Score 4-7 = Moderate risk of abuse. Do UDS 1-4 times per year.  Score 8+ = High risk of abuse. Refer to specialist.    Last order of CONTROLLED SUBSTANCE TREATMENT AGREEMENT was found on 10/6/2021 from Office Visit on 10/6/2021     UDS Summary          URINE DRUG SCREEN (Every 360 Days) Next due on 10/1/2022    10/06/2021  PAIN MANAGEMENT SCREEN    09/11/2020  Pain Management Screen    06/03/2019  Pain Management Screen    05/23/2018  [facility] PAIN MANAGEMENT SCREEN    03/22/2017  [facility] PAIN MANAGEMENT SCREEN    Only the first 5 history entries have been loaded, but more history exists.              Most recent UDS reviewed today and is consistent with prescribed medications.     I have reviewed the medical records, the Prescription Monitoring Program and I have determined that  controlled substance treatment is medically indicated.       Followup: Return in about 3 months (around 10/13/2022), or if symptoms worsen or fail to improve.

## 2022-08-22 DIAGNOSIS — I10 ESSENTIAL HYPERTENSION: ICD-10-CM

## 2022-08-22 DIAGNOSIS — F51.05 INSOMNIA DUE TO MENTAL DISORDER: ICD-10-CM

## 2022-08-22 DIAGNOSIS — E03.9 IDIOPATHIC HYPOTHYROIDISM: ICD-10-CM

## 2022-08-22 DIAGNOSIS — F31.9 BIPOLAR DEPRESSION (HCC): ICD-10-CM

## 2022-08-24 ENCOUNTER — APPOINTMENT (OUTPATIENT)
Dept: RADIOLOGY | Facility: MEDICAL CENTER | Age: 87
End: 2022-08-24
Attending: EMERGENCY MEDICINE
Payer: MEDICARE

## 2022-08-24 ENCOUNTER — HOSPITAL ENCOUNTER (EMERGENCY)
Facility: MEDICAL CENTER | Age: 87
End: 2022-08-24
Attending: EMERGENCY MEDICINE
Payer: MEDICARE

## 2022-08-24 VITALS
HEART RATE: 64 BPM | TEMPERATURE: 97.6 F | RESPIRATION RATE: 22 BRPM | SYSTOLIC BLOOD PRESSURE: 158 MMHG | HEIGHT: 67 IN | DIASTOLIC BLOOD PRESSURE: 68 MMHG | BODY MASS INDEX: 21.97 KG/M2 | OXYGEN SATURATION: 90 % | WEIGHT: 140 LBS

## 2022-08-24 DIAGNOSIS — S49.91XA INJURY OF RIGHT SHOULDER, INITIAL ENCOUNTER: ICD-10-CM

## 2022-08-24 DIAGNOSIS — S09.90XA CLOSED HEAD INJURY, INITIAL ENCOUNTER: ICD-10-CM

## 2022-08-24 DIAGNOSIS — S89.91XA INJURY OF RIGHT LOWER EXTREMITY, INITIAL ENCOUNTER: ICD-10-CM

## 2022-08-24 DIAGNOSIS — M06.9 RHEUMATOID ARTHRITIS INVOLVING MULTIPLE SITES, UNSPECIFIED WHETHER RHEUMATOID FACTOR PRESENT (HCC): ICD-10-CM

## 2022-08-24 LAB
ALBUMIN SERPL BCP-MCNC: 4.2 G/DL (ref 3.2–4.9)
ALBUMIN/GLOB SERPL: 1.4 G/DL
ALP SERPL-CCNC: 79 U/L (ref 30–99)
ALT SERPL-CCNC: 7 U/L (ref 2–50)
ANION GAP SERPL CALC-SCNC: 10 MMOL/L (ref 7–16)
AST SERPL-CCNC: 20 U/L (ref 12–45)
BASOPHILS # BLD AUTO: 0.5 % (ref 0–1.8)
BASOPHILS # BLD: 0.03 K/UL (ref 0–0.12)
BILIRUB SERPL-MCNC: 0.2 MG/DL (ref 0.1–1.5)
BUN SERPL-MCNC: 15 MG/DL (ref 8–22)
CALCIUM SERPL-MCNC: 8.9 MG/DL (ref 8.5–10.5)
CHLORIDE SERPL-SCNC: 100 MMOL/L (ref 96–112)
CO2 SERPL-SCNC: 22 MMOL/L (ref 20–33)
CREAT SERPL-MCNC: 0.89 MG/DL (ref 0.5–1.4)
EKG IMPRESSION: NORMAL
EOSINOPHIL # BLD AUTO: 0.12 K/UL (ref 0–0.51)
EOSINOPHIL NFR BLD: 2.2 % (ref 0–6.9)
ERYTHROCYTE [DISTWIDTH] IN BLOOD BY AUTOMATED COUNT: 41.7 FL (ref 35.9–50)
GFR SERPLBLD CREATININE-BSD FMLA CKD-EPI: 61 ML/MIN/1.73 M 2
GLOBULIN SER CALC-MCNC: 3.1 G/DL (ref 1.9–3.5)
GLUCOSE SERPL-MCNC: 95 MG/DL (ref 65–99)
HCT VFR BLD AUTO: 33.5 % (ref 37–47)
HGB BLD-MCNC: 11.7 G/DL (ref 12–16)
IMM GRANULOCYTES # BLD AUTO: 0.02 K/UL (ref 0–0.11)
IMM GRANULOCYTES NFR BLD AUTO: 0.4 % (ref 0–0.9)
LYMPHOCYTES # BLD AUTO: 1.1 K/UL (ref 1–4.8)
LYMPHOCYTES NFR BLD: 19.9 % (ref 22–41)
MCH RBC QN AUTO: 33.1 PG (ref 27–33)
MCHC RBC AUTO-ENTMCNC: 34.9 G/DL (ref 33.6–35)
MCV RBC AUTO: 94.6 FL (ref 81.4–97.8)
MONOCYTES # BLD AUTO: 0.67 K/UL (ref 0–0.85)
MONOCYTES NFR BLD AUTO: 12.1 % (ref 0–13.4)
NEUTROPHILS # BLD AUTO: 3.6 K/UL (ref 2–7.15)
NEUTROPHILS NFR BLD: 64.9 % (ref 44–72)
NRBC # BLD AUTO: 0 K/UL
NRBC BLD-RTO: 0 /100 WBC
PLATELET # BLD AUTO: 197 K/UL (ref 164–446)
PMV BLD AUTO: 10.8 FL (ref 9–12.9)
POTASSIUM SERPL-SCNC: 5.2 MMOL/L (ref 3.6–5.5)
PROT SERPL-MCNC: 7.3 G/DL (ref 6–8.2)
RBC # BLD AUTO: 3.54 M/UL (ref 4.2–5.4)
SODIUM SERPL-SCNC: 132 MMOL/L (ref 135–145)
WBC # BLD AUTO: 5.5 K/UL (ref 4.8–10.8)

## 2022-08-24 PROCEDURE — 99285 EMERGENCY DEPT VISIT HI MDM: CPT

## 2022-08-24 PROCEDURE — 85025 COMPLETE CBC W/AUTO DIFF WBC: CPT

## 2022-08-24 PROCEDURE — 36415 COLL VENOUS BLD VENIPUNCTURE: CPT

## 2022-08-24 PROCEDURE — 96372 THER/PROPH/DIAG INJ SC/IM: CPT

## 2022-08-24 PROCEDURE — 71045 X-RAY EXAM CHEST 1 VIEW: CPT

## 2022-08-24 PROCEDURE — 700111 HCHG RX REV CODE 636 W/ 250 OVERRIDE (IP): Performed by: EMERGENCY MEDICINE

## 2022-08-24 PROCEDURE — 73030 X-RAY EXAM OF SHOULDER: CPT | Mod: RT

## 2022-08-24 PROCEDURE — 70450 CT HEAD/BRAIN W/O DYE: CPT | Mod: MC

## 2022-08-24 PROCEDURE — 73552 X-RAY EXAM OF FEMUR 2/>: CPT | Mod: RT

## 2022-08-24 PROCEDURE — 80053 COMPREHEN METABOLIC PANEL: CPT

## 2022-08-24 PROCEDURE — 93005 ELECTROCARDIOGRAM TRACING: CPT | Performed by: EMERGENCY MEDICINE

## 2022-08-24 RX ORDER — MORPHINE SULFATE 4 MG/ML
4 INJECTION INTRAVENOUS ONCE
Status: DISCONTINUED | OUTPATIENT
Start: 2022-08-24 | End: 2022-08-24

## 2022-08-24 RX ORDER — DIVALPROEX SODIUM 500 MG/1
500 TABLET, DELAYED RELEASE ORAL
Qty: 90 TABLET | Refills: 3 | Status: SHIPPED | OUTPATIENT
Start: 2022-08-24 | End: 2023-04-12 | Stop reason: SDUPTHER

## 2022-08-24 RX ORDER — LEVOTHYROXINE SODIUM 175 MCG
175 TABLET ORAL
Qty: 90 TABLET | Refills: 3 | Status: SHIPPED | OUTPATIENT
Start: 2022-08-24 | End: 2023-07-12 | Stop reason: SDUPTHER

## 2022-08-24 RX ORDER — ONDANSETRON 4 MG/1
4 TABLET, ORALLY DISINTEGRATING ORAL ONCE
Status: COMPLETED | OUTPATIENT
Start: 2022-08-24 | End: 2022-08-24

## 2022-08-24 RX ORDER — OXYCODONE HYDROCHLORIDE AND ACETAMINOPHEN 5; 325 MG/1; MG/1
1 TABLET ORAL EVERY 4 HOURS PRN
Qty: 15 TABLET | Refills: 0 | Status: SHIPPED | OUTPATIENT
Start: 2022-08-24 | End: 2022-08-28

## 2022-08-24 RX ORDER — CARVEDILOL 12.5 MG/1
TABLET ORAL
Qty: 180 TABLET | Refills: 3 | Status: SHIPPED | OUTPATIENT
Start: 2022-08-24 | End: 2023-07-12 | Stop reason: SDUPTHER

## 2022-08-24 RX ORDER — ONDANSETRON 2 MG/ML
4 INJECTION INTRAMUSCULAR; INTRAVENOUS ONCE
Status: DISCONTINUED | OUTPATIENT
Start: 2022-08-24 | End: 2022-08-24

## 2022-08-24 RX ORDER — QUETIAPINE FUMARATE 200 MG/1
200 TABLET, FILM COATED ORAL
Qty: 90 TABLET | Refills: 3 | Status: SHIPPED | OUTPATIENT
Start: 2022-08-24 | End: 2023-04-12 | Stop reason: SDUPTHER

## 2022-08-24 RX ORDER — MORPHINE SULFATE 4 MG/ML
4 INJECTION INTRAVENOUS ONCE
Status: COMPLETED | OUTPATIENT
Start: 2022-08-24 | End: 2022-08-24

## 2022-08-24 RX ADMIN — ONDANSETRON 4 MG: 4 TABLET, ORALLY DISINTEGRATING ORAL at 17:57

## 2022-08-24 RX ADMIN — MORPHINE SULFATE 4 MG: 4 INJECTION INTRAVENOUS at 17:58

## 2022-08-24 ASSESSMENT — FIBROSIS 4 INDEX: FIB4 SCORE: 2.68

## 2022-08-24 NOTE — ED PROVIDER NOTES
"ED Provider Note    Scribed for Teo De Anda M.D. by Ayden Menjivar. 8/24/2022, 4:15 PM.    Primary care provider: Luke Escobar M.D.  Means of arrival: EMS   History obtained from: Patient   History limited by: None     CHIEF COMPLAINT  Chief Complaint   Patient presents with    T-5000 GLF     5 days ago, pt was standing to get out of chair & the chair \"broke\", pt ell on R side & struck R side of head. C/o tenderness on palp to R side of head behind ear & R shoulder tenderness. CSM intact. No LOC, + ASA    Dizziness     C/o dizziness since falling & hitting head. No other neuro deficits noted.       HPI  Ricky Junior is a 89 y.o. female who presents to the Emergency Department for evaluation of intermittent right sided headache onset 5 days ago. Patient reports she had a ground level fall on day of onset. She states she was getting out of her chair when the chair broke, resulting in her falling and striking the right side of her head. No loss of consciousness reported.  In the ED, patient states that since her fall, she has been experiencing mild dizziness and severe right sided headache which prompted her to present to the ED. Patient denies any other falls.  She presents associated symptoms of right leg pain, right shoulder pain, right knee pain, right hip pain, and dizziness . Denies emesis, or fever. No alleviating factors noted at this time. Patient lives with her . Patient has additional history of CHF, and Hypertension.     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    SURGICAL HISTORY   has a past surgical history that includes excis uterine fibroid,vag apprch and colonoscopy (2007).    SOCIAL HISTORY  Social History     Tobacco Use    Smoking status: Never    Smokeless tobacco: Never   Vaping Use    Vaping Use: Never used   Substance Use Topics    Alcohol use: No     Alcohol/week: 0.0 oz    Drug use: No      Social History     Substance and Sexual Activity   Drug Use No       FAMILY HISTORY  Family History   Problem Relation Age of Onset    Hypertension Sister     Hypertension Brother     Other Neg Hx        CURRENT MEDICATIONS  Current Outpatient Medications   Medication Instructions    aspirin EC (ECOTRIN) 81 mg, Oral, DAILY    carvedilol (COREG) 12.5 MG Tab TAKE 1 TABLET BY MOUTH TWICE DAILY WITH MEALS    divalproex (DEPAKOTE) 500 mg, Oral, EVERY BEDTIME    HYDROcodone-acetaminophen (NORCO) 7.5-325 MG tab 1 Tablet, Oral, EVERY 6 HOURS PRN    [START ON 9/12/2022] HYDROcodone-acetaminophen (NORCO) 7.5-325 MG tab 1 Tablet, Oral, EVERY 6 HOURS PRN    losartan (COZAAR) 100 mg, Oral, EVERY DAY    Misc. Devices Misc This is an order for a four wheeled walker with seat  Dx; unsteady gait, arthritis of lower back and knee  ICD-10 codes  M47.817, M17.12           CHEYENNE 99 months   NPI [id number]    polyethylene glycol 3350 (MIRALAX) 17 GM/SCOOP Powder MIX 1 CAPFUL(17GRAMS) WITH WATER AND DRINK DAILY    QUEtiapine (SEROQUEL) 200 mg, Oral, EVERY BEDTIME    Synthroid 175 mcg, Oral, EACH MORNING ON EMPTY STOMACH          ALLERGIES  No Known Allergies    PHYSICAL EXAM  VITAL SIGNS: BP (!) 158/68   Pulse 62   Temp 36.4 °C (97.5 °F) (Temporal)   Resp (!) 22   Ht 1.702 m (5' 7\")   Wt 63.5 kg (140 lb)   SpO2 95%   BMI 21.93 kg/m²     Constitutional: Well developed, Well nourished, No acute distress, elderly in appearence.    HENT: Normocephalic, Bilateral external ears normal, oropharynx moist, No oral " exudates, Nose normal.   Eyes:conjunctiva is normal, there are no signs of exudate. EOMI and PERRLA   Neck: Supple, no meningeal signs. Non tender   Lymphatic: No lymphadenopathy noted.   Cardiovascular: Regular rate and rhythm without murmurs gallops or rubs.   Thorax & Lungs: No respiratory distress. Breathing comfortably. Diminished breath sounds bilaterally, there are no wheezes no rales. Chest wall is nontender.  Abdomen: Soft, nontender, nondistended. Bowel sounds are present.   Skin: Warm, Dry, No erythema,   Back: No tenderness, No CVA tenderness.  Musculoskeletal: Tenderness in the right parietal area with no evidence of hemorrhage. Right shoulder is tender to palpation with limited range of motion secondary to pain. Lateral right chest is tender to palpation. Tenderness to the right femur about the mid thigh and right hip. No signficant bony tenderness or crepitus. Limited range of motion to history of arthritis.   Neurologic: Alert & oriented x 3, Moving all extremities. No gross abnormalities.    Psychiatric: Affect normal, Judgment normal, Mood normal.     LABS  Results for orders placed or performed during the hospital encounter of 08/24/22   CBC with Differential   Result Value Ref Range    WBC 5.5 4.8 - 10.8 K/uL    RBC 3.54 (L) 4.20 - 5.40 M/uL    Hemoglobin 11.7 (L) 12.0 - 16.0 g/dL    Hematocrit 33.5 (L) 37.0 - 47.0 %    MCV 94.6 81.4 - 97.8 fL    MCH 33.1 (H) 27.0 - 33.0 pg    MCHC 34.9 33.6 - 35.0 g/dL    RDW 41.7 35.9 - 50.0 fL    Platelet Count 197 164 - 446 K/uL    MPV 10.8 9.0 - 12.9 fL    Neutrophils-Polys 64.90 44.00 - 72.00 %    Lymphocytes 19.90 (L) 22.00 - 41.00 %    Monocytes 12.10 0.00 - 13.40 %    Eosinophils 2.20 0.00 - 6.90 %    Basophils 0.50 0.00 - 1.80 %    Immature Granulocytes 0.40 0.00 - 0.90 %    Nucleated RBC 0.00 /100 WBC    Neutrophils (Absolute) 3.60 2.00 - 7.15 K/uL    Lymphs (Absolute) 1.10 1.00 - 4.80 K/uL    Monos (Absolute) 0.67 0.00 - 0.85 K/uL    Eos (Absolute)  0.12 0.00 - 0.51 K/uL    Baso (Absolute) 0.03 0.00 - 0.12 K/uL    Immature Granulocytes (abs) 0.02 0.00 - 0.11 K/uL    NRBC (Absolute) 0.00 K/uL   Complete Metabolic Panel (CMP)   Result Value Ref Range    Sodium 132 (L) 135 - 145 mmol/L    Potassium 5.2 3.6 - 5.5 mmol/L    Chloride 100 96 - 112 mmol/L    Co2 22 20 - 33 mmol/L    Anion Gap 10.0 7.0 - 16.0    Glucose 95 65 - 99 mg/dL    Bun 15 8 - 22 mg/dL    Creatinine 0.89 0.50 - 1.40 mg/dL    Calcium 8.9 8.5 - 10.5 mg/dL    AST(SGOT) 20 12 - 45 U/L    ALT(SGPT) 7 2 - 50 U/L    Alkaline Phosphatase 79 30 - 99 U/L    Total Bilirubin 0.2 0.1 - 1.5 mg/dL    Albumin 4.2 3.2 - 4.9 g/dL    Total Protein 7.3 6.0 - 8.2 g/dL    Globulin 3.1 1.9 - 3.5 g/dL    A-G Ratio 1.4 g/dL   ESTIMATED GFR   Result Value Ref Range    GFR (CKD-EPI) 61 >60 mL/min/1.73 m 2   EKG   Result Value Ref Range    Report       [facility] Emergency Dept.    Test Date:  2022  Pt Name:    JERRY URIAS                Department: ER  MRN:        [id number]                      Room:       [facility]  Gender:     Female                       Technician: [id number]  :        [id number]                   Requested By:ER TRIAGE PROTOCOL  Order #:    [id number]                    Reading MD:    Measurements  Intervals                                Axis  Rate:       60                           P:          43  MO:         158                          QRS:        0  QRSD:       90                           T:          -1  QT:         399  QTc:        399    Interpretive Statements  Sinus rhythm  Borderline T abnormalities, diffuse leads  Baseline wander in lead(s) V6  Compared to ECG 2018 11:27:29  T-wave abnormality now present  Sinus bradycardia no longer present     12 lead EKG interpreted by me as shown above.  All labs reviewed by me.      RADIOLOGY  DX-SHOULDER 2+ RIGHT   Final Result      1.  No fracture or gross dislocation of RIGHT shoulder.   2.  Severe degenerative change of  "glenohumeral joint.   3.  Findings suggest calcific tendinopathy of the rotator cuff.  Axillary view is limited by positioning.      DX-FEMUR-2+ RIGHT   Final Result      No RIGHT femur fracture.      DX-CHEST-PORTABLE (1 VIEW)   Final Result         Patchy left basilar opacities, likely atelectasis.      CT-HEAD W/O   Final Result         1. No acute intracranial abnormality. No evidence of acute intracranial hemorrhage or mass lesion.                       The radiologist's interpretation of all radiological studies have been reviewed by me.    COURSE & MEDICAL DECISION MAKING  Pertinent Labs & Imaging studies reviewed. (See chart for details)    4:15 PM - Patient evaluated at bedside. Differential diagnoses include but not limited to: fracture vs sprain, MSK pain, . Discussed with patient about administering basic labs and imaging for further evaluation. Informed patient about medication administration for pain control . Patient verbalizes understanding and agreement to this plan of care.     4:27 PM Ordered DX-chest, Dx-femur, Ct-head, Dx-shoulder, EKG, CBC with diff., CMP for further evaluation.  Patient will be treated with Morphine 4 mg and Zofran 4 mg .     6:01 PM Patient was re-evaluated at bedside. Patient feels mildly improved and will be treated with Morphine 4 mg and Zofran 4 mg. Discussed labs and radiology as shown above. I discussed with patient plan of care following discharge. Patient was given opportunity to ask questions at this time. Counseled patient on proper prescription medication dosages for pain control and relief. Advised patient to use a walker to ambulate and patient will be ambulated prior to discharged. Patient verbalizes understanding and agrees to care of plan.  Patient will be discharged with a prescription for Percocet and a referral to PCP. Her vital signs prior to discharge: BP (!) 158/68   Pulse 62   Temp 36.4 °C (97.5 °F) (Temporal)   Resp (!) 22   Ht 1.702 m (5' 7\")   " Wt 63.5 kg (140 lb)   SpO2 95%   BMI 21.93 kg/m²     Decision Making:  Patient presents for evaluation.  Clinically she is tender in the above areas.  I did give her some pain medications and was concerned about the possibility of an intercerebral injury.  CT scan was negative x-rays of the affected areas were also normal no signs of fractures.  Patient was reevaluated she stated that she was feeling better and just requested something to help her with the pain.  The patient states that she has had some hydrocodone at home which was not helping too much so we will give her Percocet.    The patient will return for new or worsening symptoms and is stable at the time of discharge.    The patient is referred to a primary physician for blood pressure management, diabetic screening, and for all other preventative health concerns.    In prescribing controlled substances to this patient, I certify that I have obtained and reviewed the medical history of Ricky Junior. I have also made a good mira effort to obtain applicable records from other providers who have treated the patient and records did not demonstrate any increased risk of substance abuse that would prevent me from prescribing controlled substances.     I have conducted a physical exam and documented it. I have reviewed Ms. Junior’s prescription history as maintained by the [place] Prescription Monitoring Program.     I have assessed the patient’s risk for abuse, dependency, and addiction using the validated Opioid Risk Tool available at https://www.mdcalc.com/ewjjca-evnn-ludz-ort-narcotic-abuse.     Given the above, I believe the benefits of controlled substance therapy outweigh the risks. The reasons for prescribing controlled substances include non-narcotic, oral analgesic alternatives have been inadequate for pain control. Accordingly, I have discussed the risk and benefits, treatment plan, and alternative therapies with the patient.        DISPOSITION:  Patient will be discharged home in stable condition.    FOLLOW UP:  No follow-up provider specified.    OUTPATIENT MEDICATIONS:  Discharge Medication List as of 8/24/2022  6:34 PM        START taking these medications    Details   oxyCODONE-acetaminophen (PERCOCET) 5-325 MG Tab Take 1 Tablet by mouth every four hours as needed for Moderate Pain for up to 4 days., Disp-15 Tablet, R-0, Print Rx Paper                FINAL IMPRESSION  1. Closed head injury, initial encounter    2. Injury of right shoulder, initial encounter    3. Injury of right lower extremity, initial encounter    4. Rheumatoid arthritis involving multiple sites, unspecified whether rheumatoid factor present (HCC)          Ayden AN (Scribe), am scribing for, and in the presence of, Teo De Anda M.D..    Electronically signed by: Ayden Menjivar (Scribe), 8/24/2022    ITeo M.D. personally performed the services described in this documentation, as scribed by Ayden Menjivar in my presence, and it is both accurate and complete.    C    The note accurately reflects work and decisions made by me.  Teo De Anda M.D.  8/25/2022  10:38 PM

## 2022-08-24 NOTE — ED TRIAGE NOTES
"Ricky Junior  89 y.o.  female  Chief Complaint   Patient presents with    T-5000 GLF     5 days ago, pt was standing to get out of chair & the chair \"broke\", pt ell on R side & struck R side of head. C/o tenderness on palp to R side of head behind ear & R shoulder tenderness. CSM intact. No LOC, + ASA    Dizziness     C/o dizziness since falling & hitting head. No other neuro deficits noted.     BIB [facility]. . EKG ordered.  "

## 2022-08-25 DIAGNOSIS — M47.817 LUMBAR AND SACRAL ARTHRITIS: ICD-10-CM

## 2022-08-25 DIAGNOSIS — M19.021 ARTHRITIS OF RIGHT ELBOW: ICD-10-CM

## 2022-08-25 DIAGNOSIS — M47.812 CERVICAL SPINE ARTHRITIS: ICD-10-CM

## 2022-08-25 RX ORDER — HYDROCODONE BITARTRATE AND ACETAMINOPHEN 7.5; 325 MG/1; MG/1
1 TABLET ORAL EVERY 6 HOURS PRN
Qty: 100 TABLET | Refills: 0 | Status: CANCELLED | OUTPATIENT
Start: 2022-08-25 | End: 2022-09-24

## 2022-08-25 NOTE — ED NOTES
Discharge teaching and paperwork provided regarding closed head injury and all questions/concerns answered. VSS, assessment stable. Given information regarding home care and reasons to follow up with ED or primary MD. Patient provided with oxycodone Rx. Able to ambulate with cane with steady gait. Patient discharged to the care of  and assisted by W/C out of the ED.

## 2022-08-26 NOTE — TELEPHONE ENCOUNTER
Thank you, she has updated refills in the chart I do not know why Michael continually makes trouble for her.

## 2022-09-02 ENCOUNTER — OFFICE VISIT (OUTPATIENT)
Dept: MEDICAL GROUP | Facility: MEDICAL CENTER | Age: 87
End: 2022-09-02
Payer: MEDICARE

## 2022-09-02 VITALS
DIASTOLIC BLOOD PRESSURE: 74 MMHG | HEART RATE: 74 BPM | WEIGHT: 140 LBS | OXYGEN SATURATION: 95 % | TEMPERATURE: 97.6 F | SYSTOLIC BLOOD PRESSURE: 124 MMHG | HEIGHT: 67 IN | BODY MASS INDEX: 21.97 KG/M2 | RESPIRATION RATE: 16 BRPM
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
## 2022-09-02 DIAGNOSIS — Z79.891 CHRONIC USE OF OPIATE FOR THERAPEUTIC PURPOSE: ICD-10-CM

## 2022-09-02 DIAGNOSIS — M47.812 CERVICAL SPINE ARTHRITIS: ICD-10-CM
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
## 2022-09-02 DIAGNOSIS — M47.817 LUMBAR AND SACRAL ARTHRITIS: ICD-10-CM

## 2022-09-02 DIAGNOSIS — R60.9 PERIPHERAL EDEMA: ICD-10-CM

## 2022-09-02 DIAGNOSIS — M19.021 ARTHRITIS OF RIGHT ELBOW: ICD-10-CM

## 2022-09-02 DIAGNOSIS — R30.9 URINARY PAIN: ICD-10-CM

## 2022-09-02 PROCEDURE — 99214 OFFICE O/P EST MOD 30 MIN: CPT | Performed by: FAMILY MEDICINE

## 2022-09-02 RX ORDER — HYDROCHLOROTHIAZIDE 12.5 MG/1
12.5 CAPSULE, GELATIN COATED ORAL
Qty: 90 CAPSULE | Refills: 0 | Status: SHIPPED | OUTPATIENT
Start: 2022-09-02 | End: 2022-10-05 | Stop reason: SDUPTHER

## 2022-09-02 RX ORDER — HYDROCODONE BITARTRATE AND ACETAMINOPHEN 7.5; 325 MG/1; MG/1
1 TABLET ORAL EVERY 8 HOURS PRN
Qty: 90 TABLET | Refills: 0 | Status: SHIPPED | OUTPATIENT
Start: 2022-09-24 | End: 2022-10-05 | Stop reason: SDUPTHER

## 2022-09-02 RX ORDER — CEPHALEXIN 500 MG/1
500 CAPSULE ORAL 3 TIMES DAILY
Qty: 15 CAPSULE | Refills: 0 | Status: SHIPPED | OUTPATIENT
Start: 2022-09-02 | End: 2022-09-07

## 2022-09-02 ASSESSMENT — FIBROSIS 4 INDEX: FIB4 SCORE: 3.49

## 2022-09-02 NOTE — PROGRESS NOTES
Subjective:     iRcky Junior is a 89 y.o. female who presents for Hospital Follow-up.    Has not received a phone call    HPI:   Recently seen in the ER August 24 for fall onto her right side when two legs of the chair she was sitting on broke.  She continues to have right shoulder pain and hip pain.  Had x-rays of hip, CT head and x-ray shoulder.  Also chest x-ray which was negative for fracture.  No fractures, no hematoma.  Severe arthritis of the shoulder was noted.  She states it is still somewhat painful.  She has reasonable but not full range of motion of the right shoulder today.    States the keflex was helpful in reducing the urinary pain.  She would like this again.  Agreed.    She is taking her depression medications regularly.    Current medicines (including reconciliation performed today)  Current Outpatient Medications   Medication Sig Dispense Refill    cephALEXin (KEFLEX) 500 MG Cap Take 1 Capsule by mouth 3 times a day for 5 days. 15 Capsule 0    [START ON 9/24/2022] HYDROcodone-acetaminophen (NORCO) 7.5-325 MG tab Take 1 Tablet by mouth every 8 hours as needed for Moderate Pain or Severe Pain (arthritis pain, max four per day) for up to 30 days. 90 tablets is a 30 day quantity 90 Tablet 0    hydrochlorothiazide (MICROZIDE) 12.5 MG capsule Take 1 Capsule by mouth every morning as needed (edema). 90 Capsule 0    SYNTHROID 175 MCG Tab TAKE 1 TABLET BY MOUTH EVERY MORNING ON AN EMPTY STOMACH 90 Tablet 3    carvedilol (COREG) 12.5 MG Tab TAKE 1 TABLET BY MOUTH TWICE DAILY WITH MEALS 180 Tablet 3    divalproex (DEPAKOTE) 500 MG Tablet Delayed Response TAKE 1 TABLET BY MOUTH AT BEDTIME 90 Tablet 3    QUEtiapine (SEROQUEL) 200 MG Tab TAKE 1 TABLET BY MOUTH AT BEDTIME 90 Tablet 3    Misc. Devices Misc This is an order for a four wheeled walker with seat  Dx; unsteady gait, arthritis of lower back and knee  ICD-10 codes  M47.817, M17.12           CHEYENNE 99 months   NPI [id number] 1 Each 0    losartan  "(COZAAR) 100 MG Tab Take 1 Tablet by mouth every day. 90 Tablet 3    polyethylene glycol 3350 (MIRALAX) 17 GM/SCOOP Powder MIX 1 CAPFUL(17GRAMS) WITH WATER AND DRINK DAILY 510 g 3    aspirin EC (ECOTRIN) 81 MG Tablet Delayed Response Take 1 Tab by mouth every day. 30 Tab 0     No current facility-administered medications for this visit.       Allergies:   Patient has no known allergies.    Social History     Tobacco Use    Smoking status: Never    Smokeless tobacco: Never   Vaping Use    Vaping Use: Never used   Substance Use Topics    Alcohol use: No     Alcohol/week: 0.0 oz    Drug use: No       ROS:  Denies chest pain or shortness of breath.    Objective:     Vitals:    09/02/22 1443   BP: 124/74   Pulse: 74   Resp: 16   Temp: 36.4 °C (97.6 °F)   SpO2: 95%   Weight: 63.5 kg (140 lb)   Height: 1.702 m (5' 7\")     Body mass index is 21.93 kg/m².    Physical Exam:  Vital signs reviewed  Patient is using her walker  She is alert and well oriented, appropriate.  Neck range of motion is full.  Moderate cervical kyphosis and more advanced thoracic kyphosis.  This appears unchanged.  Right shoulder range of motion is limited to 90 degrees.  Left shoulder range of motion is significantly better.  Patient does have some tenderness on palpation of the shoulder without bruising or erythema.  Lungs are clear to auscultation A&P.  There is significant edema of the lower legs and feet.  There is some mild pitting but this is more nonpitting edema.    Assessment and Plan:     1. Fall at home, subsequent encounter  Patient fell onto her right side hit the head, right shoulder, right thigh and chest wall.  She was thoroughly evaluated in the emergency room and no fracture or hematoma was found.  Patient states she is feeling better, just has some increased pain in the right shoulder and hip.  She is able to ambulate using her walker.    2. Urinary pain  Patient does have urinary pain and stated that the Keflex which I had " prescribed a few weeks ago helps tremendously.  Some of those symptoms have returned but she would like another course of antibiotics.  Prescription is renewed.  - cephALEXin (KEFLEX) 500 MG Cap; Take 1 Capsule by mouth 3 times a day for 5 days.  Dispense: 15 Capsule; Refill: 0    3. Lumbar and sacral arthritis  Patient's hydrocodone continues to be helpful for her arthritic pain in her back and is also helpful for her current increased pain since her fall.  Denies somnolence or confusion from the regimen.  She has reduced to 3/day so I have reduced the prescription to 90 tablets.  - HYDROcodone-acetaminophen (NORCO) 7.5-325 MG tab; Take 1 Tablet by mouth every 8 hours as needed for Moderate Pain or Severe Pain (arthritis pain, max four per day) for up to 30 days. 90 tablets is a 30 day quantity  Dispense: 90 Tablet; Refill: 0    4. Arthritis of right elbow  Patient continues to have right elbow arthritis though that did not get significantly changed by the fall.  Denies somnolence or confusion from the regimen.  - HYDROcodone-acetaminophen (NORCO) 7.5-325 MG tab; Take 1 Tablet by mouth every 8 hours as needed for Moderate Pain or Severe Pain (arthritis pain, max four per day) for up to 30 days. 90 tablets is a 30 day quantity  Dispense: 90 Tablet; Refill: 0    5. Cervical spine arthritis  Patient's cervical spine symptoms remain stable.  This did not get markedly changed by her fall.  Range of motion is reasonable.  - HYDROcodone-acetaminophen (NORCO) 7.5-325 MG tab; Take 1 Tablet by mouth every 8 hours as needed for Moderate Pain or Severe Pain (arthritis pain, max four per day) for up to 30 days. 90 tablets is a 30 day quantity  Dispense: 90 Tablet; Refill: 0    6. Chronic use of opiate for therapeutic purpose  No aberrant or addictive use of medications has been observed.  No adverse events have been reported.  Patient counseled to not add Tylenol to current regimen.  Patient counseled to keep medications locked  up or under personal control.  [facility] board of pharmacy interface is reviewed.  No inconsistencies are found.  The patient's maximum MME is 22.5.  This is within CDC guideline for chronic pain prescribing by primary care.  Urine drug screen obtained today.  - Pain Management Screen; Future    7. Peripheral edema  Patient does have some edema.  I am reluctant to use high-dose diuretics on such an elderly lady.  We will start low-dose hydrochlorothiazide and see if that is effective enough for her.  She will be seen again in a month.  - hydrochlorothiazide (MICROZIDE) 12.5 MG capsule; Take 1 Capsule by mouth every morning as needed (edema).  Dispense: 90 Capsule; Refill: 0      - Chart and discharge summary were reviewed.   - Hospitalization and results reviewed with patient. X-ray results and CT result reviewed  - Medications reviewed including instructions regarding high risk medications, dosing and side effects.  - Recommended Services: No services needed at this time  - Advance directive/POLST on file?  No     Consequences of Chronic Opiate therapy:  (5 A's)  Analgesia:  improved when she takes the medication  Activity:  improved  Adverse Events: None reported or observed  Aberrant Behaviors: None reported or observed  Affect/Mood: good grooming, full facial expressions, normal speech pattern and content, normal thought patterns, normal perception, good insight, normal reasoning  Last CMP:   August 24, normal overall  Appropriate Imaging done:   Yes      Follow-up:Return in about 1 month (around 10/5/2022), or if symptoms worsen or fail to improve.

## 2022-10-05 ENCOUNTER — OFFICE VISIT (OUTPATIENT)
Dept: MEDICAL GROUP | Facility: MEDICAL CENTER | Age: 87
End: 2022-10-05
Payer: MEDICARE

## 2022-10-05 VITALS
RESPIRATION RATE: 16 BRPM | OXYGEN SATURATION: 99 % | WEIGHT: 139.55 LBS | DIASTOLIC BLOOD PRESSURE: 56 MMHG | HEIGHT: 67 IN | SYSTOLIC BLOOD PRESSURE: 122 MMHG | TEMPERATURE: 98.1 F | BODY MASS INDEX: 21.9 KG/M2 | HEART RATE: 62 BPM

## 2022-10-05 DIAGNOSIS — Z79.891 CHRONIC USE OF OPIATE FOR THERAPEUTIC PURPOSE: ICD-10-CM

## 2022-10-05 DIAGNOSIS — M47.812 CERVICAL SPINE ARTHRITIS: ICD-10-CM

## 2022-10-05 DIAGNOSIS — M47.817 LUMBAR AND SACRAL ARTHRITIS: ICD-10-CM

## 2022-10-05 DIAGNOSIS — M19.021 ARTHRITIS OF RIGHT ELBOW: ICD-10-CM

## 2022-10-05 DIAGNOSIS — Z23 NEED FOR IMMUNIZATION AGAINST INFLUENZA: ICD-10-CM

## 2022-10-05 DIAGNOSIS — N30.90 CYSTITIS: ICD-10-CM

## 2022-10-05 DIAGNOSIS — R60.9 PERIPHERAL EDEMA: ICD-10-CM

## 2022-10-05 PROCEDURE — 99213 OFFICE O/P EST LOW 20 MIN: CPT | Mod: 25 | Performed by: FAMILY MEDICINE

## 2022-10-05 PROCEDURE — 90662 IIV NO PRSV INCREASED AG IM: CPT | Performed by: FAMILY MEDICINE

## 2022-10-05 PROCEDURE — G0008 ADMIN INFLUENZA VIRUS VAC: HCPCS | Performed by: FAMILY MEDICINE

## 2022-10-05 RX ORDER — HYDROCHLOROTHIAZIDE 12.5 MG/1
12.5 CAPSULE, GELATIN COATED ORAL
Qty: 90 CAPSULE | Refills: 3 | Status: SHIPPED | OUTPATIENT
Start: 2022-10-05 | End: 2023-04-12

## 2022-10-05 RX ORDER — HYDROCODONE BITARTRATE AND ACETAMINOPHEN 7.5; 325 MG/1; MG/1
1 TABLET ORAL EVERY 8 HOURS PRN
Qty: 90 TABLET | Refills: 0 | Status: SHIPPED | OUTPATIENT
Start: 2022-10-24 | End: 2022-11-23

## 2022-10-05 RX ORDER — HYDROCODONE BITARTRATE AND ACETAMINOPHEN 7.5; 325 MG/1; MG/1
1 TABLET ORAL EVERY 8 HOURS PRN
Qty: 90 TABLET | Refills: 0 | Status: SHIPPED | OUTPATIENT
Start: 2022-12-23 | End: 2023-01-11 | Stop reason: SDUPTHER

## 2022-10-05 RX ORDER — HYDROCODONE BITARTRATE AND ACETAMINOPHEN 7.5; 325 MG/1; MG/1
1 TABLET ORAL EVERY 8 HOURS PRN
Qty: 90 TABLET | Refills: 0 | Status: SHIPPED | OUTPATIENT
Start: 2022-11-23 | End: 2022-12-23

## 2022-10-05 RX ORDER — CEPHALEXIN 500 MG/1
500 CAPSULE ORAL 3 TIMES DAILY
Qty: 15 CAPSULE | Refills: 0 | Status: SHIPPED | OUTPATIENT
Start: 2022-10-05 | End: 2023-01-11 | Stop reason: SDUPTHER

## 2022-10-05 ASSESSMENT — FIBROSIS 4 INDEX: FIB4 SCORE: 3.49

## 2022-10-05 NOTE — PROGRESS NOTES
Chief Complaint   Patient presents with    Follow-Up     3 month follow up to go over her medications     Arthritis       Subjective:     HPI:   Ricky Junior presents today with the followin. Cystitis  Patient did have dysuria with UTI symptoms back in August.  She responded very well clinically to cephalexin.  She is having some burning on urination and a feeling of fullness in the bladder region again.  Denies any visible blood.  Patient is unable to give a urine sample today.  I will go ahead and treat presumptively.  She will let me know if her symptoms do not improve.    2. Lumbar and sacral arthritis  Her back pain has calmed down to her normal level since her fall.  She feels she is doing better.  She continues hydrocodone APAP up to 3 times a day.  She states this brings her overall back pain from a 7 down to a 4 or 5.  This allows her to complete her ADLs.  She continues to use her cane in the house and a walker when she is outside the house.  She will sometimes use the walker in the house as well.  Denies any further falls.  Denies any change in bowel or bladder continence.  Denies any increase in leg weakness.  Patient cannot take nonsteroidal anti-inflammatory drugs other than low-dose aspirin due to history of iron deficiency anemia and GI upset when she uses NSAIDs.    3. Arthritis of right elbow  Patient states her right elbow arthritis is actually doing fairly well right now.  She uses topical cream on this.  Occasionally she uses the hydrocodone for rescue for this.    4. Cervical spine arthritis  Patient has multiple level arthritic and degenerative change of the cervical spine.  This is often the source of pain.  Patient states the hydrocodone keeps his pain in good control usually at a level 5 or a little bit better.  Denies any arm weakness or pain traveling from the neck down the arms.  Denies any increased hand weakness.    5. Chronic use of opiate for therapeutic purpose  No  "aberrant or addictive use of medications has been observed.  No adverse events have been reported.  Patient counseled to not add Tylenol to current regimen.  Patient counseled to keep medications locked up or under personal control.  PDMP did not come up today, \"bad gateway\".  Patient has her hydrocodone bottle with her.  Last filled 9/24/2022.  Rewrote her prescriptins    6. Peripheral edema  Patient does have mild peripheral edema.  The low-dose hydrochlorothiazide continues to be helpful in keeping that under control.  Her blood salts including potassium were normal on testing in August.  This is renewed.    7. Need for immunization against influenza  High-dose flu vaccine administered today.        Patient Active Problem List    Diagnosis Date Noted    Aortic atherosclerosis (HCC) 04/13/2022    Arthritis of right elbow 06/30/2021    Primary osteoarthritis of right shoulder 05/02/2018    Chronic use of opiate for therapeutic purpose 09/14/2016    Cervical spine arthritis 07/24/2015    Osteoarthritis of left knee     MEDICAL HOME 02/10/2015    Insomnia due to mental disorder 12/08/2014    Low HDL (under 40)     Lumbar and sacral arthritis     Pulmonary hypertension (HCC) 09/20/2014    Bipolar depression (HCC) 06/08/2014    History of iron deficiency anemia 04/06/2013    Idiopathic hypothyroidism 02/22/2013    Essential hypertension 03/28/2012       Current medicines (including changes today)  Current Outpatient Medications   Medication Sig Dispense Refill    cephALEXin (KEFLEX) 500 MG Cap Take 1 Capsule by mouth 3 times a day for 5 days. 15 Capsule 0    [START ON 10/24/2022] HYDROcodone-acetaminophen (NORCO) 7.5-325 MG tab Take 1 Tablet by mouth every 8 hours as needed for Moderate Pain or Severe Pain (arthritis pain, max four per day) for up to 30 days. 90 tablets is a 30 day quantity 90 Tablet 0    hydrochlorothiazide (MICROZIDE) 12.5 MG capsule Take 1 Capsule by mouth every morning as needed (edema). 90 Capsule " "3    [START ON 11/23/2022] HYDROcodone-acetaminophen (NORCO) 7.5-325 MG tab Take 1 Tablet by mouth every 8 hours as needed for Moderate Pain or Severe Pain (arthritis pain, max four per day) for up to 30 days. 90 tablets is a 30 day quantity 90 Tablet 0    [START ON 12/23/2022] HYDROcodone-acetaminophen (NORCO) 7.5-325 MG tab Take 1 Tablet by mouth every 8 hours as needed for Moderate Pain or Severe Pain (arthritis pain, max four per day) for up to 30 days. 90 tablets is a 30 day quantity 90 Tablet 0    SYNTHROID 175 MCG Tab TAKE 1 TABLET BY MOUTH EVERY MORNING ON AN EMPTY STOMACH 90 Tablet 3    QUEtiapine (SEROQUEL) 200 MG Tab TAKE 1 TABLET BY MOUTH AT BEDTIME 90 Tablet 3    Misc. Devices Misc This is an order for a four wheeled walker with seat  Dx; unsteady gait, arthritis of lower back and knee  ICD-10 codes  M47.817, M17.12           CHEYENNE 99 months   NPI [id number] 1 Each 0    losartan (COZAAR) 100 MG Tab Take 1 Tablet by mouth every day. 90 Tablet 3    polyethylene glycol 3350 (MIRALAX) 17 GM/SCOOP Powder MIX 1 CAPFUL(17GRAMS) WITH WATER AND DRINK DAILY 510 g 3    carvedilol (COREG) 12.5 MG Tab TAKE 1 TABLET BY MOUTH TWICE DAILY WITH MEALS 180 Tablet 3    divalproex (DEPAKOTE) 500 MG Tablet Delayed Response TAKE 1 TABLET BY MOUTH AT BEDTIME 90 Tablet 3    aspirin EC (ECOTRIN) 81 MG Tablet Delayed Response Take 1 Tab by mouth every day. 30 Tab 0     No current facility-administered medications for this visit.       No Known Allergies    ROS: As per HPI       Objective:     /56 (BP Location: Right arm, Patient Position: Sitting, BP Cuff Size: Adult)   Pulse 62   Temp 36.7 °C (98.1 °F) (Temporal)   Resp 16   Ht 1.702 m (5' 7\")   Wt 63.3 kg (139 lb 8.8 oz)   SpO2 99%  Body mass index is 21.86 kg/m².    Physical Exam:  Constitutional: Well-developed and well-nourished. Not diaphoretic. No distress. Lucid and fluent.  Patient, physician and staff all wearing masks.  Skin: Skin is warm and dry. No rash " noted.  Head: Atraumatic without lesions.  Eyes: Conjunctivae and extraocular motions are normal. Pupils are equal, round, and reactive to light. No scleral icterus.   Ears:  External ears unremarkable.   Neck: Supple, trachea midline. No thyromegaly present. No cervical or supraclavicular lymphadenopathy. No JVD or carotid bruits appreciated  Cardiovascular: Regular rate and rhythm.  Normal S1, S2 without murmur appreciated.  Chest: Effort normal. Clear to auscultation throughout. No adventitious sounds.   Extremities: No cyanosis, clubbing, erythema, nor edema.  Mild SI joint tenderness and lumbosacral junction spinous process tenderness.  Mild muscle spasm.  This is essentially an unchanged exam of the back.  Patient does have right elbow osteoarthritis and moderate of the left elbow as well.  Neurological: Alert and oriented x 3.   Psychiatric:  Behavior, mood, and affect are appropriate.       Assessment and Plan:     89 y.o. female with the following issues:    1. Cystitis  cephALEXin (KEFLEX) 500 MG Cap      2. Lumbar and sacral arthritis  HYDROcodone-acetaminophen (NORCO) 7.5-325 MG tab    HYDROcodone-acetaminophen (NORCO) 7.5-325 MG tab    HYDROcodone-acetaminophen (NORCO) 7.5-325 MG tab      3. Arthritis of right elbow  HYDROcodone-acetaminophen (NORCO) 7.5-325 MG tab    HYDROcodone-acetaminophen (NORCO) 7.5-325 MG tab    HYDROcodone-acetaminophen (NORCO) 7.5-325 MG tab      4. Cervical spine arthritis  HYDROcodone-acetaminophen (NORCO) 7.5-325 MG tab    HYDROcodone-acetaminophen (NORCO) 7.5-325 MG tab    HYDROcodone-acetaminophen (NORCO) 7.5-325 MG tab      5. Chronic use of opiate for therapeutic purpose  Controlled Substance Treatment Agreement      6. Peripheral edema  hydrochlorothiazide (MICROZIDE) 12.5 MG capsule      7. Need for immunization against influenza  INFLUENZA VACCINE, HIGH DOSE (65+ ONLY)        Chronic pain recheck for: Chronic arthritic pain particularly in the low back, elbows and  neck  Last dose of controlled substance: Today  Chronic pain treated with hydrocodone APAP taken 3 times a day    She  reports no history of alcohol use.  She  reports no history of drug use.    Interval history: Patient has been doing very well, she recovered from her fall  Any major change in health since last appointment? No    Consequences of Chronic Opiate therapy:  (5 A's)  Analgesia: Compared to no treatment or prior treatment, pain is currently improved  Activity: improved  Adverse Events: She denies constipation, itchy skin, nausea, and sedation  Aberrant Behaviors: She reports she is taking medication as prescribed and is not veering from agreed treatment regimen or provider recommendations. There have been no inappropriate refills or lost/stolen meds reported.  Affect/Mood: Pain is impacting patient's mood.  Patient reports stable depression/anxiety.    Nonnarcotic treatments that are being used: topical agents, ice, heat, and patient cannot take nonsteroidal anti-inflammatory drugs due to history of iron deficiency anemia presumed blood loss and intolerance of nonsteroidal anti-inflammatory drugs .     Last imaging: Shoulder imaging in August at ER: C-spine and T-spine imaging 2015    Opioid Risk Score: 1    Interpretation of Opioid Risk Score   Score 0-3 = Low risk of abuse. Do UDS at least once per year.  Score 4-7 = Moderate risk of abuse. Do UDS 1-4 times per year.  Score 8+ = High risk of abuse. Refer to specialist.    Last order of CONTROLLED SUBSTANCE TREATMENT AGREEMENT was found on 10/5/2022 from Office Visit on 10/5/2022     UDS Summary            URINE DRUG SCREEN (Every 360 Days) Next due on 9/1/2023 09/06/2022  URINE DRUG SCREEN    10/06/2021  PAIN MANAGEMENT SCREEN    09/11/2020  Pain Management Screen    06/03/2019  Pain Management Screen    05/23/2018  [facility] PAIN MANAGEMENT SCREEN    Only the first 5 history entries have been loaded, but more history exists.                   Most recent UDS reviewed today and is consistent with prescribed medications.     I have reviewed the medical records, the Prescription Monitoring Program and I have determined that controlled substance treatment is medically indicated.       Followup: Return in about 3 months (around 1/5/2023), or if symptoms worsen or fail to improve.

## 2022-11-25 DIAGNOSIS — K59.01 SLOW TRANSIT CONSTIPATION: ICD-10-CM

## 2022-11-28 RX ORDER — POLYETHYLENE GLYCOL 3350 17 G/17G
POWDER, FOR SOLUTION ORAL
Qty: 510 G | Refills: 3 | Status: SHIPPED | OUTPATIENT
Start: 2022-11-28 | End: 2023-02-21 | Stop reason: SDUPTHER

## 2023-01-11 ENCOUNTER — OFFICE VISIT (OUTPATIENT)
Dept: MEDICAL GROUP | Facility: MEDICAL CENTER | Age: 88
End: 2023-01-11
Payer: MEDICARE

## 2023-01-11 VITALS
HEIGHT: 67 IN | DIASTOLIC BLOOD PRESSURE: 70 MMHG | OXYGEN SATURATION: 98 % | HEART RATE: 74 BPM | TEMPERATURE: 97.6 F | BODY MASS INDEX: 21.75 KG/M2 | SYSTOLIC BLOOD PRESSURE: 156 MMHG | WEIGHT: 138.6 LBS

## 2023-01-11 DIAGNOSIS — Z91.81 AT RISK FOR FALLING: ICD-10-CM

## 2023-01-11 DIAGNOSIS — M47.817 LUMBAR AND SACRAL ARTHRITIS: ICD-10-CM

## 2023-01-11 DIAGNOSIS — F31.9 BIPOLAR DEPRESSION (HCC): ICD-10-CM

## 2023-01-11 DIAGNOSIS — Z79.891 CHRONIC USE OF OPIATE FOR THERAPEUTIC PURPOSE: ICD-10-CM

## 2023-01-11 DIAGNOSIS — M19.021 ARTHRITIS OF RIGHT ELBOW: ICD-10-CM

## 2023-01-11 DIAGNOSIS — Z99.89 USE OF CANE AS AMBULATORY AID: ICD-10-CM

## 2023-01-11 DIAGNOSIS — N30.90 CYSTITIS: ICD-10-CM

## 2023-01-11 DIAGNOSIS — I27.20 PULMONARY HYPERTENSION (HCC): ICD-10-CM

## 2023-01-11 DIAGNOSIS — I70.0 AORTIC ATHEROSCLEROSIS (HCC): ICD-10-CM

## 2023-01-11 DIAGNOSIS — M47.812 CERVICAL SPINE ARTHRITIS: ICD-10-CM

## 2023-01-11 PROCEDURE — 99213 OFFICE O/P EST LOW 20 MIN: CPT | Performed by: FAMILY MEDICINE

## 2023-01-11 RX ORDER — HYDROCODONE BITARTRATE AND ACETAMINOPHEN 7.5; 325 MG/1; MG/1
1 TABLET ORAL EVERY 8 HOURS PRN
Qty: 90 TABLET | Refills: 0 | Status: SHIPPED | OUTPATIENT
Start: 2023-01-20 | End: 2023-02-19

## 2023-01-11 RX ORDER — CEPHALEXIN 500 MG/1
500 CAPSULE ORAL 3 TIMES DAILY
Qty: 15 CAPSULE | Refills: 0 | Status: SHIPPED | OUTPATIENT
Start: 2023-01-11 | End: 2023-01-16

## 2023-01-11 RX ORDER — HYDROCODONE BITARTRATE AND ACETAMINOPHEN 7.5; 325 MG/1; MG/1
1 TABLET ORAL EVERY 8 HOURS PRN
Qty: 90 TABLET | Refills: 0 | Status: SHIPPED | OUTPATIENT
Start: 2023-02-19 | End: 2023-03-21

## 2023-01-11 RX ORDER — CELECOXIB 100 MG/1
100 CAPSULE ORAL DAILY
Qty: 90 CAPSULE | Refills: 2 | Status: SHIPPED | OUTPATIENT
Start: 2023-01-11 | End: 2023-07-10

## 2023-01-11 RX ORDER — HYDROCODONE BITARTRATE AND ACETAMINOPHEN 7.5; 325 MG/1; MG/1
1 TABLET ORAL EVERY 8 HOURS PRN
Qty: 90 TABLET | Refills: 0 | Status: SHIPPED | OUTPATIENT
Start: 2023-03-21 | End: 2023-04-12 | Stop reason: SDUPTHER

## 2023-01-11 ASSESSMENT — FIBROSIS 4 INDEX: FIB4 SCORE: 3.49

## 2023-01-20 ENCOUNTER — TELEPHONE (OUTPATIENT)
Dept: MEDICAL GROUP | Facility: MEDICAL CENTER | Age: 88
End: 2023-01-20
Payer: MEDICARE

## 2023-01-20 NOTE — TELEPHONE ENCOUNTER
She can start the celebrex.  It is not likely to worsen her congestive heart failure at this dose.

## 2023-01-20 NOTE — TELEPHONE ENCOUNTER
Pt LVM requesting advice regarding the Celebrex. Pt stated she has congestive heart failure, and is concerned with the possible side effects associated. Please advise.

## 2023-02-21 DIAGNOSIS — K59.01 SLOW TRANSIT CONSTIPATION: ICD-10-CM

## 2023-02-21 RX ORDER — POLYETHYLENE GLYCOL 3350 17 G/17G
POWDER, FOR SOLUTION ORAL
Qty: 510 G | Refills: 0 | Status: SHIPPED | OUTPATIENT
Start: 2023-02-21 | End: 2024-01-10 | Stop reason: SDUPTHER

## 2023-04-12 ENCOUNTER — OFFICE VISIT (OUTPATIENT)
Dept: MEDICAL GROUP | Facility: MEDICAL CENTER | Age: 88
End: 2023-04-12
Payer: MEDICARE

## 2023-04-12 VITALS
DIASTOLIC BLOOD PRESSURE: 56 MMHG | HEIGHT: 67 IN | SYSTOLIC BLOOD PRESSURE: 122 MMHG | TEMPERATURE: 98 F | HEART RATE: 82 BPM | BODY MASS INDEX: 21.56 KG/M2 | WEIGHT: 137.35 LBS | OXYGEN SATURATION: 99 %

## 2023-04-12 DIAGNOSIS — Z79.891 CHRONIC USE OF OPIATE FOR THERAPEUTIC PURPOSE: ICD-10-CM

## 2023-04-12 DIAGNOSIS — M47.817 LUMBAR AND SACRAL ARTHRITIS: ICD-10-CM

## 2023-04-12 DIAGNOSIS — M47.812 CERVICAL SPINE ARTHRITIS: ICD-10-CM

## 2023-04-12 DIAGNOSIS — I10 ESSENTIAL HYPERTENSION: ICD-10-CM

## 2023-04-12 DIAGNOSIS — M19.021 ARTHRITIS OF RIGHT ELBOW: ICD-10-CM

## 2023-04-12 DIAGNOSIS — F31.9 BIPOLAR DEPRESSION (HCC): ICD-10-CM

## 2023-04-12 DIAGNOSIS — F51.05 INSOMNIA DUE TO MENTAL DISORDER: ICD-10-CM

## 2023-04-12 PROCEDURE — 99214 OFFICE O/P EST MOD 30 MIN: CPT | Performed by: FAMILY MEDICINE

## 2023-04-12 RX ORDER — LOSARTAN POTASSIUM 100 MG/1
100 TABLET ORAL
Qty: 90 TABLET | Refills: 3 | Status: SHIPPED | OUTPATIENT
Start: 2023-04-12 | End: 2024-02-21

## 2023-04-12 RX ORDER — DIVALPROEX SODIUM 500 MG/1
500 TABLET, DELAYED RELEASE ORAL
Qty: 90 TABLET | Refills: 3 | Status: SHIPPED | OUTPATIENT
Start: 2023-04-12

## 2023-04-12 RX ORDER — HYDROCODONE BITARTRATE AND ACETAMINOPHEN 7.5; 325 MG/1; MG/1
1 TABLET ORAL EVERY 6 HOURS PRN
Qty: 100 TABLET | Refills: 0 | Status: SHIPPED | OUTPATIENT
Start: 2023-07-03 | End: 2023-07-12 | Stop reason: SDUPTHER

## 2023-04-12 RX ORDER — HYDROCODONE BITARTRATE AND ACETAMINOPHEN 7.5; 325 MG/1; MG/1
1 TABLET ORAL EVERY 6 HOURS PRN
Qty: 100 TABLET | Refills: 0 | Status: SHIPPED | OUTPATIENT
Start: 2023-05-05 | End: 2023-06-04

## 2023-04-12 RX ORDER — QUETIAPINE FUMARATE 200 MG/1
200 TABLET, FILM COATED ORAL
Qty: 90 TABLET | Refills: 3 | Status: SHIPPED | OUTPATIENT
Start: 2023-04-12 | End: 2024-01-31

## 2023-04-12 RX ORDER — HYDROCODONE BITARTRATE AND ACETAMINOPHEN 7.5; 325 MG/1; MG/1
1 TABLET ORAL EVERY 6 HOURS PRN
Qty: 100 TABLET | Refills: 0 | Status: SHIPPED | OUTPATIENT
Start: 2023-06-04 | End: 2023-07-04

## 2023-04-12 ASSESSMENT — FIBROSIS 4 INDEX: FIB4 SCORE: 3.49

## 2023-04-12 NOTE — PROGRESS NOTES
Chief Complaint   Patient presents with    Medication Refill     3m fv       Subjective:     HPI:   Ricky Junior presents today with the following: She is here for renewal of her chronic medication.    1. Lumbar and sacral arthritis  Patient has multiple level lumbar arthritis and sacral arthritis of the lumbosacral junction and also sacroiliac joints.  This is a source of chronic pain.  Patient cannot take nonsteroidal anti-inflammatory drugs on a regular basis other than celecoxib due to history of iron deficiency anemia due to GI losses.  She is tolerating the celecoxib.  She states that along with her hydrocodone APAP brings the pain from a 7 or 8 down to a 5.  This allows her to complete her ADLs and participate in her family life and in her [employer] community.  She denies somnolence or confusion from the regimen.  She does have some constipation which is well controlled with MiraLAX.    2. Arthritis of right elbow  Patient has chronic right elbow arthritis which is a source of pain.  Discussed the Celebrex.  She will now take this more regularly.  She was worried about the statement from the pharmacy as to possible side effects.    3. Cervical spine arthritis  Patient has multiple level cervical spine arthritis and stiffness.  She states the hydrocodone is very helpful in reducing this pain and improving mobility.    4. Chronic use of opiate for therapeutic purpose  No aberrant or addictive use of medications has been observed.  No adverse events have been reported.  Patient counseled to not add Tylenol to current regimen.  Patient counseled to keep medications locked up or under personal control.  [facility] board of pharmacy interface is reviewed. This is the PDMP.  No inconsistencies are found.  The patient's maximum MME is 22.5.  This is within CDC guideline for chronic pain prescribing by primary care.  However I am changing her prescription so she can take up to 4 a day which would be an MME of 30  which is still within guidelines.    5. Bipolar depression (HCC)  She has had very good control of her bipolar depression with the use of Depakote and Seroquel.  She has tolerated these medications well.  They are renewed.    6. Insomnia due to mental disorder  The Seroquel has worked quite well allowing her to sleep 5 to 7 hours nightly.    7. Essential hypertension  HTN - Chronic condition stable. Currently taking all meds as directed.   She is taking baby aspirin daily.   She is monitoring BP at home.  She checks at least monthly.  Her blood pressure is generally in the 120s over 60s similar to the excellent result today.  Occasionally it is quite low.  Denies any lightheadedness.  She tolerates the carvedilol and losartan combination well.  This is renewed today.  Denies symptoms low BP: light-headed, tunnel-vision, unusual fatigue.   Denies symptoms high BP:pounding headache, visual changes, palpitations, flushed face.   Denies medicine side effects: unusual fatigue, slow heartbeat, foot/leg swelling, cough.        Patient Active Problem List    Diagnosis Date Noted    Use of cane as ambulatory aid 01/11/2023    At risk for falling 01/11/2023    Aortic atherosclerosis (HCC) 04/13/2022    Arthritis of right elbow 06/30/2021    Primary osteoarthritis of right shoulder 05/02/2018    Chronic use of opiate for therapeutic purpose 09/14/2016    Cervical spine arthritis 07/24/2015    Osteoarthritis of left knee     MEDICAL HOME 02/10/2015    Insomnia due to mental disorder 12/08/2014    Low HDL (under 40)     Lumbar and sacral arthritis     Pulmonary hypertension (HCC) 09/20/2014    Bipolar depression (HCC) 06/08/2014    History of iron deficiency anemia 04/06/2013    Idiopathic hypothyroidism 02/22/2013    Essential hypertension 03/28/2012       Current medicines (including changes today)  Current Outpatient Medications   Medication Sig Dispense Refill    [START ON 5/5/2023] HYDROcodone-acetaminophen (NORCO) 7.5-325  "MG tab Take 1 Tablet by mouth every 6 hours as needed for Moderate Pain or Severe Pain (arthritis pain, max four per day) for up to 30 days. 100 tablets is a 30 day quantity 100 Tablet 0    [START ON 6/4/2023] HYDROcodone-acetaminophen (NORCO) 7.5-325 MG tab Take 1 Tablet by mouth every 6 hours as needed for Moderate Pain or Severe Pain (arthritis pain, max four per day) for up to 30 days. 100 tablets is a 30 day quantity 100 Tablet 0    [START ON 7/3/2023] HYDROcodone-acetaminophen (NORCO) 7.5-325 MG tab Take 1 Tablet by mouth every 6 hours as needed for Moderate Pain or Severe Pain (arthritis pain, max four per day) for up to 30 days. 100 tablets is a 30 day quantity 100 Tablet 0    QUEtiapine (SEROQUEL) 200 MG Tab Take 1 Tablet by mouth at bedtime. 90 Tablet 3    divalproex (DEPAKOTE) 500 MG Tablet Delayed Response Take 1 Tablet by mouth at bedtime. 90 Tablet 3    losartan (COZAAR) 100 MG Tab Take 1 Tablet by mouth every day. 90 Tablet 3    polyethylene glycol 3350 (MIRALAX) 17 GM/SCOOP Powder MIX 1 CAPFUL WITH WATER AND DRINK BY MOUTH EVERY  g 0    celecoxib (CELEBREX) 100 MG Cap Take 1 Capsule by mouth every day. 90 Capsule 2    SYNTHROID 175 MCG Tab TAKE 1 TABLET BY MOUTH EVERY MORNING ON AN EMPTY STOMACH 90 Tablet 3    carvedilol (COREG) 12.5 MG Tab TAKE 1 TABLET BY MOUTH TWICE DAILY WITH MEALS 180 Tablet 3    Misc. Devices Misc This is an order for a four wheeled walker with seat  Dx; unsteady gait, arthritis of lower back and knee  ICD-10 codes  M47.817, M17.12           CHEYENNE 99 months   NPI [id number] 1 Each 0    aspirin EC (ECOTRIN) 81 MG Tablet Delayed Response Take 1 Tab by mouth every day. 30 Tab 0     No current facility-administered medications for this visit.       No Known Allergies    ROS: As per HPI       Objective:     /56 (BP Location: Right arm, Patient Position: Sitting, BP Cuff Size: Small adult)   Pulse 82   Temp 36.7 °C (98 °F) (Temporal)   Ht 1.702 m (5' 7\")   Wt 62.3 kg " (137 lb 5.6 oz)   SpO2 99%  Body mass index is 21.51 kg/m².    Physical Exam:  Constitutional: Well-developed and well-nourished. Not diaphoretic. No distress. Lucid and fluent. Patient, physician and staff all wearing masks.  Skin: Skin is warm and dry. No rash noted.  Head: Atraumatic without lesions.  Eyes: Conjunctivae and extraocular motions are normal. Pupils are equal, round, and reactive to light. No scleral icterus.   Ears:  External ears unremarkable.   Neck: Supple, trachea midline. No thyromegaly present. No cervical or supraclavicular lymphadenopathy. No JVD or carotid bruits appreciated  Cardiovascular: Regular rate and rhythm.  Normal S1, S2 without murmur appreciated.  Chest: Effort normal. Clear to auscultation throughout. No adventitious sounds.   Abdomen: Soft, non tender, and without distention. Active bowel sounds in all four quadrants. No rebound, guarding, masses or hepatosplenomegaly.  Extremities: No cyanosis, clubbing, erythema, nor edema today.   Neurological: Alert and oriented x 3. She is her walker  Psychiatric:  Behavior, mood, and affect are appropriate.       Assessment and Plan:     89 y.o. female with the following issues:    1. Lumbar and sacral arthritis  HYDROcodone-acetaminophen (NORCO) 7.5-325 MG tab    HYDROcodone-acetaminophen (NORCO) 7.5-325 MG tab    HYDROcodone-acetaminophen (NORCO) 7.5-325 MG tab      2. Arthritis of right elbow  HYDROcodone-acetaminophen (NORCO) 7.5-325 MG tab    HYDROcodone-acetaminophen (NORCO) 7.5-325 MG tab    HYDROcodone-acetaminophen (NORCO) 7.5-325 MG tab      3. Cervical spine arthritis  HYDROcodone-acetaminophen (NORCO) 7.5-325 MG tab    HYDROcodone-acetaminophen (NORCO) 7.5-325 MG tab    HYDROcodone-acetaminophen (NORCO) 7.5-325 MG tab      4. Chronic use of opiate for therapeutic purpose        5. Bipolar depression (HCC)  QUEtiapine (SEROQUEL) 200 MG Tab    divalproex (DEPAKOTE) 500 MG Tablet Delayed Response      6. Insomnia due to mental  disorder  QUEtiapine (SEROQUEL) 200 MG Tab      7. Essential hypertension  losartan (COZAAR) 100 MG Tab        Chronic pain recheck for: Chronic degenerative and arthritic pain of the spine and elbows particularly right elbow.  Last dose of controlled substance: Today  Chronic pain treated with hydrocodone APAP taken 3 times a day, rarely four    She  reports no history of alcohol use.  She  reports no history of drug use.    Interval history: Patient has had increased pain in the lower spine and instead of taking 2-3 hydrocodone a day she is occasionally having to take 4 which means she is running out a little early.  Medications are adjusted.  Any major change in health since last appointment? No    Consequences of Chronic Opiate therapy:  (5 A's)  Analgesia: Compared to no treatment or prior treatment, pain is currently improved  Activity: improved  Adverse Events: She denies constipation, itchy skin, nausea, and sedation  Aberrant Behaviors: She reports she is taking medication as prescribed and is not veering from agreed treatment regimen or provider recommendations. There have been no inappropriate refills or lost/stolen meds reported.  Affect/Mood: Pain is impacting patient's mood.  Patient reports stable depression/anxiety.    Nonnarcotic treatments that are being used: NSAIDs/PEREZ-2, topical agents, and heat.     Last imagin2022    Opioid Risk Score: 1    Interpretation of Opioid Risk Score   Score 0-3 = Low risk of abuse. Do UDS at least once per year.  Score 4-7 = Moderate risk of abuse. Do UDS 1-4 times per year.  Score 8+ = High risk of abuse. Refer to specialist.    Last order of CONTROLLED SUBSTANCE TREATMENT AGREEMENT was found on 10/5/2022 from Office Visit on 10/5/2022     UDS Summary            URINE DRUG SCREEN (Every 360 Days) Next due on 2022  URINE DRUG SCREEN    10/06/2021  PAIN MANAGEMENT SCREEN    2020  Pain Management Screen    2019  Pain Management  Screen    05/23/2018  [facility] PAIN MANAGEMENT SCREEN    Only the first 5 history entries have been loaded, but more history exists.                  Most recent UDS reviewed today and is consistent with prescribed medications.     I have reviewed the medical records, the Prescription Monitoring Program and I have determined that controlled substance treatment is medically indicated.       Followup: Return in about 3 months (around 7/12/2023), or if symptoms worsen or fail to improve.

## 2023-07-10 DIAGNOSIS — M19.021 ARTHRITIS OF RIGHT ELBOW: ICD-10-CM

## 2023-07-10 DIAGNOSIS — M47.817 LUMBAR AND SACRAL ARTHRITIS: ICD-10-CM

## 2023-07-10 DIAGNOSIS — M47.812 CERVICAL SPINE ARTHRITIS: ICD-10-CM

## 2023-07-10 RX ORDER — CELECOXIB 100 MG/1
100 CAPSULE ORAL DAILY
Qty: 90 CAPSULE | Refills: 2 | Status: SHIPPED | OUTPATIENT
Start: 2023-07-10

## 2023-07-10 RX ORDER — CELECOXIB 100 MG/1
100 CAPSULE ORAL DAILY
Qty: 90 CAPSULE | Refills: 2 | Status: SHIPPED | OUTPATIENT
Start: 2023-07-10 | End: 2023-10-13

## 2023-07-12 ENCOUNTER — HOSPITAL ENCOUNTER (OUTPATIENT)
Dept: LAB | Facility: MEDICAL CENTER | Age: 88
End: 2023-07-12
Attending: FAMILY MEDICINE
Payer: MEDICARE

## 2023-07-12 ENCOUNTER — OFFICE VISIT (OUTPATIENT)
Dept: MEDICAL GROUP | Facility: MEDICAL CENTER | Age: 88
End: 2023-07-12
Payer: MEDICARE

## 2023-07-12 VITALS
HEIGHT: 67 IN | BODY MASS INDEX: 20.93 KG/M2 | HEART RATE: 63 BPM | DIASTOLIC BLOOD PRESSURE: 70 MMHG | SYSTOLIC BLOOD PRESSURE: 122 MMHG | WEIGHT: 133.38 LBS | OXYGEN SATURATION: 92 % | TEMPERATURE: 97.6 F

## 2023-07-12 DIAGNOSIS — M47.812 CERVICAL SPINE ARTHRITIS: ICD-10-CM

## 2023-07-12 DIAGNOSIS — I10 ESSENTIAL HYPERTENSION: ICD-10-CM

## 2023-07-12 DIAGNOSIS — E03.9 IDIOPATHIC HYPOTHYROIDISM: ICD-10-CM

## 2023-07-12 DIAGNOSIS — Z86.2 HISTORY OF IRON DEFICIENCY ANEMIA: ICD-10-CM

## 2023-07-12 DIAGNOSIS — Z79.891 CHRONIC USE OF OPIATE FOR THERAPEUTIC PURPOSE: ICD-10-CM

## 2023-07-12 DIAGNOSIS — M19.021 ARTHRITIS OF RIGHT ELBOW: ICD-10-CM

## 2023-07-12 DIAGNOSIS — M47.817 LUMBAR AND SACRAL ARTHRITIS: ICD-10-CM

## 2023-07-12 LAB
ALBUMIN SERPL BCP-MCNC: 4.1 G/DL (ref 3.2–4.9)
ALBUMIN/GLOB SERPL: 1.4 G/DL
ALP SERPL-CCNC: 65 U/L (ref 30–99)
ALT SERPL-CCNC: 10 U/L (ref 2–50)
ANION GAP SERPL CALC-SCNC: 11 MMOL/L (ref 7–16)
AST SERPL-CCNC: 14 U/L (ref 12–45)
BILIRUB SERPL-MCNC: 0.3 MG/DL (ref 0.1–1.5)
BUN SERPL-MCNC: 17 MG/DL (ref 8–22)
CALCIUM ALBUM COR SERPL-MCNC: 9.1 MG/DL (ref 8.5–10.5)
CALCIUM SERPL-MCNC: 9.2 MG/DL (ref 8.5–10.5)
CHLORIDE SERPL-SCNC: 96 MMOL/L (ref 96–112)
CO2 SERPL-SCNC: 24 MMOL/L (ref 20–33)
CREAT SERPL-MCNC: 0.93 MG/DL (ref 0.5–1.4)
ERYTHROCYTE [DISTWIDTH] IN BLOOD BY AUTOMATED COUNT: 43.2 FL (ref 35.9–50)
GFR SERPLBLD CREATININE-BSD FMLA CKD-EPI: 58 ML/MIN/1.73 M 2
GLOBULIN SER CALC-MCNC: 3 G/DL (ref 1.9–3.5)
GLUCOSE SERPL-MCNC: 77 MG/DL (ref 65–99)
HCT VFR BLD AUTO: 34.5 % (ref 37–47)
HGB BLD-MCNC: 11.3 G/DL (ref 12–16)
MCH RBC QN AUTO: 31.7 PG (ref 27–33)
MCHC RBC AUTO-ENTMCNC: 32.8 G/DL (ref 32.2–35.5)
MCV RBC AUTO: 96.9 FL (ref 81.4–97.8)
PLATELET # BLD AUTO: 239 K/UL (ref 164–446)
PMV BLD AUTO: 11.2 FL (ref 9–12.9)
POTASSIUM SERPL-SCNC: 5.8 MMOL/L (ref 3.6–5.5)
PROT SERPL-MCNC: 7.1 G/DL (ref 6–8.2)
RBC # BLD AUTO: 3.56 M/UL (ref 4.2–5.4)
SODIUM SERPL-SCNC: 131 MMOL/L (ref 135–145)
TSH SERPL DL<=0.005 MIU/L-ACNC: 1.14 UIU/ML (ref 0.38–5.33)
WBC # BLD AUTO: 5.4 K/UL (ref 4.8–10.8)

## 2023-07-12 PROCEDURE — 99214 OFFICE O/P EST MOD 30 MIN: CPT | Performed by: FAMILY MEDICINE

## 2023-07-12 PROCEDURE — 3078F DIAST BP <80 MM HG: CPT | Performed by: FAMILY MEDICINE

## 2023-07-12 PROCEDURE — 36415 COLL VENOUS BLD VENIPUNCTURE: CPT

## 2023-07-12 PROCEDURE — 3074F SYST BP LT 130 MM HG: CPT | Performed by: FAMILY MEDICINE

## 2023-07-12 PROCEDURE — 85027 COMPLETE CBC AUTOMATED: CPT

## 2023-07-12 PROCEDURE — 80053 COMPREHEN METABOLIC PANEL: CPT

## 2023-07-12 PROCEDURE — 84443 ASSAY THYROID STIM HORMONE: CPT

## 2023-07-12 RX ORDER — LEVOTHYROXINE SODIUM 175 MCG
175 TABLET ORAL
Qty: 90 TABLET | Refills: 3 | Status: SHIPPED | OUTPATIENT
Start: 2023-07-12

## 2023-07-12 RX ORDER — HYDROCODONE BITARTRATE AND ACETAMINOPHEN 7.5; 325 MG/1; MG/1
1 TABLET ORAL EVERY 6 HOURS PRN
Qty: 100 TABLET | Refills: 0 | Status: SHIPPED | OUTPATIENT
Start: 2023-07-19 | End: 2023-08-18

## 2023-07-12 RX ORDER — HYDROCODONE BITARTRATE AND ACETAMINOPHEN 7.5; 325 MG/1; MG/1
1 TABLET ORAL EVERY 6 HOURS PRN
Qty: 100 TABLET | Refills: 0 | Status: SHIPPED | OUTPATIENT
Start: 2023-09-17 | End: 2023-10-13 | Stop reason: SDUPTHER

## 2023-07-12 RX ORDER — CARVEDILOL 12.5 MG/1
12.5 TABLET ORAL 2 TIMES DAILY WITH MEALS
Qty: 180 TABLET | Refills: 3 | Status: SHIPPED | OUTPATIENT
Start: 2023-07-12

## 2023-07-12 RX ORDER — HYDROCODONE BITARTRATE AND ACETAMINOPHEN 7.5; 325 MG/1; MG/1
1 TABLET ORAL EVERY 6 HOURS PRN
Qty: 100 TABLET | Refills: 0 | Status: SHIPPED | OUTPATIENT
Start: 2023-08-18 | End: 2023-09-17

## 2023-07-12 ASSESSMENT — FIBROSIS 4 INDEX: FIB4 SCORE: 3.53

## 2023-07-12 NOTE — PROGRESS NOTES
Chief Complaint   Patient presents with    Medication Refill     3m fv    Back Pain    Arthritis    Hypertension    Hypothyroidism       Subjective:     HPI:   Ricky Junior presents today with the followin. Essential hypertension  HTN - Chronic condition stable. Currently taking all meds as directed.   She is taking baby aspirin daily.   She is not monitoring BP at home. Blood pressure very good here today  Denies symptoms low BP: light-headed, tunnel-vision, unusual fatigue.   Denies symptoms high BP:pounding headache, visual changes, palpitations, flushed face.   Denies medicine side effects: unusual fatigue, slow heartbeat, foot/leg swelling, cough.  Lab order discussed and placed.  Carvedilol renewed.    2. Idiopathic hypothyroidism  Patient reports good energy level on the medication. Patient denies insomnia, tremor or change in appetite.  Patient is taking the medication on an empty stomach in the morning and waiting at least 30 minutes before eating.  Last TSH in 2022 was below target.  Follow up lab order discussed and placed    3. Lumbar and sacral arthritis  Patient has long-standing multilevel mixed degenerative change including lumbar disc disease, arthritis, ligamentous thickening .  Patient is having radicular pain to the left knee, generally controlled by her current regimen.  Continues her exercises.  The current regimen of hydrocodone APAP is helping the pain bringing it from a level 8 to a level 5 and sometimes better.  The regimen is improving activity.  She continues her walking with her walker.  She does this mostly in her residence.  The regimen allows the patient to complete her ADLs with assistance from her  and family.  Patient denies somnolence, confusion, balance problems or severe constipation from the regimen. Patient notes side effect of dry mouth.  Patient denies new or worsening urine or stool incontinence. Patient denies leg weakness and balance problems.   Patient is tolerating chronic Celebrex which is helping as well.    4. Arthritis of right elbow  Patient feels the Celebrex is helpful.  She would like to continue her current regimen.  She states the hydrocodone continues to be helpful.  Patient is taking typically 3/day.  Occasionally she will have a good day and only take 2.  Sometimes she needs all 4.    5. Cervical spine arthritis  Patient has arthritis and stiffness of the cervical spine.  This is sometimes a source of pain though this has improved with the celecoxib along with the hydrocodone.  She denies somnolence or confusion.  She denies any falls recently.    6. Chronic use of opiate for therapeutic purpose  No aberrant or addictive use of medications has been observed.  No adverse events have been reported.  Patient counseled to not add Tylenol to current regimen.  Patient counseled to keep medications locked up or under personal control.  [facility] board of pharmacy interface is reviewed. This is the PDMP.  No inconsistencies are found.  The patient's maximum MME is 30.  This is within CDC guideline for chronic pain prescribing by primary care.    Patient does have past history of anemia.  CBC order discussed and placed.      Patient Active Problem List    Diagnosis Date Noted    Use of cane as ambulatory aid 01/11/2023    At risk for falling 01/11/2023    Aortic atherosclerosis (HCC) 04/13/2022    Arthritis of right elbow 06/30/2021    Primary osteoarthritis of right shoulder 05/02/2018    Chronic use of opiate for therapeutic purpose 09/14/2016    Cervical spine arthritis 07/24/2015    Osteoarthritis of left knee     MEDICAL HOME 02/10/2015    Insomnia due to mental disorder 12/08/2014    Low HDL (under 40)     Lumbar and sacral arthritis     Pulmonary hypertension (HCC) 09/20/2014    Bipolar depression (HCC) 06/08/2014    History of iron deficiency anemia 04/06/2013    Idiopathic hypothyroidism 02/22/2013    Essential hypertension 03/28/2012        Current medicines (including changes today)  Current Outpatient Medications   Medication Sig Dispense Refill    carvedilol (COREG) 12.5 MG Tab Take 1 Tablet by mouth 2 times a day with meals. 180 Tablet 3    SYNTHROID 175 MCG Tab Take 1 Tablet by mouth every morning on an empty stomach. 90 Tablet 3    [START ON 7/19/2023] HYDROcodone-acetaminophen (NORCO) 7.5-325 MG tab Take 1 Tablet by mouth every 6 hours as needed for Moderate Pain or Severe Pain (arthritis pain, max four per day) for up to 30 days. 100 tablets is a 30 day quantity 100 Tablet 0    [START ON 8/18/2023] HYDROcodone-acetaminophen (NORCO) 7.5-325 MG tab Take 1 Tablet by mouth every 6 hours as needed for Moderate Pain or Severe Pain (arthritis pain, max four per day) for up to 30 days. 100 tablets is a 30 day quantity 100 Tablet 0    [START ON 9/17/2023] HYDROcodone-acetaminophen (NORCO) 7.5-325 MG tab Take 1 Tablet by mouth every 6 hours as needed for Moderate Pain or Severe Pain (arthritis pain, max four per day) for up to 30 days. 100 tablets is a 30 day quantity 100 Tablet 0    celecoxib (CELEBREX) 100 MG Cap TAKE 1 CAPSULE BY MOUTH EVERY DAY 90 Capsule 2    celecoxib (CELEBREX) 100 MG Cap TAKE 1 CAPSULE BY MOUTH EVERY DAY 90 Capsule 2    QUEtiapine (SEROQUEL) 200 MG Tab Take 1 Tablet by mouth at bedtime. 90 Tablet 3    divalproex (DEPAKOTE) 500 MG Tablet Delayed Response Take 1 Tablet by mouth at bedtime. 90 Tablet 3    losartan (COZAAR) 100 MG Tab Take 1 Tablet by mouth every day. 90 Tablet 3    polyethylene glycol 3350 (MIRALAX) 17 GM/SCOOP Powder MIX 1 CAPFUL WITH WATER AND DRINK BY MOUTH EVERY  g 0    Misc. Devices Misc This is an order for a four wheeled walker with seat  Dx; unsteady gait, arthritis of lower back and knee  ICD-10 codes  M47.817, M17.12           CHEYENNE 99 months   NPI [id number] 1 Each 0    aspirin EC (ECOTRIN) 81 MG Tablet Delayed Response Take 1 Tab by mouth every day. 30 Tab 0     No current  "facility-administered medications for this visit.       No Known Allergies    ROS: As per HPI  denies chest pain or shortness of breath.       Objective:     /70 (BP Location: Right arm, Patient Position: Sitting, BP Cuff Size: Small adult)   Pulse 63   Temp 36.4 °C (97.6 °F) (Temporal)   Ht 1.702 m (5' 7\")   Wt 60.5 kg (133 lb 6.1 oz)   SpO2 92%  Body mass index is 20.89 kg/m².    Physical Exam:  Constitutional: Well-developed and well-nourished. Not diaphoretic. No distress. Lucid and fluent.  Skin: Skin is warm and dry. No rash noted.  Head: Atraumatic without lesions.  Eyes: Conjunctivae and extraocular motions are normal. Pupils are equal, round, and reactive to light. No scleral icterus.   Ears:  External ears unremarkable. Patient wearing mask today.  Neck: Supple, trachea midline. No thyromegaly present. No cervical or supraclavicular lymphadenopathy. No JVD or carotid bruits appreciated  Cardiovascular: Regular rate and rhythm.  Normal S1, S2 without murmur appreciated.  Chest: Effort normal. Clear to auscultation throughout. No adventitious sounds. Thoracic kyphosis is present.  Extremities: No cyanosis, clubbing, erythema, nor edema.   Neurological: Alert and oriented x 3. Using 4 wheeled walker.  Walks steadily with that.  No tremor.  Psychiatric:  Behavior, mood, and affect are appropriate.       Assessment and Plan:     89 y.o. female with the following issues:    1. Essential hypertension  carvedilol (COREG) 12.5 MG Tab    Comp Metabolic Panel    CANCELED: Comp Metabolic Panel      2. Idiopathic hypothyroidism  SYNTHROID 175 MCG Tab    TSH      3. Lumbar and sacral arthritis  HYDROcodone-acetaminophen (NORCO) 7.5-325 MG tab    HYDROcodone-acetaminophen (NORCO) 7.5-325 MG tab    HYDROcodone-acetaminophen (NORCO) 7.5-325 MG tab      4. Arthritis of right elbow  HYDROcodone-acetaminophen (NORCO) 7.5-325 MG tab    HYDROcodone-acetaminophen (NORCO) 7.5-325 MG tab    HYDROcodone-acetaminophen " (NORCO) 7.5-325 MG tab      5. Cervical spine arthritis  HYDROcodone-acetaminophen (NORCO) 7.5-325 MG tab    HYDROcodone-acetaminophen (NORCO) 7.5-325 MG tab    HYDROcodone-acetaminophen (NORCO) 7.5-325 MG tab      6. Chronic use of opiate for therapeutic purpose  Controlled Substance Treatment Agreement      7. History of iron deficiency anemia  CBC WITHOUT DIFFERENTIAL        Chronic pain recheck for: chronic degenerative and arthritic pain, spine, elbow, knee.  Some radicular component.  Last dose of controlled substance: today  Chronic pain treated with hydrocodone/apap taken 3-4 times a day    She  reports no history of alcohol use.  She  reports no history of drug use.    Interval history: has been stable.  Knee injection from  did not help much  Any major change in health since last appointment? No    Consequences of Chronic Opiate therapy:  (5 A's)  Analgesia: Compared to no treatment or prior treatment, pain is currently improved  Activity: improved  Adverse Events: She denies constipation, itchy skin, nausea, and sedation  Aberrant Behaviors: She reports she is taking medication as prescribed and is not veering from agreed treatment regimen or provider recommendations. There have been no inappropriate refills or lost/stolen meds reported.  Affect/Mood: Pain is impacting patient's mood.  Patient reports stable depression/anxiety.    Nonnarcotic treatments that are being used: NSAIDs/PEREZ-2, topical agents, heat, and   .     Last imagin2023, 2015    Opioid Risk Score: 1    Interpretation of Opioid Risk Score   Score 0-3 = Low risk of abuse. Do UDS at least once per year.  Score 4-7 = Moderate risk of abuse. Do UDS 1-4 times per year.  Score 8+ = High risk of abuse. Refer to specialist.    Last order of CONTROLLED SUBSTANCE TREATMENT AGREEMENT was found on 2023 from Office Visit on 2023     UDS Summary            URINE DRUG SCREEN (Every 360 Days) Next due on 2023       09/06/2022  URINE DRUG SCREEN    10/06/2021  PAIN MANAGEMENT SCREEN    09/11/2020  Pain Management Screen    06/03/2019  Pain Management Screen    05/23/2018  [facility] PAIN MANAGEMENT SCREEN    Only the first 5 history entries have been loaded, but more history exists.                  Most recent UDS reviewed today and is consistent with prescribed medications.     I have reviewed the medical records, the Prescription Monitoring Program and I have determined that controlled substance treatment is medically indicated.       Followup: Return in about 3 months (around 10/12/2023), or if symptoms worsen or fail to improve.

## 2023-10-13 ENCOUNTER — OFFICE VISIT (OUTPATIENT)
Dept: MEDICAL GROUP | Facility: MEDICAL CENTER | Age: 88
End: 2023-10-13
Payer: MEDICARE

## 2023-10-13 VITALS
WEIGHT: 128.6 LBS | TEMPERATURE: 98 F | OXYGEN SATURATION: 97 % | BODY MASS INDEX: 20.18 KG/M2 | DIASTOLIC BLOOD PRESSURE: 76 MMHG | HEART RATE: 89 BPM | HEIGHT: 67 IN | RESPIRATION RATE: 18 BRPM | SYSTOLIC BLOOD PRESSURE: 122 MMHG

## 2023-10-13 DIAGNOSIS — Z23 NEED FOR IMMUNIZATION AGAINST INFLUENZA: ICD-10-CM

## 2023-10-13 DIAGNOSIS — M19.021 ARTHRITIS OF RIGHT ELBOW: ICD-10-CM

## 2023-10-13 DIAGNOSIS — M47.812 CERVICAL SPINE ARTHRITIS: ICD-10-CM

## 2023-10-13 DIAGNOSIS — Z79.891 CHRONIC USE OF OPIATE FOR THERAPEUTIC PURPOSE: ICD-10-CM

## 2023-10-13 DIAGNOSIS — M47.817 LUMBAR AND SACRAL ARTHRITIS: ICD-10-CM

## 2023-10-13 PROCEDURE — G0008 ADMIN INFLUENZA VIRUS VAC: HCPCS | Performed by: FAMILY MEDICINE

## 2023-10-13 PROCEDURE — 3074F SYST BP LT 130 MM HG: CPT | Performed by: FAMILY MEDICINE

## 2023-10-13 PROCEDURE — 99213 OFFICE O/P EST LOW 20 MIN: CPT | Mod: 25 | Performed by: FAMILY MEDICINE

## 2023-10-13 PROCEDURE — 3078F DIAST BP <80 MM HG: CPT | Performed by: FAMILY MEDICINE

## 2023-10-13 PROCEDURE — 90662 IIV NO PRSV INCREASED AG IM: CPT | Performed by: FAMILY MEDICINE

## 2023-10-13 RX ORDER — HYDROCODONE BITARTRATE AND ACETAMINOPHEN 7.5; 325 MG/1; MG/1
1 TABLET ORAL EVERY 6 HOURS PRN
Qty: 100 TABLET | Refills: 0 | Status: SHIPPED | OUTPATIENT
Start: 2023-10-25 | End: 2023-11-24

## 2023-10-13 RX ORDER — HYDROCODONE BITARTRATE AND ACETAMINOPHEN 7.5; 325 MG/1; MG/1
1 TABLET ORAL EVERY 6 HOURS PRN
Qty: 100 TABLET | Refills: 0 | Status: SHIPPED | OUTPATIENT
Start: 2023-12-24 | End: 2024-01-10 | Stop reason: SDUPTHER

## 2023-10-13 RX ORDER — HYDROCODONE BITARTRATE AND ACETAMINOPHEN 7.5; 325 MG/1; MG/1
1 TABLET ORAL EVERY 6 HOURS PRN
Qty: 100 TABLET | Refills: 0 | Status: SHIPPED | OUTPATIENT
Start: 2023-11-24 | End: 2023-12-24

## 2023-10-13 ASSESSMENT — FIBROSIS 4 INDEX: FIB4 SCORE: 1.7

## 2023-10-13 NOTE — PROGRESS NOTES
Chief Complaint   Patient presents with    Follow-Up     Q3M    Knee Pain    Arthritis    Elbow Pain       Subjective:     HPI:   Ricky Junior presents today with the followin. Lumbar and sacral arthritis  Patient has chronic lumbar and sacral arthritic and degenerative disease.  This is a source of pain.  Patient uses topical treatments and heat.  She also takes celecoxib 100 mg once daily.  This along with the hydrocodone APAP reduces her pain from a 7 or 8 down to a 5 which allows her to complete her ADLs and sleep with assistance from her  and family.  Patient denies somnolence or confusion from the regimen.  She denies significant constipation.    2. Arthritis of right elbow  Patient has chronic right elbow arthritis.  States symptoms have improved somewhat using the Celebrex.  She also uses heat as needed.  She continues to be active particularly in Muslim activities.  She continues to do her own laundry and housework with some assistance.  The hydrocodone continues to be helpful.    3. Cervical spine arthritis  Patient has multiple level cervical spine arthritis.  At times this is quite stiff and painful.  However, she feels the celecoxib has helped.  Heat helps as well.  She uses the hydrocodone APAP for rescue.  Typically she is taking 3/day, occasionally she needs 4.    4. Chronic use of opiate for therapeutic purpose  No aberrant or addictive use of medications has been observed.  No adverse events have been reported.  Patient counseled to not add Tylenol to current regimen.  Patient counseled to keep medications locked up or under personal control.  [facility] board of pharmacy interface is reviewed. This is the PDMP.  No inconsistencies are found.  The patient's maximum MME is 30.  This is within CDC guideline for chronic pain prescribing by primary care.    5. Need for immunization against influenza  High-dose flu vaccine administered today    Her knee pain is now significantly better.   She had recent knee injections with orthopedics.      Patient Active Problem List    Diagnosis Date Noted    Use of cane as ambulatory aid 01/11/2023    At risk for falling 01/11/2023    Aortic atherosclerosis (HCC) 04/13/2022    Arthritis of right elbow 06/30/2021    Primary osteoarthritis of right shoulder 05/02/2018    Chronic use of opiate for therapeutic purpose 09/14/2016    Cervical spine arthritis 07/24/2015    Osteoarthritis of left knee     MEDICAL HOME 02/10/2015    Insomnia due to mental disorder 12/08/2014    Low HDL (under 40)     Lumbar and sacral arthritis     Pulmonary hypertension (HCC) 09/20/2014    Bipolar depression (HCC) 06/08/2014    History of iron deficiency anemia 04/06/2013    Idiopathic hypothyroidism 02/22/2013    Essential hypertension 03/28/2012       Current medicines (including changes today)  Current Outpatient Medications   Medication Sig Dispense Refill    [START ON 10/25/2023] HYDROcodone-acetaminophen (NORCO) 7.5-325 MG tab Take 1 Tablet by mouth every 6 hours as needed for Moderate Pain or Severe Pain (arthritis pain, max four per day) for up to 30 days. 100 tablets is a 30 day quantity 100 Tablet 0    [START ON 11/24/2023] HYDROcodone-acetaminophen (NORCO) 7.5-325 MG tab Take 1 Tablet by mouth every 6 hours as needed for Moderate Pain or Severe Pain (arthritis pain, max four per day) for up to 30 days. 100 tablets is a 30 day quantity 100 Tablet 0    [START ON 12/24/2023] HYDROcodone-acetaminophen (NORCO) 7.5-325 MG tab Take 1 Tablet by mouth every 6 hours as needed for Moderate Pain or Severe Pain (arthritis pain, max four per day) for up to 30 days. 100 tablets is a 30 day quantity 100 Tablet 0    carvedilol (COREG) 12.5 MG Tab Take 1 Tablet by mouth 2 times a day with meals. 180 Tablet 3    SYNTHROID 175 MCG Tab Take 1 Tablet by mouth every morning on an empty stomach. 90 Tablet 3    celecoxib (CELEBREX) 100 MG Cap TAKE 1 CAPSULE BY MOUTH EVERY DAY 90 Capsule 2     "QUEtiapine (SEROQUEL) 200 MG Tab Take 1 Tablet by mouth at bedtime. 90 Tablet 3    divalproex (DEPAKOTE) 500 MG Tablet Delayed Response Take 1 Tablet by mouth at bedtime. 90 Tablet 3    losartan (COZAAR) 100 MG Tab Take 1 Tablet by mouth every day. 90 Tablet 3    polyethylene glycol 3350 (MIRALAX) 17 GM/SCOOP Powder MIX 1 CAPFUL WITH WATER AND DRINK BY MOUTH EVERY  g 0    Misc. Devices Misc This is an order for a four wheeled walker with seat  Dx; unsteady gait, arthritis of lower back and knee  ICD-10 codes  M47.817, M17.12           CHEYENNE 99 months   NPI [id number] 1 Each 0    aspirin EC (ECOTRIN) 81 MG Tablet Delayed Response Take 1 Tab by mouth every day. 30 Tab 0     No current facility-administered medications for this visit.       No Known Allergies    ROS: As per HPI       Objective:     /76   Pulse 89   Temp 36.7 °C (98 °F)   Resp 18   Ht 1.702 m (5' 7\")   Wt 58.3 kg (128 lb 9.6 oz)   SpO2 97%  Body mass index is 20.14 kg/m².    Physical Exam:  Constitutional: Well-developed and well-nourished. Not diaphoretic. No distress. Lucid and fluent.  Skin: Skin is warm and dry. No rash noted.  Head: Atraumatic without lesions.  Eyes: Conjunctivae and extraocular motions are normal. Pupils are equal, round, and reactive to light. No scleral icterus.   Ears:  External ears unremarkable.   Neck: Spinous process is mildly tender.  Extension and flexion are both mildly limited.  Tilting is very limited. No thyromegaly present. No cervical or supraclavicular lymphadenopathy. No JVD or carotid bruits appreciated  Cardiovascular: Regular rate and rhythm.  Normal S1, S2 without murmur appreciated.  Chest: Effort normal. Clear to auscultation throughout. No adventitious sounds.   Back: Spinous process tenderness particularly lumbosacral region.  No erythema or bruising.  Moderate muscle spasm as usual.  No significant change.    Extremities: No cyanosis, clubbing, erythema, nor edema.   Neurological: Alert " and oriented x 3.  No tremor appreciated.  Movements are symmetric.  She is using her cane today.  She typically uses either a cane or a walker.  Psychiatric:  Behavior, mood, and affect are appropriate.       Assessment and Plan:     89 y.o. female with the following issues:    1. Lumbar and sacral arthritis  HYDROcodone-acetaminophen (NORCO) 7.5-325 MG tab    HYDROcodone-acetaminophen (NORCO) 7.5-325 MG tab    HYDROcodone-acetaminophen (NORCO) 7.5-325 MG tab      2. Arthritis of right elbow  HYDROcodone-acetaminophen (NORCO) 7.5-325 MG tab    HYDROcodone-acetaminophen (NORCO) 7.5-325 MG tab    HYDROcodone-acetaminophen (NORCO) 7.5-325 MG tab      3. Cervical spine arthritis  HYDROcodone-acetaminophen (NORCO) 7.5-325 MG tab    HYDROcodone-acetaminophen (NORCO) 7.5-325 MG tab    HYDROcodone-acetaminophen (NORCO) 7.5-325 MG tab      4. Chronic use of opiate for therapeutic purpose  Pain Management Screen      5. Need for immunization against influenza  INFLUENZA VACCINE, HIGH DOSE (65+ ONLY)        Chronic pain recheck for: Chronic degenerative and arthritic problems particularly in the lower spine, elbow, knees and neck  Last dose of controlled substance: Today  Chronic pain treated with hydrocodone APAP taken 3-4 times a day    She  reports no history of alcohol use.  She  reports no history of drug use.    Interval history: Stable overall  Any major change in health since last appointment? No    Consequences of Chronic Opiate therapy:  (5 A's)  Analgesia: Compared to no treatment or prior treatment, pain is currently improved  Activity: improved  Adverse Events: She denies constipation, itchy skin, nausea, and sedation  Aberrant Behaviors: She reports she is taking medication as prescribed and is not veering from agreed treatment regimen or provider recommendations. There have been no inappropriate refills or lost/stolen meds reported.  Affect/Mood: Pain is impacting patient's mood.  Patient reports stable  depression/anxiety.    Nonnarcotic treatments that are being used: NSAIDs/PEREZ-2, topical agents, and heat.     Last imaging: knee imaging in May. Cervical spine 2015, T spine also    Opioid Risk Score: 1    Interpretation of Opioid Risk Score   Score 0-3 = Low risk of abuse. Do UDS at least once per year.  Score 4-7 = Moderate risk of abuse. Do UDS 1-4 times per year.  Score 8+ = High risk of abuse. Refer to specialist.    Last order of CONTROLLED SUBSTANCE TREATMENT AGREEMENT was found on 7/12/2023 from Office Visit on 7/12/2023     UDS Summary            Ordered - Urine Drug Screening (Every 360 Days) Ordered on 10/13/2023      09/06/2022  URINE DRUG SCREEN    10/06/2021  PAIN MANAGEMENT SCREEN    09/11/2020  Pain Management Screen    06/03/2019  Pain Management Screen    05/23/2018  [facility] PAIN MANAGEMENT SCREEN    Only the first 5 history entries have been loaded, but more history exists.                  Most recent UDS reviewed today and is consistent with prescribed medications. UDS obtained today.    I have reviewed the medical records, the Prescription Monitoring Program and I have determined that controlled substance treatment is medically indicated.       Followup: Return in about 3 months (around 1/13/2024), or if symptoms worsen or fail to improve.

## 2024-01-10 ENCOUNTER — OFFICE VISIT (OUTPATIENT)
Dept: MEDICAL GROUP | Facility: MEDICAL CENTER | Age: 89
End: 2024-01-10
Payer: MEDICARE

## 2024-01-10 VITALS
HEIGHT: 67 IN | WEIGHT: 130.29 LBS | BODY MASS INDEX: 20.45 KG/M2 | SYSTOLIC BLOOD PRESSURE: 132 MMHG | RESPIRATION RATE: 18 BRPM | TEMPERATURE: 98 F | DIASTOLIC BLOOD PRESSURE: 80 MMHG | OXYGEN SATURATION: 96 % | HEART RATE: 84 BPM

## 2024-01-10 DIAGNOSIS — M47.812 CERVICAL SPINE ARTHRITIS: ICD-10-CM

## 2024-01-10 DIAGNOSIS — M19.021 ARTHRITIS OF RIGHT ELBOW: ICD-10-CM

## 2024-01-10 DIAGNOSIS — K59.01 SLOW TRANSIT CONSTIPATION: ICD-10-CM

## 2024-01-10 DIAGNOSIS — F31.9 BIPOLAR DEPRESSION (HCC): ICD-10-CM

## 2024-01-10 DIAGNOSIS — M47.817 LUMBAR AND SACRAL ARTHRITIS: ICD-10-CM

## 2024-01-10 DIAGNOSIS — I70.0 AORTIC ATHEROSCLEROSIS (HCC): ICD-10-CM

## 2024-01-10 DIAGNOSIS — I27.20 MODERATE PULMONARY HYPERTENSION (HCC): ICD-10-CM

## 2024-01-10 DIAGNOSIS — R60.9 PERIPHERAL EDEMA: ICD-10-CM

## 2024-01-10 DIAGNOSIS — Z79.891 CHRONIC USE OF OPIATE FOR THERAPEUTIC PURPOSE: ICD-10-CM

## 2024-01-10 PROBLEM — R60.0 PERIPHERAL EDEMA: Status: ACTIVE | Noted: 2024-01-10

## 2024-01-10 PROCEDURE — 3079F DIAST BP 80-89 MM HG: CPT | Performed by: FAMILY MEDICINE

## 2024-01-10 PROCEDURE — 3075F SYST BP GE 130 - 139MM HG: CPT | Performed by: FAMILY MEDICINE

## 2024-01-10 PROCEDURE — 99214 OFFICE O/P EST MOD 30 MIN: CPT | Performed by: FAMILY MEDICINE

## 2024-01-10 RX ORDER — HYDROCODONE BITARTRATE AND ACETAMINOPHEN 7.5; 325 MG/1; MG/1
1 TABLET ORAL EVERY 6 HOURS PRN
Qty: 100 TABLET | Refills: 0 | Status: SHIPPED | OUTPATIENT
Start: 2024-02-02 | End: 2024-03-03

## 2024-01-10 RX ORDER — TORSEMIDE 5 MG/1
5 TABLET ORAL EVERY MORNING
Qty: 90 TABLET | Refills: 2 | Status: SHIPPED | OUTPATIENT
Start: 2024-01-10

## 2024-01-10 RX ORDER — HYDROCODONE BITARTRATE AND ACETAMINOPHEN 7.5; 325 MG/1; MG/1
1 TABLET ORAL EVERY 6 HOURS PRN
Qty: 100 TABLET | Refills: 0 | Status: SHIPPED | OUTPATIENT
Start: 2024-03-03 | End: 2024-04-02

## 2024-01-10 RX ORDER — HYDROCODONE BITARTRATE AND ACETAMINOPHEN 7.5; 325 MG/1; MG/1
1 TABLET ORAL EVERY 6 HOURS PRN
Qty: 100 TABLET | Refills: 0 | Status: SHIPPED | OUTPATIENT
Start: 2024-04-02 | End: 2024-05-02

## 2024-01-10 RX ORDER — POLYETHYLENE GLYCOL 3350 17 G/17G
POWDER, FOR SOLUTION ORAL
Qty: 510 G | Refills: 0 | Status: SHIPPED | OUTPATIENT
Start: 2024-01-10

## 2024-01-10 ASSESSMENT — FIBROSIS 4 INDEX: FIB4 SCORE: 1.7

## 2024-01-10 NOTE — PROGRESS NOTES
Chief Complaint   Patient presents with    Follow-Up     Q3M      Arthritis    Edema     Both ankles and lower legs       Subjective:     HPI:   Ricky Junior presents today with the followin. Peripheral edema  Has noted increased ankle and lower leg swelling over the last two or three months.  Dr. Griffin her orthopedist noticed that when she saw him Monday.  denies chest pain or shortness of breath.  No rales appreciated.  Torsemide low dose 5 mg order placed.    2. Lumbar and sacral arthritis  Patient has longstanding lumbar and sacral arthritis with multiple level degenerative and arthritic disease of the lower spine.  This is a source of pain.  While patient can tolerate very low-dose aspirin she cannot tolerate full aspirin.  She is taking celecoxib which has been helpful.  She takes the hydrocodone APAP as the celecoxib is not sufficient.  She states the hydrocodone controls her pain well bringing the pain from a 7 or 8 down to a 5.    3. Arthritis of right elbow  The celecoxib has helped the right elbow arthritis significantly.  She still has to take the hydrocodone occasionally for rescue but this is less of a constant problem than it was.    4. Cervical spine arthritis  Patient has multiple level cervical spine arthritis and does have some neck stiffness which is occasionally significant.  The Celebrex has helped this.  She feels the hydrocodone APAP continues to help significantly bringing her neck and spine pain from a 7 or 8 down to a 5 which allows her to complete her ADLs with help from her spouse and family.    5. Chronic use of opiate for therapeutic purpose  No aberrant or addictive use of medications has been observed.  No adverse events have been reported.  Patient counseled to not add Tylenol to current regimen.  Patient counseled to keep medications locked up or under personal control.  [facility] board of pharmacy interface is reviewed. This is the PDMP.  No inconsistencies are found.   The patient's maximum MME is 30, generally takes less.  This is within CDC guideline for chronic pain prescribing by primary care.    6. Aortic atherosclerosis (HCC)  She has incidentally found aortic atherosclerosis which of course is normal for 89 years of age.  No aneurysm has been described.    7. Moderate pulmonary hypertension (HCC)  40-45 on past echo.  Denies any exertional SOB or chest pressure.  No rales today.  No JVD.    8. Bipolar depression (HCC)  Patient remains very stable on her Depakote and Seroquel regimen.    9. Slow transit constipation  Patient does have history of slow transit constipation controlled very well with MiraLAX.  MiraLAX is renewed.        Patient Active Problem List    Diagnosis Date Noted    Peripheral edema 01/10/2024    Use of cane as ambulatory aid 01/11/2023    At risk for falling 01/11/2023    Aortic atherosclerosis (HCC) 04/13/2022    Arthritis of right elbow 06/30/2021    Primary osteoarthritis of right shoulder 05/02/2018    Chronic use of opiate for therapeutic purpose 09/14/2016    Cervical spine arthritis 07/24/2015    Osteoarthritis of left knee     MEDICAL HOME 02/10/2015    Insomnia due to mental disorder 12/08/2014    Low HDL (under 40)     Lumbar and sacral arthritis     Moderate pulmonary hypertension (HCC) 09/20/2014    Bipolar depression (HCC) 06/08/2014    History of iron deficiency anemia 04/06/2013    Idiopathic hypothyroidism 02/22/2013    Essential hypertension 03/28/2012       Current medicines (including changes today)  Current Outpatient Medications   Medication Sig Dispense Refill    torsemide (DEMADEX) 5 MG Tab Take 1 Tablet by mouth every morning. 90 Tablet 2    [START ON 2/2/2024] HYDROcodone-acetaminophen (NORCO) 7.5-325 MG tab Take 1 Tablet by mouth every 6 hours as needed for Moderate Pain or Severe Pain (arthritis pain, max four per day) for up to 30 days. 100 tablets is a 30 day quantity 100 Tablet 0    [START ON 3/3/2024]  "HYDROcodone-acetaminophen (NORCO) 7.5-325 MG tab Take 1 Tablet by mouth every 6 hours as needed for Moderate Pain or Severe Pain (arthritis pain, max four per day) for up to 30 days. 100 tablets is a 30 day quantity 100 Tablet 0    [START ON 4/2/2024] HYDROcodone-acetaminophen (NORCO) 7.5-325 MG tab Take 1 Tablet by mouth every 6 hours as needed for Moderate Pain or Severe Pain (arthritis pain, max four per day) for up to 30 days. 100 tablets is a 30 day quantity 100 Tablet 0    polyethylene glycol 3350 (MIRALAX) 17 GM/SCOOP Powder MIX 1 CAPFUL WITH WATER AND DRINK BY MOUTH EVERY  g 0    carvedilol (COREG) 12.5 MG Tab Take 1 Tablet by mouth 2 times a day with meals. 180 Tablet 3    SYNTHROID 175 MCG Tab Take 1 Tablet by mouth every morning on an empty stomach. 90 Tablet 3    celecoxib (CELEBREX) 100 MG Cap TAKE 1 CAPSULE BY MOUTH EVERY DAY 90 Capsule 2    QUEtiapine (SEROQUEL) 200 MG Tab Take 1 Tablet by mouth at bedtime. 90 Tablet 3    divalproex (DEPAKOTE) 500 MG Tablet Delayed Response Take 1 Tablet by mouth at bedtime. 90 Tablet 3    losartan (COZAAR) 100 MG Tab Take 1 Tablet by mouth every day. 90 Tablet 3    Misc. Devices Misc This is an order for a four wheeled walker with seat  Dx; unsteady gait, arthritis of lower back and knee  ICD-10 codes  M47.817, M17.12           CHEYENNE 99 months   NPI [id number] 1 Each 0    aspirin EC (ECOTRIN) 81 MG Tablet Delayed Response Take 1 Tab by mouth every day. 30 Tab 0     No current facility-administered medications for this visit.       No Known Allergies    ROS: As per HPI       Objective:     /80   Pulse 84   Temp 36.7 °C (98 °F)   Resp 18   Ht 1.702 m (5' 7\")   Wt 59.1 kg (130 lb 4.7 oz)   SpO2 96%  Body mass index is 20.41 kg/m².    Physical Exam:  Constitutional: Well-developed and well-nourished. Not diaphoretic. No distress. Lucid and fluent.  Skin: Skin is warm and dry. No rash noted.  Head: Atraumatic without lesions.  Eyes: Conjunctivae and " extraocular motions are normal. Pupils are equal, round, and reactive to light. No scleral icterus.   Ears:  External ears unremarkable.  Neck: Supple, trachea midline. No thyromegaly present. No cervical or supraclavicular lymphadenopathy. No JVD or carotid bruits appreciated  Cardiovascular: Regular rate and rhythm.  Normal S1, S2 without murmur appreciated.  Chest: Effort normal. Clear to auscultation throughout. No adventitious sounds. No rales.  Abdomen: Soft, non tender, and without distention. Active bowel sounds in all four quadrants. No rebound, guarding, masses or hepatosplenomegaly.  Extremities: No cyanosis, clubbing, erythema.  1+ pitting edema of both ankles and lower legs.   Neurological: Alert and oriented x 3. No tremor appreciated.  Using her four wheeled walker.  Psychiatric:  Behavior, mood, and affect are appropriate.       Assessment and Plan:     89 y.o. female with the following issues:    1. Peripheral edema  torsemide (DEMADEX) 5 MG Tab      2. Lumbar and sacral arthritis  HYDROcodone-acetaminophen (NORCO) 7.5-325 MG tab    HYDROcodone-acetaminophen (NORCO) 7.5-325 MG tab    HYDROcodone-acetaminophen (NORCO) 7.5-325 MG tab      3. Arthritis of right elbow  HYDROcodone-acetaminophen (NORCO) 7.5-325 MG tab    HYDROcodone-acetaminophen (NORCO) 7.5-325 MG tab    HYDROcodone-acetaminophen (NORCO) 7.5-325 MG tab      4. Cervical spine arthritis  HYDROcodone-acetaminophen (NORCO) 7.5-325 MG tab    HYDROcodone-acetaminophen (NORCO) 7.5-325 MG tab    HYDROcodone-acetaminophen (NORCO) 7.5-325 MG tab      5. Chronic use of opiate for therapeutic purpose        6. Aortic atherosclerosis (HCC)        7. Moderate pulmonary hypertension (HCC)        8. Bipolar depression (HCC)        9. Slow transit constipation  polyethylene glycol 3350 (MIRALAX) 17 GM/SCOOP Powder        Chronic pain recheck for: Arthritic and degenerative changes particularly in the spine but multiple joints also.  Last dose of  controlled substance: Today  Chronic pain treated with hydrocodone APAP taken 3-4 times a day    She  reports no history of alcohol use.  She  reports no history of drug use.    Interval history: Has been stable overall, no falls  Any major change in health since last appointment? No    Consequences of Chronic Opiate therapy:  (5 A's)  Analgesia: Compared to no treatment or prior treatment, pain is currently improved  Activity: improved  Adverse Events: She denies constipation, itchy skin, nausea, and sedation  Aberrant Behaviors: She reports she is taking medication as prescribed and is not veering from agreed treatment regimen or provider recommendations. There have been no inappropriate refills or lost/stolen meds reported.  Affect/Mood: Pain is impacting patient's mood.  Patient reports stable depression/anxiety.    Nonnarcotic treatments that are being used: NSAIDs/PEREZ-2.  Patient also uses heat and over-the-counter topical treatments.    Last imaging: Knee x-rays done in May, last spine x-rays done in 22.    Opioid Risk Score: 1    Interpretation of Opioid Risk Score   Score 0-3 = Low risk of abuse. Do UDS at least once per year.  Score 4-7 = Moderate risk of abuse. Do UDS 1-4 times per year.  Score 8+ = High risk of abuse. Refer to specialist.    Last order of CONTROLLED SUBSTANCE TREATMENT AGREEMENT was found on 7/12/2023 from Office Visit on 7/12/2023     UDS Summary            Urine Drug Screening (Every 360 Days) Next due on 10/7/2024      10/13/2023  URINE DRUG SCREEN    09/06/2022  URINE DRUG SCREEN    10/06/2021  PAIN MANAGEMENT SCREEN    09/11/2020  Pain Management Screen    06/03/2019  Pain Management Screen    Only the first 5 history entries have been loaded, but more history exists.                  Most recent UDS reviewed today and is consistent with prescribed medications.     I have reviewed the medical records, the Prescription Monitoring Program and I have determined that controlled  substance treatment is medically indicated.       Followup: Return in about 3 months (around 4/10/2024), or if symptoms worsen or fail to improve.

## 2024-01-31 DIAGNOSIS — F51.05 INSOMNIA DUE TO MENTAL DISORDER: ICD-10-CM

## 2024-01-31 DIAGNOSIS — F31.9 BIPOLAR DEPRESSION (HCC): ICD-10-CM

## 2024-01-31 RX ORDER — QUETIAPINE FUMARATE 200 MG/1
200 TABLET, FILM COATED ORAL
Qty: 90 TABLET | Refills: 3 | Status: SHIPPED | OUTPATIENT
Start: 2024-01-31

## 2024-01-31 NOTE — TELEPHONE ENCOUNTER
Received request via: Pharmacy    Was the patient seen in the last year in this department? Yes    Does the patient have an active prescription (recently filled or refills available) for medication(s) requested? No    [facility]     Does the patient have retirement Plus and need 100 day supply (blood pressure, diabetes and cholesterol meds only)? Patient does not have SCP

## 2024-02-06 ENCOUNTER — APPOINTMENT (OUTPATIENT)
Dept: RADIOLOGY | Facility: MEDICAL CENTER | Age: 89
End: 2024-02-06
Attending: EMERGENCY MEDICINE
Payer: MEDICARE

## 2024-02-06 ENCOUNTER — HOSPITAL ENCOUNTER (EMERGENCY)
Facility: MEDICAL CENTER | Age: 89
End: 2024-02-06
Attending: EMERGENCY MEDICINE
Payer: MEDICARE

## 2024-02-06 VITALS
WEIGHT: 115 LBS | RESPIRATION RATE: 16 BRPM | SYSTOLIC BLOOD PRESSURE: 202 MMHG | HEIGHT: 67 IN | HEART RATE: 59 BPM | TEMPERATURE: 97 F | DIASTOLIC BLOOD PRESSURE: 87 MMHG | BODY MASS INDEX: 18.05 KG/M2 | OXYGEN SATURATION: 93 %

## 2024-02-06 DIAGNOSIS — I10 HYPERTENSION, UNSPECIFIED TYPE: ICD-10-CM

## 2024-02-06 DIAGNOSIS — M79.601 RIGHT ARM PAIN: ICD-10-CM
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
## 2024-02-06 LAB
ALBUMIN SERPL BCP-MCNC: 3.8 G/DL (ref 3.2–4.9)
ALBUMIN/GLOB SERPL: 1.2 G/DL
ALP SERPL-CCNC: 84 U/L (ref 30–99)
ALT SERPL-CCNC: 7 U/L (ref 2–50)
ANION GAP SERPL CALC-SCNC: 12 MMOL/L (ref 7–16)
AST SERPL-CCNC: 15 U/L (ref 12–45)
BASOPHILS # BLD AUTO: 0.4 % (ref 0–1.8)
BASOPHILS # BLD: 0.03 K/UL (ref 0–0.12)
BILIRUB SERPL-MCNC: 0.2 MG/DL (ref 0.1–1.5)
BUN SERPL-MCNC: 18 MG/DL (ref 8–22)
CALCIUM ALBUM COR SERPL-MCNC: 9 MG/DL (ref 8.5–10.5)
CALCIUM SERPL-MCNC: 8.8 MG/DL (ref 8.5–10.5)
CHLORIDE SERPL-SCNC: 99 MMOL/L (ref 96–112)
CO2 SERPL-SCNC: 21 MMOL/L (ref 20–33)
CREAT SERPL-MCNC: 1.27 MG/DL (ref 0.5–1.4)
EOSINOPHIL # BLD AUTO: 0.25 K/UL (ref 0–0.51)
EOSINOPHIL NFR BLD: 3.5 % (ref 0–6.9)
ERYTHROCYTE [DISTWIDTH] IN BLOOD BY AUTOMATED COUNT: 41.9 FL (ref 35.9–50)
GFR SERPLBLD CREATININE-BSD FMLA CKD-EPI: 40 ML/MIN/1.73 M 2
GLOBULIN SER CALC-MCNC: 3.1 G/DL (ref 1.9–3.5)
GLUCOSE SERPL-MCNC: 87 MG/DL (ref 65–99)
HCT VFR BLD AUTO: 34.6 % (ref 37–47)
HGB BLD-MCNC: 11.4 G/DL (ref 12–16)
IMM GRANULOCYTES # BLD AUTO: 0.03 K/UL (ref 0–0.11)
IMM GRANULOCYTES NFR BLD AUTO: 0.4 % (ref 0–0.9)
LYMPHOCYTES # BLD AUTO: 1.26 K/UL (ref 1–4.8)
LYMPHOCYTES NFR BLD: 17.8 % (ref 22–41)
MCH RBC QN AUTO: 31.8 PG (ref 27–33)
MCHC RBC AUTO-ENTMCNC: 32.9 G/DL (ref 32.2–35.5)
MCV RBC AUTO: 96.6 FL (ref 81.4–97.8)
MONOCYTES # BLD AUTO: 0.89 K/UL (ref 0–0.85)
MONOCYTES NFR BLD AUTO: 12.6 % (ref 0–13.4)
NEUTROPHILS # BLD AUTO: 4.61 K/UL (ref 1.82–7.42)
NEUTROPHILS NFR BLD: 65.3 % (ref 44–72)
NRBC # BLD AUTO: 0 K/UL
NRBC BLD-RTO: 0 /100 WBC (ref 0–0.2)
PLATELET # BLD AUTO: 235 K/UL (ref 164–446)
PMV BLD AUTO: 10.9 FL (ref 9–12.9)
POTASSIUM SERPL-SCNC: 4.9 MMOL/L (ref 3.6–5.5)
PROT SERPL-MCNC: 6.9 G/DL (ref 6–8.2)
RBC # BLD AUTO: 3.58 M/UL (ref 4.2–5.4)
SODIUM SERPL-SCNC: 132 MMOL/L (ref 135–145)
WBC # BLD AUTO: 7.1 K/UL (ref 4.8–10.8)

## 2024-02-06 PROCEDURE — 70450 CT HEAD/BRAIN W/O DYE: CPT

## 2024-02-06 PROCEDURE — 73060 X-RAY EXAM OF HUMERUS: CPT | Mod: RT

## 2024-02-06 PROCEDURE — 72192 CT PELVIS W/O DYE: CPT

## 2024-02-06 PROCEDURE — 71250 CT THORAX DX C-: CPT

## 2024-02-06 PROCEDURE — 700102 HCHG RX REV CODE 250 W/ 637 OVERRIDE(OP): Performed by: EMERGENCY MEDICINE

## 2024-02-06 PROCEDURE — 36415 COLL VENOUS BLD VENIPUNCTURE: CPT

## 2024-02-06 PROCEDURE — 99285 EMERGENCY DEPT VISIT HI MDM: CPT

## 2024-02-06 PROCEDURE — 73562 X-RAY EXAM OF KNEE 3: CPT | Mod: RT

## 2024-02-06 PROCEDURE — A9270 NON-COVERED ITEM OR SERVICE: HCPCS | Performed by: EMERGENCY MEDICINE

## 2024-02-06 PROCEDURE — 85025 COMPLETE CBC W/AUTO DIFF WBC: CPT

## 2024-02-06 PROCEDURE — 80053 COMPREHEN METABOLIC PANEL: CPT

## 2024-02-06 RX ORDER — HYDROCODONE BITARTRATE AND ACETAMINOPHEN 5; 325 MG/1; MG/1
1.5 TABLET ORAL ONCE
Status: COMPLETED | OUTPATIENT
Start: 2024-02-06 | End: 2024-02-06

## 2024-02-06 RX ORDER — CARVEDILOL 12.5 MG/1
12.5 TABLET ORAL ONCE
Status: COMPLETED | OUTPATIENT
Start: 2024-02-06 | End: 2024-02-06

## 2024-02-06 RX ADMIN — HYDROCODONE BITARTRATE AND ACETAMINOPHEN 1.5 TABLET: 5; 325 TABLET ORAL at 18:10

## 2024-02-06 RX ADMIN — CARVEDILOL 12.5 MG: 12.5 TABLET, FILM COATED ORAL at 20:11

## 2024-02-06 ASSESSMENT — FIBROSIS 4 INDEX: FIB4 SCORE: 1.7

## 2024-02-07 NOTE — DISCHARGE PLANNING
This nurse provided a cab voucher # [id number] to KENYA Ruiz as both patient and  are requesting transportation home.  Patients  will be 89 y/o tomorrow and is not comfortable driving at night.

## 2024-02-07 NOTE — DISCHARGE INSTRUCTIONS
You were seen in the Emergency Department for a fall.    Xrays and CT scans were completed without significant acute abnormalities.    Please use 1,000mg of tylenol every 6 hours as needed for pain.    Please follow up with your primary care physician.    Return to the Emergency Department with increasing pain, confusion, vomiting, or other concerns.

## 2024-02-07 NOTE — ED TRIAGE NOTES
Chief Complaint   Patient presents with    GLF     Pt BIB EMS from home. Pt states she was bringing her  some cookies, tripped over a rug and fell onto R shoulder. -head strike, -LOC, -thinners. CMS intact. No obvious deformity noted.     Arm Pain     Pt BIB [facility] for above.     Given 4mg zofran & 100mcg fent by EMS. Pt takes norco at home for chronic back pain.     Pt placed on monitoring, chart up for ERP.

## 2024-02-07 NOTE — ED NOTES
Patient ambulated to the pierce with single point cane and minimal assistance. No distress noted. Patient wheeled to and from the restroom. ERP notified.

## 2024-02-07 NOTE — ED PROVIDER NOTES
"ED Provider Note    CHIEF COMPLAINT  Chief Complaint   Patient presents with    GLF     Pt BIB EMS from home. Pt states she was bringing her  some cookies, tripped over a rug and fell onto R shoulder. -head strike, -LOC, -thinners. CMS intact. No obvious deformity noted.     Arm Pain     EXTERNAL RECORDS REVIEWED  Indicate the patient was last seen by her PCP 1/10/24. She has a history of chronic arthritis on opiate medications, pulmonary hypertension, and peripheral edema.    HPI/ROS  LIMITATION TO HISTORY   Select: : None  OUTSIDE HISTORIAN(S):  EMS regarding on scene information and interventions administered en route as detailed below.    Ricky Junior is a 89 y.o. female who presents to the Emergency Department via EMS for evaluation of right sided arm pain onset just prior to arrival secondary to a ground level fall. She describes attempting to bring her  some cookies when she tripped and fell backwards onto her right shoulder. She is unsure if she hit her head, but notes \"I was out for a minute or two\" questionable lost consciousness. She was unable to get up due to pain along her entire right side including right chest, hip and knee. She reports taking Aspirin 81 mg PO daily, as well as Carvedilol 12.5 mg BID. She took her morning Carvedilol dose, but not her nighttime dose yet. She denies using supplemental oxygen at baseline. Per EMS, she was medicated with Zofran 4 mg and Fentanyl 100 mcg en route with some alleviation.    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 She has never used smokeless tobacco. She reports that she does not drink alcohol and does not use drugs.    CURRENT MEDICATIONS  Discharge Medication List as of 2/6/2024  9:21 PM        CONTINUE these medications which have NOT CHANGED    Details   QUEtiapine (SEROQUEL) 200 MG Tab TAKE 1 TABLET BY MOUTH AT BEDTIME, Disp-90 Tablet, R-3, Normal      torsemide (DEMADEX) 5 MG Tab Take 1 Tablet by mouth every morning., Disp-90 Tablet, R-2, Normal      !! HYDROcodone-acetaminophen (NORCO) 7.5-325 MG tab Take 1 Tablet by mouth every 6 hours as needed for Moderate Pain or Severe Pain (arthritis pain, max four per day) for up to 30 days. 100 tablets is a 30 day quantitySeen 1/10/2024, renewal of chronic medication. 100 is a 30 day quantity.   ICD-10 codes ; M47.817, M19.021, M47.812. do not fill until 2/2/2024Disp-100 Tablet, R-0, Normal      !! HYDROcodone-acetaminophen (NORCO) 7.5-325 MG tab Take 1 Tablet by mouth every 6 hours as needed for Moderate Pain or Severe Pain (arthritis pain, max four per day) for up to 30 days. 100 tablets is a 30 day quantitySeen 1/10/2024, renewal of chronic medication. 100 is a 30 day quantity.   ICD-10 codes ; M47.817, M19.021, M47.812. do not fill until 3/3/2024Disp-100 Tablet, R-0, Normal      !! HYDROcodone-acetaminophen (NORCO) 7.5-325 MG tab Take 1 Tablet by mouth every 6 hours as needed for Moderate Pain or Severe Pain (arthritis pain, max four per day) for up to 30 days. 100 tablets is a 30 day quantitySeen 1/10/2024, renewal of chronic medication. 100 is a 30 day  "quantity.   ICD-10 codes ; M47.817, M19.021, M47.812. do not fill until 4/2/2024Disp-100 Tablet, R-0, Normal      polyethylene glycol 3350 (MIRALAX) 17 GM/SCOOP Powder MIX 1 CAPFUL WITH WATER AND DRINK BY MOUTH EVERY DAY, Disp-510 g, R-0, Normal      carvedilol (COREG) 12.5 MG Tab Take 1 Tablet by mouth 2 times a day with meals., Disp-180 Tablet, R-3, Normal      SYNTHROID 175 MCG Tab Take 1 Tablet by mouth every morning on an empty stomach.Brand is medically necessaryDisp-90 Tablet, R-3, CHRISTIANO, Normal      celecoxib (CELEBREX) 100 MG Cap TAKE 1 CAPSULE BY MOUTH EVERY DAY, Disp-90 Capsule, R-2, Normal      divalproex (DEPAKOTE) 500 MG Tablet Delayed Response Take 1 Tablet by mouth at bedtime., Disp-90 Tablet, R-3, Normal      losartan (COZAAR) 100 MG Tab Take 1 Tablet by mouth every day., Disp-90 Tablet, R-3, Normal      Misc. Devices Misc This is an order for a four wheeled walker with seat  Dx; unsteady gait, arthritis of lower back and knee  ICD-10 codes  M47.817, M17.12           CHEYENNE 99 months   NPI [id number] Each, R-0, Print Plain Paper      aspirin EC (ECOTRIN) 81 MG Tablet Delayed Response Take 1 Tab by mouth every day., Disp-30 Tab, R-0, OTC       !! - Potential duplicate medications found. Please discuss with provider.        ALLERGIES  Patient has no known allergies.    PHYSICAL EXAM  BP (!) 167/77   Pulse 60   Temp 36.6 °C (97.8 °F) (Temporal)   Resp 16   Ht 1.702 m (5' 7\")   Wt 52.2 kg (115 lb)   SpO2 97%    Constitutional: Nontoxic appearing. Alert in no apparent distress.  HENT: Normocephalic, Atraumatic. Bilateral external ears normal. Nose normal.  Moist mucous membranes.  Oropharynx clear.  Eyes: Pupils are equal and reactive. Conjunctiva normal.   Neck: Supple, full range of motion. No midline C-Spine tenderness.   Heart: Regular rate and rhythm.  No murmurs.    Lungs: No respiratory distress, normal work of breathing. Lungs clear to auscultation bilaterally.  Abdomen Soft, no " distention.  No tenderness to palpation.  Musculoskeletal: Tenderness over the upper right arm. No deformities. Tenderness over the right hip and knee  No lower extremity edema.  Skin: Warm, Dry.  No erythema, No rash.   Neurologic: Alert and oriented x3. Moving all extremities spontaneously without focal deficits. Motor and sensation intact distal to injury.  Psychiatric: Affect normal, Mood normal, Appears appropriate and not intoxicated.    DIAGNOSTIC STUDIES / PROCEDURES    LABS  Labs Reviewed   CBC WITH DIFFERENTIAL - Abnormal; Notable for the following components:       Result Value    RBC 3.58 (*)     Hemoglobin 11.4 (*)     Hematocrit 34.6 (*)     Lymphocytes 17.80 (*)     Monos (Absolute) 0.89 (*)     All other components within normal limits   COMP METABOLIC PANEL - Abnormal; Notable for the following components:    Sodium 132 (*)     All other components within normal limits   ESTIMATED GFR - Abnormal; Notable for the following components:    GFR (CKD-EPI) 40 (*)     All other components within normal limits      RADIOLOGY  I have independently interpreted the diagnostic imaging associated with this visit and am waiting the final reading from the radiologist.   My preliminary interpretation is a follows: no rib fracture, pelvic fracture, humerus fracture  Radiologist interpretation:   CT-PELVIS W/O   Final Result      1.  No hip or pelvic fracture.   2.  Prominent heterotopic ossification anterior to the LEFT proximal femur shaft.   3.  Moderate degenerative change of both hips.   4.  Diffuse osteopenia.      CT-CHEST (THORAX) W/O   Final Result      Unremarkable CT scan of the chest without contrast.      Fleischner Society pulmonary nodule recommendations:   Not Applicable         CT-HEAD W/O   Final Result      1.  Diffuse atrophy.   2.  No acute intracranial abnormality.         DX-KNEE 3 VIEWS RIGHT   Final Result      No evidence of fracture or dislocation.               INTERPRETING LOCATION:  [address] [facility] [place], [address]      DX-HUMERUS 2+ RIGHT   Final Result      No evidence of humerus fracture.        COURSE & MEDICAL DECISION MAKING  5:09 PM - Patient was evaluated at bedside. Ordered for CT-Head w/o,  CT-Pelvis w/o,  DX-Humerus Right, DX-Knee Right, CT-Head w/o, CBC w/diff, and CMP to evaluate. The patient will be medicated with Norco 5-325 mg PO for her symptoms. Patient verbalizes understanding and support with my plan of care.       ED Observation Status? No; Patient does not meet criteria for ED Observation.     INITIAL ASSESSMENT, COURSE AND PLAN  Care Narrative: Elderly female presents with right sided pain after a ground level mechanical fall just prior to arrival.  She is hypertensive however did not take nightly blood pressure medications.  Vitals otherwise normal.  No signs of significant trauma on exam.  Imaging without intracranial hemorrhage, rib fracture, pelvic fracture.  No fracture of right humerus or right knee.  Labs are reassuring other than mild chronic anemia and hyponatremia.    7:43 PM - Patient was reevaluated at bedside. She states that her pain has improved and she is no longer in pain. The patient will be medicated with Carvedilol 12.5 mg PO, which is her home medication.    8:!5M - Upon reassessment, patient is resting comfortably with stable vital signs. Blood pressure remains high but improved. No new complaints at this time.  Patient was able to ambulate with cane without difficulty.  Feels comfortable with discharge home with  in taxi.  Discussed results with patient and/or family as well as importance of primary care follow up.  Patient understands plan of care and strict return precautions for new or changing symptoms.       ADDITIONAL PROBLEM LIST  Problem #1:  Fall - no injuries sustained, given return precautions for changing symptoms    Problem #2: Hypertension - continue home medications and monitor with close PCP follow up      DISPOSITION AND  DISCUSSIONS  Escalation of care considered, and ultimately not performed:acute inpatient care management, however at this time, the patient is most appropriate for outpatient management    Decision tools and prescription drugs considered including, but not limited to: Pain Medications patient already taking chronic opiate pain medications .        DISPOSITION:  Patient will be discharged home in stable condition.    FOLLOW UP:  Luke Escobar M.D.  [address] [facility] [address]  [facility] [address]  [phone]    Schedule an appointment as soon as possible for a visit       [facility], Emergency Dept  [address] [facility] [id number] [phone]    If symptoms worsen      OUTPATIENT MEDICATIONS:  Discharge Medication List as of 2/6/2024  9:21 PM            FINAL DIAGNOSIS  1. Fall, initial encounter    2. Right arm pain    3. Hypertension, unspecified type        The note accurately reflects work and decisions made by me.  Deisy Sarabia M.D.  2/7/2024  1:29 AM     Jaron AN (Crissy), am scribing for, and in the presence of, Deisy Sarabia M.D..    Electronically signed by: Jaron Blanc (Crissy), 2/6/2024    Deisy AN M.D. personally performed the services described in this documentation, as scribed by Jaron Blanc in my presence, and it is both accurate and complete.

## 2024-02-07 NOTE — ED NOTES
PIV removed. Patient provided discharge instructions, verbalizes understanding and denies any further questions. Patient instructed to return if condition worsens. No distress noted. Cab voucher provided for patient and spouse. Patient wheeled to the front lobby with all belongings.

## 2024-02-07 NOTE — ED NOTES
Bedside report received from KENYA Mark. Assumed care of patient and she is resting, denies any pain or needs. Bed locked and in lowest position. Call light available and within reach. No oxygen requirements at this time. Appropriate fall precautions in place. Patient on cardiac monitoring, automatic BP and pulse oximeter. See MAR for medications and infusions. No distress noted.

## 2024-02-21 DIAGNOSIS — I10 ESSENTIAL HYPERTENSION: ICD-10-CM

## 2024-02-21 RX ORDER — LOSARTAN POTASSIUM 100 MG/1
100 TABLET ORAL
Qty: 90 TABLET | Refills: 3 | Status: SHIPPED | OUTPATIENT
Start: 2024-02-21

## 2024-04-01 DIAGNOSIS — M19.021 ARTHRITIS OF RIGHT ELBOW: ICD-10-CM

## 2024-04-01 DIAGNOSIS — M47.817 LUMBAR AND SACRAL ARTHRITIS: ICD-10-CM

## 2024-04-01 DIAGNOSIS — M47.812 CERVICAL SPINE ARTHRITIS: ICD-10-CM

## 2024-04-01 RX ORDER — CELECOXIB 100 MG/1
100 CAPSULE ORAL DAILY
Qty: 90 CAPSULE | Refills: 2 | Status: SHIPPED | OUTPATIENT
Start: 2024-04-01 | End: 2024-04-12 | Stop reason: SDUPTHER

## 2024-04-12 ENCOUNTER — OFFICE VISIT (OUTPATIENT)
Dept: MEDICAL GROUP | Facility: MEDICAL CENTER | Age: 89
End: 2024-04-12
Payer: MEDICARE

## 2024-04-12 ENCOUNTER — HOSPITAL ENCOUNTER (INPATIENT)
Facility: MEDICAL CENTER | Age: 89
LOS: 10 days | DRG: 056 | End: 2024-04-25
Attending: EMERGENCY MEDICINE | Admitting: INTERNAL MEDICINE
Payer: MEDICARE

## 2024-04-12 ENCOUNTER — APPOINTMENT (OUTPATIENT)
Dept: RADIOLOGY | Facility: MEDICAL CENTER | Age: 89
DRG: 056 | End: 2024-04-12
Attending: EMERGENCY MEDICINE
Payer: MEDICARE

## 2024-04-12 ENCOUNTER — HOSPITAL ENCOUNTER (OUTPATIENT)
Dept: LAB | Facility: MEDICAL CENTER | Age: 89
End: 2024-04-12
Attending: FAMILY MEDICINE
Payer: MEDICARE

## 2024-04-12 VITALS
OXYGEN SATURATION: 95 % | TEMPERATURE: 98 F | BODY MASS INDEX: 19.45 KG/M2 | RESPIRATION RATE: 18 BRPM | WEIGHT: 123.9 LBS | HEART RATE: 78 BPM | SYSTOLIC BLOOD PRESSURE: 138 MMHG | DIASTOLIC BLOOD PRESSURE: 86 MMHG | HEIGHT: 67 IN

## 2024-04-12 DIAGNOSIS — R19.7 DIARRHEA, UNSPECIFIED TYPE: ICD-10-CM

## 2024-04-12 DIAGNOSIS — F23 ACUTE PSYCHOSIS (HCC): ICD-10-CM

## 2024-04-12 DIAGNOSIS — R60.0 PERIPHERAL EDEMA: ICD-10-CM

## 2024-04-12 DIAGNOSIS — Z86.59 HISTORY OF BIPOLAR DISORDER: ICD-10-CM

## 2024-04-12 DIAGNOSIS — Z86.2 HISTORY OF IRON DEFICIENCY ANEMIA: ICD-10-CM

## 2024-04-12 DIAGNOSIS — E03.9 IDIOPATHIC HYPOTHYROIDISM: ICD-10-CM

## 2024-04-12 DIAGNOSIS — F51.05 INSOMNIA DUE TO MENTAL DISORDER: ICD-10-CM

## 2024-04-12 DIAGNOSIS — M19.011 PRIMARY OSTEOARTHRITIS OF RIGHT SHOULDER: ICD-10-CM

## 2024-04-12 DIAGNOSIS — Z79.891 CHRONIC USE OF OPIATE FOR THERAPEUTIC PURPOSE: ICD-10-CM

## 2024-04-12 DIAGNOSIS — I10 ESSENTIAL HYPERTENSION: ICD-10-CM

## 2024-04-12 DIAGNOSIS — M47.812 CERVICAL SPINE ARTHRITIS: ICD-10-CM

## 2024-04-12 DIAGNOSIS — M19.021 ARTHRITIS OF RIGHT ELBOW: ICD-10-CM

## 2024-04-12 DIAGNOSIS — R41.0 ACUTE CONFUSION: ICD-10-CM

## 2024-04-12 DIAGNOSIS — E87.1 HYPONATREMIA: ICD-10-CM

## 2024-04-12 DIAGNOSIS — M47.817 LUMBAR AND SACRAL ARTHRITIS: ICD-10-CM

## 2024-04-12 DIAGNOSIS — E53.8 VITAMIN B12 DEFICIENCY: ICD-10-CM

## 2024-04-12 DIAGNOSIS — F30.9 MANIC EPISODE (HCC): ICD-10-CM

## 2024-04-12 DIAGNOSIS — M17.12 PRIMARY OSTEOARTHRITIS OF LEFT KNEE: ICD-10-CM

## 2024-04-12 DIAGNOSIS — I10 UNCONTROLLED HYPERTENSION: ICD-10-CM

## 2024-04-12 DIAGNOSIS — I10 PRIMARY HYPERTENSION: ICD-10-CM

## 2024-04-12 DIAGNOSIS — A52.3 NEUROSYPHILIS: ICD-10-CM

## 2024-04-12 DIAGNOSIS — F31.9 BIPOLAR DEPRESSION (HCC): ICD-10-CM

## 2024-04-12 LAB
ALBUMIN SERPL BCP-MCNC: 4.3 G/DL (ref 3.2–4.9)
ALBUMIN SERPL BCP-MCNC: 4.3 G/DL (ref 3.2–4.9)
ALBUMIN/GLOB SERPL: 1.4 G/DL
ALBUMIN/GLOB SERPL: 1.5 G/DL
ALP SERPL-CCNC: 70 U/L (ref 30–99)
ALP SERPL-CCNC: 75 U/L (ref 30–99)
ALT SERPL-CCNC: 7 U/L (ref 2–50)
ALT SERPL-CCNC: 9 U/L (ref 2–50)
AMMONIA PLAS-SCNC: 19 UMOL/L (ref 11–45)
AMPHET UR QL SCN: NEGATIVE
ANION GAP SERPL CALC-SCNC: 12 MMOL/L (ref 7–16)
ANION GAP SERPL CALC-SCNC: 14 MMOL/L (ref 7–16)
APPEARANCE UR: CLEAR
AST SERPL-CCNC: 14 U/L (ref 12–45)
AST SERPL-CCNC: 18 U/L (ref 12–45)
BACTERIA #/AREA URNS HPF: NEGATIVE /HPF
BARBITURATES UR QL SCN: NEGATIVE
BASOPHILS # BLD AUTO: 0.4 % (ref 0–1.8)
BASOPHILS # BLD: 0.03 K/UL (ref 0–0.12)
BENZODIAZ UR QL SCN: NEGATIVE
BILIRUB SERPL-MCNC: 0.2 MG/DL (ref 0.1–1.5)
BILIRUB SERPL-MCNC: 0.2 MG/DL (ref 0.1–1.5)
BILIRUB UR QL STRIP.AUTO: NEGATIVE
BUN SERPL-MCNC: 26 MG/DL (ref 8–22)
BUN SERPL-MCNC: 28 MG/DL (ref 8–22)
BZE UR QL SCN: NEGATIVE
CALCIUM ALBUM COR SERPL-MCNC: 8.7 MG/DL (ref 8.5–10.5)
CALCIUM ALBUM COR SERPL-MCNC: 9.1 MG/DL (ref 8.5–10.5)
CALCIUM SERPL-MCNC: 8.9 MG/DL (ref 8.5–10.5)
CALCIUM SERPL-MCNC: 9.3 MG/DL (ref 8.5–10.5)
CANNABINOIDS UR QL SCN: NEGATIVE
CHLORIDE SERPL-SCNC: 97 MMOL/L (ref 96–112)
CHLORIDE SERPL-SCNC: 98 MMOL/L (ref 96–112)
CO2 SERPL-SCNC: 22 MMOL/L (ref 20–33)
CO2 SERPL-SCNC: 24 MMOL/L (ref 20–33)
COLOR UR: YELLOW
CREAT SERPL-MCNC: 1.12 MG/DL (ref 0.5–1.4)
CREAT SERPL-MCNC: 1.19 MG/DL (ref 0.5–1.4)
EOSINOPHIL # BLD AUTO: 0.31 K/UL (ref 0–0.51)
EOSINOPHIL NFR BLD: 3.6 % (ref 0–6.9)
EPI CELLS #/AREA URNS HPF: NEGATIVE /HPF
ERYTHROCYTE [DISTWIDTH] IN BLOOD BY AUTOMATED COUNT: 45.6 FL (ref 35.9–50)
ERYTHROCYTE [DISTWIDTH] IN BLOOD BY AUTOMATED COUNT: 46.7 FL (ref 35.9–50)
ETHANOL BLD-MCNC: <10.1 MG/DL
FENTANYL UR QL: NEGATIVE
FLUAV RNA SPEC QL NAA+PROBE: NEGATIVE
FLUBV RNA SPEC QL NAA+PROBE: NEGATIVE
GFR SERPLBLD CREATININE-BSD FMLA CKD-EPI: 43 ML/MIN/1.73 M 2
GFR SERPLBLD CREATININE-BSD FMLA CKD-EPI: 46 ML/MIN/1.73 M 2
GLOBULIN SER CALC-MCNC: 2.9 G/DL (ref 1.9–3.5)
GLOBULIN SER CALC-MCNC: 3.1 G/DL (ref 1.9–3.5)
GLUCOSE BLD STRIP.AUTO-MCNC: 84 MG/DL (ref 65–99)
GLUCOSE SERPL-MCNC: 82 MG/DL (ref 65–99)
GLUCOSE SERPL-MCNC: 87 MG/DL (ref 65–99)
GLUCOSE UR STRIP.AUTO-MCNC: NEGATIVE MG/DL
HCT VFR BLD AUTO: 33.8 % (ref 37–47)
HCT VFR BLD AUTO: 34 % (ref 37–47)
HGB BLD-MCNC: 10.9 G/DL (ref 12–16)
HGB BLD-MCNC: 11 G/DL (ref 12–16)
HYALINE CASTS #/AREA URNS LPF: NORMAL /LPF
IMM GRANULOCYTES # BLD AUTO: 0.01 K/UL (ref 0–0.11)
IMM GRANULOCYTES NFR BLD AUTO: 0.1 % (ref 0–0.9)
KETONES UR STRIP.AUTO-MCNC: NEGATIVE MG/DL
LEUKOCYTE ESTERASE UR QL STRIP.AUTO: ABNORMAL
LYMPHOCYTES # BLD AUTO: 1.37 K/UL (ref 1–4.8)
LYMPHOCYTES NFR BLD: 16 % (ref 22–41)
MCH RBC QN AUTO: 31.1 PG (ref 27–33)
MCH RBC QN AUTO: 31.3 PG (ref 27–33)
MCHC RBC AUTO-ENTMCNC: 32.1 G/DL (ref 32.2–35.5)
MCHC RBC AUTO-ENTMCNC: 32.5 G/DL (ref 32.2–35.5)
MCV RBC AUTO: 95.5 FL (ref 81.4–97.8)
MCV RBC AUTO: 97.7 FL (ref 81.4–97.8)
METHADONE UR QL SCN: NEGATIVE
MICRO URNS: ABNORMAL
MONOCYTES # BLD AUTO: 0.94 K/UL (ref 0–0.85)
MONOCYTES NFR BLD AUTO: 11 % (ref 0–13.4)
NEUTROPHILS # BLD AUTO: 5.88 K/UL (ref 1.82–7.42)
NEUTROPHILS NFR BLD: 68.9 % (ref 44–72)
NITRITE UR QL STRIP.AUTO: NEGATIVE
NRBC # BLD AUTO: 0 K/UL
NRBC BLD-RTO: 0 /100 WBC (ref 0–0.2)
OPIATES UR QL SCN: NEGATIVE
OXYCODONE UR QL SCN: NEGATIVE
PCP UR QL SCN: NEGATIVE
PH UR STRIP.AUTO: 6.5 [PH] (ref 5–8)
PLATELET # BLD AUTO: 272 K/UL (ref 164–446)
PLATELET # BLD AUTO: 276 K/UL (ref 164–446)
PMV BLD AUTO: 10.9 FL (ref 9–12.9)
PMV BLD AUTO: 11.2 FL (ref 9–12.9)
POTASSIUM SERPL-SCNC: 5.8 MMOL/L (ref 3.6–5.5)
POTASSIUM SERPL-SCNC: 6.3 MMOL/L (ref 3.6–5.5)
PROPOXYPH UR QL SCN: NEGATIVE
PROT SERPL-MCNC: 7.2 G/DL (ref 6–8.2)
PROT SERPL-MCNC: 7.4 G/DL (ref 6–8.2)
PROT UR QL STRIP: NEGATIVE MG/DL
RBC # BLD AUTO: 3.48 M/UL (ref 4.2–5.4)
RBC # BLD AUTO: 3.54 M/UL (ref 4.2–5.4)
RBC # URNS HPF: NORMAL /HPF
RBC UR QL AUTO: NEGATIVE
RSV RNA SPEC QL NAA+PROBE: NEGATIVE
SARS-COV-2 RNA RESP QL NAA+PROBE: NOTDETECTED
SODIUM SERPL-SCNC: 133 MMOL/L (ref 135–145)
SODIUM SERPL-SCNC: 134 MMOL/L (ref 135–145)
SP GR UR STRIP.AUTO: 1.01
T4 FREE SERPL-MCNC: 1.41 NG/DL (ref 0.93–1.7)
TSH SERPL DL<=0.005 MIU/L-ACNC: 1.57 UIU/ML (ref 0.38–5.33)
TSH SERPL DL<=0.005 MIU/L-ACNC: 1.68 UIU/ML (ref 0.38–5.33)
UROBILINOGEN UR STRIP.AUTO-MCNC: 0.2 MG/DL
WBC # BLD AUTO: 7 K/UL (ref 4.8–10.8)
WBC # BLD AUTO: 8.5 K/UL (ref 4.8–10.8)
WBC #/AREA URNS HPF: NORMAL /HPF

## 2024-04-12 PROCEDURE — 80053 COMPREHEN METABOLIC PANEL: CPT

## 2024-04-12 PROCEDURE — 3075F SYST BP GE 130 - 139MM HG: CPT | Performed by: FAMILY MEDICINE

## 2024-04-12 PROCEDURE — 81001 URINALYSIS AUTO W/SCOPE: CPT

## 2024-04-12 PROCEDURE — 85027 COMPLETE CBC AUTOMATED: CPT

## 2024-04-12 PROCEDURE — 71045 X-RAY EXAM CHEST 1 VIEW: CPT

## 2024-04-12 PROCEDURE — 70450 CT HEAD/BRAIN W/O DYE: CPT

## 2024-04-12 PROCEDURE — 80053 COMPREHEN METABOLIC PANEL: CPT | Mod: 91

## 2024-04-12 PROCEDURE — 99214 OFFICE O/P EST MOD 30 MIN: CPT | Performed by: FAMILY MEDICINE

## 2024-04-12 PROCEDURE — 700111 HCHG RX REV CODE 636 W/ 250 OVERRIDE (IP): Performed by: EMERGENCY MEDICINE

## 2024-04-12 PROCEDURE — 36415 COLL VENOUS BLD VENIPUNCTURE: CPT

## 2024-04-12 PROCEDURE — 82962 GLUCOSE BLOOD TEST: CPT

## 2024-04-12 PROCEDURE — 82140 ASSAY OF AMMONIA: CPT

## 2024-04-12 PROCEDURE — 84439 ASSAY OF FREE THYROXINE: CPT

## 2024-04-12 PROCEDURE — 82077 ASSAY SPEC XCP UR&BREATH IA: CPT

## 2024-04-12 PROCEDURE — 84443 ASSAY THYROID STIM HORMONE: CPT

## 2024-04-12 PROCEDURE — 90791 PSYCH DIAGNOSTIC EVALUATION: CPT

## 2024-04-12 PROCEDURE — 700105 HCHG RX REV CODE 258: Performed by: EMERGENCY MEDICINE

## 2024-04-12 PROCEDURE — 3079F DIAST BP 80-89 MM HG: CPT | Performed by: FAMILY MEDICINE

## 2024-04-12 PROCEDURE — 87086 URINE CULTURE/COLONY COUNT: CPT

## 2024-04-12 PROCEDURE — 96374 THER/PROPH/DIAG INJ IV PUSH: CPT

## 2024-04-12 PROCEDURE — 85025 COMPLETE CBC W/AUTO DIFF WBC: CPT

## 2024-04-12 PROCEDURE — 99285 EMERGENCY DEPT VISIT HI MDM: CPT

## 2024-04-12 PROCEDURE — 80307 DRUG TEST PRSMV CHEM ANLYZR: CPT

## 2024-04-12 PROCEDURE — 0241U HCHG SARS-COV-2 COVID-19 NFCT DS RESP RNA 4 TRGT ED POC: CPT

## 2024-04-12 PROCEDURE — 84443 ASSAY THYROID STIM HORMONE: CPT | Mod: 91

## 2024-04-12 RX ORDER — HYDROCODONE BITARTRATE AND ACETAMINOPHEN 7.5; 325 MG/1; MG/1
1 TABLET ORAL EVERY 6 HOURS PRN
Qty: 100 TABLET | Refills: 0 | Status: SHIPPED | OUTPATIENT
Start: 2024-05-12 | End: 2024-04-12

## 2024-04-12 RX ORDER — CELECOXIB 100 MG/1
100 CAPSULE ORAL DAILY
Qty: 90 CAPSULE | Refills: 2 | Status: SHIPPED | OUTPATIENT
Start: 2024-04-12 | End: 2024-04-12

## 2024-04-12 RX ORDER — TORSEMIDE 5 MG/1
5 TABLET ORAL EVERY MORNING
Qty: 90 TABLET | Refills: 2 | Status: SHIPPED | OUTPATIENT
Start: 2024-04-12

## 2024-04-12 RX ORDER — DIVALPROEX SODIUM 500 MG/1
500 TABLET, DELAYED RELEASE ORAL
Qty: 90 TABLET | Refills: 3 | Status: ON HOLD | OUTPATIENT
Start: 2024-04-12 | End: 2024-04-23

## 2024-04-12 RX ORDER — LEVOTHYROXINE SODIUM 175 MCG
175 TABLET ORAL
Qty: 90 TABLET | Refills: 3 | Status: SHIPPED | OUTPATIENT
Start: 2024-04-12

## 2024-04-12 RX ORDER — HYDRALAZINE HYDROCHLORIDE 20 MG/ML
10 INJECTION INTRAMUSCULAR; INTRAVENOUS ONCE
Status: COMPLETED | OUTPATIENT
Start: 2024-04-13 | End: 2024-04-13

## 2024-04-12 RX ORDER — LORAZEPAM 2 MG/ML
0.5 INJECTION INTRAMUSCULAR ONCE
Status: COMPLETED | OUTPATIENT
Start: 2024-04-12 | End: 2024-04-12

## 2024-04-12 RX ORDER — HYDROCODONE BITARTRATE AND ACETAMINOPHEN 7.5; 325 MG/1; MG/1
1 TABLET ORAL EVERY 6 HOURS PRN
Qty: 100 TABLET | Refills: 0 | Status: SHIPPED | OUTPATIENT
Start: 2024-04-12 | End: 2024-04-12

## 2024-04-12 RX ORDER — ASCORBIC ACID 500 MG
500 TABLET ORAL DAILY
COMMUNITY

## 2024-04-12 RX ORDER — HYDROCODONE BITARTRATE AND ACETAMINOPHEN 7.5; 325 MG/1; MG/1
1 TABLET ORAL EVERY 6 HOURS PRN
Qty: 100 TABLET | Refills: 0 | Status: ON HOLD | OUTPATIENT
Start: 2024-06-11 | End: 2024-04-24

## 2024-04-12 RX ORDER — SODIUM CHLORIDE 9 MG/ML
INJECTION, SOLUTION INTRAVENOUS CONTINUOUS
Status: ACTIVE | OUTPATIENT
Start: 2024-04-12 | End: 2024-04-14

## 2024-04-12 RX ORDER — CARVEDILOL 12.5 MG/1
12.5 TABLET ORAL 2 TIMES DAILY WITH MEALS
Qty: 180 TABLET | Refills: 3 | Status: ON HOLD | OUTPATIENT
Start: 2024-04-12 | End: 2024-04-24

## 2024-04-12 RX ORDER — LOSARTAN POTASSIUM 100 MG/1
100 TABLET ORAL
Qty: 90 TABLET | Refills: 3 | Status: SHIPPED | OUTPATIENT
Start: 2024-04-12

## 2024-04-12 RX ADMIN — SODIUM CHLORIDE: 9 INJECTION, SOLUTION INTRAVENOUS at 22:14

## 2024-04-12 RX ADMIN — LORAZEPAM 0.5 MG: 2 INJECTION INTRAMUSCULAR; INTRAVENOUS at 22:14

## 2024-04-12 ASSESSMENT — FIBROSIS 4 INDEX
FIB4 SCORE: 2.22
FIB4 SCORE: 2.22

## 2024-04-12 NOTE — PROGRESS NOTES
Chief Complaint   Patient presents with    Follow-Up    Leg Pain    Back Pain    Hypothyroidism    Depression       Subjective:     HPI:   Ricky Junior presents today with the following: She is accompanied by her  and her son.    1. Idiopathic hypothyroidism  Unfortunately her  who oversees her medications had confused the losartan with her Synthroid.  She had brought all her medications today and there is no Synthroid present.  Patient seems depressed anxious certainly rambling.  Discussed with her and family that resumption of the Synthroid is important.  Renewal has been sent to her pharmacy.  Patient seems fatigued and somewhat disoriented in her thinking.  After visit summary is printed out and I have indicated for the family what medications she needs to be on.    2. Essential hypertension  Her blood pressure is not very well-controlled today but she is somewhat agitated.  Is unclear exactly how her medications have been given lately.    3. Bipolar depression (HCC)  Patient has history of bipolar depression with very severe depression and psychosis.  It is important that she stay on the Depakote and Seroquel.  Patient's  today identified the Depakote is something she is not on.  Her  is clearly is not as sharp at this today as he usually is.  She has told me before that he is a few years older than she.  Their son will be helping to clarify the medications.    4. Lumbar and sacral arthritis  Patient has chronic pain from lumbar and sacral arthritis.  Unfortunately her  was giving her losartan thinking it was a pain medication and it appears she has been out of her pain medication now for a few weeks.  She asks me several times today to give her a prescription for her pain medication.  PDMP review shows no inconsistencies.  She has done very well on that medication.  No adverse events reported or observed.  I believe the medication is necessary for her.    5. Arthritis  of right elbow  The hydrocodone typically she states brings her overall pain from a 7 or 8 down to a 5.  Today the elbow is not a major problem but it is at times.    6. Cervical spine arthritis  Patient has stiffness in multiple level arthritis of her neck.  The hydrocodone has been helpful and is renewed.  Celebrex has been very helpful but apparently she has been out of that as well.  That is also renewed today.    7. Chronic use of opiate for therapeutic purpose  No aberrant or addictive use of medications has been observed.  No adverse events have been reported.  Patient counseled to not add Tylenol to current regimen.  Patient counseled to keep medications locked up or under personal control.  [facility] board of pharmacy interface is reviewed. This is the PDMP.  No inconsistencies are found.  The patient's maximum MME is 30.  This is within CDC guideline for chronic pain prescribing by primary care.    8. Peripheral edema  Patient has longstanding peripheral edema and is on low-dose torsemide.  This is 1 daily in the morning.  It is unclear whether she has been taking that.  Again, family will look at this carefully.  Typically her  feels her pill minder once a week.  Have emphasized to her son that it is very important for her that the medications be taken exactly as directed.  He voices understanding.    9. History of iron deficiency anemia  Follow-up lab order discussed and placed    10. Acute confusion  Lab orders discussed and placed.  Also have asked them to complete a urine test as well to rule out UTI.        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 90 Tablet 3    losartan (COZAAR) 100 MG Tab Take 1 Tablet by mouth every day. 90 Tablet 3    carvedilol (COREG) 12.5 MG Tab Take 1 Tablet by mouth 2 times a day with meals. 180 Tablet 3    divalproex (DEPAKOTE) 500 MG Tablet Delayed Response Take 1 Tablet by mouth at bedtime. 90 Tablet 3    HYDROcodone-acetaminophen (NORCO) 7.5-325 MG tab Take 1 Tablet by mouth every 6 hours as needed for Moderate Pain or Severe Pain (arthritis pain, max four per day) for up to 30 days. 100 tablets is a 30 day quantity 100 Tablet 0    [START ON 5/12/2024] HYDROcodone-acetaminophen (NORCO) 7.5-325 MG tab Take 1 Tablet by mouth every 6 hours as needed for Moderate Pain or Severe Pain (arthritis pain, max four per day) for up to 30 days. 100 tablets is a 30 day quantity 100 Tablet 0    [START ON 6/11/2024] HYDROcodone-acetaminophen (NORCO) 7.5-325 MG tab Take 1 Tablet by mouth every 6 hours as needed for Moderate Pain or Severe Pain (arthritis pain, max four per day) for up to 30 days. 100 tablets is a 30 day quantity 100 Tablet 0    celecoxib (CELEBREX) 100 MG Cap Take 1 Capsule by mouth every day. 90 Capsule 2    torsemide (DEMADEX) 5 MG Tab Take 1 Tablet by mouth every morning. 90 Tablet 2    QUEtiapine (SEROQUEL) 200 MG Tab TAKE 1 TABLET BY MOUTH AT BEDTIME 90 Tablet 3    polyethylene glycol 3350 (MIRALAX) 17 GM/SCOOP Powder MIX 1 CAPFUL WITH WATER AND DRINK BY MOUTH  "EVERY  g 0    Misc. Devices Misc This is an order for a four wheeled walker with seat  Dx; unsteady gait, arthritis of lower back and knee  ICD-10 codes  M47.817, M17.12           CHEYENNE 99 months   NPI [id number] 1 Each 0    aspirin EC (ECOTRIN) 81 MG Tablet Delayed Response Take 1 Tab by mouth every day. 30 Tab 0     No current facility-administered medications for this visit.       No Known Allergies    ROS: As per HPI       Objective:     /86   Pulse 78   Temp 36.7 °C (98 °F)   Resp 18   Ht 1.702 m (5' 7\")   Wt 56.2 kg (123 lb 14.4 oz)   SpO2 95%  Body mass index is 19.41 kg/m².    Physical Exam:  Constitutional: Well-developed and well-nourished. Not diaphoretic. No distress. Lucid and fluent.  Skin: Skin is warm and dry. No rash noted.  Head: Atraumatic without lesions.  Eyes: Conjunctivae and extraocular motions are normal. Pupils are equal, round, and reactive to light. No scleral icterus.   Ears:  External ears unremarkable.   Mouth/Throat: Tongue normal. Oropharynx is clear and moist. Posterior pharynx without erythema or exudates.  Neck: Patient has cervical kyphosis.  Range of motion is somewhat limited in extension and flexion.  There is some muscle spasm.  No thyromegaly present. No cervical or supraclavicular lymphadenopathy. No JVD or carotid bruits appreciated  Cardiovascular: Regular rate and rhythm.  Normal S1, S2 without murmur appreciated.  Chest: Effort normal. Clear to auscultation throughout. No adventitious sounds.  No rales appreciated.  Abdomen: Soft, non tender, and without distention. Active bowel sounds in all four quadrants. No rebound, guarding, masses or hepatosplenomegaly.  Extremities: No cyanosis, clubbing, erythema, nor edema.   Neurological: Alert and oriented x 3.  Patient is using her walker today.  She is quite steady when she uses this.  Psychiatric:  Behavior, mood, and affect are appropriate.       Assessment and Plan:     89 y.o. female with the following " issues:    1. Idiopathic hypothyroidism  SYNTHROID 175 MCG Tab    TSH      2. Essential hypertension  losartan (COZAAR) 100 MG Tab    carvedilol (COREG) 12.5 MG Tab    Comp Metabolic Panel      3. Bipolar depression (HCC)  divalproex (DEPAKOTE) 500 MG Tablet Delayed Response      4. Lumbar and sacral arthritis  HYDROcodone-acetaminophen (NORCO) 7.5-325 MG tab    HYDROcodone-acetaminophen (NORCO) 7.5-325 MG tab    HYDROcodone-acetaminophen (NORCO) 7.5-325 MG tab    celecoxib (CELEBREX) 100 MG Cap      5. Arthritis of right elbow  HYDROcodone-acetaminophen (NORCO) 7.5-325 MG tab    HYDROcodone-acetaminophen (NORCO) 7.5-325 MG tab    HYDROcodone-acetaminophen (NORCO) 7.5-325 MG tab    celecoxib (CELEBREX) 100 MG Cap      6. Cervical spine arthritis  HYDROcodone-acetaminophen (NORCO) 7.5-325 MG tab    HYDROcodone-acetaminophen (NORCO) 7.5-325 MG tab    HYDROcodone-acetaminophen (NORCO) 7.5-325 MG tab    celecoxib (CELEBREX) 100 MG Cap      7. Chronic use of opiate for therapeutic purpose        8. Peripheral edema  torsemide (DEMADEX) 5 MG Tab    Comp Metabolic Panel    CBC WITHOUT DIFFERENTIAL    TSH      9. History of iron deficiency anemia  CBC WITHOUT DIFFERENTIAL      10. Acute confusion  URINE CULTURE(NEW)        Chronic pain recheck for: Chronic arthritic and degenerative pain  Last dose of controlled substance: Unclear if she has had any in the last 2 weeks  Chronic pain treated with hydrocodone APAP taken 3-4 times a day    She  reports no history of alcohol use.  She  reports no history of drug use.    Interval history: Patient is exhibiting some return of her severe depression and paranoia.  I believe this is medication related, particularly lack of her Synthroid, Depakote and Seroquel..  Any major change in health since last appointment? Yes see above    Consequences of Chronic Opiate therapy:  (5 A's)  Analgesia: Compared to no treatment or prior treatment, pain is currently improved  Activity:  improved  Adverse Events: She denies constipation, itchy skin, nausea, and sedation  Aberrant Behaviors: She reports she is taking medication as prescribed and is not veering from agreed treatment regimen or provider recommendations. There have been no inappropriate refills or lost/stolen meds reported.  Affect/Mood: Pain is impacting patient's mood.  Patient clearly is very anxious and somewhat paranoid here today.  Mood fluctuates wildly.     Nonnarcotic treatments that are being used: NSAIDs/PEREZ-2, topical agents, and heat.     Last imaging: Pelvic CT in February after fall.  Showed degenerative and arthritic change of the lumbar spine.  Significant arthritis with loss of joint space in both hips and subchondral sclerosis.  There were thankfully no fractures.    Opioid Risk Score: 1    Interpretation of Opioid Risk Score   Score 0-3 = Low risk of abuse. Do UDS at least once per year.  Score 4-7 = Moderate risk of abuse. Do UDS 1-4 times per year.  Score 8+ = High risk of abuse. Refer to specialist.    Last order of CONTROLLED SUBSTANCE TREATMENT AGREEMENT was found on 7/12/2023 from Office Visit on 7/12/2023     UDS Summary            Urine Drug Screening (Every 360 Days) Next due on 10/7/2024      10/13/2023  URINE DRUG SCREEN    09/06/2022  URINE DRUG SCREEN    10/06/2021  PAIN MANAGEMENT SCREEN    09/11/2020  Pain Management Screen    06/03/2019  Pain Management Screen    Only the first 5 history entries have been loaded, but more history exists.                  Most recent UDS reviewed today and is consistent with prescribed medications.     I have reviewed the medical records, the Prescription Monitoring Program and I have determined that controlled substance treatment is medically indicated.       Followup: Return in about 3 months (around 7/12/2024), or if symptoms worsen or fail to improve.

## 2024-04-13 PROCEDURE — 700102 HCHG RX REV CODE 250 W/ 637 OVERRIDE(OP): Performed by: EMERGENCY MEDICINE

## 2024-04-13 PROCEDURE — A9270 NON-COVERED ITEM OR SERVICE: HCPCS | Performed by: EMERGENCY MEDICINE

## 2024-04-13 PROCEDURE — 700111 HCHG RX REV CODE 636 W/ 250 OVERRIDE (IP): Performed by: EMERGENCY MEDICINE

## 2024-04-13 PROCEDURE — 36415 COLL VENOUS BLD VENIPUNCTURE: CPT

## 2024-04-13 PROCEDURE — 96375 TX/PRO/DX INJ NEW DRUG ADDON: CPT

## 2024-04-13 RX ORDER — LEVOTHYROXINE SODIUM 175 UG/1
175 TABLET ORAL
Status: DISCONTINUED | OUTPATIENT
Start: 2024-04-14 | End: 2024-04-25 | Stop reason: HOSPADM

## 2024-04-13 RX ORDER — LOSARTAN POTASSIUM 50 MG/1
100 TABLET ORAL DAILY
Status: DISCONTINUED | OUTPATIENT
Start: 2024-04-14 | End: 2024-04-25 | Stop reason: HOSPADM

## 2024-04-13 RX ORDER — TORSEMIDE 5 MG/1
5 TABLET ORAL EVERY MORNING
Status: DISCONTINUED | OUTPATIENT
Start: 2024-04-14 | End: 2024-04-15

## 2024-04-13 RX ORDER — HYDROCODONE BITARTRATE AND ACETAMINOPHEN 5; 325 MG/1; MG/1
1 TABLET ORAL EVERY 6 HOURS PRN
Status: DISCONTINUED | OUTPATIENT
Start: 2024-06-11 | End: 2024-04-13

## 2024-04-13 RX ORDER — QUETIAPINE FUMARATE 100 MG/1
200 TABLET, FILM COATED ORAL
Status: DISCONTINUED | OUTPATIENT
Start: 2024-04-13 | End: 2024-04-20

## 2024-04-13 RX ORDER — CARVEDILOL 6.25 MG/1
12.5 TABLET ORAL 2 TIMES DAILY WITH MEALS
Status: DISCONTINUED | OUTPATIENT
Start: 2024-04-13 | End: 2024-04-24

## 2024-04-13 RX ORDER — DIVALPROEX SODIUM 500 MG/1
500 TABLET, DELAYED RELEASE ORAL
Status: DISCONTINUED | OUTPATIENT
Start: 2024-04-13 | End: 2024-04-13

## 2024-04-13 RX ORDER — DIVALPROEX SODIUM 500 MG/1
500 TABLET, DELAYED RELEASE ORAL EVERY 12 HOURS
Status: DISCONTINUED | OUTPATIENT
Start: 2024-04-13 | End: 2024-04-14

## 2024-04-13 RX ORDER — ACETAMINOPHEN 325 MG/1
650 TABLET ORAL EVERY 6 HOURS PRN
Status: DISCONTINUED | OUTPATIENT
Start: 2024-04-13 | End: 2024-04-25 | Stop reason: HOSPADM

## 2024-04-13 RX ORDER — POLYETHYLENE GLYCOL 3350 17 G/17G
1 POWDER, FOR SOLUTION ORAL DAILY
Status: DISCONTINUED | OUTPATIENT
Start: 2024-04-14 | End: 2024-04-25 | Stop reason: HOSPADM

## 2024-04-13 RX ORDER — CARVEDILOL 12.5 MG/1
12.5 TABLET ORAL ONCE
Status: COMPLETED | OUTPATIENT
Start: 2024-04-13 | End: 2024-04-13

## 2024-04-13 RX ORDER — HYDROCODONE BITARTRATE AND ACETAMINOPHEN 5; 325 MG/1; MG/1
1 TABLET ORAL EVERY 6 HOURS PRN
Status: DISCONTINUED | OUTPATIENT
Start: 2024-04-14 | End: 2024-04-25 | Stop reason: HOSPADM

## 2024-04-13 RX ADMIN — QUETIAPINE FUMARATE 200 MG: 100 TABLET ORAL at 21:11

## 2024-04-13 RX ADMIN — DIVALPROEX SODIUM 500 MG: 500 TABLET, DELAYED RELEASE ORAL at 13:55

## 2024-04-13 RX ADMIN — CARVEDILOL 12.5 MG: 12.5 TABLET, FILM COATED ORAL at 06:25

## 2024-04-13 RX ADMIN — HYDRALAZINE HYDROCHLORIDE 10 MG: 20 INJECTION, SOLUTION INTRAMUSCULAR; INTRAVENOUS at 00:09

## 2024-04-13 RX ADMIN — CARVEDILOL 12.5 MG: 12.5 TABLET, FILM COATED ORAL at 18:57

## 2024-04-13 ASSESSMENT — COGNITIVE AND FUNCTIONAL STATUS - GENERAL
MOBILITY SCORE: 22
SUGGESTED CMS G CODE MODIFIER MOBILITY: CJ
SUGGESTED CMS G CODE MODIFIER DAILY ACTIVITY: CH
WALKING IN HOSPITAL ROOM: A LITTLE
CLIMB 3 TO 5 STEPS WITH RAILING: A LITTLE
DAILY ACTIVITIY SCORE: 24

## 2024-04-13 NOTE — ED NOTES
Bedside report received from off going RN/tech: Yoko ZAMBRANO , assumed care of patient.  POC discussed with patient. Call light within reach, all needs addressed at this time.       Fall risk interventions in place: Patient's personal possessions are with in their safe reach, Place fall risk sign on patient's door, Keep floor surfaces clean and dry, and Bed Alarm in use (all applicable per [place] Fall risk assessment)   Continuous monitoring: Pulse Ox or Blood Pressure  IVF/IV medications: Not Applicable   Oxygen: Room Air  Bedside sitter: Not Applicable   Isolation: Not Applicable

## 2024-04-13 NOTE — CONSULTS
"[facility] PSYCHIATRIC CONSULT:Intake  Reason for admission:   Patient was at the [facility] lab when staff walked her over to ER due to being confused and asking to go to ER. Per family patients PCP believes her thyroid levels are possible off. Patient A/O x 3. Patient states, \" My son is from the devil and I need to tell all the people. I can not go home because my  is from the devi.\"   Consulting Physician/APN/PA: Linda Richardson D.O.   Reason for Consult: psychosis  Consultant: Makeda Gonzales MD    Legal Status  vol       CC: not SI/HI  HPI: 89 yo female who is unclear if the devil is with her  or is not. She says he does not know satan but also says satan \"has his mind\". When asked how this manifests she begins talking of the past, the 70's-80's and reports his being  a number of times before, having children by those relationships, at least once, a dtr and the mom lived with them (but later isn't sure). Later in the presence  of her  and son she says her  was trying to kill her but cannot give examples. Yet she is okay with them being in the room (son and ). Overall her delusions are not well organized.     She denies any past psych hx at all whatsoever. She cannot remember what she takes except for her hydrocodone though she knows she takes other meds.     who is 99 and frail says he puts her meds in a pill box and she takes them. This is because in the past she \"Overdosed on them but not is a SA\". She has been hospitalized numerous times in the past and dx \"bipolar\". Unclear what the symptoms are. She has fallen recently but does not have sz. Both he and son feel her memory is good.     Depression: denies, not SI \"never \", not hopeless.   Anxiety: no significant anxiety  Psychosis: denies  Breanna: cannot get a hx  Other: when she was returning from the bathroom, she couldn't figure out how to use the brakes on the walker.     Notes:   4/12/2024: " "SI/HI/hallucinations. Hx bipolar d/o. She is having delusions and grandiose thoughts. Reports recently having difficulty sleeping. She was at a doctor's appt today at [facility] and ended up requesting she be brought to the emergency department. Pt's  states she has been acting different for approx 3 weeks.       Chart(s) Review:  2005: hx of inpt psych. Seroquel 125 mg/d geodon 100 mg/d, lamictal 25 mg bid, hypothyroidism, back pain and RH. denies any significant symptoms of depression.,history of darin,auditory or visual hallucinations.  The patient does endorse several paranoid thoughts that are firm, fixed, and false.  The patient is showing  paranoid delusions.  She believes the people in the hospital are out to get her as well as her  to make her look crazy. Denies suicidal ideation, denies homicidal ideation other than wanting to attack her  with a chair   2006:Describes her childhood as very good.  States she has a very good life. Has been  for 32 years to her current  and has no children,only an older stepson.  Mini-mental state exam is 27/30 . Ambien for sleep. Psychotic dis. [facility]  2014:  talking about the horse races and having her  bet on all of her family's names and for [facility] as well, the bets being a million dollars which will benefit us all. She knows the president personally, she is grateful to [employer] for all the care they've provided, she feels very happy, she is a \"psychic\", we (the psych team) are the most beautiful people she has ever seen.....she talks of the bible, that both God and the Devil talk to her but she only listens to God, she was born into wealth . Was a  and worked as a \"tile plate \". Schizoaffective disorder bipolar type. Depakote 500 mg bid and seroquel 100 mg hs.    2015: aleve OD.... previously taken Amitriptyline at an unknown dose, with good perceived benefit. Dementia - pt has a history of CVA. Pt is alert and " "oriented but shows areas of cognitive deficit and confusion.     Outside records: hx of malignant neoplasm of kidney, sarcopenia, HTN, heart failure, hypothyroidism and absence of other organs. endorses multiple hospitalizations in the past couple of  years.     Medical ROS:  Review of systems per tx tm: see above. Reviewed  Neurological: no sz, did fall a few wks ago and hit the side of her head. No CVAs per pt: on records 2015: hx of CVA    Psychiatric Exam (MSE):  Vitals:Blood pressure (!) 175/73, pulse 77, temperature 36.7 °C (98 °F), temperature source Temporal, resp. rate 18, height 1.702 m (5' 7\"), weight 56.5 kg (124 lb 9 oz), SpO2 97%, not currently breastfeeding.    Constitutional:thin to  normal habitus, good eye contact, a few gold teeth, male pattern baldness. Uses walker. Lordosis.  General Appearance:as noted  Musculoskeletal: as noted above  Alert/Orientation: x 3. Does not know why she is here  Attn/Concentration: intact  Fund of Knowledge: not tested  Memory recent/remote: grossly intact  Speech: wnl  Language: fluent  Thought Content: + psychosis , no SI/HI     Thought Process: tends to ramble and focus on past  Insight/Judgement: impaired  Mood: not identified  Affect: euthymic  Clock: crowded, numbers 12 -7. Hands do not connect. 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 They don't answer the phone because of robo calls.   Support:  and adult stepson  Education: 12 grade  Abuse: denies  Drugs/Alcohol: denies    Labs:  Lab Results   Component Value Date/Time    AMPHUR Negative 04/12/2024 1702    BARBSURINE Negative 04/12/2024 1702    BENZODIAZU Negative 04/12/2024 1702    COCAINEMET Negative 04/12/2024 1702    METHADONE Negative 04/12/2024 1702    ECSTASY Negative 02/09/2015 1800    OPIATES Negative 04/12/2024 1702    OXYCODN Negative 04/12/2024 1702    PCPURINE Negative 04/12/2024 1702    PROPOXY Negative 04/12/2024 1702    CANNABINOID Negative 04/12/2024 1702     Recent Labs     04/12/24  1529 04/12/24  1714   WBC 7.0 8.5   RBC 3.48* 3.54*   HEMOGLOBIN 10.9* 11.0*   HEMATOCRIT 34.0* 33.8*   MCV 97.7 95.5   MCH 31.3 31.1   RDW 46.7 45.6   PLATELETCT 276 272   MPV 11.2 10.9   NEUTSPOLYS  --  68.90   LYMPHOCYTES  --  16.00*   MONOCYTES  --  11.00   EOSINOPHILS  --  3.60   BASOPHILS  --  0.40     Recent Labs     04/12/24  1529 04/12/24  1714   SODIUM 134* 133*   POTASSIUM 6.3* 5.8*   CHLORIDE 98 97   CO2 24 22   GLUCOSE 82 87   BUN 28* 26*      Latest Reference Range & Units 04/12/24 17:14   TSH 0.380 - 5.330 uIU/mL 1.570   Free T-4 0.93 - 1.70 ng/dL 1.41       Cranial Imaging: personally reviewed  Cranial CT: neg  MRI: 2013: a few rare foci of T2 and FLAIR hyperintensity in the subcortical and/or deep white matter consistent with small vessel ischemic change     EKG: QTc:  399       Meds Current:  Scheduled Medications   Medication Dose Frequency    carvedilol  12.5 mg BID WITH MEALS    [START ON 4/14/2024] losartan  100 mg DAILY    [START ON 4/14/2024] polyethylene glycol/lytes  1 Packet DAILY    " QUEtiapine  200 mg QHS    [START ON 4/14/2024] levothyroxine  175 mcg AM ES    [START ON 4/14/2024] torsemide  5 mg QAM    divalproex  500 mg Q12HRS     Allergies: Patient has no known allergies.      Assessement    1. 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  Continue seroquel  Pt cannot return home until she is no longer paranoid of him  Not sure if they have in home help but given the fragilty of both, they could benefit.  5   Please call son if transferred within the hospital or to another hospital:  appears cognitively intact but doesn't answer the phone.    Will continue to follow with you.    Thank you for the consult.           Discharge recommendations: inpt psych. Note:  does NOT want her going to [facility].      If released from [facility]: Discharge Instructions:  -Reviewed safety plan: 911, ER, PCM, MHC, Suicide crisis line  -Please assist with outpatient Psychiatric/substance use follow up appointments at discharge once medically cleared.

## 2024-04-13 NOTE — ED NOTES
Bedside report received from off going RN Hemanth, assumed care of patient.  POC discussed with patient. Call light within reach, all needs addressed at this time.       Fall risk interventions in place: Move the patient closer to the nurse's station, Patient's personal possessions are with in their safe reach, Place fall risk sign on patient's door, Give patient urinal if applicable, Keep floor surfaces clean and dry, Accompanied to restroom, and Bed Alarm in use (all applicable per [place] Fall risk assessment)   Continuous monitoring: Pulse Ox or Blood Pressure  IVF/IV medications: Infusion per MAR (List Med(s)) Normal Saline at 125 ml/hr  Oxygen: Room Air  Bedside sitter: Not Applicable   Isolation: Not Applicable

## 2024-04-13 NOTE — ED PROVIDER NOTES
"ER Provider Note    Scribed for Dr. Linda Richardson D.O. by Cheryl Bailey. 4/12/2024  7:07 PM    Primary Care Provider: Luke Escobar M.D.    CHIEF COMPLAINT  Chief Complaint   Patient presents with    ALOC     Patient was at the [facility] lab when staff walked her over to ER due to being confused and asking to go to ER. Per family patients PCP believes her thyroid levels are possible off. Patient A/O x 3. Patient states, \" My son is from the devil and I need to tell all the people. I can not go home because my  is from the devil.\"       EXTERNAL RECORDS REVIEWED  Outpatient Notes Patient seen at office today for follow up of idiopathic hypothyroidism, hypertension, bipolar depression, and acute confusion as the patient was then sent for lab orders to rule out UTI.    HPI/ROS  LIMITATION TO HISTORY   Select: Altered mental status / Confusion    OUTSIDE HISTORIAN(S):  Family Reports some of the history of present illness.    Ricky Junior is a 89 y.o. female who presents to the ED for an altered mental status onset prior to arrival. Patient was at the [facility] lab when staff brought here into the ER for confusion and altered mental status. The patient was asking to be brought to the ED. Family reports that her PCP was contacted and believes the patient's thyroid levels are possibly off. There are no known alleviating or exacerbating factors.        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 She has never used smokeless tobacco. She reports that she does not drink alcohol and does not use drugs.    CURRENT MEDICATIONS  Previous Medications    ASPIRIN EC (ECOTRIN) 81 MG TABLET DELAYED RESPONSE    Take 1 Tab by mouth every day.    CARVEDILOL (COREG) 12.5 MG TAB    Take 1 Tablet by mouth 2 times a day with meals.    CELECOXIB (CELEBREX) 100 MG CAP    Take 1 Capsule by mouth every day.    DIVALPROEX (DEPAKOTE) 500 MG TABLET DELAYED RESPONSE    Take 1 Tablet by mouth at bedtime.    HYDROCODONE-ACETAMINOPHEN (NORCO) 7.5-325 MG TAB    Take 1 Tablet by mouth every 6 hours as needed for Moderate Pain or Severe Pain (arthritis pain, max four per day) for up to 30 days. 100 tablets is a 30 day quantity    HYDROCODONE-ACETAMINOPHEN (NORCO) 7.5-325 MG TAB    Take 1 Tablet by mouth every 6 hours as needed for Moderate Pain or Severe Pain (arthritis pain, max four per day) for up to 30 days. 100 tablets is a 30 day quantity    HYDROCODONE-ACETAMINOPHEN (NORCO) 7.5-325 MG TAB    Take 1 Tablet by mouth every 6 hours as needed for Moderate Pain or Severe Pain (arthritis pain, max four per day) for up to 30 days. 100 tablets is a 30 day quantity    LOSARTAN (COZAAR) 100 MG TAB    Take 1 Tablet by mouth every day.    MISC. DEVICES MISC    This is an order for a four wheeled walker with seat  Dx; unsteady gait, arthritis of lower back and knee  ICD-10 codes  M47.817, M17.12           CHEYENNE 99 months   NPI [id number]    POLYETHYLENE GLYCOL 3350 (MIRALAX) 17 GM/SCOOP POWDER    MIX 1 CAPFUL WITH WATER AND DRINK BY MOUTH EVERY DAY    QUETIAPINE (SEROQUEL) 200 MG TAB    TAKE 1 TABLET BY MOUTH AT BEDTIME     "SYNTHROID 175 MCG TAB    Take 1 Tablet by mouth every morning on an empty stomach.    TORSEMIDE (DEMADEX) 5 MG TAB    Take 1 Tablet by mouth every morning.       ALLERGIES  Patient has no known allergies.    PHYSICAL EXAM  BP (!) 130/91   Pulse 80   Temp 36.7 °C (98 °F) (Temporal)   Resp 18   Ht 1.702 m (5' 7\")   Wt 56.5 kg (124 lb 9 oz)   SpO2 100%   BMI 19.51 kg/m²   Constitutional: Confused, elderly, petitie female in mild distress. Very anxious and agitated, manic, describing her 's being the best  and all of the universe 1 minute and stating that he has the devil in him.  HENT: Normocephalic, atraumatic.  Moist oral mucosa.   Cardiovascular: Tachycardic heart rate and Regular rhythm. No murmur,   Thorax & Lungs: Clear and equal breath sounds with good excursion. No respiratory distress, no rhonchi, wheezing or rales.   Abdomen: Bowel sounds normal in all four quadrants. Soft,nontender, no rebound , guarding, palpable masses. No flank tenderness.   Skin: Warm, Dry, No erythema, No rashes.    Extremities: Peripheral pulses 4/4  No tenderness, 3+ lower extremity pitting edema,     Neurologic: Alert & oriented x 3, Normal motor function, Normal sensory function,   Psychiatric: Affect Manic, easily agitated, judgment is impaired due to psychosis.  Patient is making statements of her  and her son are filled with the devil.    DIAGNOSTIC STUDIES & PROCEDURES    Labs:   Results for orders placed or performed during the hospital encounter of 04/12/24   CBC With Differential   Result Value Ref Range    WBC 8.5 4.8 - 10.8 K/uL    RBC 3.54 (L) 4.20 - 5.40 M/uL    Hemoglobin 11.0 (L) 12.0 - 16.0 g/dL    Hematocrit 33.8 (L) 37.0 - 47.0 %    MCV 95.5 81.4 - 97.8 fL    MCH 31.1 27.0 - 33.0 pg    MCHC 32.5 32.2 - 35.5 g/dL    RDW 45.6 35.9 - 50.0 fL    Platelet Count 272 164 - 446 K/uL    MPV 10.9 9.0 - 12.9 fL    Neutrophils-Polys 68.90 44.00 - 72.00 %    Lymphocytes 16.00 (L) 22.00 - 41.00 %    " Monocytes 11.00 0.00 - 13.40 %    Eosinophils 3.60 0.00 - 6.90 %    Basophils 0.40 0.00 - 1.80 %    Immature Granulocytes 0.10 0.00 - 0.90 %    Nucleated RBC 0.00 0.00 - 0.20 /100 WBC    Neutrophils (Absolute) 5.88 1.82 - 7.42 K/uL    Lymphs (Absolute) 1.37 1.00 - 4.80 K/uL    Monos (Absolute) 0.94 (H) 0.00 - 0.85 K/uL    Eos (Absolute) 0.31 0.00 - 0.51 K/uL    Baso (Absolute) 0.03 0.00 - 0.12 K/uL    Immature Granulocytes (abs) 0.01 0.00 - 0.11 K/uL    NRBC (Absolute) 0.00 K/uL   Comp Metabolic Panel   Result Value Ref Range    Sodium 133 (L) 135 - 145 mmol/L    Potassium 5.8 (H) 3.6 - 5.5 mmol/L    Chloride 97 96 - 112 mmol/L    Co2 22 20 - 33 mmol/L    Anion Gap 14.0 7.0 - 16.0    Glucose 87 65 - 99 mg/dL    Bun 26 (H) 8 - 22 mg/dL    Creatinine 1.12 0.50 - 1.40 mg/dL    Calcium 8.9 8.5 - 10.5 mg/dL    Correct Calcium 8.7 8.5 - 10.5 mg/dL    AST(SGOT) 18 12 - 45 U/L    ALT(SGPT) 7 2 - 50 U/L    Alkaline Phosphatase 70 30 - 99 U/L    Total Bilirubin 0.2 0.1 - 1.5 mg/dL    Albumin 4.3 3.2 - 4.9 g/dL    Total Protein 7.2 6.0 - 8.2 g/dL    Globulin 2.9 1.9 - 3.5 g/dL    A-G Ratio 1.5 g/dL   Diagnostic Alcohol   Result Value Ref Range    Diagnostic Alcohol <10.1 <10.1 mg/dL   Urinalysis    Specimen: Urine   Result Value Ref Range    Color Yellow     Character Clear     Specific Gravity 1.009 <1.035    Ph 6.5 5.0 - 8.0    Glucose Negative Negative mg/dL    Ketones Negative Negative mg/dL    Protein Negative Negative mg/dL    Bilirubin Negative Negative    Urobilinogen, Urine 0.2 Negative    Nitrite Negative Negative    Leukocyte Esterase Trace (A) Negative    Occult Blood Negative Negative    Micro Urine Req Microscopic    Urine Drug Screen (Triage)   Result Value Ref Range    Amphetamines Urine Negative Negative    Barbiturates Negative Negative    Benzodiazepines Negative Negative    Cocaine Metabolite Negative Negative    Fentanyl, Urine Negative Negative    Methadone Negative Negative    Opiates Negative Negative     Oxycodone Negative Negative    Phencyclidine -Pcp Negative Negative    Propoxyphene Negative Negative    Cannabinoid Metab Negative Negative   URINE MICROSCOPIC (W/UA)   Result Value Ref Range    WBC 0-2 /hpf    RBC 0-2 /hpf    Bacteria Negative None /hpf    Epithelial Cells Negative /hpf    Hyaline Cast 0-2 /lpf   ESTIMATED GFR   Result Value Ref Range    GFR (CKD-EPI) 46 (A) >60 mL/min/1.73 m 2   TSH   Result Value Ref Range    TSH 1.570 0.380 - 5.330 uIU/mL   FREE THYROXINE   Result Value Ref Range    Free T-4 1.41 0.93 - 1.70 ng/dL   AMMONIA   Result Value Ref Range    Ammonia 19 11 - 45 umol/L   POCT glucose device results   Result Value Ref Range    POC Glucose, Blood 84 65 - 99 mg/dL   POC CoV-2, FLU A/B, RSV by PCR   Result Value Ref Range    POC Influenza A RNA, PCR Negative Negative    POC Influenza B RNA, PCR Negative Negative    POC RSV, by PCR Negative Negative    POC SARS-CoV-2, PCR NotDetected     All labs reviewed by me.    Radiology:   The attending Emergency Physician has independently interpreted the diagnostic imaging associated with this visit and is awaiting the final reading from the radiologist, which will be displayed below.    Preliminary interpretation is a follows: DX Chest with No acute abnormalities, CT head with No acute abnormalities   Radiologist interpretation:    CT-HEAD W/O   Final Result      1.  No acute intracranial abnormality detected.         DX-CHEST-PORTABLE (1 VIEW)   Final Result      No acute cardiac or pulmonary abnormalities are identified.           COURSE & MEDICAL DECISION MAKING    ED Observation Status? Yes; I am placing the patient in to an observation status due to a diagnostic uncertainty as well as therapeutic intensity. Patient placed in observation status at 10:46 PM, 4/12/2024.     Observation plan is as follows: Pending psychiatry consultation and assessment     Upon Reevaluation, the patient's condition has: Remained stable.    Patient discharged from  ED Observation status per dayshift physician after psychiatric evaluation.    INITIAL ASSESSMENT AND PLAN  Care Narrative:       7:07 PM - Patient seen and evaluated at bedside. Discussed plan of care, including labs, imaging, and further evaluation. Patient agrees to plan of care. Upon initial assessment, concern for urosepsis. Patient will be treated with NS Infusion for her symptoms. Ordered Urine drug screen, UA, diagnostic alcohol, CMP, CBC w/ diff, POCT Glucose, Ammonia, Free thyroxine, TSH, DX-Chest, and CT- head to evaluate. Differential diagnoses include but are not limited to: Urosepsis, dehydration     Laboratories are all unremarkable including thyroid studies except for a mildly elevated potassium of 5.8.  Patient's mental status has remained the same although she is somewhat agitated so she will be treated with Ativan.    8:48 PM - Consulted [facility] regarding the patient, and they agree to see her. Patient medicated with Ativan 0.5 mg.     10:44 PM - Jevon consulted with me regarding the patient. Patient has a history of bipolar disorder with psychosis at times. Jevon reports that the patient's  mentioned she was admitted for this in the past, and the patient has not been sleeping lately. Plan to keep patient in ED observation until morning and to consult with psychiatry.     11:51 PM - Patient medicated with Apresoline 10 mg at this time, for high blood pressure.     HYDRATION: Based on the patient's presentation of Dehydration the patient was given IV fluids. IV Hydration was used because oral hydration was not adequate alone. Upon recheck following hydration, the patient was improved.    ADDITIONAL PROBLEM LIST AND DISPOSITION  Hypertension, bipolar disorder               DISPOSITION AND DISCUSSIONS  I have discussed management of the patient with the following physicians and MANJULA's: Psychiatry     Discussion of management with other Q or appropriate source(s): Behavioral Health         Escalation of care considered, and ultimately not performed: acute inpatient care management, however at this time, the patient is most appropriate for outpatient management.    Barriers to care at this time, including but not limited to:  None .     Decision tools and prescription drugs considered including, but not limited to: Patient will be treated for her psychosis and blood pressure..    FINAL IMPRESSION   1. Acute psychosis (HCC)    2. History of bipolar disorder    3. Manic episode (HCC)    4. Uncontrolled hypertension         Cheryl AN (Crissy), am scribing for, and in the presence of, Linda Richardson D.O..    Electronically signed by: Cheryl Bailey (Crissy), 4/12/2024    Linda AN D.O. personally performed the services described in this documentation, as scribed by Cheryl Bailey in my presence, and it is both accurate and complete.    The note accurately reflects work and decisions made by me.  Linda Richardson D.O.  4/13/2024  1:40 AM

## 2024-04-13 NOTE — ED NOTES
Patient is resting comfortably. Warm blanket provided to pt. Call light within reach instructed to call staff for assistance. Aaox3. Bed alarm in place.

## 2024-04-13 NOTE — CONSULTS
"[facility]   TRIAGE ASSESSMENT    Name: Ricky Junior  MRN: [id number]  : 1931  Age: 89 y.o.  Date of assessment: 2024  PCP: Luke Escobar M.D.  Persons in attendance: Patient, Spouse/Partner, and Children  Patient Location: [facility]    CHIEF COMPLAINT/PRESENTING ISSUE (as stated by pt, , & son):  Pt is a 89 y/o female who appears to be having a manic episode. SI/HI/hallucinations. Hx bipolar d/o. She is having delusions and grandiose thoughts. Reports recently having difficulty sleeping. She was at a doctor's appt today at [facility] and ended up requesting she be brought to the emergency department. Pt's  states she has been acting different for approx 3 weeks. She lives with her ; son lives in town and checks in on them. Psychiatry consult ordered for medication management and to determine further plan of care. Okay for  and son to be at bedside; they want to be apart of psychiatry consult and discharge planning.  Chief Complaint   Patient presents with    ALOC     Patient was at the [facility] lab when staff walked her over to ER due to being confused and asking to go to ER. Per family patients PCP believes her thyroid levels are possible off. Patient A/O x 3. Patient states, \" My son is from the devil and I need to tell all the people. I can not go home because my  is from the [facility].\"        CURRENT LIVING SITUATION/SOCIAL SUPPORT/FINANCIAL RESOURCES: Retired. Lives with . Has son in town that checks on them.     BEHAVIORAL HEALTH/SUBSTANCE USE TREATMENT HISTORY  Does patient/parent report a history of prior behavioral health/substance use treatment for patient?   Yes:    Pt is established with outpatient psychiatric providers and has hx of multiple inpatient psychiatric hospitalizations.     SAFETY ASSESSMENT - SELF  Does patient acknowledge current or past symptoms of dangerousness to self or is previous history noted? " no  Does parent/significant other report patient has current or past symptoms of dangerousness to self? N\A  Does presenting problem suggest symptoms of dangerousness to self? No; denies SI.    SAFETY ASSESSMENT - OTHERS  Does patient acknowledge current or past symptoms of aggressive behavior or risk to others or is previous history noted? no  Does parent/significant other report patient has current or past symptoms of aggressive behavior or risk to others?  N\A  Does presenting problem suggest symptoms of dangerousness to others? No; denies HI.    LEGAL HISTORY  Does patient acknowledge history of arrest/alf/skilled nursing or is previous history noted? no    Crisis Safety Plan completed and copy given to patient? N\A    ABUSE/NEGLECT SCREENING  Does patient report feeling “unsafe” in his/her home, or afraid of anyone?  no  Does patient report any history of physical, sexual, or emotional abuse?  no  Does parent or significant other report any of the above? N\A  Is there evidence of neglect by self?  no  Is there evidence of neglect by a caregiver? no  Does the patient/parent report any history of CPS/APS/police involvement related to suspected abuse/neglect or domestic violence? no  Based on the information provided during the current assessment, is a mandated report of suspected abuse/neglect being made?  No    SUBSTANCE USE SCREENING       UDS results: negative  Breathalyzer results: <10.1          MENTAL STATUS   Participation: Verbally monopolizing, Attentive, Engaged, and Open to feedback  Grooming: Casual  Orientation: Alert and Disoriented to: situation  Behavior: Hyperactive  Eye contact: Good  Mood: Manic  Affect: Flexible and Full range  Thought process: Circumstantial  Thought content: Paranoia  Speech: Hypertalkative  Perception: Derealization  Memory:  Poor memory for chronology of events  Insight: Poor  Judgment:  Poor  Other:    Collateral information:   Source:  [x]  & son present in person: Benjamin  (spouse) [phone]; Benjamin Holloway (Son) [phone]  [] Significant other by telephone  [] [facility]   [x] [facility] Nursing Staff  [x] [facility] Record  [x] Other: ERP    [] Unable to complete full assessment due to:  [] Acute intoxication  [] Patient declined to participate/engage  [] Patient verbally unresponsive  [] Significant cognitive deficits  [] Significant perceptual distortions or behavioral disorganization  [x] Other: N/A     CLINICAL IMPRESSIONS:  Primary:  Acute psychosis  Secondary:  Manic Episode     IDENTIFIED NEEDS/PLAN:  [Trigger DISPOSITION list for any items marked]    []  Imminent safety risk - self [] Imminent safety risk - others   []  Acute substance withdrawal [x]  Psychosis/Impaired reality testing   [x]  Mood/anxiety []  Substance use/Addictive behavior   [x]  Maladaptive behaviro []  Parent/child conflict   []  Family/Couples conflict [x]  Biomedical   []  Housing []  Financial   []   Legal  Occupational/Educational   []  Domestic violence []  Other:     Recommended Plan of Care:  Actively being addressed by [facility]. Denies SI/HI/hallucinations. Hx of bipolar d/o and appears to be having a manic episode. Psychiatry consult ordered for medication management and to determine further plan of care. Okay for  and son to be at bedside.      Has the Recommended Plan of Care/Level of Observation been reviewed with the patient's assigned nurse? Yes; no level of observation needed.     Does patient/parent or guardian express agreement with the above plan? yes      Referral appointment(s) scheduled? N\A    Alert team only:   I have discussed findings and recommendations with Dr. Richardson who is in agreement with these recommendations.     Referral information sent to the following outpatient community providers : Pt established with outpatient providers.     Referral information sent to the following inpatient community providers : N/A    If applicable :  Referred  to  Alert Team for legal hold follow up at (time): N/A      Kathryn Nicole R.N.  4/12/2024

## 2024-04-13 NOTE — ED NOTES
Medication history reviewed with patient at bedside with home medication bottles provided.   Med rec is complete  Allergies reviewed.   Patient has not had any outpatient antibiotics in the last 30 days.   Anticoagulants: No    Colton Peralta

## 2024-04-13 NOTE — PROGRESS NOTES
"ED Observation Progress Note    Date of Service: 04/13/24    Interval History and Interventions  Patient remains for psychiatric consultation, possible placement.  I discussed the patient with nursing staff, we have started process to restart patient's home medications.  I also discussed this with the emergency department clinical pharmacist.  Behavioral health has seen the patient, concern for possible manic behavior, bipolar disorder with psychosis as similar to previous.  We are awaiting reevaluation by psychiatry.  Mild elevation blood pressure at this time, most recently 175/73.  Home medications include losartan, carvedilol and torsemide all should help control the patient's hypertension      Physical Exam  BP (!) 175/73   Pulse 77   Temp 36.7 °C (98 °F) (Temporal)   Resp 18   Ht 1.702 m (5' 7\")   Wt 56.5 kg (124 lb 9 oz)   SpO2 97%   BMI 19.51 kg/m² .    Constitutional: Awake and alert. Nontoxic  HENT: No facial swelling  Eyes: Pupils are equal  Neck: Trachea midline  Cardiovascular: Normal heart rate   Thorax & Lungs: No respiratory distress  Skin:  No jaundice  Psychiatric: Patient is calm, normal mood    Labs  Results for orders placed or performed during the hospital encounter of 04/12/24   CBC With Differential   Result Value Ref Range    WBC 8.5 4.8 - 10.8 K/uL    RBC 3.54 (L) 4.20 - 5.40 M/uL    Hemoglobin 11.0 (L) 12.0 - 16.0 g/dL    Hematocrit 33.8 (L) 37.0 - 47.0 %    MCV 95.5 81.4 - 97.8 fL    MCH 31.1 27.0 - 33.0 pg    MCHC 32.5 32.2 - 35.5 g/dL    RDW 45.6 35.9 - 50.0 fL    Platelet Count 272 164 - 446 K/uL    MPV 10.9 9.0 - 12.9 fL    Neutrophils-Polys 68.90 44.00 - 72.00 %    Lymphocytes 16.00 (L) 22.00 - 41.00 %    Monocytes 11.00 0.00 - 13.40 %    Eosinophils 3.60 0.00 - 6.90 %    Basophils 0.40 0.00 - 1.80 %    Immature Granulocytes 0.10 0.00 - 0.90 %    Nucleated RBC 0.00 0.00 - 0.20 /100 WBC    Neutrophils (Absolute) 5.88 1.82 - 7.42 K/uL    Lymphs (Absolute) 1.37 1.00 - 4.80 K/uL "    Monos (Absolute) 0.94 (H) 0.00 - 0.85 K/uL    Eos (Absolute) 0.31 0.00 - 0.51 K/uL    Baso (Absolute) 0.03 0.00 - 0.12 K/uL    Immature Granulocytes (abs) 0.01 0.00 - 0.11 K/uL    NRBC (Absolute) 0.00 K/uL   Comp Metabolic Panel   Result Value Ref Range    Sodium 133 (L) 135 - 145 mmol/L    Potassium 5.8 (H) 3.6 - 5.5 mmol/L    Chloride 97 96 - 112 mmol/L    Co2 22 20 - 33 mmol/L    Anion Gap 14.0 7.0 - 16.0    Glucose 87 65 - 99 mg/dL    Bun 26 (H) 8 - 22 mg/dL    Creatinine 1.12 0.50 - 1.40 mg/dL    Calcium 8.9 8.5 - 10.5 mg/dL    Correct Calcium 8.7 8.5 - 10.5 mg/dL    AST(SGOT) 18 12 - 45 U/L    ALT(SGPT) 7 2 - 50 U/L    Alkaline Phosphatase 70 30 - 99 U/L    Total Bilirubin 0.2 0.1 - 1.5 mg/dL    Albumin 4.3 3.2 - 4.9 g/dL    Total Protein 7.2 6.0 - 8.2 g/dL    Globulin 2.9 1.9 - 3.5 g/dL    A-G Ratio 1.5 g/dL   Diagnostic Alcohol   Result Value Ref Range    Diagnostic Alcohol <10.1 <10.1 mg/dL   Urinalysis    Specimen: Urine   Result Value Ref Range    Color Yellow     Character Clear     Specific Gravity 1.009 <1.035    Ph 6.5 5.0 - 8.0    Glucose Negative Negative mg/dL    Ketones Negative Negative mg/dL    Protein Negative Negative mg/dL    Bilirubin Negative Negative    Urobilinogen, Urine 0.2 Negative    Nitrite Negative Negative    Leukocyte Esterase Trace (A) Negative    Occult Blood Negative Negative    Micro Urine Req Microscopic    Urine Drug Screen (Triage)   Result Value Ref Range    Amphetamines Urine Negative Negative    Barbiturates Negative Negative    Benzodiazepines Negative Negative    Cocaine Metabolite Negative Negative    Fentanyl, Urine Negative Negative    Methadone Negative Negative    Opiates Negative Negative    Oxycodone Negative Negative    Phencyclidine -Pcp Negative Negative    Propoxyphene Negative Negative    Cannabinoid Metab Negative Negative   URINE MICROSCOPIC (W/UA)   Result Value Ref Range    WBC 0-2 /hpf    RBC 0-2 /hpf    Bacteria Negative None /hpf    Epithelial  Cells Negative /hpf    Hyaline Cast 0-2 /lpf   ESTIMATED GFR   Result Value Ref Range    GFR (CKD-EPI) 46 (A) >60 mL/min/1.73 m 2   TSH   Result Value Ref Range    TSH 1.570 0.380 - 5.330 uIU/mL   FREE THYROXINE   Result Value Ref Range    Free T-4 1.41 0.93 - 1.70 ng/dL   AMMONIA   Result Value Ref Range    Ammonia 19 11 - 45 umol/L   POCT glucose device results   Result Value Ref Range    POC Glucose, Blood 84 65 - 99 mg/dL   POC CoV-2, FLU A/B, RSV by PCR   Result Value Ref Range    POC Influenza A RNA, PCR Negative Negative    POC Influenza B RNA, PCR Negative Negative    POC RSV, by PCR Negative Negative    POC SARS-CoV-2, PCR NotDetected        Radiology  CT-HEAD W/O   Final Result      1.  No acute intracranial abnormality detected.         DX-CHEST-PORTABLE (1 VIEW)   Final Result      No acute cardiac or pulmonary abnormalities are identified.          Problem List  1. Acute psychosis (HCC)        2. History of bipolar disorder        3. Manic episode (HCC)        4. Uncontrolled hypertension              Electronically signed by: Kunal Holley M.D., 4/13/2024 12:38 PM

## 2024-04-13 NOTE — ED NOTES
Patient walked to restroom with RN. Patient transferred to hospital bed. Diet order placed. New gown given and changed. Patient now resting on gurney.

## 2024-04-13 NOTE — ED NOTES
Pt ambulated to bathroom with personal walker and contact guard assist. Pt had one large soft BM.

## 2024-04-13 NOTE — PROGRESS NOTES
ED Observation Progress Note    Date of Service: 04/13/24    Interval History and Interventions  Patient was discussed with Dr. Tiwari, plan is to increase Depakote to 500 mg twice a day, restart Seroquel.  Will check Depakote level in the morning.  At this time, patient is not cleared from psychiatric standpoint for discharge home.    Problem List    1. Acute psychosis (HCC)        2. History of bipolar disorder        3. Manic episode (HCC)        4. Uncontrolled hypertension              Electronically signed by: Kunal Holley M.D., 4/13/2024 1:04 PM

## 2024-04-14 PROBLEM — F29 PSYCHOTIC DISORDER (HCC): Status: ACTIVE | Noted: 2024-04-14

## 2024-04-14 LAB
ANION GAP SERPL CALC-SCNC: 9 MMOL/L (ref 7–16)
BACTERIA UR CULT: NORMAL
BUN SERPL-MCNC: 25 MG/DL (ref 8–22)
CALCIUM SERPL-MCNC: 8.9 MG/DL (ref 8.5–10.5)
CHLORIDE SERPL-SCNC: 97 MMOL/L (ref 96–112)
CO2 SERPL-SCNC: 21 MMOL/L (ref 20–33)
CREAT SERPL-MCNC: 0.74 MG/DL (ref 0.5–1.4)
GFR SERPLBLD CREATININE-BSD FMLA CKD-EPI: 75 ML/MIN/1.73 M 2
GLUCOSE SERPL-MCNC: 94 MG/DL (ref 65–99)
POTASSIUM SERPL-SCNC: 4.5 MMOL/L (ref 3.6–5.5)
RPR SER QL: NON REACTIVE
SIGNIFICANT IND 70042: NORMAL
SITE SITE: NORMAL
SODIUM SERPL-SCNC: 127 MMOL/L (ref 135–145)
SOURCE SOURCE: NORMAL
T PALLIDUM AB SER QL IA: REACTIVE
VALPROATE SERPL-MCNC: 23.3 UG/ML (ref 50–100)
VIT B12 SERPL-MCNC: 287 PG/ML (ref 211–911)

## 2024-04-14 PROCEDURE — 700102 HCHG RX REV CODE 250 W/ 637 OVERRIDE(OP): Performed by: EMERGENCY MEDICINE

## 2024-04-14 PROCEDURE — 36415 COLL VENOUS BLD VENIPUNCTURE: CPT

## 2024-04-14 PROCEDURE — 80048 BASIC METABOLIC PNL TOTAL CA: CPT

## 2024-04-14 PROCEDURE — 99222 1ST HOSP IP/OBS MODERATE 55: CPT | Performed by: STUDENT IN AN ORGANIZED HEALTH CARE EDUCATION/TRAINING PROGRAM

## 2024-04-14 PROCEDURE — A9270 NON-COVERED ITEM OR SERVICE: HCPCS | Performed by: EMERGENCY MEDICINE

## 2024-04-14 PROCEDURE — 80164 ASSAY DIPROPYLACETIC ACD TOT: CPT

## 2024-04-14 PROCEDURE — 86780 TREPONEMA PALLIDUM: CPT

## 2024-04-14 PROCEDURE — 82607 VITAMIN B-12: CPT

## 2024-04-14 PROCEDURE — 86592 SYPHILIS TEST NON-TREP QUAL: CPT

## 2024-04-14 PROCEDURE — 700105 HCHG RX REV CODE 258: Performed by: EMERGENCY MEDICINE

## 2024-04-14 RX ADMIN — TORSEMIDE 5 MG: 5 TABLET ORAL at 06:13

## 2024-04-14 RX ADMIN — DIVALPROEX SODIUM 500 MG: 500 TABLET, DELAYED RELEASE ORAL at 06:13

## 2024-04-14 RX ADMIN — CARVEDILOL 12.5 MG: 12.5 TABLET, FILM COATED ORAL at 18:28

## 2024-04-14 RX ADMIN — SODIUM CHLORIDE: 9 INJECTION, SOLUTION INTRAVENOUS at 09:05

## 2024-04-14 RX ADMIN — CARVEDILOL 12.5 MG: 12.5 TABLET, FILM COATED ORAL at 10:29

## 2024-04-14 RX ADMIN — LEVOTHYROXINE SODIUM 175 MCG: 0.17 TABLET ORAL at 06:13

## 2024-04-14 RX ADMIN — QUETIAPINE FUMARATE 200 MG: 100 TABLET ORAL at 20:51

## 2024-04-14 ASSESSMENT — COGNITIVE AND FUNCTIONAL STATUS - GENERAL
HELP NEEDED FOR BATHING: A LITTLE
SUGGESTED CMS G CODE MODIFIER MOBILITY: CL
PERSONAL GROOMING: A LITTLE
DRESSING REGULAR UPPER BODY CLOTHING: A LITTLE
MOBILITY SCORE: 14
TOILETING: A LOT
CLIMB 3 TO 5 STEPS WITH RAILING: A LOT
MOBILITY SCORE: 14
SUGGESTED CMS G CODE MODIFIER MOBILITY: CL
MOVING FROM LYING ON BACK TO SITTING ON SIDE OF FLAT BED: A LOT
MOVING FROM LYING ON BACK TO SITTING ON SIDE OF FLAT BED: A LOT
STANDING UP FROM CHAIR USING ARMS: A LITTLE
STANDING UP FROM CHAIR USING ARMS: A LITTLE
CLIMB 3 TO 5 STEPS WITH RAILING: A LOT
DRESSING REGULAR LOWER BODY CLOTHING: A LOT
DRESSING REGULAR LOWER BODY CLOTHING: A LOT
DRESSING REGULAR UPPER BODY CLOTHING: A LITTLE
WALKING IN HOSPITAL ROOM: A LITTLE
SUGGESTED CMS G CODE MODIFIER DAILY ACTIVITY: CK
WALKING IN HOSPITAL ROOM: A LITTLE
TURNING FROM BACK TO SIDE WHILE IN FLAT BAD: A LOT
MOVING TO AND FROM BED TO CHAIR: A LOT
TOILETING: A LOT
SUGGESTED CMS G CODE MODIFIER DAILY ACTIVITY: CK
HELP NEEDED FOR BATHING: A LITTLE
DAILY ACTIVITIY SCORE: 17
MOVING TO AND FROM BED TO CHAIR: A LOT
DAILY ACTIVITIY SCORE: 17
TURNING FROM BACK TO SIDE WHILE IN FLAT BAD: A LOT
PERSONAL GROOMING: A LITTLE

## 2024-04-14 NOTE — ED NOTES
Bedside report received from off going RN/tech: Hemanth ZAMBRANO, assumed care of patient.  POC discussed with patient. Call light within reach, all needs addressed at this time.     Fall risk interventions in place: Place socks on patient, Place fall risk sign on patient's door, Keep floor surfaces clean and dry, and Bed Alarm in use (all applicable per [place] Fall risk assessment)   Continuous monitoring: Not Applicable   IVF/IV medications: Infusion per MAR (List Med(s)) NS 125cc/hr  Oxygen: Room Air  Bedside sitter: Not Applicable   Isolation: Not Applicable    Pt is on bed alarm.

## 2024-04-14 NOTE — ASSESSMENT & PLAN NOTE
4/24/2024  With delusions and darin  Psychiatry consulted  TSH CT head UA U tox wnl. No clear metabolic etiologies.   Hold Seroquel  Hold on mood stabilizers that may affect sodium such as Depakote for now  4/22 less delusions and will dc the MRI brain.  4/20 due to higher BP, I will check CT head to rule out enlarged ventricles.  EEG is normal  This may be compounded by hyponatremia which is being addressed  B12 on the low side.  Start B12 injections

## 2024-04-14 NOTE — PROGRESS NOTES
Patient's home medications have been reviewed by the pharmacy team.     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    CARVEDILOL (COREG) 12.5 MG TAB    Take 1 Tablet by mouth 2 times a day with meals.    DIVALPROEX (DEPAKOTE) 500 MG TABLET DELAYED RESPONSE    Take 1 Tablet by mouth at bedtime.    HYDROCODONE-ACETAMINOPHEN (NORCO) 7.5-325 MG TAB    Take 1 Tablet by mouth every 6 hours as needed for Moderate Pain or Severe Pain (arthritis pain, max four per day) for up to 30 days. 100 tablets is a 30 day quantity    LOSARTAN (COZAAR) 100 MG TAB    Take 1 Tablet by mouth every day.    POLYETHYLENE GLYCOL 3350 (MIRALAX) 17 GM/SCOOP POWDER    MIX 1 CAPFUL WITH WATER AND DRINK BY MOUTH EVERY DAY    QUETIAPINE (SEROQUEL) 200 MG TAB    TAKE 1 TABLET BY MOUTH AT BEDTIME    SYNTHROID 175 MCG TAB    Take 1 Tablet by mouth every morning on an empty stomach.    TORSEMIDE (DEMADEX) 5 MG TAB    Take 1 Tablet by mouth every morning.   Modified Medications    No medications on file   Discontinued Medications    ASPIRIN EC (ECOTRIN) 81 MG TABLET DELAYED RESPONSE    Take 1 Tab by mouth every day.    CELECOXIB (CELEBREX) 100 MG CAP    Take 1 Capsule by mouth every day.    HYDROCODONE-ACETAMINOPHEN (NORCO) 7.5-325 MG TAB    Take 1 Tablet by mouth every 6 hours as needed  for Moderate Pain or Severe Pain (arthritis pain, max four per day) for up to 30 days. 100 tablets is a 30 day quantity    HYDROCODONE-ACETAMINOPHEN (NORCO) 7.5-325 MG TAB    Take 1 Tablet by mouth every 6 hours as needed for Moderate Pain or Severe Pain (arthritis pain, max four per day) for up to 30 days. 100 tablets is a 30 day quantity    MISC. DEVICES MISC    This is an order for a four wheeled walker with seat  Dx; unsteady gait, arthritis of lower back and knee  ICD-10 codes  M47.817, M17.12           CHEYENNE 99 months   NPI [id number]          A:  Medications do not appear to be contributing to current complaints.     P:    No recommendations at this time. Will defer to psych recommendations.     Toby Worthy, HaiderD

## 2024-04-14 NOTE — ED NOTES
Bedside report received from KENYA Olivarez. Fall risk precautions in place. Call bell in reach. Pt on room air, all lines traced. Ns running in IV at 125mL/hr. IV flushing & patent, no redness. In bed, on bed alarm,  at bedside. Case Management RN & ERP currently rounding with RN about plan.

## 2024-04-14 NOTE — ED NOTES
Pt's family asking for update.  Spoke w/ ED CM, psych evaluated patient this morning.  Message sent to ERP via [email or web address] about family requesting update.  All needs met.

## 2024-04-14 NOTE — ED NOTES
Pt incontinent of stool.  Pt provided with linen change, new brief and new purewick.  Pt cleaned thoroughly.  Pt repositioned although self turns, in hospital bed.  Lights dimmed per request for comfort.  All needs met.

## 2024-04-14 NOTE — CONSULTS
Hospital Medicine Consultation    Date of Service  4/14/2024    Referring Physician  Amie Macdonald M.D.    Consulting Physician  Jasvir Reeves M.D.    Reason for Consultation  Behavior disturbances.    History of Presenting Illness  89 y.o. female who presented 4/12/2024 with behavioral disturbances and confusion.  Patient has been having unorganized thought process. She has been brought into ER by her family for evaluation.  It is difficult to get a clear/reliable history from her.  She believes that her  is not able to talk much as he has been in contact with the devil.  The devil had put a satanic spell on him; she does not trust him.  She wants to go home to get her money and hide.    In Ed, pt with normal vital signs. Except potassium 5.8, no clear metabolic etiologies as culprit for her behavioral changes. Psychiatry consulted, recommendations followed. Medicine consulted to reconcile meds and possible admission.    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    Surgical History   has a past surgical history that includes pr excis uterine fibroid,vag apprch and colonoscopy (2007).    Family History  family history includes Hypertension in her brother and sister.    Social History   reports that she has never smoked. She has never used smokeless tobacco. She reports that she does not drink alcohol and does not use drugs.    Medications  Prior to Admission Medications   Prescriptions Last Dose Informant Patient Reported? Taking?    HYDROcodone-acetaminophen (NORCO) 7.5-325 MG tab 4/12/2024 at am Patient, Rx Bottle (For Med Information) No Yes   Sig: Take 1 Tablet by mouth every 6 hours as needed for Moderate Pain or Severe Pain (arthritis pain, max four per day) for up to 30 days. 100 tablets is a 30 day quantity   QUEtiapine (SEROQUEL) 200 MG Tab 4/11/2024 at hs Patient, Rx Bottle (For Med Information) No Yes   Sig: TAKE 1 TABLET BY MOUTH AT BEDTIME   SYNTHROID 175 MCG Tab 4/12/2024 at am Patient, Rx Bottle (For Med Information) No Yes   Sig: Take 1 Tablet by mouth every morning on an empty stomach.   ascorbic acid (VITAMIN C) 500 MG tablet 4/12/2024 at am Patient, Rx Bottle (For Med Information) Yes Yes   Sig: Take 500 mg by mouth every day.   carvedilol (COREG) 12.5 MG Tab 4/12/2024 at am Patient, Rx Bottle (For Med Information) No Yes   Sig: Take 1 Tablet by mouth 2 times a day with meals.   divalproex (DEPAKOTE) 500 MG Tablet Delayed Response 4/11/2024 at hs Patient, Rx Bottle (For Med Information) No Yes   Sig: Take 1 Tablet by mouth at bedtime.   losartan (COZAAR) 100 MG Tab 4/12/2024 at am Patient, Rx Bottle (For Med Information) No Yes   Sig: Take 1 Tablet by mouth every day.   polyethylene glycol 3350 (MIRALAX) 17 GM/SCOOP Powder 4/12/2024 at am Patient, Rx Bottle (For Med Information) No Yes   Sig: MIX 1 CAPFUL WITH WATER AND DRINK BY MOUTH EVERY DAY   Patient taking differently: Take 17 g by mouth every day. MIX 1 CAPFUL WITH WATER AND DRINK BY MOUTH EVERY DAY   torsemide (DEMADEX) 5 MG Tab 4/12/2024 at am Patient, Rx Bottle (For Med Information) No Yes   Sig: Take 1 Tablet by mouth every morning.      Facility-Administered Medications: None       Allergies  No Known Allergies    Physical Exam  Temp:  [36.7 °C (98 °F)] 36.7 °C (98 °F)  Pulse:  [] 97  Resp:  [15-18] 16  BP: (115-224)/() 115/57  SpO2:  [92 %-100 %] 99 %    Physical Exam  Constitutional:       Appearance: Normal appearance.      Comments: Follow simple  commands  Unorganized thought processes   HENT:      Head: Normocephalic.      Nose: Nose normal.      Mouth/Throat:      Mouth: Mucous membranes are moist.   Eyes:      Pupils: Pupils are equal, round, and reactive to light.   Cardiovascular:      Rate and Rhythm: Normal rate and regular rhythm.      Pulses: Normal pulses.   Pulmonary:      Effort: Pulmonary effort is normal.      Breath sounds: Normal breath sounds.   Abdominal:      General: Abdomen is flat. Bowel sounds are normal.      Palpations: Abdomen is soft.   Musculoskeletal:      Cervical back: Neck supple.   Skin:     General: Skin is warm.   Neurological:      Mental Status: She is alert. She is disoriented.      Comments: AAO x 2, unsure who the president is         Fluids      Laboratory  Recent Labs     04/12/24  1529 04/12/24  1714   WBC 7.0 8.5   RBC 3.48* 3.54*   HEMOGLOBIN 10.9* 11.0*   HEMATOCRIT 34.0* 33.8*   MCV 97.7 95.5   MCH 31.3 31.1   MCHC 32.1* 32.5   RDW 46.7 45.6   PLATELETCT 276 272   MPV 11.2 10.9     Recent Labs     04/12/24  1529 04/12/24  1714   SODIUM 134* 133*   POTASSIUM 6.3* 5.8*   CHLORIDE 98 97   CO2 24 22   GLUCOSE 82 87   BUN 28* 26*   CREATININE 1.19 1.12   CALCIUM 9.3 8.9                     Imaging  CT-HEAD W/O   Final Result      1.  No acute intracranial abnormality detected.         DX-CHEST-PORTABLE (1 VIEW)   Final Result      No acute cardiac or pulmonary abnormalities are identified.          Assessment/Plan  * Psychotic disorder (HCC)  Assessment & Plan  TSH CT head UA U tox wnl. No clear metabolic etiologies. B12, RPR ordered. Does not meet criteria for inpt medical admission at this time  Psychiatry on board, following recommendations. Follow up with psych in am        Idiopathic hypothyroidism- (present on admission)  Assessment & Plan  Continue synthroid    Hypertension  Assessment & Plan  Restart home meds  Hold losartan until repeat potassium results. Previous potassium 5.8.

## 2024-04-14 NOTE — CONSULTS
"  [facility] PSYCHIATRIC FOLLOW-UP:(established)  *Reason for admission:  Patient was at the [facility] lab when staff walked her over to ER due to being confused and asking to go to ER. Per family patients PCP believes her thyroid levels are possible off. Patient A/O x 3. Patient states, \" My son is from the devil and I need to tell all the people. I can not go home because my  is from the devil.\"   *Legal Hold Status: not applicable                S:  slept, eating breakfast, in a good mood. Again tells me that Opal has a hold of her 's mind who she calls \"sweet cakes\" and makes him mean. Eventually able to ascertain that he does not hit her.  She thinks she gets \"$8000 (thousand) in disability and he has $100 bills hidden around the house. \"That's why he doesn't want any to come over\". She is oriented x 3. She also asked \"where do I know you form?\"    Per nursing staff: whereas her  and stepson visited around noon and she was okay with this, later, towards evening, she refused to see her  and again spoke of Opal being in his mind.     O: Medical ROS (as pertinent):                      *Psychiatric Examination:   Vitals:   Vitals:    04/14/24 0428 04/14/24 0528 04/14/24 0628 04/14/24 0802   BP: 129/60 125/58 127/85 (!) 179/77   Pulse: 82 88 80 75   Resp:    18   Temp:    37.2 °C (98.9 °F)   TempSrc:    Temporal   SpO2: 98% 98% 98% 98%   Weight:       Height:         General Appearance:  intermittent eye contact, cooperative, and friendly: having breakfast  Abnormal Movements: none   Gait and Posture: not observed  Speech: within normal limits  Thought Process: normal rate  Associations:   linear  Abnormal or Psychotic Thoughts: delusions  Judgement and Insight: impaired   Orientation:  x 3  Recent and Remote Memory:  has some impairments  Attention Span and Concentration: intact  Language:fluent  Fund of Knowledge: not tested  Mood and Affect: euthymic  SI/HI:  suicidal " "- no and homicidal - no        Current Medications:  Scheduled Medications   Medication Dose Frequency    carvedilol  12.5 mg BID WITH MEALS    [Held by provider] losartan  100 mg DAILY    polyethylene glycol/lytes  1 Packet DAILY    QUEtiapine  200 mg QHS    levothyroxine  175 mcg AM ES    torsemide  5 mg QAM    divalproex  500 mg Q12HRS      Latest Reference Range & Units 04/14/24 06:08   Valproic Acid 50.0 - 100.0 ug/mL 23.3 (L)   (L): Data is abnormally low       *ASSESSMENT/RECOMENDATIONS:  1.Psychotic disorder unspc      R/O schizoaffective disorder, bipolar disorder   2  Neurocognitive disorder unspc: hx of CVA. Pt does have some deficits as noted yesterday but independently of that, she is psychotic from a mental illness as well.   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 She may not have had an episode by luck these years.   Pt cannot return home until she is no longer paranoid of him  Not sure if they have in home help but given the fragilty of both, they could benefit.  4   Please call son (step) if transferred within the " hospital or to another hospital:  appears cognitively intact but doesn't answer the phone.   5   B12, RPR     Will continue to follow with you.    Thank you for the consult.            Discharge recommendations: inpt psych. Note:  does NOT want her going to  [facility] if possible.       If released from [facility]: Discharge Instructions:  -Reviewed safety plan: 911, ER, PCM, MHC, Suicide crisis line  -Please assist with outpatient Psychiatric/substance use follow up appointments at discharge once medically cleared.

## 2024-04-14 NOTE — PROGRESS NOTES
"ED Observation Progress Note    Date of Service: 04/14/24    Interval History and Interventions  Briefly this is a 89-year-old female who presented for altered level of consciousness on 4/12/2024.  Medical workup was unremarkable except for mildly elevated potassium of 5.8.  She was agitated and treated with Ativan.  Psychiatry team evaluated patient and noted that she was having some delusions, should her thoughts were not well-organized.  Patient does have prior history of inpatient psychiatric stays.  Repeat potassium done this morning has normalized.  Hospitalist consulted this morning.  Patient has positive syphilis, RPR titers pending.  Continue medication management for delusions via psychiatric team.    Physical Exam  /57   Pulse 97   Temp 36.7 °C (98 °F) (Temporal)   Resp 16   Ht 1.702 m (5' 7\")   Wt 56.5 kg (124 lb 9 oz)   SpO2 99%   BMI 19.51 kg/m² .    Constitutional: Awake and alert. Nontoxic  HENT:  Grossly normal  Eyes: Grossly normal  Neck: Normal range of motion  Cardiovascular: Normal heart rate   Thorax & Lungs: No respiratory distress  Skin:  No pathologic rash.   Extremities: Well perfused  Psychiatric: Affect normal    Labs  Results for orders placed or performed during the hospital encounter of 04/12/24   CBC With Differential   Result Value Ref Range    WBC 8.5 4.8 - 10.8 K/uL    RBC 3.54 (L) 4.20 - 5.40 M/uL    Hemoglobin 11.0 (L) 12.0 - 16.0 g/dL    Hematocrit 33.8 (L) 37.0 - 47.0 %    MCV 95.5 81.4 - 97.8 fL    MCH 31.1 27.0 - 33.0 pg    MCHC 32.5 32.2 - 35.5 g/dL    RDW 45.6 35.9 - 50.0 fL    Platelet Count 272 164 - 446 K/uL    MPV 10.9 9.0 - 12.9 fL    Neutrophils-Polys 68.90 44.00 - 72.00 %    Lymphocytes 16.00 (L) 22.00 - 41.00 %    Monocytes 11.00 0.00 - 13.40 %    Eosinophils 3.60 0.00 - 6.90 %    Basophils 0.40 0.00 - 1.80 %    Immature Granulocytes 0.10 0.00 - 0.90 %    Nucleated RBC 0.00 0.00 - 0.20 /100 WBC    Neutrophils (Absolute) 5.88 1.82 - 7.42 K/uL    Lymphs " (Absolute) 1.37 1.00 - 4.80 K/uL    Monos (Absolute) 0.94 (H) 0.00 - 0.85 K/uL    Eos (Absolute) 0.31 0.00 - 0.51 K/uL    Baso (Absolute) 0.03 0.00 - 0.12 K/uL    Immature Granulocytes (abs) 0.01 0.00 - 0.11 K/uL    NRBC (Absolute) 0.00 K/uL   Comp Metabolic Panel   Result Value Ref Range    Sodium 133 (L) 135 - 145 mmol/L    Potassium 5.8 (H) 3.6 - 5.5 mmol/L    Chloride 97 96 - 112 mmol/L    Co2 22 20 - 33 mmol/L    Anion Gap 14.0 7.0 - 16.0    Glucose 87 65 - 99 mg/dL    Bun 26 (H) 8 - 22 mg/dL    Creatinine 1.12 0.50 - 1.40 mg/dL    Calcium 8.9 8.5 - 10.5 mg/dL    Correct Calcium 8.7 8.5 - 10.5 mg/dL    AST(SGOT) 18 12 - 45 U/L    ALT(SGPT) 7 2 - 50 U/L    Alkaline Phosphatase 70 30 - 99 U/L    Total Bilirubin 0.2 0.1 - 1.5 mg/dL    Albumin 4.3 3.2 - 4.9 g/dL    Total Protein 7.2 6.0 - 8.2 g/dL    Globulin 2.9 1.9 - 3.5 g/dL    A-G Ratio 1.5 g/dL   Diagnostic Alcohol   Result Value Ref Range    Diagnostic Alcohol <10.1 <10.1 mg/dL   Urinalysis    Specimen: Urine   Result Value Ref Range    Color Yellow     Character Clear     Specific Gravity 1.009 <1.035    Ph 6.5 5.0 - 8.0    Glucose Negative Negative mg/dL    Ketones Negative Negative mg/dL    Protein Negative Negative mg/dL    Bilirubin Negative Negative    Urobilinogen, Urine 0.2 Negative    Nitrite Negative Negative    Leukocyte Esterase Trace (A) Negative    Occult Blood Negative Negative    Micro Urine Req Microscopic    Urine Drug Screen (Triage)   Result Value Ref Range    Amphetamines Urine Negative Negative    Barbiturates Negative Negative    Benzodiazepines Negative Negative    Cocaine Metabolite Negative Negative    Fentanyl, Urine Negative Negative    Methadone Negative Negative    Opiates Negative Negative    Oxycodone Negative Negative    Phencyclidine -Pcp Negative Negative    Propoxyphene Negative Negative    Cannabinoid Metab Negative Negative   URINE MICROSCOPIC (W/UA)   Result Value Ref Range    WBC 0-2 /hpf    RBC 0-2 /hpf    Bacteria  Negative None /hpf    Epithelial Cells Negative /hpf    Hyaline Cast 0-2 /lpf   ESTIMATED GFR   Result Value Ref Range    GFR (CKD-EPI) 46 (A) >60 mL/min/1.73 m 2   TSH   Result Value Ref Range    TSH 1.570 0.380 - 5.330 uIU/mL   FREE THYROXINE   Result Value Ref Range    Free T-4 1.41 0.93 - 1.70 ng/dL   AMMONIA   Result Value Ref Range    Ammonia 19 11 - 45 umol/L   POCT glucose device results   Result Value Ref Range    POC Glucose, Blood 84 65 - 99 mg/dL   POC CoV-2, FLU A/B, RSV by PCR   Result Value Ref Range    POC Influenza A RNA, PCR Negative Negative    POC Influenza B RNA, PCR Negative Negative    POC RSV, by PCR Negative Negative    POC SARS-CoV-2, PCR NotDetected        Radiology  CT-HEAD W/O   Final Result      1.  No acute intracranial abnormality detected.         DX-CHEST-PORTABLE (1 VIEW)   Final Result      No acute cardiac or pulmonary abnormalities are identified.          Problem List  1.  Delusion/confusion-history of underlying psychiatric disorder, psychiatry team following, once stabilized can likely be discharged back home  2.  Hyperkalemia on initial presentation is now resolved  3.  Positive treponemal screen, per history review patient does have a history of syphilis in past, RPR titers pending    Electronically signed by: Amie Macdonald M.D., 4/14/2024 5:10 AM

## 2024-04-14 NOTE — CONSULTS
BRIEF PSYCHIATRIC CONSULT NOTE: pt is visiting with . Had been refusing to see him last pm.     Pt does not meet LH criteria. Her difficulties taking care of herself because of age, etc and not because of a mental disorder. .     Pt's Na has dropped to 127 which coincides with increase in depakote. Will dc depakote and reorder Na in am. Did not increase seroquel because of risk of sedation and therefore falls. Discussed with BENNETT Mcarthur MD

## 2024-04-14 NOTE — ED NOTES
Bedside report received from off going RN: Ruddy, assumed care of patient.      Fall risk interventions in place: Move the patient closer to the nurse's station, Patient's personal possessions are with in their safe reach, Place fall risk sign on patient's door, and Keep floor surfaces clean and dry (all applicable per [place] Fall risk assessment)   Continuous monitoring: Pulse Ox or Blood Pressure  IVF/IV medications: Not Applicable   Oxygen: Room Air  Bedside sitter: Not Applicable   Isolation: Not Applicable

## 2024-04-14 NOTE — ED NOTES
Patient ambulated to the bathroom independently with slow, shuffling gait using her personal four wheeled walker and accompanied by this RN for fall risk safety. Patient assisted with repositioning and medicated per MAR, tolerated well with sips of water. Patient complaining of leg pain, ERP updated.

## 2024-04-14 NOTE — ASSESSMENT & PLAN NOTE
4/24/2024  Continue active treatment with losartan and Coreg.  4/20 amlodipine added  Monitoring BP  As needed hydralazine and added prn enalapril.

## 2024-04-14 NOTE — ED NOTES
Bedside report received from off going RN/tech: Allison, assumed care of patient.  POC discussed with patient. Call light within reach, all needs addressed at this time.       Fall risk interventions in place: Patient's personal possessions are with in their safe reach, Place fall risk sign on patient's door, and Keep floor surfaces clean and dry (all applicable per [place] Fall risk assessment)   Continuous monitoring: Pulse Ox  IVF/IV medications: Infusion per MAR (List Med(s)) NS per MAR  Oxygen: Room Air  Bedside sitter: Not Applicable   Isolation: Not Applicable

## 2024-04-14 NOTE — ED NOTES
Per Dr. Gonzales, plan is for inpatient psych, not adequately supported at home with mental status and ADLs. However, pt does not meet criteria for a Legal Hold. ERP aware, ALERT Team RN aware. Referrals being made.

## 2024-04-15 ENCOUNTER — APPOINTMENT (OUTPATIENT)
Dept: RADIOLOGY | Facility: MEDICAL CENTER | Age: 89
DRG: 056 | End: 2024-04-15
Attending: INTERNAL MEDICINE
Payer: MEDICARE

## 2024-04-15 PROBLEM — E53.8 VITAMIN B12 DEFICIENCY: Status: ACTIVE | Noted: 2024-04-15

## 2024-04-15 PROBLEM — E87.1 HYPONATREMIA: Status: ACTIVE | Noted: 2024-04-15

## 2024-04-15 LAB
ANION GAP SERPL CALC-SCNC: 12 MMOL/L (ref 7–16)
ANION GAP SERPL CALC-SCNC: 8 MMOL/L (ref 7–16)
BUN SERPL-MCNC: 15 MG/DL (ref 8–22)
BUN SERPL-MCNC: 19 MG/DL (ref 8–22)
CALCIUM SERPL-MCNC: 8.2 MG/DL (ref 8.5–10.5)
CALCIUM SERPL-MCNC: 8.4 MG/DL (ref 8.5–10.5)
CHLORIDE SERPL-SCNC: 91 MMOL/L (ref 96–112)
CHLORIDE SERPL-SCNC: 92 MMOL/L (ref 96–112)
CHLORIDE UR-SCNC: 126 MMOL/L
CO2 SERPL-SCNC: 20 MMOL/L (ref 20–33)
CO2 SERPL-SCNC: 22 MMOL/L (ref 20–33)
CREAT SERPL-MCNC: 0.66 MG/DL (ref 0.5–1.4)
CREAT SERPL-MCNC: 0.75 MG/DL (ref 0.5–1.4)
CREAT UR-MCNC: 27.24 MG/DL
EKG IMPRESSION: NORMAL
GFR SERPLBLD CREATININE-BSD FMLA CKD-EPI: 74 ML/MIN/1.73 M 2
GFR SERPLBLD CREATININE-BSD FMLA CKD-EPI: 82 ML/MIN/1.73 M 2
GLUCOSE SERPL-MCNC: 134 MG/DL (ref 65–99)
GLUCOSE SERPL-MCNC: 91 MG/DL (ref 65–99)
OSMOLALITY UR: 459 MOSM/KG H2O (ref 300–900)
POTASSIUM SERPL-SCNC: 4.1 MMOL/L (ref 3.6–5.5)
POTASSIUM SERPL-SCNC: 4.3 MMOL/L (ref 3.6–5.5)
POTASSIUM UR-SCNC: 30 MMOL/L
SODIUM SERPL-SCNC: 122 MMOL/L (ref 135–145)
SODIUM SERPL-SCNC: 123 MMOL/L (ref 135–145)
SODIUM UR-SCNC: 123 MMOL/L

## 2024-04-15 PROCEDURE — 36415 COLL VENOUS BLD VENIPUNCTURE: CPT

## 2024-04-15 PROCEDURE — 84133 ASSAY OF URINE POTASSIUM: CPT

## 2024-04-15 PROCEDURE — 009U3ZX DRAINAGE OF SPINAL CANAL, PERCUTANEOUS APPROACH, DIAGNOSTIC: ICD-10-PCS | Performed by: STUDENT IN AN ORGANIZED HEALTH CARE EDUCATION/TRAINING PROGRAM

## 2024-04-15 PROCEDURE — 700102 HCHG RX REV CODE 250 W/ 637 OVERRIDE(OP): Performed by: STUDENT IN AN ORGANIZED HEALTH CARE EDUCATION/TRAINING PROGRAM

## 2024-04-15 PROCEDURE — 84300 ASSAY OF URINE SODIUM: CPT

## 2024-04-15 PROCEDURE — 93005 ELECTROCARDIOGRAM TRACING: CPT | Performed by: STUDENT IN AN ORGANIZED HEALTH CARE EDUCATION/TRAINING PROGRAM

## 2024-04-15 PROCEDURE — 99231 SBSQ HOSP IP/OBS SF/LOW 25: CPT | Mod: GC | Performed by: STUDENT IN AN ORGANIZED HEALTH CARE EDUCATION/TRAINING PROGRAM

## 2024-04-15 PROCEDURE — A9270 NON-COVERED ITEM OR SERVICE: HCPCS | Performed by: EMERGENCY MEDICINE

## 2024-04-15 PROCEDURE — 99223 1ST HOSP IP/OBS HIGH 75: CPT | Mod: AI | Performed by: INTERNAL MEDICINE

## 2024-04-15 PROCEDURE — 93010 ELECTROCARDIOGRAM REPORT: CPT | Performed by: INTERNAL MEDICINE

## 2024-04-15 PROCEDURE — 700102 HCHG RX REV CODE 250 W/ 637 OVERRIDE(OP): Performed by: EMERGENCY MEDICINE

## 2024-04-15 PROCEDURE — 80048 BASIC METABOLIC PNL TOTAL CA: CPT

## 2024-04-15 PROCEDURE — 82570 ASSAY OF URINE CREATININE: CPT

## 2024-04-15 PROCEDURE — 770001 HCHG ROOM/CARE - MED/SURG/GYN PRIV*

## 2024-04-15 PROCEDURE — A9270 NON-COVERED ITEM OR SERVICE: HCPCS | Performed by: STUDENT IN AN ORGANIZED HEALTH CARE EDUCATION/TRAINING PROGRAM

## 2024-04-15 PROCEDURE — 83935 ASSAY OF URINE OSMOLALITY: CPT

## 2024-04-15 PROCEDURE — 62270 DX LMBR SPI PNXR: CPT | Performed by: STUDENT IN AN ORGANIZED HEALTH CARE EDUCATION/TRAINING PROGRAM

## 2024-04-15 PROCEDURE — 82436 ASSAY OF URINE CHLORIDE: CPT

## 2024-04-15 PROCEDURE — 700105 HCHG RX REV CODE 258: Performed by: INTERNAL MEDICINE

## 2024-04-15 PROCEDURE — 700111 HCHG RX REV CODE 636 W/ 250 OVERRIDE (IP): Performed by: INTERNAL MEDICINE

## 2024-04-15 RX ORDER — SODIUM CHLORIDE 9 MG/ML
INJECTION, SOLUTION INTRAVENOUS CONTINUOUS
Status: DISCONTINUED | OUTPATIENT
Start: 2024-04-15 | End: 2024-04-17

## 2024-04-15 RX ORDER — ENOXAPARIN SODIUM 100 MG/ML
30 INJECTION SUBCUTANEOUS EVERY 12 HOURS
Status: DISCONTINUED | OUTPATIENT
Start: 2024-04-16 | End: 2024-04-19

## 2024-04-15 RX ORDER — CYANOCOBALAMIN 1000 UG/ML
1000 INJECTION, SOLUTION INTRAMUSCULAR; SUBCUTANEOUS DAILY
Qty: 3 ML | Refills: 0 | Status: COMPLETED | OUTPATIENT
Start: 2024-04-15 | End: 2024-04-17

## 2024-04-15 RX ORDER — LORAZEPAM 2 MG/ML
0.5 INJECTION INTRAMUSCULAR
Status: DISCONTINUED | OUTPATIENT
Start: 2024-04-15 | End: 2024-04-20

## 2024-04-15 RX ORDER — HYDRALAZINE HYDROCHLORIDE 20 MG/ML
20 INJECTION INTRAMUSCULAR; INTRAVENOUS EVERY 6 HOURS PRN
Status: DISCONTINUED | OUTPATIENT
Start: 2024-04-15 | End: 2024-04-17

## 2024-04-15 RX ADMIN — HYDROCODONE BITARTRATE AND ACETAMINOPHEN 1 TABLET: 5; 325 TABLET ORAL at 03:34

## 2024-04-15 RX ADMIN — SODIUM CHLORIDE: 9 INJECTION, SOLUTION INTRAVENOUS at 11:38

## 2024-04-15 RX ADMIN — CARVEDILOL 12.5 MG: 12.5 TABLET, FILM COATED ORAL at 07:44

## 2024-04-15 RX ADMIN — CARVEDILOL 12.5 MG: 12.5 TABLET, FILM COATED ORAL at 18:06

## 2024-04-15 RX ADMIN — TORSEMIDE 5 MG: 5 TABLET ORAL at 07:45

## 2024-04-15 RX ADMIN — QUETIAPINE FUMARATE 200 MG: 100 TABLET ORAL at 21:13

## 2024-04-15 RX ADMIN — CYANOCOBALAMIN 1000 MCG: 1000 INJECTION, SOLUTION INTRAMUSCULAR; SUBCUTANEOUS at 18:06

## 2024-04-15 RX ADMIN — LEVOTHYROXINE SODIUM 175 MCG: 0.17 TABLET ORAL at 07:44

## 2024-04-15 ASSESSMENT — COGNITIVE AND FUNCTIONAL STATUS - GENERAL
DRESSING REGULAR UPPER BODY CLOTHING: A LITTLE
TOILETING: A LOT
CLIMB 3 TO 5 STEPS WITH RAILING: A LOT
HELP NEEDED FOR BATHING: A LITTLE
PERSONAL GROOMING: A LITTLE
SUGGESTED CMS G CODE MODIFIER MOBILITY: CL
WALKING IN HOSPITAL ROOM: A LOT
DRESSING REGULAR UPPER BODY CLOTHING: A LITTLE
MOVING FROM LYING ON BACK TO SITTING ON SIDE OF FLAT BED: A LITTLE
MOVING FROM LYING ON BACK TO SITTING ON SIDE OF FLAT BED: A LOT
MOVING TO AND FROM BED TO CHAIR: A LOT
PERSONAL GROOMING: A LITTLE
STANDING UP FROM CHAIR USING ARMS: A LITTLE
DAILY ACTIVITIY SCORE: 18
TOILETING: A LOT
TURNING FROM BACK TO SIDE WHILE IN FLAT BAD: A LITTLE
HELP NEEDED FOR BATHING: A LITTLE
MOBILITY SCORE: 14
MOVING TO AND FROM BED TO CHAIR: A LOT
TURNING FROM BACK TO SIDE WHILE IN FLAT BAD: A LOT
SUGGESTED CMS G CODE MODIFIER MOBILITY: CL
DRESSING REGULAR LOWER BODY CLOTHING: A LOT
STANDING UP FROM CHAIR USING ARMS: A LOT
CLIMB 3 TO 5 STEPS WITH RAILING: A LOT
SUGGESTED CMS G CODE MODIFIER DAILY ACTIVITY: CK
DAILY ACTIVITIY SCORE: 17
SUGGESTED CMS G CODE MODIFIER DAILY ACTIVITY: CK
WALKING IN HOSPITAL ROOM: A LITTLE
MOBILITY SCORE: 14
DRESSING REGULAR LOWER BODY CLOTHING: A LITTLE

## 2024-04-15 ASSESSMENT — PATIENT HEALTH QUESTIONNAIRE - PHQ9
SUM OF ALL RESPONSES TO PHQ9 QUESTIONS 1 AND 2: 0
1. LITTLE INTEREST OR PLEASURE IN DOING THINGS: NOT AT ALL
1. LITTLE INTEREST OR PLEASURE IN DOING THINGS: NOT AT ALL
2. FEELING DOWN, DEPRESSED, IRRITABLE, OR HOPELESS: NOT AT ALL
2. FEELING DOWN, DEPRESSED, IRRITABLE, OR HOPELESS: NOT AT ALL
SUM OF ALL RESPONSES TO PHQ9 QUESTIONS 1 AND 2: 0

## 2024-04-15 ASSESSMENT — ENCOUNTER SYMPTOMS
CARDIOVASCULAR NEGATIVE: 1
GASTROINTESTINAL NEGATIVE: 1
CONSTITUTIONAL NEGATIVE: 1
RESPIRATORY NEGATIVE: 1

## 2024-04-15 ASSESSMENT — LIFESTYLE VARIABLES: ALCOHOL_USE: NO

## 2024-04-15 NOTE — ED NOTES
Bedside report given to KENYA High. Pt on bed alarm. Ns running at 125mL/hour with dial a flow in R forearm IV.

## 2024-04-15 NOTE — ED NOTES
Pt incontinent of loose stool. Linens & brief + purewick changed. Pt's skin remains intact, darkened, non reddened area to R hip stable (documented in EPIC on previous shift under clinical media). Pt comfortable, ate 80% of her meal.

## 2024-04-15 NOTE — CONSULTS
"PSYCHIATRIC FOLLOW-UP: (established)  Reason for admission:   Confusion/AMS and paranoid delusions  Legal Hold Status:          Voluntary  Chart reviewed.         HPI: 89 year old female with past psychiatric history of bipolar disorder who presented with acute confusion. Psychiatry was consulted for evaluation of possible delusions and medication management. Patient's mental status remained largely unchanged while in ED, was admitted on 4/15/24 due to worsening hyponatremia.    Subjective/Interval hx:  Patient was evaluated at bedside with attending physician present. She answered most questions appropriately and directly. Patient continues to demonstrate spontaneous Restorationism-based delusions although she is denying any paranoia; the content of these delusions is largely disorganized. She denies any recent changes in her sleep or appetite. She denies SI, HI, or AVH.  She was aware that her  was at bedside but still insisted that she feels that her  is trying to kill her because he has Satan  and him.  She was very insistent that her  be examined in the emergency department to find out what is\" wrong with him.\"    Collateral from leroy:  Patient has been experiencing delusions for the past 2 months and may not be medication adherent.  He reports that his father has been organizing her medications for years but his father who was also present during the interview could not tell me which medication she takes regularly and indicated that at times she will take out specific medication she does not feel that she needs.  Both the patient's  and her son reports that the patient has been increasingly psychotic over the last 3 weeks.  Her son flew in from [place] to stay with them because her  needed more help because the patient has been becoming more psychotic.  They report that over the last 20 years she has had episodes on and off where she will become paranoid regarding her family " "members.  She has been in a psychiatric hospital at least 4 times for paranoia.  They states she has been diagnosed with bipolar disorder but are both unsure as to how old she was when she was diagnosed.  They also do not give a clear history of darin.  We did look at medications the patient brought from home.  She was taking both Seroquel 200 mg at bedtime and Depakote 500 mg at bedtime.  It looks as if she was largely adherent with his medications.  They do report that the patient has no prior history of seizures.    Medical ROS (as pertinent):     Review of Systems   Constitutional: Negative.    Respiratory: Negative.     Cardiovascular: Negative.    Gastrointestinal: Negative.            Psychiatric Examination:  Vitals:   Vitals:    04/15/24 1202   BP: (!) 176/76   Pulse: 84   Resp: 17   Temp:    SpO2: 95%        General Appearance: Appears state age, appropriate grooming & hygiene, no acute distress  Behavior:    -Appropriate activity, appropriate eye contact, pleasant & cooperative   -No tremors noted   -No psychomotor agitation or retardation   -Gait not observed  Speech: Appropriate quantity, spontaneous. Regular rate and rhythm. Appropriate volume and intonation. Articulation is clear  Language: English  Thought processes: Superficially linear but at times illogical and disorganized. No evidence of loose associations  Thought content: No evidence of SI/HI/AVH. Not observed responding to internal stimuli. Episcopal based delusions observed. Denies paranoia  Mood: \"better [...] tired\"  Affect: Congruent with stated mood. Anxious, limited range, appropriately reactive to conversation. No evidence of lability, darin, or agitation  Judgement and Insight: Limited/limited  Cognition:    -is oriented to self, month, year, and place   -Inattention present (failed listing of months in reverse)   -Immediate/delayed memory not formally tested but grossly intact   -Age-appropriate fund of knowledge   -failed clock " drawing test (unable to draw all numbers correctly and placed on a clock and could not draw the hands to show the time 1110)  CAM-ICU screen positive due to inability to sustain attention to identify the letter a in a series of letters.    New PAST MEDICAL/PSYCH/FAMILY/SOCIAL(as reported by patient):       None; patient's  and son confirm that the patient has no known history of seizure.  Depakote is for mood stabilization      EK/15/24 NSR   Results for orders placed or performed during the hospital encounter of 22   EKG   Result Value Ref Range    Report       [facility].    Test Date:  2022  Pt Name:    JERRY URIAS                Department: ER  MRN:        [id number]                      Room:       [facility]  Gender:     Female                       Technician: [id number]  :        [id number]                   Requested By:ER TRIAGE PROTOCOL  Order #:    [id number]                    Reading MD:    Measurements  Intervals                                Axis  Rate:       60                           P:          43  CA:         158                          QRS:        0  QRSD:       90                           T:          -1  QT:         399  QTc:        399    Interpretive Statements  Sinus rhythm  Borderline T abnormalities, diffuse leads  Baseline wander in lead(s) V6  Compared to ECG 2018 11:27:29  T-wave abnormality now present  Sinus bradycardia no longer present        Brain Imaging: pending (MRI brain w/O)  CT head W/o contrast 24  FINDINGS:  No intracranial mass, mass effect, midline shift, hydrocephalus, hemorrhage or CT apparent acute infarct.     Moderate atrophy.     Dystrophic basal ganglia calcifications.     Calvarium is intact.     Paranasal sinuses in the field of view are unremarkable.     Mastoids in the field of view are unremarkable.        IMPRESSION:     1.  No acute intracranial abnormality detected.    EEG:  pending      Labs personally reviewed:   Recent Results (from the past 24 hour(s))   Basic Metabolic Panel    Collection Time: 04/15/24  6:50 AM   Result Value Ref Range    Sodium 122 (L) 135 - 145 mmol/L    Potassium 4.3 3.6 - 5.5 mmol/L    Chloride 92 (L) 96 - 112 mmol/L    Co2 22 20 - 33 mmol/L    Glucose 91 65 - 99 mg/dL    Bun 19 8 - 22 mg/dL    Creatinine 0.75 0.50 - 1.40 mg/dL    Calcium 8.4 (L) 8.5 - 10.5 mg/dL    Anion Gap 8.0 7.0 - 16.0   ESTIMATED GFR    Collection Time: 04/15/24  6:50 AM   Result Value Ref Range    GFR (CKD-EPI) 74 >60 mL/min/1.73 m 2   URINE SODIUM RANDOM    Collection Time: 04/15/24  9:12 AM   Result Value Ref Range    Sodium, Urine -per volume 123 mmol/L   URINE POTASSIUM RANDOM    Collection Time: 04/15/24  9:12 AM   Result Value Ref Range    Potassium 30.0 mmol/L   URINE CHLORIDE RANDOM    Collection Time: 04/15/24  9:12 AM   Result Value Ref Range    Chloride, Urine-per volume 126 mmol/L   URINE CREATININE RANDOM    Collection Time: 04/15/24  9:12 AM   Result Value Ref Range    Creatinine, Random Urine 27.24 mg/dL   OSMOLALITY URINE    Collection Time: 04/15/24  9:12 AM   Result Value Ref Range    Osmolality Urine 459 300 - 900 mOsm/kg H2O     B12 (4/14) 287  UA (4/12) trace esterase  4/14 VPA-23.3  4/13 ammonia-19  TSH (4/12)-1.57    Assessment:  89 year old female with past psychiatric history of bipolar disorder who presented with acute confusion on 4/12/24. Psychiatry was consulted for evaluation of possible delusions and medication management. Patient's mental status remained largely unchanged while in ED, was admitted on 4/15/24. Patient demonstrates spontaneous Yazidism-based delusions that are largely disorganized/illogical. Patient also failed clock drawing test on exam today. Mentation is impaired with psychotic symptoms present at this time but cannot exclude organic/medical cause given hyponatremia and other possible etiologies.    Dx:  Hx of Bipolar disorder (unknown medication  adherence)  R/O Delirium vs underlying neurocognitive disorder    Medical :  Per primary team      Plan:  Legal hold: N/A due to delirium  Patient does NOT meet criteria for capacity to sign-out AMA due to her altered level of consciousness  Psychotropic medications  Continue Seroquel 200mg at bedtime  May use Seroquel 25mg BID PRN for severe agitation  Hold home Depakote until hyponatremia is corrected, this can contribute to hyponatremia  Labs ordered/reviewed: EEG recommended to rule out any epileptiform discharges and to see if there are findings suggestive of encephalopathy  EKG ordered/reviewed  Old records ordered/reviewed/summarized  Collateral obtained  Safety plan not indicated  Tobacco cessation not indicated  Discussed the case with: Dr. Miranda and Dr. Bautista  Psychiatry will follow up    Thank you for the consult.

## 2024-04-15 NOTE — ED NOTES
Bedside report received from off going RN/tech: KENYA High, assumed care of patient.  POC discussed with patient. Call light within reach, all needs addressed at this time.       Fall risk interventions in place: Move the patient closer to the nurse's station, Patient's personal possessions are with in their safe reach, Place socks on patient, Place fall risk sign on patient's door, and Accompanied to restroom (all applicable per [place] Fall risk assessment)   Continuous monitoring: Not Applicable   IVF/IV medications: Not Applicable   Oxygen: Room Air  Bedside sitter: Not Applicable   Isolation: Not Applicable

## 2024-04-15 NOTE — ED NOTES
Pt requesting more pain medication due to her lower back pain. Educated pt that the medication that was given to her at 0330 takes time to start working. Pt verbalizes understanding and agrees to the plan to reassess at a more appropriate time. Pt otherwise resting on hospital bed, no acute signs of distress present.

## 2024-04-15 NOTE — ED NOTES
Pt resting on hospital bed, no acute signs of distress present. Even, unlabored breathing noted.

## 2024-04-15 NOTE — ED NOTES
Patient resting on gurney with eyes closed. Equal chest rise and fall noted. No labored respirations  No identifiable needs at this time  Pending medication administration

## 2024-04-15 NOTE — H&P
"Hospital Medicine History & Physical Note    Date of Service  4/15/2024    Primary Care Physician  Luke Escobar M.D.    Consultants  psychiatry    Specialist Names: Dr. Gonzales     Code Status  Full Code    Chief Complaint  Chief Complaint   Patient presents with    ALOC     Patient was at the [facility] when staff walked her over to ER due to being confused and asking to go to ER. Per family patients PCP believes her thyroid levels are possible off. Patient A/O x 3. Patient states, \" My son is from the [facility] and I need to tell all the people. I can not go home because my  is from the [facility].\"       History of Presenting Illness  Ricky Junior is a 89 y.o. 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 She saw her primary doctor prior to coming to ED. She was alert and oriented x3, however she has some delusions. She was kept in ED observation due to delusion. Then Na dropped to 122. We got called for hyponatremia  She has same delusions today on my examination 4/15/2024 that her  was obsessed from [facility]. She is keep repeating the story that she was working for the [employer]. She is retired because of her back. She makes $ 8000 a month. She has hided money at her home from her  who makes more then her, but he is spending them on gambling.   She was denies pain, fever. Diarrhea. Psychiatry following and adjusting meds   Noted B12- on low. Started b12 injections. Syphilis is reactive.   Started on some IVF for hyponatremia. She is slight hypovolemic.   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    Surgical History   has a past surgical history that includes pr excis uterine fibroid,vag apprch and colonoscopy (2007).     Family History  family history includes Hypertension in her brother and sister.   Family history reviewed with patient. There is no family history that is pertinent to the chief complaint.     Social History   reports that she has never smoked. She has never used smokeless tobacco. She reports that she does not drink alcohol and does not use drugs.    Allergies  No Known Allergies    Medications  Prior to Admission Medications   Prescriptions Last Dose Informant Patient Reported? Taking?   HYDROcodone-acetaminophen (NORCO) 7.5-325 MG tab 4/12/2024 at am Patient, Rx Bottle (For Med Information) No Yes   Sig: Take 1 Tablet by mouth every 6 hours as needed for Moderate Pain or Severe Pain (arthritis pain, max four per day) for up to 30 days. 100 tablets is a 30 day quantity   QUEtiapine (SEROQUEL) 200 MG Tab 4/11/2024 at hs Patient, Rx Bottle (For Med Information) No Yes   Sig: TAKE 1 TABLET BY MOUTH AT BEDTIME   SYNTHROID 175 MCG Tab 4/12/2024 at am Patient, Rx Bottle (For Med Information) No Yes   Sig: Take 1 Tablet by mouth every morning on an empty stomach.   ascorbic acid (VITAMIN C) 500 MG tablet 4/12/2024 at am Patient, Rx Bottle (For Med Information) Yes Yes   Sig: Take 500 mg by mouth every day.   carvedilol (COREG) 12.5 MG Tab 4/12/2024 at am Patient, Rx Bottle (For Med Information) No Yes   Sig: Take 1 Tablet by mouth 2 times a day with meals.   divalproex (DEPAKOTE) 500 MG Tablet Delayed Response 4/11/2024 at hs Patient, Rx Bottle (For Med Information) No Yes   Sig: Take  1 Tablet by mouth at bedtime.   losartan (COZAAR) 100 MG Tab 4/12/2024 at am Patient, Rx Bottle (For Med Information) No Yes   Sig: Take 1 Tablet by mouth every day.   polyethylene glycol 3350 (MIRALAX) 17 GM/SCOOP Powder 4/12/2024 at am Patient, Rx Bottle (For Med Information) No Yes   Sig: MIX 1 CAPFUL WITH WATER AND DRINK BY MOUTH EVERY DAY   Patient taking differently: Take 17 g by mouth every day. MIX 1 CAPFUL WITH WATER AND DRINK BY MOUTH EVERY DAY   torsemide (DEMADEX) 5 MG Tab 4/12/2024 at am Patient, Rx Bottle (For Med Information) No Yes   Sig: Take 1 Tablet by mouth every morning.      Facility-Administered Medications: None       Physical Exam  Temp:  [36.1 °C (97 °F)-36.7 °C (98.1 °F)] 36.1 °C (97 °F)  Pulse:  [76-96] 96  Resp:  [17-18] 17  BP: (144-202)/(65-93) 151/84  SpO2:  [95 %-98 %] 96 %  Blood Pressure : (!) 179/84   Temperature: 36.7 °C (98.1 °F)   Pulse: 91   Respiration: 18   Pulse Oximetry: 98 %       Physical Exam  Vitals and nursing note reviewed.   Constitutional:       Comments: frail   HENT:      Head: Normocephalic and atraumatic.   Eyes:      General: No scleral icterus.     Extraocular Movements: Extraocular movements intact.      Pupils: Pupils are equal, round, and reactive to light.   Cardiovascular:      Rate and Rhythm: Normal rate.      Heart sounds: No murmur heard.  Pulmonary:      Effort: Pulmonary effort is normal. No respiratory distress.      Breath sounds: No wheezing or rales.   Abdominal:      General: There is no distension.      Palpations: Abdomen is soft.      Tenderness: There is no abdominal tenderness.   Skin:     Coloration: Skin is not pale.   Neurological:      Cranial Nerves: No cranial nerve deficit.      Motor: No weakness.      Comments: She does not know why she is here   Psychiatric:         Attention and Perception: She is inattentive.         Speech: Speech is tangential.         Thought Content: Thought content is delusional.         Laboratory:     "    Recent Labs     04/14/24  0608 04/15/24  0650   SODIUM 127* 122*   POTASSIUM 4.5 4.3   CHLORIDE 97 92*   CO2 21 22   GLUCOSE 94 91   BUN 25* 19   CREATININE 0.74 0.75   CALCIUM 8.9 8.4*     Recent Labs     04/14/24  0608 04/15/24  0650   GLUCOSE 94 91         No results for input(s): \"NTPROBNP\" in the last 72 hours.      No results for input(s): \"TROPONINT\" in the last 72 hours.    Imaging:  CT-HEAD W/O   Final Result      1.  No acute intracranial abnormality detected.         DX-CHEST-PORTABLE (1 VIEW)   Final Result      No acute cardiac or pulmonary abnormalities are identified.      DX-LUMBAR PUNCTURE FOR DIAGNOSIS    (Results Pending)   MR-BRAIN-W/O    (Results Pending)       X-Ray:  I have personally reviewed the images and compared with prior images.  EKG:  I have personally reviewed the images and compared with prior images.    Assessment/Plan:  Justification for Admission Status  I anticipate this patient will require at least two midnights for appropriate medical management, necessitating inpatient admission because hyponatremia, acute psychosis     Patient will need a Telemetry bed on MEDICAL service .  The need is secondary to hyponatremia .    * Psychotic disorder (HCC)- (present on admission)  Assessment & Plan  TSH CT head UA U tox wnl. No clear metabolic etiologies.   B12 on the low side.  Start B12 injections  Syphilis screening is positive however RPR are negative  I did curbside with infectious disease.  Syphilis tertiary cannot be ruled out.  She will need LP for further evaluation  with time RPR can be negative, therefore neurosyphilis being on differential  MRI brain for further evaluation also ordered  After discussion with psychiatry also did order EEG to rule out encephalopathy  Also order radiology LP.  Dr. Luis tried at the bedside, and not successful   Psychiatry on board, following recommendations. Follow up with psych in am        Vitamin B12 deficiency- (present on " admission)  Assessment & Plan  Replace  Continue to follow-up with primary care  Monitor for improvement on her mentation/delusional    Hyponatremia- (present on admission)  Assessment & Plan  Not likely symptomatic.   Patient looks slightly on the dry side vs euvolemic   Started some IVF   Continue to monitor closely       Bipolar depression (HCC)- (present on admission)  Assessment & Plan  Per chart review she has had bipolar. But I don't see any delusional reports from her PCP   She is on seroquel and depakote  Holding depakote  because of hyponatremia  Psychiatry following and truly appreciate their recs     Idiopathic hypothyroidism- (present on admission)  Assessment & Plan  Continue synthroid    Hypertension- (present on admission)  Assessment & Plan  Hold losartan and torsemide for hypertension because of hyponatremia that worsened  Continue carvedilol  Hydralazine as needed for now  Continue to resume losartan        VTE prophylaxis: SCDs/TEDs hold open  Hold anticoags for LP to get done

## 2024-04-15 NOTE — ED NOTES
Patient resting on gurney with eyes closed. Equal chest rise and fall noted. No labored respirations  No identifiable needs at this time  Pt remains connected to cardiac monitoring/vitals

## 2024-04-15 NOTE — ED NOTES
Pt given meal tray and assisted with eating. Pt siting up in bed eating meal, pt has no needs at this time

## 2024-04-15 NOTE — ED NOTES
Bedside report received from off going RN/tech: Juan, assumed care of patient.  POC discussed with patient. Call light within reach, all needs addressed at this time.       Fall risk interventions in place: Patient's personal possessions are with in their safe reach, Place socks on patient, Place fall risk sign on patient's door, Keep floor surfaces clean and dry, Accompanied to restroom, and Bed Alarm in use (all applicable per [place] Fall risk assessment)   Continuous monitoring: Pulse Ox or Blood Pressure  IVF/IV medications: Not Applicable   Oxygen: Room Air  Bedside sitter: Not Applicable   Isolation: Not Applicable

## 2024-04-15 NOTE — ED NOTES
Bedside report received from off going RN: Jorden. Assumed care of patient.  POC discussed with patient. Call light within reach, all needs addressed at this time. Pt on a purewick at this time.      Fall risk interventions in place: Move the patient closer to the nurse's station, Patient's personal possessions are with in their safe reach, Place socks on patient, Place fall risk sign on patient's door, Keep floor surfaces clean and dry, and Bed Alarm in use   Continuous monitoring: Cardiac Leads, Pulse Ox, or Blood Pressure  IVF/IV medications: NS infusing at 125 mL/hr   Oxygen: Room Air  Bedside sitter: Not Applicable   Isolation: Not Applicable

## 2024-04-15 NOTE — DISCHARGE PLANNING
Alert Team Note:    Contacted by [place], spoke to Yolnade. Pt referral has been received and is being reviewed. Informed Yolande that pt is not on a hold. Per Yolande, due to the pts insurance, they may be able to accept the pt without a hold. Yolande to staff referral and inform writer.

## 2024-04-15 NOTE — ED NOTES
Pt's skin inspected. Pt has redness on sacrum, but is blanchable, there is a bruise to the left hip, moisture cream applied to peritoneal area. Skin is otherwise intact. Pt incontinent. Cleaned patient, inserted new purewick. Pt repositioned.

## 2024-04-15 NOTE — PROGRESS NOTES
4 Eyes Skin Assessment Completed by KENYA Fonseca and KENYA Membreno.    Head WDL  Ears WDL  Nose WDL  Mouth WDL  Neck WDL  Breast/Chest WDL  Shoulder Blades WDL  Spine WDL  (R) Arm/Elbow/Hand WDL  (L) Arm/Elbow/Hand WDL  Abdomen WDL  Groin WDL  Scrotum/Coccyx/Buttocks Discoloration right hip  (R) Leg WDL  (L) Leg WDL discoloration anterior left lower leg  (R) Heel/Foot/Toe WDL  (L) Heel/Foot/Toe WDL          Devices In Places       Interventions In Place Heel Mepilex, Sacral Mepilex, and Pillows    Possible Skin Injury Yes    Pictures Uploaded Into Epic Yes  Wound Consult Placed Yes  RN Wound Prevention Protocol Ordered Yes

## 2024-04-15 NOTE — ED NOTES
Bedside report received from off going RN Becca, assumed care of patient.  POC discussed with patient. Call light within reach, all needs addressed at this time.     Fall risk interventions in place: Move the patient closer to the nurse's station, Place socks on patient, Place fall risk sign on patient's door, Keep floor surfaces clean and dry, Accompanied to restroom, and Bed Alarm in use (all applicable per [place] Fall risk assessment)   Continuous monitoring: Pulse Ox or Blood Pressure  IVF/IV medications: Infusion per MAR (List Med(s)) NS infusion (stop time 0000)  Oxygen: Room Air  Bedside sitter: Not Applicable   Isolation: Not Applicable

## 2024-04-15 NOTE — DISCHARGE PLANNING
0816: [facility] called. Packet is being reviewed.  0827: Contacted Amanda at [facility] to notify her that pt is being admitted for medical management. Will resend a referral when pt is medically cleared and ready for transfer.

## 2024-04-15 NOTE — ED NOTES
Report given to Juan ZAMBRANO. Safety measures in place. NS infusion stopped per MAR. Call light within reach. Care relinquished.

## 2024-04-15 NOTE — DISCHARGE SUMMARY
"  ED Observation Discharge Summary    Patient:Ricky Junior  Patient : 1931  Patient MRN: [id number]  Patient PCP: Luke Escobar M.D.    Admit Date: 2024  Discharge Date and Time: 04/15/24 8:48 AM  Discharge Diagnosis:   1. Acute psychosis (HCC)    2. History of bipolar disorder    3. Manic episode (HCC)    4. Uncontrolled hypertension    5. Hyponatremia        Discharge Attending: Duong Mc M.D.  Discharge Service: ED Observation    ED Course  Patient here with function history of psychiatric disorder, with possible manic episode and dementia versus delirium.  Patient worked up initially when she arrived 2024.  Patient labs were notable for hyperkalemia and positive syphilis antibody.  Patient's RPR is negative thankfully which is consistent with prior but not active infection.  Patient repeat laboratories returned with improved potassium but patient's sodium trended down to 127 after patient was started torsemide.  Repeat BMP revealed 122 Na therefore requiring admission for correction.  This is likely iatrogenic, therefore torsemide was held.  This will be a relatively delicate balancing act given patient's history of hyperkalemia when she was off of the torsemide.  I discussed the case with hospitalist who has agreed to admit.    Discharge Exam:  BP (!) 202/93   Pulse 76   Temp 36.1 °C (96.9 °F) (Temporal)   Resp 16   Ht 1.702 m (5' 7\")   Wt 56.5 kg (124 lb 9 oz)   SpO2 96%   BMI 19.51 kg/m² .    Constitutional: Awake and alert. Nontoxic  HENT:  Grossly normal  Eyes: Grossly normal  Neck: Normal range of motion  Cardiovascular: Normal heart rate   Thorax & Lungs: No respiratory distress  Abdomen: Nontender  Skin:  No pathologic rash.   Extremities: Well perfused  Psychiatric: Affect normal    Labs  Results for orders placed or performed during the hospital encounter of 24   CBC With Differential   Result Value Ref Range    WBC 8.5 4.8 - 10.8 K/uL    RBC 3.54 (L) 4.20 - " 5.40 M/uL    Hemoglobin 11.0 (L) 12.0 - 16.0 g/dL    Hematocrit 33.8 (L) 37.0 - 47.0 %    MCV 95.5 81.4 - 97.8 fL    MCH 31.1 27.0 - 33.0 pg    MCHC 32.5 32.2 - 35.5 g/dL    RDW 45.6 35.9 - 50.0 fL    Platelet Count 272 164 - 446 K/uL    MPV 10.9 9.0 - 12.9 fL    Neutrophils-Polys 68.90 44.00 - 72.00 %    Lymphocytes 16.00 (L) 22.00 - 41.00 %    Monocytes 11.00 0.00 - 13.40 %    Eosinophils 3.60 0.00 - 6.90 %    Basophils 0.40 0.00 - 1.80 %    Immature Granulocytes 0.10 0.00 - 0.90 %    Nucleated RBC 0.00 0.00 - 0.20 /100 WBC    Neutrophils (Absolute) 5.88 1.82 - 7.42 K/uL    Lymphs (Absolute) 1.37 1.00 - 4.80 K/uL    Monos (Absolute) 0.94 (H) 0.00 - 0.85 K/uL    Eos (Absolute) 0.31 0.00 - 0.51 K/uL    Baso (Absolute) 0.03 0.00 - 0.12 K/uL    Immature Granulocytes (abs) 0.01 0.00 - 0.11 K/uL    NRBC (Absolute) 0.00 K/uL   Comp Metabolic Panel   Result Value Ref Range    Sodium 133 (L) 135 - 145 mmol/L    Potassium 5.8 (H) 3.6 - 5.5 mmol/L    Chloride 97 96 - 112 mmol/L    Co2 22 20 - 33 mmol/L    Anion Gap 14.0 7.0 - 16.0    Glucose 87 65 - 99 mg/dL    Bun 26 (H) 8 - 22 mg/dL    Creatinine 1.12 0.50 - 1.40 mg/dL    Calcium 8.9 8.5 - 10.5 mg/dL    Correct Calcium 8.7 8.5 - 10.5 mg/dL    AST(SGOT) 18 12 - 45 U/L    ALT(SGPT) 7 2 - 50 U/L    Alkaline Phosphatase 70 30 - 99 U/L    Total Bilirubin 0.2 0.1 - 1.5 mg/dL    Albumin 4.3 3.2 - 4.9 g/dL    Total Protein 7.2 6.0 - 8.2 g/dL    Globulin 2.9 1.9 - 3.5 g/dL    A-G Ratio 1.5 g/dL   Diagnostic Alcohol   Result Value Ref Range    Diagnostic Alcohol <10.1 <10.1 mg/dL   Urinalysis    Specimen: Urine   Result Value Ref Range    Color Yellow     Character Clear     Specific Gravity 1.009 <1.035    Ph 6.5 5.0 - 8.0    Glucose Negative Negative mg/dL    Ketones Negative Negative mg/dL    Protein Negative Negative mg/dL    Bilirubin Negative Negative    Urobilinogen, Urine 0.2 Negative    Nitrite Negative Negative    Leukocyte Esterase Trace (A) Negative    Occult Blood  Negative Negative    Micro Urine Req Microscopic    Urine Drug Screen (Triage)   Result Value Ref Range    Amphetamines Urine Negative Negative    Barbiturates Negative Negative    Benzodiazepines Negative Negative    Cocaine Metabolite Negative Negative    Fentanyl, Urine Negative Negative    Methadone Negative Negative    Opiates Negative Negative    Oxycodone Negative Negative    Phencyclidine -Pcp Negative Negative    Propoxyphene Negative Negative    Cannabinoid Metab Negative Negative   URINE MICROSCOPIC (W/UA)   Result Value Ref Range    WBC 0-2 /hpf    RBC 0-2 /hpf    Bacteria Negative None /hpf    Epithelial Cells Negative /hpf    Hyaline Cast 0-2 /lpf   ESTIMATED GFR   Result Value Ref Range    GFR (CKD-EPI) 46 (A) >60 mL/min/1.73 m 2   TSH   Result Value Ref Range    TSH 1.570 0.380 - 5.330 uIU/mL   FREE THYROXINE   Result Value Ref Range    Free T-4 1.41 0.93 - 1.70 ng/dL   AMMONIA   Result Value Ref Range    Ammonia 19 11 - 45 umol/L   VALPROIC ACID   Result Value Ref Range    Valproic Acid 23.3 (L) 50.0 - 100.0 ug/mL   Basic Metabolic Panel   Result Value Ref Range    Sodium 127 (L) 135 - 145 mmol/L    Potassium 4.5 3.6 - 5.5 mmol/L    Chloride 97 96 - 112 mmol/L    Co2 21 20 - 33 mmol/L    Glucose 94 65 - 99 mg/dL    Bun 25 (H) 8 - 22 mg/dL    Creatinine 0.74 0.50 - 1.40 mg/dL    Calcium 8.9 8.5 - 10.5 mg/dL    Anion Gap 9.0 7.0 - 16.0   VITAMIN B12   Result Value Ref Range    Vitamin B12 -True Cobalamin 287 211 - 911 pg/mL   T.PALLIDUM AB ISAK (SCREENING)   Result Value Ref Range    Syphilis, Treponemal Qual Reactive (A) Non-Reactive   ESTIMATED GFR   Result Value Ref Range    GFR (CKD-EPI) 75 >60 mL/min/1.73 m 2   RPR (SYPHILIS)   Result Value Ref Range    Rapid Plasma Reagin -Rpr- Non Reactive Non Reactive   Basic Metabolic Panel   Result Value Ref Range    Sodium 122 (L) 135 - 145 mmol/L    Potassium 4.3 3.6 - 5.5 mmol/L    Chloride 92 (L) 96 - 112 mmol/L    Co2 22 20 - 33 mmol/L    Glucose 91 65 -  99 mg/dL    Bun 19 8 - 22 mg/dL    Creatinine 0.75 0.50 - 1.40 mg/dL    Calcium 8.4 (L) 8.5 - 10.5 mg/dL    Anion Gap 8.0 7.0 - 16.0   ESTIMATED GFR   Result Value Ref Range    GFR (CKD-EPI) 74 >60 mL/min/1.73 m 2   POCT glucose device results   Result Value Ref Range    POC Glucose, Blood 84 65 - 99 mg/dL   POC CoV-2, FLU A/B, RSV by PCR   Result Value Ref Range    POC Influenza A RNA, PCR Negative Negative    POC Influenza B RNA, PCR Negative Negative    POC RSV, by PCR Negative Negative    POC SARS-CoV-2, PCR NotDetected        Radiology  CT-HEAD W/O   Final Result      1.  No acute intracranial abnormality detected.         DX-CHEST-PORTABLE (1 VIEW)   Final Result      No acute cardiac or pulmonary abnormalities are identified.          Medications:   New Prescriptions    No medications on file       My final assessment includes   1. Acute psychosis (HCC)    2. History of bipolar disorder    3. Manic episode (HCC)    4. Uncontrolled hypertension    5. Hyponatremia        Upon Reevaluation, the patient's condition has: not improved; and will be escalated to hospitalization.    Patient discharged from ED Observation status at 8:50 AM (Time) 04/15/24 (Date).     Total time spent on this ED Observation discharge encounter is > 30 Minutes    Electronically signed by: Duong Mc M.D., 4/15/2024 8:48 AM

## 2024-04-15 NOTE — CARE PLAN
Problem: Fall Risk  Goal: Patient will remain free from falls  Outcome: Progressing  Note: Education provided regarding fall safety including use of call bell, bed alarm is on and bed in lowest position   The patient is Stable - Low risk of patient condition declining or worsening         Progress made toward(s) clinical / shift goals:      Patient is not progressing towards the following goals:

## 2024-04-15 NOTE — DISCHARGE PLANNING
Alert Team:    Referral faxed to [facility], [facility] and [facility]

## 2024-04-15 NOTE — ED NOTES
"Came to room as pt was screaming \"help\". When asked if pt was okay, she said \"oh, yes. I just need my Bible. And I need to call my  to get all of the money from my house\". Pt redirected, given her Bible. Pleasant, does not appear to be in distress.   "

## 2024-04-16 ENCOUNTER — APPOINTMENT (OUTPATIENT)
Dept: RADIOLOGY | Facility: MEDICAL CENTER | Age: 89
DRG: 056 | End: 2024-04-16
Attending: INTERNAL MEDICINE
Payer: MEDICARE

## 2024-04-16 LAB
ANION GAP SERPL CALC-SCNC: 10 MMOL/L (ref 7–16)
APTT PPP: 30.5 SEC (ref 24.7–36)
BUN SERPL-MCNC: 15 MG/DL (ref 8–22)
BURR CELLS/RBC NFR CSF MANUAL: 0 %
C GATTII+NEOFOR DNA CSF QL NAA+NON-PROBE: NOT DETECTED
CALCIUM SERPL-MCNC: 8.6 MG/DL (ref 8.5–10.5)
CHLORIDE SERPL-SCNC: 94 MMOL/L (ref 96–112)
CLARITY CSF: ABNORMAL
CMV DNA CSF QL NAA+NON-PROBE: NOT DETECTED
CO2 SERPL-SCNC: 20 MMOL/L (ref 20–33)
COLOR CSF: ABNORMAL
COLOR SPUN CSF: COLORLESS
CREAT SERPL-MCNC: 0.6 MG/DL (ref 0.5–1.4)
E COLI K1 DNA CSF QL NAA+NON-PROBE: NOT DETECTED
EV RNA CSF QL NAA+NON-PROBE: NOT DETECTED
GFR SERPLBLD CREATININE-BSD FMLA CKD-EPI: 84 ML/MIN/1.73 M 2
GLUCOSE CSF-MCNC: 56 MG/DL (ref 40–80)
GLUCOSE SERPL-MCNC: 96 MG/DL (ref 65–99)
GP B STREP DNA CSF QL NAA+NON-PROBE: NOT DETECTED
HAEM INFLU DNA CSF QL NAA+NON-PROBE: NOT DETECTED
HHV6 DNA CSF QL NAA+NON-PROBE: NOT DETECTED
HSV1 DNA CSF QL NAA+NON-PROBE: NOT DETECTED
HSV2 DNA CSF QL NAA+NON-PROBE: NOT DETECTED
INR PPP: 1.12 (ref 0.87–1.13)
L MONOCYTOG DNA CSF QL NAA+NON-PROBE: NOT DETECTED
LYMPHOCYTES NFR CSF: 1 %
MONOS+MACROS NFR CSF MANUAL: 9 %
N MEN DNA CSF QL NAA+NON-PROBE: NOT DETECTED
NEUTROPHILS NFR CSF: 90 %
NUC CELL # CSF: 21 CELLS/UL (ref 0–10)
PARECHOVIRUS A RNA CSF QL NAA+NON-PROBE: NOT DETECTED
POTASSIUM SERPL-SCNC: 4.4 MMOL/L (ref 3.6–5.5)
PROT CSF-MCNC: 92 MG/DL (ref 15–45)
PROTHROMBIN TIME: 14.5 SEC (ref 12–14.6)
RBC # CSF: 2000 CELLS/UL
S PNEUM DNA CSF QL NAA+NON-PROBE: NOT DETECTED
SODIUM SERPL-SCNC: 124 MMOL/L (ref 135–145)
SPECIMEN VOL CSF: 14 ML
TUBE # CSF: 3
TUBE # CSF: ABNORMAL
VZV DNA CSF QL NAA+NON-PROBE: NOT DETECTED

## 2024-04-16 PROCEDURE — A9270 NON-COVERED ITEM OR SERVICE: HCPCS | Performed by: EMERGENCY MEDICINE

## 2024-04-16 PROCEDURE — 87070 CULTURE OTHR SPECIMN AEROBIC: CPT

## 2024-04-16 PROCEDURE — 85730 THROMBOPLASTIN TIME PARTIAL: CPT

## 2024-04-16 PROCEDURE — 700102 HCHG RX REV CODE 250 W/ 637 OVERRIDE(OP): Performed by: INTERNAL MEDICINE

## 2024-04-16 PROCEDURE — A9270 NON-COVERED ITEM OR SERVICE: HCPCS | Performed by: INTERNAL MEDICINE

## 2024-04-16 PROCEDURE — A9270 NON-COVERED ITEM OR SERVICE: HCPCS | Performed by: STUDENT IN AN ORGANIZED HEALTH CARE EDUCATION/TRAINING PROGRAM

## 2024-04-16 PROCEDURE — 82945 GLUCOSE OTHER FLUID: CPT

## 2024-04-16 PROCEDURE — 95816 EEG AWAKE AND DROWSY: CPT | Mod: 26 | Performed by: PSYCHIATRY & NEUROLOGY

## 2024-04-16 PROCEDURE — 86780 TREPONEMA PALLIDUM: CPT

## 2024-04-16 PROCEDURE — 99233 SBSQ HOSP IP/OBS HIGH 50: CPT | Performed by: HOSPITALIST

## 2024-04-16 PROCEDURE — 86592 SYPHILIS TEST NON-TREP QUAL: CPT

## 2024-04-16 PROCEDURE — 80048 BASIC METABOLIC PNL TOTAL CA: CPT

## 2024-04-16 PROCEDURE — 36415 COLL VENOUS BLD VENIPUNCTURE: CPT

## 2024-04-16 PROCEDURE — 85610 PROTHROMBIN TIME: CPT

## 2024-04-16 PROCEDURE — 62328 DX LMBR SPI PNXR W/FLUOR/CT: CPT

## 2024-04-16 PROCEDURE — 4A10X4Z MONITORING OF CENTRAL NERVOUS ELECTRICAL ACTIVITY, EXTERNAL APPROACH: ICD-10-PCS | Performed by: PSYCHIATRY & NEUROLOGY

## 2024-04-16 PROCEDURE — 84157 ASSAY OF PROTEIN OTHER: CPT

## 2024-04-16 PROCEDURE — 700102 HCHG RX REV CODE 250 W/ 637 OVERRIDE(OP): Performed by: EMERGENCY MEDICINE

## 2024-04-16 PROCEDURE — 87483 CNS DNA AMP PROBE TYPE 12-25: CPT

## 2024-04-16 PROCEDURE — 99231 SBSQ HOSP IP/OBS SF/LOW 25: CPT | Performed by: STUDENT IN AN ORGANIZED HEALTH CARE EDUCATION/TRAINING PROGRAM

## 2024-04-16 PROCEDURE — 700111 HCHG RX REV CODE 636 W/ 250 OVERRIDE (IP): Performed by: INTERNAL MEDICINE

## 2024-04-16 PROCEDURE — 87205 SMEAR GRAM STAIN: CPT

## 2024-04-16 PROCEDURE — 700102 HCHG RX REV CODE 250 W/ 637 OVERRIDE(OP): Performed by: STUDENT IN AN ORGANIZED HEALTH CARE EDUCATION/TRAINING PROGRAM

## 2024-04-16 PROCEDURE — 95816 EEG AWAKE AND DROWSY: CPT | Performed by: PSYCHIATRY & NEUROLOGY

## 2024-04-16 PROCEDURE — 95819 EEG AWAKE AND ASLEEP: CPT | Performed by: STUDENT IN AN ORGANIZED HEALTH CARE EDUCATION/TRAINING PROGRAM

## 2024-04-16 PROCEDURE — 700111 HCHG RX REV CODE 636 W/ 250 OVERRIDE (IP): Mod: JZ

## 2024-04-16 PROCEDURE — 89051 BODY FLUID CELL COUNT: CPT

## 2024-04-16 PROCEDURE — 770001 HCHG ROOM/CARE - MED/SURG/GYN PRIV*

## 2024-04-16 RX ORDER — HALOPERIDOL 5 MG/ML
1 INJECTION INTRAMUSCULAR EVERY 4 HOURS PRN
Status: DISCONTINUED | OUTPATIENT
Start: 2024-04-16 | End: 2024-04-17

## 2024-04-16 RX ADMIN — HALOPERIDOL LACTATE 1 MG: 5 INJECTION, SOLUTION INTRAMUSCULAR at 23:11

## 2024-04-16 RX ADMIN — CYANOCOBALAMIN 1000 MCG: 1000 INJECTION, SOLUTION INTRAMUSCULAR; SUBCUTANEOUS at 17:20

## 2024-04-16 RX ADMIN — QUETIAPINE FUMARATE 200 MG: 100 TABLET ORAL at 21:16

## 2024-04-16 RX ADMIN — CARVEDILOL 12.5 MG: 12.5 TABLET, FILM COATED ORAL at 17:20

## 2024-04-16 RX ADMIN — LOSARTAN POTASSIUM 100 MG: 50 TABLET, FILM COATED ORAL at 05:24

## 2024-04-16 RX ADMIN — HYDROCODONE BITARTRATE AND ACETAMINOPHEN 1 TABLET: 5; 325 TABLET ORAL at 16:18

## 2024-04-16 RX ADMIN — HYDRALAZINE HYDROCHLORIDE 20 MG: 20 INJECTION, SOLUTION INTRAMUSCULAR; INTRAVENOUS at 21:22

## 2024-04-16 RX ADMIN — CARVEDILOL 12.5 MG: 12.5 TABLET, FILM COATED ORAL at 07:57

## 2024-04-16 RX ADMIN — LEVOTHYROXINE SODIUM 175 MCG: 0.17 TABLET ORAL at 05:24

## 2024-04-16 ASSESSMENT — ENCOUNTER SYMPTOMS
VOMITING: 0
ABDOMINAL PAIN: 0
HEADACHES: 0
NAUSEA: 0
TREMORS: 0
SHORTNESS OF BREATH: 0

## 2024-04-16 ASSESSMENT — PAIN DESCRIPTION - PAIN TYPE: TYPE: ACUTE PAIN

## 2024-04-16 NOTE — ASSESSMENT & PLAN NOTE
4/24/2024  Question of secondary Seroquel which has been held also Depakote has been held.  I have stopped 3% NaCl fluids  4/21 Increase NaCl tabs.   Encourage oral intake but states she doesn't have her partial dentures and it is difficult.  We are trying to get her to and take more oral fluids and diet.  Watch fluid intake urine output  Diet as tolerates  Holding any thiazides

## 2024-04-16 NOTE — CARE PLAN
"The patient is Watcher - Medium risk of patient condition declining or worsening    Shift Goals  Clinical Goals: Remain free from injury or falls  Patient Goals: \"Bring my  back to the hospital\"  Family Goals: LAWRENCE    Progress made toward(s) clinical / shift goals: Patient remains free from injury this shift. Tele-sitter at bedside. No acute changes this shift.    Patient is not progressing towards the following goals:    Problem: Knowledge Deficit - Standard  Goal: Patient and family/care givers will demonstrate understanding of plan of care, disease process/condition, diagnostic tests and medications  Outcome: Not Progressing    Patient able to answer orientation questions appropriately but will go on tangents regarding her , son, finances, & her being afraid that her  will get hurt. Re-orientation provided with no evidence of learning.     "

## 2024-04-16 NOTE — WOUND TEAM
"[facility]  Inpatient Services  Wound and Skin Care Brief Evaluation    Admission Date: 4/12/2024     Last order of IP CONSULT TO WOUND CARE was found on 4/15/2024 from Hospital Encounter on 4/12/2024     HPI, PMH, SH: Reviewed    Chief Complaint   Patient presents with    ALOC     Patient was at the [facility] lab when staff walked her over to ER due to being confused and asking to go to ER. Per family patients PCP believes her thyroid levels are possible off. Patient A/O x 3. Patient states, \" My son is from the devil and I need to tell all the people. I can not go home because my  is from the devil.\"     Diagnosis: Hyponatremia [E87.1]    Unit where seen by Wound Team: S636/00     Wound consult placed regarding sacrum, R hip, toes. Chart and images reviewed. This discussed with bedside RN Gina. This clinician in to assess patient. Patient pleasant and agreeable. Patient sacrum with natural skin tone discoloration, no current wounds noted. Patient Right hip also with prominent darkened discoloration, appears chronic and more similar to natural skin discoloration / hyperpigmentation. Heels intact. Toes with intact scabs, kept open to air.     No pressure injuries or advanced wound care needs identified. Wound consult completed. No further follow up unless indicated and consulted.                                PREVENTATIVE INTERVENTIONS:    Q shift Alvarado - performed per nursing policy  Q shift pressure point assessments - performed per nursing policy    Surface/Positioning  Standard/trauma mattress - Currently in Place  Reposition q 2 hours - Currently in Place    Offloading/Redistribution  Heel offloading dressing (Silicone dressing) - Currently in Place      Containment/Moisture Prevention    Purwick/Condom Cath - Currently in Place  "

## 2024-04-16 NOTE — ASSESSMENT & PLAN NOTE
4/24/2024  Replace  Continue to follow-up with primary care  Monitor for improvement on her mentation/delusional

## 2024-04-16 NOTE — PROGRESS NOTES
Hospital Medicine Daily Progress Note    Date of Service  4/16/2024    Chief Complaint  Ricky Junior is a 89 y.o. female admitted 4/12/2024 with altered mental status    Hospital Course  Mrs. Junior is a 89 y.o. 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 She saw her primary doctor prior to coming to ED. She was alert and oriented x3, however she has some delusions. She was kept in ED observation due to delusion. Then Na dropped to 122. We got called for hyponatremia  She has same delusions today on my examination 4/15/2024 that her  was obsessed from devil. 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 She makes $ 8000 a month. She has hided money at her home from her  who makes more then her, but he is spending them on gambling.   She was denies pain, fever. Diarrhea. Psychiatry following and adjusting meds   Noted B12- on low. Started b12 injections. Syphilis is reactive.   Started on some IVF for hyponatremia. She is slight hypovolemic.   Depakote was also hold because of hyponatremia     Interval Problem Update  4/16: Mrs. Junior was evaluated on the medical floor. Bedside lumbar puncture was unsuccessful yesterday. Coags ordered for this morning and radiology consulted for lumbar puncture and results are negative with exception of a mildly elevated protein her sodium is low at 124.    I have discussed this patient's plan of care and discharge plan at IDT rounds today with Case Management, Nursing, Nursing leadership, and other members of the IDT team.    Consultants/Specialty  Psychiatry I discussed with Dr. Newby    Code Status  Full Code    Disposition  The patient is not medically cleared for discharge to home or a post-acute facility.      I have placed the appropriate orders for post-discharge needs.    Review of  Systems  Review of Systems   Unable to perform ROS: Mental status change        Physical Exam  Temp:  [36.1 °C (97 °F)-36.7 °C (98.1 °F)] 36.7 °C (98.1 °F)  Pulse:  [63-96] 66  Resp:  [16-18] 18  BP: (146-179)/(67-84) 172/80  SpO2:  [93 %-98 %] 96 %    Physical Exam  Vitals and nursing note reviewed.   Constitutional:       Appearance: She is ill-appearing.      Comments: Elderly and thin   Cardiovascular:      Rate and Rhythm: Normal rate and regular rhythm.   Pulmonary:      Effort: Pulmonary effort is normal.      Breath sounds: Normal breath sounds.   Abdominal:      General: There is no distension.      Tenderness: There is no abdominal tenderness.   Musculoskeletal:      Cervical back: Neck supple.      Right lower leg: No edema.   Neurological:      Mental Status: She is alert.      Comments: Generally oriented          Fluids  No intake or output data in the 24 hours ending 04/16/24 1024    Laboratory      Recent Labs     04/15/24  0650 04/15/24  2017 04/16/24  0800   SODIUM 122* 123* 124*   POTASSIUM 4.3 4.1 4.4   CHLORIDE 92* 91* 94*   CO2 22 20 20   GLUCOSE 91 134* 96   BUN 19 15 15   CREATININE 0.75 0.66 0.60   CALCIUM 8.4* 8.2* 8.6                   Imaging  DX-LUMBAR PUNCTURE FOR DIAGNOSIS   Final Result      Fluoroscopic-guided lumbar puncture as described above.      CT-HEAD W/O   Final Result      1.  No acute intracranial abnormality detected.         DX-CHEST-PORTABLE (1 VIEW)   Final Result      No acute cardiac or pulmonary abnormalities are identified.      MR-BRAIN-W/O    (Results Pending)        Assessment/Plan  * Psychotic disorder (HCC)- (present on admission)  Assessment & Plan  With delusions  TSH CT head UA U tox wnl. No clear metabolic etiologies.   B12 on the low side.  Start B12 injections      MRI brain pending  EEG pending    Bedside lumbar puncture was unsuccessful thus radiology consult for LP. Meningitis panel, cells, protein, syphilis ordered.   Psychiatry on board, following  recommendations.   This may be compounded by hyponatremia which is being addressed        Hyponatremia- (present on admission)  Assessment & Plan  Significant hyponatremia that went down to 122.  Decrease normal saline to 75 mL/h and fluid restrict 1.8 L basic metabolic panel ordered for the morning      Vitamin B12 deficiency- (present on admission)  Assessment & Plan  Replace  Continue to follow-up with primary care  Monitor for improvement on her mentation/delusional    Bipolar depression (HCC)- (present on admission)  Assessment & Plan  Per chart review she has had bipolar. But I don't see any delusional reports from her PCP   She is on seroquel and depakote  Holding depakote  because of hyponatremia  Psychiatry has been consulted  Seroquel 200 mg daily will be continued    Idiopathic hypothyroidism- (present on admission)  Assessment & Plan  Continue synthroid    Hypertension- (present on admission)  Assessment & Plan  Hold losartan and torsemide for hypertension because of hyponatremia that worsened  Continue carvedilol  Hydralazine as needed for now  Continue to resume losartan         VTE prophylaxis:   SCDs/TEDs      I have performed a physical exam and reviewed and updated ROS and Plan today (4/16/2024). In review of yesterday's note (4/15/2024), there are no changes except as documented above.

## 2024-04-16 NOTE — PROCEDURES
Procedure Lumbar Puncture    Date/Time: 4/15/2024 5:42 PM    Performed by: Felice Hanson M.D.  Authorized by: Felice Hanson M.D.    Consent:     Consent obtained:  Verbal    Consent given by:  Healthcare agent (son and )    Risks discussed:  Bleeding, nerve damage, headache, repeat procedure, pain and infection    Alternatives discussed:  No treatment  Universal protocol:     Procedure explained and questions answered to patient or proxy's satisfaction: yes      Immediately prior to procedure a time out was called: yes      Site/side marked: yes      Patient identity confirmed:  Provided demographic data and arm band  Pre-procedure details:     Procedure purpose:  Diagnostic    Preparation: Patient was prepped and draped in usual sterile fashion    Sedation:     Sedation type:  None  Anesthesia:     Anesthesia method:  Local infiltration    Local anesthetic:  Lidocaine 1% w/o epi  Procedure details:     Lumbar space:  L4-L5 interspace    Patient position:  L lateral decubitus    Needle gauge:  20    Needle type:  Spinal needle - Quincke tip    Needle length (in):  3.5    Ultrasound guidance: no      Number of attempts:  5 or more (L5-S1 and L4-5)  Post-procedure:     Puncture site:  Adhesive bandage applied    Patient tolerance of procedure:  Tolerated well, no immediate complications  Comments:      Unsuccessful attempt.

## 2024-04-16 NOTE — PROCEDURES
VIDEO ELECTROENCEPHALOGRAM REPORT      Referring provider: Dr. Velarde    DOS: 04/16/24 (total recording of 27 minutes).     INDICATION:  Ricky Junior 89 y.o. female presenting with AMS    CURRENT ANTIEPILEPTIC REGIMEN: none     TECHNIQUE: 30 channel video electroencephalogram (EEG) was performed in accordance with the international 10-20 system. The study was reviewed in bipolar and referential montages. The recording examined the patient during   awake and drowsy states    DESCRIPTION OF THE RECORD:  During the wakefulness, the background showed a symmetrical 8-9 Hz alpha activity posteriorly with amplitude of 70 mV.  There was reactivity to eye closure/opening.  A normal anterior-posterior gradient was noted with faster beta frequencies seen anteriorly.  During drowsiness, increased theta/beta frequencies were seen.    ACTIVATION PROCEDURES:     hyperventilation was not performed    Intermittent Photic stimulation was performed in a stepwise fashion from 1 to 30 Hz and elicited no photic driving response.     ICTAL AND/OR INTERICTAL FINDINGS:   No focal or generalized epileptiform activity noted. No regional slowing was seen during this routine study.  No clinical events or seizures were reported or recorded during the study.     EKG: sampling of the EKG recording demonstrated sinus rhythm.     EVENTS: none     INTERPRETATION:    This is a normal video EEG recording in the awake and drowsy states.   Note: A normal EEG does not rule out epilepsy.      The study was limited by abundant myogenic and movement artifacts and maybe repeated if clinically indicated.      Jefry Babcock MD  Diplomate in Neurology&Epilepsy  Office: [phone]  Fax: [phone]

## 2024-04-16 NOTE — ASSESSMENT & PLAN NOTE
4/24/2024  Holding antipsychotics for now in face of hyponatremia  Psychiatry consulting but did not feel need of further medications as delusions seem to be clearing.

## 2024-04-16 NOTE — CONSULTS
"PSYCHIATRIC FOLLOW-UP: (established)  Reason for admission:   Confusion/AMS and paranoid delusions  Reason for consult: psychosis  Legal Hold Status:          N/A  Chart reviewed.          Interval hx:   The patient reports she is doing well this afternoon.  She reports she is feeling less worried than yesterday.  However, she still feels that her  has been possessed by Opal and therefore cannot be trusted.  She states she is thinking about getting a divorce from him because she cannot trust him.  When I asked specifically what he is done to make her feel this way she states that he has turned their Scientologist Gnosticism against her even though she has tried to be a good Rastafarian and gives a certain amount of her pension away as a tithe.  She states he has told her not to\" touch people\" or\" be near people\" in her Scientologist and she interprets this as a way of keeping her  from her Scientologist community.  She does feel she is a messenger from God and states that she owns\" every bank in the world.\"   She denies any fears regarding any of the staff or concerns about her medical treatment here.  Of note she is oriented to place and states she is here to\" get my mind right.\"  However she has very poor recollection as to what procedures were done today.  Attempted to discuss her lumbar puncture with her but she had no awareness that this had occurred today.  She also did not have any awareness regarding having an EEG done today.  She is unsure as to why these tests are being done but states that\" you are the doctor's so you know what you are doing.\"  I also did attempt to assess her insight into her diagnosis of bipolar disorder.  The patient does not feel she has bipolar disorder.  I asked her about the reason why she takes Depakote and Seroquel and she is unable to give me a reason.  She states her doctor gives them to her and that since this person is a good doctor they must know what they are doing.  She of " "note does not recall that she does take Seroquel.      Mood: ' ok right now\"  Sleep: denies problems with sleep  Appetite: appetite I\"ok\" , Of note patient was hungry at the conclusion of our interview and requested assistance with feeding  Energy:\"ok\"  Denies SI/HI  Denies A/V hallucinations, \"I'm not crazy\"  Continues to express delusions    Reviewed nursing notes and spoke with nursing staff. The patient is compliant with medication. The patient has not been aggressive or agitated. The patient has not required psychiatric PRN medications in the last 24 hours. Spoke with nursing staff who state that the patient has been not agitated or aggressive but does have paranoid delusions.  She has been compliant with care.       Medical ROS (as pertinent):     (+) back pain   Review of Systems   Respiratory:  Negative for shortness of breath.    Cardiovascular:  Negative for chest pain.   Gastrointestinal:  Negative for abdominal pain, nausea and vomiting.   Neurological:  Negative for tremors and headaches.     Psychiatric Examination:  Vitals:    04/16/24 0842   BP: (!) 172/80   Pulse: 66   Resp: 18   Temp: 36.7 °C (98.1 °F)   SpO2: 96%       General Appearance: Appears state age, appropriate grooming & hygiene, no acute distress  Behavior:               -Appropriate activity, appropriate eye contact, pleasant & cooperative              -No tremors noted, no tics, no dykinesias no dystonias              -No psychomotor agitation or retardation              -Gait not observed  Speech: Appropriate quantity, spontaneous. Regular rate and rhythm. Appropriate volume and intonation. Articulation is clear. Hyperverbal but not pressured  Language: English  Thought processes: tangential at times, needs redirection  Thought content: No evidence of SI/HI/AVH. Not observed responding to internal stimuli. [facility] based delusions observed.   Mood: \"ok\"  Affect: Congruent with stated mood. She appears happy and content  Judgement and " Insight: Limited/limited due to confusion  Cognition:               -is oriented to self, month, year, and place but not situation   -recent memory is poor    New PAST MEDICAL/PSYCH/FAMILY/SOCIAL(as reported by patient):                none                            EK/15/24 NSR     Brain Imaging: pending (MRI brain w/ contrast)    CT head W/o contrast 24  FINDINGS:  No intracranial mass, mass effect, midline shift, hydrocephalus, hemorrhage or CT apparent acute infarct.     Moderate atrophy.     Dystrophic basal ganglia calcifications.     Calvarium is intact.     Paranasal sinuses in the field of view are unremarkable.     Mastoids in the field of view are unremarkable.        IMPRESSION:     1.  No acute intracranial abnormality detected.     EEG:   DESCRIPTION OF THE RECORD:  During the wakefulness, the background showed a symmetrical 8-9  Hz alpha activity posteriorly with amplitude of 70 mV. There was  reactivity to eye closure/opening. A normal anterior-posterior  gradient was noted with faster beta frequencies seen anteriorly.  During drowsiness, increased theta/beta frequencies were seen.    ACTIVATION PROCEDURES:    hyperventilation was not performed    Intermittent Photic stimulation was performed in a stepwise  fashion from 1 to 30 Hz and elicited no photic driving response.    ICTAL AND/OR INTERICTAL FINDINGS:  No focal or generalized epileptiform activity noted. No regional  slowing was seen during this routine study. No clinical events  or seizures were reported or recorded during the study.    EKG: sampling of the EKG recording demonstrated sinus rhythm.    EVENTS: none    INTERPRETATION:    This is a normal video EEG recording in the awake and drowsy  states.  Note: A normal EEG does not rule out epilepsy.    The study was limited by abundant myogenic and movement artifacts  and maybe repeated if clinically indicated.       Labs personally reviewed:   Lab Results   Component Value  Date/Time    SODIUM 124 (L) 04/16/2024 08:00 AM    POTASSIUM 4.4 04/16/2024 08:00 AM    CHLORIDE 94 (L) 04/16/2024 08:00 AM    CO2 20 04/16/2024 08:00 AM    GLUCOSE 96 04/16/2024 08:00 AM    BUN 15 04/16/2024 08:00 AM    CREATININE 0.60 04/16/2024 08:00 AM    CREATININE 1.9 (H) 10/27/2008 05:48 AM    BUNCREATRAT 14 10/23/2014 12:01 PM       B12 (4/14) 287  UA (4/12) trace esterase  4/14 VPA-23.3  4/13 ammonia-19  TSH (4/12)-1.57     Assessment:  89 year old female with past psychiatric history of bipolar disorder who presented with acute confusion on 4/12/24. Psychiatry was consulted for evaluation of possible delusions and medication management.     The patient has been pleasant and largely cooperative with treatment and workup.  Psychosis is not currently causing agitation and is not impeding provision of care.  Hyponatremia still noted which can be contributing to altered mental status.  MRI of the brain is still pending.  She was recently started on IM B12 for treatment of low B12 levels which may be contributing to altered mental status.  Of note EEG did not show any slowing suggestive of encephalopathy or any epileptiform activity.  It is possible that this could be patient's cognitive baseline but will await results of MRI, LP completed today, and resolution of hyponatremia to better make this determination.    Of note the patient still has very poor understanding and recall of the treatments and reasons for continued hospitalization so it is still this physician's opinion that she does not have the ability to sign out AGAINST MEDICAL ADVICE     Dx:  Hx of Bipolar disorder -currently psychotic  Delirium   R/o underlying neurocognitive disorder     Medical :  Per primary team        Plan:  Legal hold: N/A due to delirium  Patient does NOT meet criteria for capacity to sign-out AMA due to her altered level of consciousness  Psychotropic medications  Continue Seroquel 200mg at bedtime,   May use Seroquel 25mg  BID PRN for severe agitation  Hold home Depakote until hyponatremia is corrected, this can contribute to hyponatremia  Labs ordered/reviewed:   EKG ordered/reviewed  Discussed the case with: Dr. Miranda and Dr. Velarde  Psychiatry will follow up     Thank you for the consult.

## 2024-04-17 ENCOUNTER — APPOINTMENT (OUTPATIENT)
Dept: RADIOLOGY | Facility: MEDICAL CENTER | Age: 89
DRG: 056 | End: 2024-04-17
Attending: INTERNAL MEDICINE
Payer: MEDICARE

## 2024-04-17 PROBLEM — A52.3 NEUROSYPHILIS: Status: ACTIVE | Noted: 2024-04-17

## 2024-04-17 LAB
ANION GAP SERPL CALC-SCNC: 12 MMOL/L (ref 7–16)
ANION GAP SERPL CALC-SCNC: 13 MMOL/L (ref 7–16)
BUN SERPL-MCNC: 15 MG/DL (ref 8–22)
BUN SERPL-MCNC: 15 MG/DL (ref 8–22)
CALCIUM SERPL-MCNC: 8.6 MG/DL (ref 8.5–10.5)
CALCIUM SERPL-MCNC: 8.6 MG/DL (ref 8.5–10.5)
CHLORIDE SERPL-SCNC: 85 MMOL/L (ref 96–112)
CHLORIDE SERPL-SCNC: 86 MMOL/L (ref 96–112)
CO2 SERPL-SCNC: 18 MMOL/L (ref 20–33)
CO2 SERPL-SCNC: 19 MMOL/L (ref 20–33)
CREAT SERPL-MCNC: 0.67 MG/DL (ref 0.5–1.4)
CREAT SERPL-MCNC: 0.69 MG/DL (ref 0.5–1.4)
GFR SERPLBLD CREATININE-BSD FMLA CKD-EPI: 81 ML/MIN/1.73 M 2
GFR SERPLBLD CREATININE-BSD FMLA CKD-EPI: 82 ML/MIN/1.73 M 2
GLUCOSE SERPL-MCNC: 136 MG/DL (ref 65–99)
GLUCOSE SERPL-MCNC: 99 MG/DL (ref 65–99)
HIV 1+2 AB+HIV1 P24 AG SERPL QL IA: NORMAL
OSMOLALITY SERPL: 256 MOSM/KG H2O (ref 278–298)
POTASSIUM SERPL-SCNC: 4.7 MMOL/L (ref 3.6–5.5)
POTASSIUM SERPL-SCNC: 5 MMOL/L (ref 3.6–5.5)
SODIUM SERPL-SCNC: 116 MMOL/L (ref 135–145)
SODIUM SERPL-SCNC: 116 MMOL/L (ref 135–145)
SODIUM SERPL-SCNC: 117 MMOL/L (ref 135–145)
T PALLIDUM AB SER QL AGGL: REACTIVE

## 2024-04-17 PROCEDURE — 770000 HCHG ROOM/CARE - INTERMEDIATE ICU *

## 2024-04-17 PROCEDURE — 700111 HCHG RX REV CODE 636 W/ 250 OVERRIDE (IP): Performed by: INTERNAL MEDICINE

## 2024-04-17 PROCEDURE — A9270 NON-COVERED ITEM OR SERVICE: HCPCS | Performed by: EMERGENCY MEDICINE

## 2024-04-17 PROCEDURE — A9270 NON-COVERED ITEM OR SERVICE: HCPCS | Performed by: INTERNAL MEDICINE

## 2024-04-17 PROCEDURE — 700111 HCHG RX REV CODE 636 W/ 250 OVERRIDE (IP): Performed by: HOSPITALIST

## 2024-04-17 PROCEDURE — 83930 ASSAY OF BLOOD OSMOLALITY: CPT

## 2024-04-17 PROCEDURE — 700105 HCHG RX REV CODE 258: Performed by: HOSPITALIST

## 2024-04-17 PROCEDURE — 99231 SBSQ HOSP IP/OBS SF/LOW 25: CPT | Performed by: STUDENT IN AN ORGANIZED HEALTH CARE EDUCATION/TRAINING PROGRAM

## 2024-04-17 PROCEDURE — 99223 1ST HOSP IP/OBS HIGH 75: CPT | Performed by: INTERNAL MEDICINE

## 2024-04-17 PROCEDURE — 700102 HCHG RX REV CODE 250 W/ 637 OVERRIDE(OP): Performed by: EMERGENCY MEDICINE

## 2024-04-17 PROCEDURE — 80048 BASIC METABOLIC PNL TOTAL CA: CPT

## 2024-04-17 PROCEDURE — 700102 HCHG RX REV CODE 250 W/ 637 OVERRIDE(OP): Performed by: INTERNAL MEDICINE

## 2024-04-17 PROCEDURE — 84295 ASSAY OF SERUM SODIUM: CPT

## 2024-04-17 PROCEDURE — 99233 SBSQ HOSP IP/OBS HIGH 50: CPT | Performed by: HOSPITALIST

## 2024-04-17 PROCEDURE — 97163 PT EVAL HIGH COMPLEX 45 MIN: CPT

## 2024-04-17 PROCEDURE — 700105 HCHG RX REV CODE 258: Performed by: INTERNAL MEDICINE

## 2024-04-17 PROCEDURE — G0475 HIV COMBINATION ASSAY: HCPCS

## 2024-04-17 PROCEDURE — 97167 OT EVAL HIGH COMPLEX 60 MIN: CPT

## 2024-04-17 RX ORDER — 3% SODIUM CHLORIDE 3 G/100ML
100 INJECTION, SOLUTION INTRAVENOUS ONCE
Status: COMPLETED | OUTPATIENT
Start: 2024-04-17 | End: 2024-04-17

## 2024-04-17 RX ORDER — HYDRALAZINE HYDROCHLORIDE 20 MG/ML
10 INJECTION INTRAMUSCULAR; INTRAVENOUS EVERY 4 HOURS PRN
Status: DISCONTINUED | OUTPATIENT
Start: 2024-04-17 | End: 2024-04-25 | Stop reason: HOSPADM

## 2024-04-17 RX ORDER — LORAZEPAM 2 MG/ML
0.5 INJECTION INTRAMUSCULAR 2 TIMES DAILY PRN
Status: DISCONTINUED | OUTPATIENT
Start: 2024-04-17 | End: 2024-04-17

## 2024-04-17 RX ORDER — LORAZEPAM 2 MG/ML
0.5 INJECTION INTRAMUSCULAR
Status: DISCONTINUED | OUTPATIENT
Start: 2024-04-17 | End: 2024-04-20

## 2024-04-17 RX ORDER — QUETIAPINE FUMARATE 25 MG/1
25 TABLET, FILM COATED ORAL EVERY 6 HOURS PRN
Status: DISCONTINUED | OUTPATIENT
Start: 2024-04-17 | End: 2024-04-17

## 2024-04-17 RX ORDER — QUETIAPINE FUMARATE 25 MG/1
25 TABLET, FILM COATED ORAL 2 TIMES DAILY PRN
Status: DISCONTINUED | OUTPATIENT
Start: 2024-04-17 | End: 2024-04-17

## 2024-04-17 RX ADMIN — SODIUM CHLORIDE 18 MILLION UNITS: 9 INJECTION, SOLUTION INTRAVENOUS at 10:41

## 2024-04-17 RX ADMIN — CARVEDILOL 12.5 MG: 12.5 TABLET, FILM COATED ORAL at 08:41

## 2024-04-17 RX ADMIN — HYDRALAZINE HYDROCHLORIDE 10 MG: 20 INJECTION, SOLUTION INTRAMUSCULAR; INTRAVENOUS at 19:06

## 2024-04-17 RX ADMIN — CYANOCOBALAMIN 1000 MCG: 1000 INJECTION, SOLUTION INTRAMUSCULAR; SUBCUTANEOUS at 17:11

## 2024-04-17 RX ADMIN — LOSARTAN POTASSIUM 100 MG: 50 TABLET, FILM COATED ORAL at 08:41

## 2024-04-17 RX ADMIN — HYDROCODONE BITARTRATE AND ACETAMINOPHEN 1 TABLET: 5; 325 TABLET ORAL at 12:36

## 2024-04-17 RX ADMIN — CARVEDILOL 12.5 MG: 12.5 TABLET, FILM COATED ORAL at 17:11

## 2024-04-17 RX ADMIN — SODIUM CHLORIDE 100 ML: 3 INJECTION, SOLUTION INTRAVENOUS at 18:30

## 2024-04-17 RX ADMIN — LEVOTHYROXINE SODIUM 175 MCG: 0.17 TABLET ORAL at 06:20

## 2024-04-17 RX ADMIN — SODIUM CHLORIDE: 9 INJECTION, SOLUTION INTRAVENOUS at 06:20

## 2024-04-17 RX ADMIN — HYDROCODONE BITARTRATE AND ACETAMINOPHEN 1 TABLET: 5; 325 TABLET ORAL at 01:47

## 2024-04-17 ASSESSMENT — COGNITIVE AND FUNCTIONAL STATUS - GENERAL
CLIMB 3 TO 5 STEPS WITH RAILING: TOTAL
TURNING FROM BACK TO SIDE WHILE IN FLAT BAD: A LITTLE
DRESSING REGULAR LOWER BODY CLOTHING: A LOT
STANDING UP FROM CHAIR USING ARMS: A LITTLE
TOILETING: A LOT
MOBILITY SCORE: 15
DRESSING REGULAR UPPER BODY CLOTHING: A LOT
MOVING FROM LYING ON BACK TO SITTING ON SIDE OF FLAT BED: A LITTLE
WALKING IN HOSPITAL ROOM: A LOT
PERSONAL GROOMING: A LOT
MOVING TO AND FROM BED TO CHAIR: A LITTLE
SUGGESTED CMS G CODE MODIFIER MOBILITY: CK
HELP NEEDED FOR BATHING: A LOT

## 2024-04-17 ASSESSMENT — PAIN DESCRIPTION - PAIN TYPE
TYPE: ACUTE PAIN;CHRONIC PAIN
TYPE: ACUTE PAIN
TYPE: CHRONIC PAIN
TYPE: ACUTE PAIN

## 2024-04-17 ASSESSMENT — GAIT ASSESSMENTS
DISTANCE (FEET): 1
GAIT LEVEL OF ASSIST: MODERATE ASSIST
ASSISTIVE DEVICE: FRONT WHEEL WALKER

## 2024-04-17 NOTE — CARE PLAN
The patient is Stable - Low risk of patient condition declining or worsening    Shift Goals  Clinical Goals: pain control  Patient Goals: rest  Family Goals: LAWRENCE    Progress made toward(s) clinical / shift goals:  patient resting in bed. Delusional. IVF. Large BM overnight    Patient is not progressing towards the following goals:

## 2024-04-17 NOTE — ASSESSMENT & PLAN NOTE
4/24/2024  New diagnosis by cerebrospinal fluid  4/20 Alert  that he should be check for syphilis and treated if positive  Infectious disease consulted  High-dose IV penicillin G continuous infusion  HIV negative

## 2024-04-17 NOTE — THERAPY
Physical Therapy   Initial Evaluation     Patient Name: Ricky Junior  Age:  89 y.o., Sex:  female  Medical Record #: [id number]  Today's Date: 4/17/2024     Precautions  Precautions: Fall Risk    Assessment  Pt presents with impaired cognition, balance, and strength associated with being sent from lab to ED for AMS in setting of bipolar type I and hypothyroidism, prior inpatient psych as well as themes of God and Devil hallucinations; per  has been acting differently for 3 weeks prior with difficulty speaking hospital course c/b continued delusions of grandeur and suspicion that her  is possessed, LP consistent with neurosyphilis on 2 week course of antibiotics, low sodium as well currently 117; pt very pleasant and cooperative; easily redirectable to care, forward thinking in regards to 'consult psych/everybody, I want to know what's wrong with my brain', continues prior documented themes throughout but follows functional commands; she was moderate assist for mobility with FWW (reports her 4ww is from the Devil/her  and does not like); given she was ambulatory in ED and walked in, hopeful most is related to her current medical issues and will improve quickly however at her age debility will occur quickly; will follow to assist     Plan    Physical Therapy Initial Treatment Plan   Treatment Plan : Equipment, Bed Mobility, Family / Caregiver Training, Gait Training, Manual Therapy, Neuro Re-Education / Balance, Self Care / Home Evaluation, Stair Training, Therapeutic Activities, Therapeutic Exercise  Treatment Frequency: 3 Times per Week  Duration: Until Therapy Goals Met    DC Equipment Recommendations: Unable to determine at this time  Discharge Recommendations: Other - defer to psych/antibiotic plan of care; currently would benefit from PT in next location and is a moderate assist for mobility;        Abridged Subjective/Objective     04/17/24 1115   Prior Living Situation   Prior Services  None   Housing / Facility 1 Story House   Equipment Owned 4-Wheel Walker   Lives with - Patient's Self Care Capacity Spouse   Comments did not answer whether she has steps keeps referring to not having the door keys to get in anyway; frequent tangential reports of being the 'world's prophet' and suspcious of her ' repetitively stating she hurt her back in the 80s and has been taking oxy for pain control; does apepar to be forward thinkings, keeps asking for her PCP and to refer her to 'all the specialist to figure out my brain' appears to know something is different but difficult to tell logic beteween conflabulation and paranoia but only surrounding her ;   Prior Level of Functional Mobility   Bed Mobility Unable To Determine At This Time   Comments does not answer question, reports the 4ww at bedside is from her  who got it from the devi   Cognition    Cognition / Consciousness X   Orientation Level Not Oriented to Reason   Level of Consciousness Alert   Ability To Follow Commands 1 Step   Safety Awareness Impaired   New Learning Impaired   Attention Impaired   Initiation Impaired   Comments pleasant and cooperative and very apprecitative of care however very difficult to direct to itemized testing following all function based commands   Strength Lower Body   Lower Body Strength  X   Gross Strength Generalized Weakness, Equal Bilaterally   Sensation Lower Body   Lower Extremity Sensation   WDL   Balance Assessment   Sitting Balance (Static) Fair   Sitting Balance (Dynamic) Fair   Standing Balance (Static) Poor +   Standing Balance (Dynamic) Poor   Weight Shift Sitting Poor   Weight Shift Standing Poor   Comments B UE support in sitting/standing; internally distracted making upright weight shifting more difficult however appear to hsave intact strength;   Bed Mobility    Supine to Sit Moderate Assist   Sit to Supine   (NT, placed in chair to allow bed to air, quiet soiled and wet/left stripped  to not crawl back in)   Gait Analysis   Gait Level Of Assist Moderate Assist   Assistive Device Front Wheel Walker   Distance (Feet) 1   Comments able to faciltiate two steps fowrad for transfer but requires step by step cues to complete due to internal distraction   Functional Mobility   Sit to Stand Moderate Assist   Bed, Chair, Wheelchair Transfer Moderate Assist   Transfer Method Stand Step   Patient / Family Goals    Patient / Family Goal #1 to get 'her head right'   Short Term Goals    Short Term Goal # 1 Pt will perform supine<>sit from flat HOB/no railing with supervision within 6 visits to ensure independent mobiltiy at home.   Short Term Goal # 2 Pt will perform sit<>stand with FWW and supervision within 6 visits to progress to independence.   Short Term Goal # 3 Pt will ambulate x 50ft with FWW and supervision within 6 visits to progress to independence.   Education Group   Role of Physical Therapist Patient Response Patient;Acceptance;Explanation;Demonstration;Verbal Demonstration;Action Demonstration;Reinforcement Needed   Additional Comments role of acute PT, need for mobility;

## 2024-04-17 NOTE — CONSULTS
"PSYCHIATRIC FOLLOW-UP: (established)  Reason for admission:   Confusion/AMS and paranoid delusions  Reason for consult: psychosis  Legal Hold Status:          N/A  Chart reviewed.          Interval hx:   Patient evaluated at bedside with attending physician present. Patient continues to demonstrate spontaneous grandiose and Oriental orthodox delusions. She is hyperverbal but interruptable, distractible, and tangential today. No evidence of general suspicion but continues to endorse paranoid delusions regarding . She follows commands and is agreeable to complete MMSE today.    Denies SI/HI  Denies A/V hallucinations  Continues to express delusions    Per nursing patient remains confused with impaired memory; verbally aggressive last night requiring PRN Haldol 5mg.       Medical ROS (as pertinent):     Unable to complete due to patient's disorganization.     Psychiatric Examination:  Vitals:    04/17/24 0825   BP: 101/76   Pulse: 85   Resp: 18   Temp: 36.4 °C (97.5 °F)   SpO2: 98%       General Appearance: Appears state age, appropriate grooming & hygiene, no acute distress  Behavior:               -Appropriate activity, appropriate eye contact, pleasant & cooperative              -No tremors noted, no tics, no dykinesias no dystonias              -No psychomotor agitation or retardation              -Gait not observed  Speech: Appropriate quantity, spontaneous. Regular rate and rhythm. Appropriate volume and intonation. Articulation is clear. Hyperverbal but not pressured  Language: English  Thought processes: tangential, needs frequent redirection  Thought content: No evidence of SI/HI/AVH. Not observed responding to internal stimuli. Grandiose and Oriental orthodox based delusions observed.   Mood: \"I'm okay right now\"  Affect: Congruent with stated mood. She appears happy and content  Judgement and Insight: Limited/limited due to confusion  Cognition:               -is oriented to self, month, year, and place but not " situation   -recent memory is poor    New PAST MEDICAL/PSYCH/FAMILY/SOCIAL(as reported by patient):                none                            EK/15/24 NSR     Brain Imaging: pending (MRI brain w/ contrast)    CT head W/o contrast 24  FINDINGS:  No intracranial mass, mass effect, midline shift, hydrocephalus, hemorrhage or CT apparent acute infarct.     Moderate atrophy.     Dystrophic basal ganglia calcifications.     Calvarium is intact.     Paranasal sinuses in the field of view are unremarkable.     Mastoids in the field of view are unremarkable.        IMPRESSION:     1.  No acute intracranial abnormality detected.     EEG:   DESCRIPTION OF THE RECORD:  During the wakefulness, the background showed a symmetrical 8-9  Hz alpha activity posteriorly with amplitude of 70 mV. There was  reactivity to eye closure/opening. A normal anterior-posterior  gradient was noted with faster beta frequencies seen anteriorly.  During drowsiness, increased theta/beta frequencies were seen.    ACTIVATION PROCEDURES:    hyperventilation was not performed    Intermittent Photic stimulation was performed in a stepwise  fashion from 1 to 30 Hz and elicited no photic driving response.    ICTAL AND/OR INTERICTAL FINDINGS:  No focal or generalized epileptiform activity noted. No regional  slowing was seen during this routine study. No clinical events  or seizures were reported or recorded during the study.    EKG: sampling of the EKG recording demonstrated sinus rhythm.    EVENTS: none    INTERPRETATION:    This is a normal video EEG recording in the awake and drowsy  states.  Note: A normal EEG does not rule out epilepsy.    The study was limited by abundant myogenic and movement artifacts  and maybe repeated if clinically indicated.       Labs personally reviewed:   Lab Results   Component Value Date/Time    SODIUM 117 (LL) 2024 07:50 AM    POTASSIUM 4.7 2024 07:50 AM    CHLORIDE 86 (L) 2024  07:50 AM    CO2 19 (L) 04/17/2024 07:50 AM    GLUCOSE 99 04/17/2024 07:50 AM    BUN 15 04/17/2024 07:50 AM    CREATININE 0.67 04/17/2024 07:50 AM    CREATININE 1.9 (H) 10/27/2008 05:48 AM    BUNCREATRAT 14 10/23/2014 12:01 PM       B12 (4/14) 287  UA (4/12) trace esterase  4/14 VPA-23.3  4/13 ammonia-19  TSH (4/12)-1.57     Assessment:  89 year old female with past psychiatric history of bipolar disorder who presented with acute confusion on 4/12/24. Psychiatry was consulted for evaluation of possible delusions and medication management.     The patient has been pleasant and largely cooperative with treatment and workup.  Blood work concerning for syphilis and infectious disease evaluated the patient and felt that there were concerns for neurosyphilis which may be contributing to the patient's presentation, ID on board for management.  Additional confounding factors for altered mental status include ongoing hyponatremia.  MRI of the brain is still pending.  She was recently started on IM B12 for treatment of low B12 levels which may be contributing to altered mental status.     Of note the patient still has very poor understanding and recall of the treatments and reasons for continued hospitalization so it is still this physician's opinion that she does not have the ability to sign out AGAINST MEDICAL ADVICE     Dx:  Neurosyphilis  Hx of Bipolar disorder (rule out psychosis secondary to medical condition)  Delirium   --R/o underlying neurocognitive disorder     Medical :  Per primary team        Plan:  Legal hold: N/A due to delirium  Patient does NOT meet criteria for capacity to sign-out AMA due to her altered level of consciousness  Psychotropic medications  Continue Seroquel 200mg at bedtime, may be able to decrease this medication if psychosis does improve on antibiotics  May use Seroquel 25mg BID PRN for severe agitation; would avoid the use of first generation antipsychotics such as Haldol as they can worsen  hyponatremia  Hold home Depakote until hyponatremia is corrected, this can contribute to hyponatremia  Labs reviewed  EKG reviewed  Discussed the case with: Dr. Miranda and Dr. Velarde  Psychiatry will follow up     Thank you for the consult.

## 2024-04-17 NOTE — PROGRESS NOTES
Hospital Medicine Daily Progress Note    Date of Service  4/17/2024    Chief Complaint  Ricky Junior is a 89 y.o. female admitted 4/12/2024 with altered mental status    Hospital Course  Mrs. Junior is a 89 y.o. 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 She saw her primary doctor prior to coming to ED. She was alert and oriented x3, however she has some delusions. She was kept in ED observation due to delusion. Then Na dropped to 122. We got called for hyponatremia  She has same delusions today on my examination 4/15/2024 that her  was obsessed from devil. 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 She makes $ 8000 a month. She has hided money at her home from her  who makes more then her, but he is spending them on gambling.   She was denies pain, fever. Diarrhea. Psychiatry following and adjusting meds   Noted B12- on low. Started b12 injections. Syphilis is reactive.   Started on some IVF for hyponatremia. She is slight hypovolemic.   Depakote was also hold because of hyponatremia     Interval Problem Update  4/16: Mrs. Junior was evaluated on the medical floor. Bedside lumbar puncture was unsuccessful yesterday. Coags ordered for this morning and radiology consulted for lumbar puncture and results are negative with exception of a mildly elevated protein her sodium is low at 124.  4/17: Mrs. Junior was seen on the medical floor. Her EEG was normal. 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 21 nucleated cells on CSF with elevated protein of 92.  Infectious disease consulted she has been started on high-dose penicillin G for neurosyphilis.  Her sodium level came back severely low at 117 and this will be repeated    I have discussed this patient's plan of care and discharge plan at IDT rounds today with Case Management, Nursing, Nursing  leadership, and other members of the IDT team.    Consultants/Specialty  Psychiatry I discussed with Dr. Newby in person  Infectious disease I spoke with Dr. Sanchez    Code Status  Full Code    Disposition  The patient is not medically cleared for discharge to home or a post-acute facility.      I have placed the appropriate orders for post-discharge needs.    Review of Systems  Review of Systems   Unable to perform ROS: Mental status change        Physical Exam  Temp:  [36.1 °C (97 °F)-36.9 °C (98.5 °F)] 36.4 °C (97.5 °F)  Pulse:  [66-91] 85  Resp:  [18] 18  BP: (101-175)/() 101/76  SpO2:  [96 %-100 %] 100 %    Physical Exam  Vitals and nursing note reviewed.   Constitutional:       Appearance: She is ill-appearing.      Comments: Elderly and thin   Cardiovascular:      Rate and Rhythm: Normal rate and regular rhythm.   Pulmonary:      Effort: Pulmonary effort is normal.      Breath sounds: Normal breath sounds.   Abdominal:      General: There is no distension.      Tenderness: There is no abdominal tenderness.   Musculoskeletal:      Cervical back: Neck supple.      Right lower leg: No edema.   Neurological:      Mental Status: She is alert.      Comments: Generally oriented   She moves her extremities equally   Psychiatric:      Comments: She is yelling out and singing and appears to be quite manic         Fluids    Intake/Output Summary (Last 24 hours) at 4/17/2024 0828  Last data filed at 4/17/2024 0620  Gross per 24 hour   Intake 1010 ml   Output --   Net 1010 ml       Laboratory      Recent Labs     04/15/24  0650 04/15/24  2017 04/16/24  0800   SODIUM 122* 123* 124*   POTASSIUM 4.3 4.1 4.4   CHLORIDE 92* 91* 94*   CO2 22 20 20   GLUCOSE 91 134* 96   BUN 19 15 15   CREATININE 0.75 0.66 0.60   CALCIUM 8.4* 8.2* 8.6     Recent Labs     04/16/24  1006   APTT 30.5   INR 1.12               Imaging  DX-LUMBAR PUNCTURE FOR DIAGNOSIS   Final Result      Fluoroscopic-guided lumbar puncture as  described above.      CT-HEAD W/O   Final Result      1.  No acute intracranial abnormality detected.         DX-CHEST-PORTABLE (1 VIEW)   Final Result      No acute cardiac or pulmonary abnormalities are identified.      MR-BRAIN-W/O    (Results Pending)        Assessment/Plan  * Psychotic disorder (HCC)- (present on admission)  Assessment & Plan  With delusions and darin  Psychiatry consulted  TSH CT head UA U tox wnl. No clear metabolic etiologies.   Continue Seroquel  Hold on mood stabilizers that may affect sodium such as Depakote for now  MRI brain pending  EEG is normal  This may be compounded by hyponatremia which is being addressed  B12 on the low side.  Start B12 injections      Neurosyphilis- (present on admission)  Assessment & Plan  New diagnosis by cerebrospinal fluid  Infectious disease consulted  High-dose IV penicillin G continuous infusion  HIV negative    Hyponatremia- (present on admission)  Assessment & Plan  Significant hyponatremia that went down to 122.  Decreased normal saline to 75 mL/h and fluid restricted 1.8 L   Sodium has come back 117 which does not make sense as she has not been drinking excess water according to her nurse and has no access to it.  This value will be repeated.  If it indeed remains is low she may transfer to the IM and require a bolus of 3% saline   Every 6 hour sodium levels ordered  basic metabolic panel ordered for the morning      Vitamin B12 deficiency- (present on admission)  Assessment & Plan  Replace  Continue to follow-up with primary care  Monitor for improvement on her mentation/delusional    Bipolar depression (HCC)- (present on admission)  Assessment & Plan  Per chart review she has had bipolar. But I don't see any delusional reports from her PCP   She is on seroquel and depakote  Holding depakote  because of hyponatremia  Psychiatry has been consulted  Seroquel 200 mg daily will be continued    Idiopathic hypothyroidism- (present on  admission)  Assessment & Plan  Continue synthroid    Hypertension- (present on admission)  Assessment & Plan  Hold losartan and torsemide for hypertension because of hyponatremia that worsened  Continue carvedilol  Hydralazine as needed for now  Continue to resume losartan         VTE prophylaxis: VTE Selection    I have performed a physical exam and reviewed and updated ROS and Plan today (4/17/2024). In review of yesterday's note (4/16/2024), there are no changes except as documented above.

## 2024-04-17 NOTE — CARE PLAN
The patient is Stable - Low risk of patient condition declining or worsening    Shift Goals  Clinical Goals: pain control  Patient Goals: rest  Family Goals: gabriel    Progress made toward(s) clinical / shift goals:  patient medicated for pain per MAR    Patient is not progressing towards the following goals:

## 2024-04-17 NOTE — CARE PLAN
The patient is Stable - Low risk of patient condition declining or worsening    Shift Goals  Clinical Goals: Patient will be reoriented as needed and free from falls this shift  Patient Goals: to leave the hospital to get her purse and come back  Family Goals: LAWRENCE    Progress made toward(s) clinical / shift goals:      Problem: Fall Risk  Goal: Patient will remain free from falls this shift  Outcome: Progressing  Free from falls     Problem: Skin Integrity  Goal: Skin integrity is maintained or improved this shift  Outcome: Progressing  Stood patient this shift, skin looked intact.        Patient is not progressing towards the following goals:    Patient told she can't leave the hospital to get her purse but a family member cam bring it if she needed it.

## 2024-04-17 NOTE — THERAPY
Occupational Therapy   Initial Evaluation     Patient Name: Ricky Junior  Age:  89 y.o., Sex:  female  Medical Record #: [id number]  Today's Date: 4/17/2024          Assessment  Patient is 89 y.o. female with a diagnosis of ALOC.   Pt currently limited by decreased functional mobility, activity tolerance, cognition, sensation, strength, AROM, coordination, balance, and pain which are affecting pt's ability to complete ADLs/IADLs at baseline. Pt would benefit from OT services in the acute care setting to maximize functional recovery.      Plan    Occupational Therapy Initial Treatment Plan   Treatment Interventions: (P) Self Care / Activities of Daily Living, Therapeutic Activity  Treatment Frequency: (P) 3 Times per Week  Duration: (P) Until Therapy Goals Met       Discharge Recommendations: (P) Recommend post-acute placement for additional occupational therapy services prior to discharge home        04/17/24 1044   Prior Living Situation   Prior Services None   Housing / Facility 1 Story House  (states she does not have a key to be able to get back into the house.)   Equipment Owned 4-Wheel Walker   Lives with - Patient's Self Care Capacity Spouse  (states that her  is the devil)   Prior Level of ADL Function   Self Feeding Unable To Determine At This Time   Dressing Unable To Determine At This Time   Cognition    Level of Consciousness Alert  (confused/delirious)   ADL Assessment   Grooming Moderate Assist   Upper Body Dressing Maximal Assist   Lower Body Dressing Maximal Assist   Toileting Maximal Assist   Functional Mobility   Sit to Stand Moderate Assist   Bed, Chair, Wheelchair Transfer Moderate Assist   Short Term Goals   Short Term Goal # 1 supervised with UB dressing   Short Term Goal # 2 min A with LB dressing   Short Term Goal # 3 min A with ADL txfs   Education Group   Education Provided Role of Occupational Therapist   Role of Occupational Therapist Patient Response  Patient;Acceptance;Explanation;Reinforcement Needed   Problem List   Problem List Decreased Active Daily Living Skills;Decreased Functional Mobility;Decreased Activity Tolerance;Impaired Postural Control / Balance;Impaired Cognitive Function

## 2024-04-17 NOTE — PROGRESS NOTES
Notified Bulmaro Velarde about bowel movement being mucousy with red streaks    Will continue to monitor

## 2024-04-17 NOTE — CONSULTS
INFECTIOUS DISEASES INPATIENT CONSULT NOTE     Date of Service: 4/17/2024    Consult Requested By: Bulmaro Velarde M.D.    Reason for Consultation: Neurosyphilis    History of Present Illness:   Ricky Junior is a 89 y.o. 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  Extensive review of emergency physician notes, hospital medicine notes and consultant notes performed.  Reliable history could not be obtained as patient with confusion and delusions.  She has been having some delusions recently.  On presentation, her sodium was 122.  CT head was negative.  RPR was nonreactive however her treponemal qualitative syphilis and MHH-TP were reactive.  Given concerns for neurosyphilis in the setting of confusion, she underwent a lumbar puncture on 4/16 which revealed 21 WBCs, PMN predominance, normal glucose and elevated protein.  This is highly concerning for neurosyphilis.      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    treated with 3 weekly injections at [facility]-records in media    Severe concentric left ventricular hypertrophy        PSH:  Past Surgical History:   Procedure Laterality Date    COLONOSCOPY  2007    NV EXCIS UTERINE FIBROID,VAG APPRCH         FAMILY HX:  Family History   Problem Relation Age of Onset    Hypertension Sister     Hypertension Brother     Other Neg Hx        SOCIAL HX:  Social History     Socioeconomic History    Marital status:      Spouse  name: Not on file    Number of children: Not on file    Years of education: Not on file    Highest education level: Not on file   Occupational History    Not on file   Tobacco Use    Smoking status: Never    Smokeless tobacco: Never   Vaping Use    Vaping Use: Never used   Substance and Sexual Activity    Alcohol use: No     Alcohol/week: 0.0 oz    Drug use: No    Sexual activity: Not Currently   Other Topics Concern    Not on file   Social History Narrative    Not on file     Social Determinants of Health     Financial Resource Strain: Not on file   Food Insecurity: Not on file   Transportation Needs: Not on file   Physical Activity: Not on file   Stress: Not on file   Social Connections: Not on file   Intimate Partner Violence: Not on file   Housing Stability: Not on file     Social History     Tobacco Use   Smoking Status Never   Smokeless Tobacco Never     Social History     Substance and Sexual Activity   Alcohol Use No    Alcohol/week: 0.0 oz       Allergies/Intolerances:  No Known Allergies        Other Current Medications:    Current Facility-Administered Medications:     haloperidol lactate (Haldol) injection 1 mg, 1 mg, Intravenous, Q4HRS PRN, Jesus Mejias, A.P.R.NMaricruz, 1 mg at 04/16/24 2311    NS infusion, , Intravenous, Continuous, Bulmaro Velarde M.D., Last Rate: 75 mL/hr at 04/17/24 0620, New Bag at 04/17/24 0620    [Held by provider] enoxaparin (Lovenox) inj 30 mg, 30 mg, Subcutaneous, Q12HRS, Sandro Bautista M.D.    hydrALAZINE (Apresoline) injection 20 mg, 20 mg, Intravenous, Q6HRS PRN, Sandro Bautista M.D., 20 mg at 04/16/24 2122    cyanocobalamin (Vitamin B-12) injection 1,000 mcg, 1,000 mcg, Intramuscular, DAILY, Sandro Bautista M.D., 1,000 mcg at 04/16/24 1720    LORazepam (Ativan) injection 0.5 mg, 0.5 mg, Intravenous, Once PRN, Sandro Bautista M.D.    carvedilol (Coreg) tablet 12.5 mg, 12.5 mg, Oral, BID WITH MEALS, Kunal Holley M.D., 12.5 mg at 04/17/24 0841    losartan (Cozaar) tablet 100 mg, 100 mg,  "Oral, DAILY, Sandro Bautista M.D., 100 mg at 24 0841    polyethylene glycol/lytes (Miralax) Packet 1 Packet, 1 Packet, Oral, DAILY, Kunal Holley M.D.    QUEtiapine (SEROquel) tablet 200 mg, 200 mg, Oral, QHS, Karla Pete D.O., 200 mg at 24 2116    levothyroxine (Synthroid) tablet 175 mcg, 175 mcg, Oral, AM ES, Kunal Holley M.D., 175 mcg at 24 0620    HYDROcodone-acetaminophen (Norco) 5-325 MG per tablet 1 Tablet, 1 Tablet, Oral, Q6HRS PRN, Romel Coyle M.D., 1 Tablet at 24 0147    acetaminophen (Tylenol) tablet 650 mg, 650 mg, Oral, Q6HRS PRN, Romel Coyle M.D.  [unfilled]    Most Recent Vital Signs:  /76   Pulse 85   Temp 36.4 °C (97.5 °F) (Temporal)   Resp 18   Ht 1.702 m (5' 7\")   Wt 56.5 kg (124 lb 9 oz)   SpO2 98%   BMI 19.51 kg/m²   Temp  Av.6 °C (97.9 °F)  Min: 36.1 °C (96.9 °F)  Max: 37.2 °C (99 °F)    Physical Exam:  General: well nourished, no diaphoresis, well-appearing, no acute distress  HEENT: sclera anicteric, PERRL, extraocular muscles intact, mucous membranes moist, oropharynx clear and moist, no oral lesions or exudate, no evidence of argyll Garcia pupil  Neck: supple, no lymphadenopathy  Chest: CTAB, no rales, rhonchi or wheezes, normal work of breathing.  Cardiac: regular rate and rhythm, normal S1 S2, no murmurs, rubs or gallops  Abdomen: + bowel sounds, soft, non-tender, non-distended, no hepatosplenomegaly  Extremities: WWP, no edema, 2+ pedal pulses  Skin: warm and dry, no rashes or worrisome lesions  Neuro: Alert to name only, having delusions, tangential, non-focal exam, speech fluent, full range of motion to bilateral upper and lower extremities  Psych: normal mood and behavior, pleasant    Pertinent Lab Results:  No results for input(s): \"WBC\", \"HGB\" in the last 72 hours.    Invalid input(s): \"PLATELET\", \"ABSOLUTENEUT\"           Recent Labs     04/15/24  2017 24  0800 24  0750   SODIUM 123* 124* 117* " "  POTASSIUM 4.1 4.4 4.7   CHLORIDE 91* 94* 86*   CO2 20 20 19*   CREATININE 0.66 0.60 0.67        No results for input(s): \"ALBUMIN\" in the last 72 hours.    Invalid input(s): \"AST\", \"ALT\", \"ALKPHOS\", \"BILITOT\", \"TOTALBILIRUB\", \"BILIRUBINTOT\", \"BILIRUBINDIR\", \"BILIRUBININD\", \"ALKALINEPHOS\"     Pertinent Micro:  Results       Procedure Component Value Units Date/Time    Urinalysis [[id number]]  (Abnormal) Collected: 04/12/24 1702    Order Status: Completed Specimen: Urine Updated: 04/12/24 1740     Color Yellow     Character Clear     Specific Gravity 1.009     Ph 6.5     Glucose Negative mg/dL      Ketones Negative mg/dL      Protein Negative mg/dL      Bilirubin Negative     Urobilinogen, Urine 0.2     Nitrite Negative     Leukocyte Esterase Trace     Occult Blood Negative     Micro Urine Req Microscopic          Blood Culture   Date Value Ref Range Status   08/21/2014 No growth after 5 days of incubation.  Final     Blood Culture Hold   Date Value Ref Range Status   05/10/2013 Collected  Final        Studies:  DX-LUMBAR PUNCTURE FOR DIAGNOSIS    Result Date: 4/16/2024 4/16/2024 2:10 PM HISTORY/REASON FOR EXAM:  confusion. Dr. Hanson did attempt LP at the bedside and it was not successful. Altered mental status TECHNIQUE/EXAM DESCRIPTION AND NUMBER OF VIEWS: Fluoroscopic-guided lumbar puncture. 1 fluoroscopic images obtained. Fluoroscopy time: 0.7 minutes Fluoroscopy dose(DAP): 0.960 Gy*cm^2 PROCEDURE:     Informed consent was obtained. A timeout was taken. With the patient in prone position, the lumbar region was prepped and draped in a sterile manner. Following local anesthesia with 1% lidocaine, a 22-gauge spinal needle was advanced into the subarachnoid space at the L2-3 level.  Approximately 15 cc of CSF was collected and the specimens sent to the lab for the studies requested. The patient tolerated the procedure well with no evidence of complication. The procedure was performed by CAROLE Lopez under my " direct supervision.     Fluoroscopic-guided lumbar puncture as described above.    CT-HEAD W/O    Result Date: 4/12/2024 4/12/2024 7:22 PM HISTORY/REASON FOR EXAM:  Altered mental status. TECHNIQUE/EXAM DESCRIPTION AND NUMBER OF VIEWS: CT of the head without contrast. The study was performed on a helical multidetector CT scanner. Contiguous axial sections were obtained from the skull base through the vertex. Up to date radiation dose reduction adjustments have been utilized to meet ALARA standards for radiation dose reduction. COMPARISON:  None available FINDINGS: No intracranial mass, mass effect, midline shift, hydrocephalus, hemorrhage or CT apparent acute infarct. Moderate atrophy. Dystrophic basal ganglia calcifications. Calvarium is intact. Paranasal sinuses in the field of view are unremarkable. Mastoids in the field of view are unremarkable.     1.  No acute intracranial abnormality detected.     DX-CHEST-PORTABLE (1 VIEW)    Result Date: 4/12/2024 4/12/2024 6:57 PM HISTORY/REASON FOR EXAM:  Altered consciousness TECHNIQUE/EXAM DESCRIPTION AND NUMBER OF VIEWS: Single portable view of the chest. COMPARISON: 8/24/2022 FINDINGS: HEART: Stable size. LUNGS: No areas of air space disease are demonstrated. PLEURA: No effusion or pneumothorax.     No acute cardiac or pulmonary abnormalities are identified.      IMPRESSION:   1.  Neurosyphilis   2.  Altered mentation with delusions  3.  Bipolar depression  4.  Hyponatremia      PLAN:   Ricky Junior is a 89 y.o. woman with a history of bipolar depression with recent psychosis admitted on 4/12/2024 secondary to confusion.  She underwent a lumbar puncture on 4/15 with elevated WBC and elevated protein highly concerning for neurosyphilis given clinical presentation and positive syphilis antibodies.  Unfortunately, unable to obtain a reliable history regarding prior syphilis testing and/or treatment.    -Will start IV penicillin 18,000,000 units daily for a 2-week  course with stop date of 4/30/2024  -Follow CSF culture, VDRL from CSF-pending  -Okay with midline    Disposition: Recommend SNF for long-term course of IV antibiotics      Plan of care discussed with IM Bulmaro Velarde M.D.. Will continue to follow    Holly Sanchez M.D.      Please note that this dictation was created using voice recognition software. I have worked with technical experts from [facility] to optimize the interface.  I have made every reasonable attempt to correct obvious errors, but there may be errors of grammar and possibly content that I did not discover before finalizing the note.

## 2024-04-17 NOTE — PROGRESS NOTES
Hector from Lab called with critical result of sodium 117 at 09:24. Critical lab result read back to Hector.   Dr. Veladre notified of critical lab result at 0926.  Critical lab result read back by Dr. Velarde.

## 2024-04-18 ENCOUNTER — APPOINTMENT (OUTPATIENT)
Dept: RADIOLOGY | Facility: MEDICAL CENTER | Age: 89
DRG: 056 | End: 2024-04-18
Attending: HOSPITALIST
Payer: MEDICARE

## 2024-04-18 LAB
ANION GAP SERPL CALC-SCNC: 12 MMOL/L (ref 7–16)
BUN SERPL-MCNC: 13 MG/DL (ref 8–22)
CALCIUM SERPL-MCNC: 8.1 MG/DL (ref 8.5–10.5)
CHLORIDE SERPL-SCNC: 85 MMOL/L (ref 96–112)
CO2 SERPL-SCNC: 17 MMOL/L (ref 20–33)
CREAT SERPL-MCNC: 0.58 MG/DL (ref 0.5–1.4)
GFR SERPLBLD CREATININE-BSD FMLA CKD-EPI: 84 ML/MIN/1.73 M 2
GLUCOSE SERPL-MCNC: 102 MG/DL (ref 65–99)
GRAM STN SPEC: NORMAL
POTASSIUM SERPL-SCNC: 4.5 MMOL/L (ref 3.6–5.5)
SIGNIFICANT IND 70042: NORMAL
SITE SITE: NORMAL
SODIUM SERPL-SCNC: 111 MMOL/L (ref 135–145)
SODIUM SERPL-SCNC: 112 MMOL/L (ref 135–145)
SODIUM SERPL-SCNC: 114 MMOL/L (ref 135–145)
SODIUM SERPL-SCNC: 118 MMOL/L (ref 135–145)
SODIUM SERPL-SCNC: 119 MMOL/L (ref 135–145)
SODIUM SERPL-SCNC: 120 MMOL/L (ref 135–145)
SOURCE SOURCE: NORMAL

## 2024-04-18 PROCEDURE — A9270 NON-COVERED ITEM OR SERVICE: HCPCS | Performed by: EMERGENCY MEDICINE

## 2024-04-18 PROCEDURE — 770000 HCHG ROOM/CARE - INTERMEDIATE ICU *

## 2024-04-18 PROCEDURE — A9270 NON-COVERED ITEM OR SERVICE: HCPCS | Performed by: STUDENT IN AN ORGANIZED HEALTH CARE EDUCATION/TRAINING PROGRAM

## 2024-04-18 PROCEDURE — 700105 HCHG RX REV CODE 258: Performed by: STUDENT IN AN ORGANIZED HEALTH CARE EDUCATION/TRAINING PROGRAM

## 2024-04-18 PROCEDURE — 99231 SBSQ HOSP IP/OBS SF/LOW 25: CPT | Performed by: STUDENT IN AN ORGANIZED HEALTH CARE EDUCATION/TRAINING PROGRAM

## 2024-04-18 PROCEDURE — 700105 HCHG RX REV CODE 258: Performed by: HOSPITALIST

## 2024-04-18 PROCEDURE — 700111 HCHG RX REV CODE 636 W/ 250 OVERRIDE (IP): Performed by: INTERNAL MEDICINE

## 2024-04-18 PROCEDURE — C1751 CATH, INF, PER/CENT/MIDLINE: HCPCS

## 2024-04-18 PROCEDURE — 84295 ASSAY OF SERUM SODIUM: CPT

## 2024-04-18 PROCEDURE — 80048 BASIC METABOLIC PNL TOTAL CA: CPT

## 2024-04-18 PROCEDURE — 700102 HCHG RX REV CODE 250 W/ 637 OVERRIDE(OP): Performed by: EMERGENCY MEDICINE

## 2024-04-18 PROCEDURE — 99291 CRITICAL CARE FIRST HOUR: CPT | Performed by: HOSPITALIST

## 2024-04-18 PROCEDURE — 700105 HCHG RX REV CODE 258: Performed by: INTERNAL MEDICINE

## 2024-04-18 PROCEDURE — 700102 HCHG RX REV CODE 250 W/ 637 OVERRIDE(OP): Performed by: INTERNAL MEDICINE

## 2024-04-18 PROCEDURE — 99233 SBSQ HOSP IP/OBS HIGH 50: CPT | Performed by: INTERNAL MEDICINE

## 2024-04-18 PROCEDURE — 700102 HCHG RX REV CODE 250 W/ 637 OVERRIDE(OP): Performed by: STUDENT IN AN ORGANIZED HEALTH CARE EDUCATION/TRAINING PROGRAM

## 2024-04-18 PROCEDURE — A9270 NON-COVERED ITEM OR SERVICE: HCPCS | Performed by: INTERNAL MEDICINE

## 2024-04-18 PROCEDURE — 700111 HCHG RX REV CODE 636 W/ 250 OVERRIDE (IP): Performed by: HOSPITALIST

## 2024-04-18 PROCEDURE — 700111 HCHG RX REV CODE 636 W/ 250 OVERRIDE (IP): Performed by: STUDENT IN AN ORGANIZED HEALTH CARE EDUCATION/TRAINING PROGRAM

## 2024-04-18 RX ORDER — 3% SODIUM CHLORIDE 3 G/100ML
INJECTION, SOLUTION INTRAVENOUS CONTINUOUS
Status: DISCONTINUED | OUTPATIENT
Start: 2024-04-18 | End: 2024-04-22

## 2024-04-18 RX ORDER — 3% SODIUM CHLORIDE 3 G/100ML
100 INJECTION, SOLUTION INTRAVENOUS ONCE
Status: COMPLETED | OUTPATIENT
Start: 2024-04-18 | End: 2024-04-18

## 2024-04-18 RX ORDER — UREA 10 %
5 LOTION (ML) TOPICAL NIGHTLY PRN
Status: DISCONTINUED | OUTPATIENT
Start: 2024-04-18 | End: 2024-04-25 | Stop reason: HOSPADM

## 2024-04-18 RX ORDER — 3% SODIUM CHLORIDE 3 G/100ML
INJECTION, SOLUTION INTRAVENOUS CONTINUOUS
Status: DISCONTINUED | OUTPATIENT
Start: 2024-04-18 | End: 2024-04-18

## 2024-04-18 RX ORDER — 3% SODIUM CHLORIDE 3 G/100ML
INJECTION, SOLUTION INTRAVENOUS CONTINUOUS
Status: ACTIVE | OUTPATIENT
Start: 2024-04-18 | End: 2024-04-18

## 2024-04-18 RX ADMIN — HYDRALAZINE HYDROCHLORIDE 10 MG: 20 INJECTION, SOLUTION INTRAMUSCULAR; INTRAVENOUS at 20:23

## 2024-04-18 RX ADMIN — CARVEDILOL 12.5 MG: 12.5 TABLET, FILM COATED ORAL at 10:02

## 2024-04-18 RX ADMIN — SODIUM CHLORIDE 100 ML: 3 INJECTION, SOLUTION INTRAVENOUS at 02:19

## 2024-04-18 RX ADMIN — LORAZEPAM 0.5 MG: 2 INJECTION INTRAMUSCULAR; INTRAVENOUS at 16:28

## 2024-04-18 RX ADMIN — SODIUM CHLORIDE 18 MILLION UNITS: 9 INJECTION, SOLUTION INTRAVENOUS at 10:01

## 2024-04-18 RX ADMIN — LEVOTHYROXINE SODIUM 175 MCG: 0.17 TABLET ORAL at 05:48

## 2024-04-18 RX ADMIN — SODIUM CHLORIDE: 3 INJECTION, SOLUTION INTRAVENOUS at 05:55

## 2024-04-18 RX ADMIN — Medication 5 MG: at 21:10

## 2024-04-18 RX ADMIN — HYDROCODONE BITARTRATE AND ACETAMINOPHEN 1 TABLET: 5; 325 TABLET ORAL at 10:02

## 2024-04-18 RX ADMIN — SODIUM CHLORIDE: 3 INJECTION, SOLUTION INTRAVENOUS at 10:58

## 2024-04-18 RX ADMIN — LOSARTAN POTASSIUM 100 MG: 50 TABLET, FILM COATED ORAL at 05:48

## 2024-04-18 ASSESSMENT — PAIN DESCRIPTION - PAIN TYPE
TYPE: CHRONIC PAIN
TYPE: CHRONIC PAIN;ACUTE PAIN
TYPE: CHRONIC PAIN
TYPE: CHRONIC PAIN

## 2024-04-18 NOTE — PROGRESS NOTES
Codey from Lab called with critical result of sodium 112 at 0131. Critical lab result read back to Codey.   Dr. Davis notified of critical lab result at 0134.  Critical lab result read back by Dr. Davis. MD placed new orders.

## 2024-04-18 NOTE — PROGRESS NOTES
Hospital Medicine Daily Progress Note    Date of Service  4/18/2024    Chief Complaint  Altered mental status    Hospital Course  Ricky Junior is a 89 y.o. female chronic back pain with degenerative joint disease, bipolar disorder admitted 4/12/2024 with altered mental status.  She was seen by primary care provider before coming to the emergency room she was alert and oriented x 3 but is having delusions.  She was kept in the ER due to her delusions.  It was noted that her sodium dropped to 122.  Per admission notes she was delusional stating that her  was obsessed with the devil.  It was noted that she has neurosyphilis and a low B12.  Infectious disease has been consulted and initiated patient on IV penicillin.    Interval Problem Update  4/18: Patient is on 3% sodium chloride.  A.m. sodium 114. Monitor in IMCU with q 6 hr Na.  She is tangential and delusional in speech.  Tells the RN he is a prophet and cuellar of god.  Told me to call every bank in the work and business. Moves all extremities purposefully.  Anxious but not yelling. Per RN patient did not sleep all night.  Care discussed with psychiatry    Patient is currently critically ill with altered mentation with concern of severe hyponatremia with a serum sodium of 111 which can lower the threshold for seizure.  We are also addressing neurosyphilis and her altered mental status.  She is being closely monitored in the IMCU for frequent serum sodium level checks and for need of close adjustment and titration of her percent sodium.  We do not want her sodium levels to increase greater than 10 points and a 24-hour period of time.  Critical care time 35 minutes excluding any procedures.    I have discussed this patient's plan of care and discharge plan at IDT rounds today with Case Management, Nursing, Nursing leadership, and other members of the IDT team.    Consultants/Specialty  infectious disease    Code Status  Full Code    Disposition  The  patient is not medically cleared for discharge to home or a post-acute facility.  Anticipate discharge to: home with close outpatient follow-up    I have placed the appropriate orders for post-discharge needs.    Review of Systems  Review of Systems   Unable to perform ROS: Psychiatric disorder        Physical Exam  Temp:  [36.4 °C (97.5 °F)-37.2 °C (99 °F)] 37.2 °C (99 °F)  Pulse:  [] 95  Resp:  [13-44] 43  BP: (101-208)/(53-97) 157/77  SpO2:  [92 %-100 %] 97 %    Physical Exam  Vitals reviewed.   Constitutional:       Appearance: Normal appearance. She is underweight. She is not diaphoretic.      Comments: thin   HENT:      Head: Normocephalic and atraumatic.      Nose: Nose normal.      Mouth/Throat:      Mouth: Mucous membranes are moist.      Pharynx: No oropharyngeal exudate.   Eyes:      General:         Right eye: No discharge.         Left eye: No discharge.      Extraocular Movements: Extraocular movements intact.      Conjunctiva/sclera: Conjunctivae normal.   Cardiovascular:      Rate and Rhythm: Regular rhythm. Tachycardia present.      Pulses:           Radial pulses are 2+ on the right side and 2+ on the left side.        Dorsalis pedis pulses are 2+ on the right side and 2+ on the left side.      Heart sounds: No murmur heard.  Pulmonary:      Effort: Pulmonary effort is normal. No respiratory distress.      Breath sounds: Normal breath sounds. No wheezing or rales.   Abdominal:      General: Bowel sounds are normal. There is no distension.      Palpations: Abdomen is soft.      Tenderness: There is no abdominal tenderness. There is no guarding.   Musculoskeletal:         General: No swelling or tenderness.      Cervical back: Normal range of motion and neck supple. No muscular tenderness.      Right lower leg: No edema.      Left lower leg: No edema.   Lymphadenopathy:      Cervical: No cervical adenopathy.   Skin:     Coloration: Skin is not jaundiced or pale.   Neurological:      General: No  focal deficit present.      Mental Status: She is alert and oriented to person, place, and time.      Cranial Nerves: No cranial nerve deficit.   Psychiatric:         Mood and Affect: Mood is anxious.         Speech: Speech normal.         Behavior: Behavior normal. Behavior is cooperative.         Thought Content: Thought content is delusional.         Cognition and Memory: Cognition is impaired.         Judgment: Judgment is inappropriate.         Fluids    Intake/Output Summary (Last 24 hours) at 4/18/2024 0709  Last data filed at 4/18/2024 0600  Gross per 24 hour   Intake 1675.76 ml   Output --   Net 1675.76 ml       Laboratory      Recent Labs     04/17/24  0750 04/17/24  1559 04/17/24  2037 04/18/24  0050 04/18/24  0420   SODIUM 117* 116* 116* 112* 114*   POTASSIUM 4.7 5.0  --   --  4.5   CHLORIDE 86* 85*  --   --  85*   CO2 19* 18*  --   --  17*   GLUCOSE 99 136*  --   --  102*   BUN 15 15  --   --  13   CREATININE 0.67 0.69  --   --  0.58   CALCIUM 8.6 8.6  --   --  8.1*     Recent Labs     04/16/24  1006   APTT 30.5   INR 1.12               Imaging  DX-LUMBAR PUNCTURE FOR DIAGNOSIS   Final Result      Fluoroscopic-guided lumbar puncture as described above.      CT-HEAD W/O   Final Result      1.  No acute intracranial abnormality detected.         DX-CHEST-PORTABLE (1 VIEW)   Final Result      No acute cardiac or pulmonary abnormalities are identified.      MR-BRAIN-W/O    (Results Pending)        Assessment/Plan  * Psychotic disorder (HCC)- (present on admission)  Assessment & Plan  With delusions and darin  Psychiatry consulted  TSH CT head UA U tox wnl. No clear metabolic etiologies.   Continue Seroquel  Hold on mood stabilizers that may affect sodium such as Depakote for now  MRI brain pending  EEG is normal  This may be compounded by hyponatremia which is being addressed  B12 on the low side.  Start B12 injections      Neurosyphilis- (present on admission)  Assessment & Plan  New diagnosis by  cerebrospinal fluid  Infectious disease consulted  High-dose IV penicillin G continuous infusion  HIV negative    Vitamin B12 deficiency- (present on admission)  Assessment & Plan  Replace  Continue to follow-up with primary care  Monitor for improvement on her mentation/delusional    Hyponatremia- (present on admission)  Assessment & Plan  Significant hyponatremia that went down to 122.  Decreased normal saline to 75 mL/h and fluid restricted 1.8 L   Sodium has come back 117 which does not make sense as she has not been drinking excess water according to her nurse and has no access to it.  This value will be repeated.  If it indeed remains is low she may transfer to the [facility] and require a bolus of 3% saline   Every 6 hour sodium levels ordered  basic metabolic panel ordered for the morning      Bipolar depression (HCC)- (present on admission)  Assessment & Plan  Per chart review she has had bipolar. But I don't see any delusional reports from her PCP   She is on seroquel and depakote  Holding depakote  because of hyponatremia  Psychiatry has been consulted  Seroquel 200 mg daily will be continued    Idiopathic hypothyroidism- (present on admission)  Assessment & Plan  Continue synthroid    Hypertension- (present on admission)  Assessment & Plan  Hold losartan and torsemide for hypertension because of hyponatremia that worsened  Continue carvedilol  Hydralazine as needed for now  Continue to resume losartan         VTE prophylaxis:   SCDs/TEDs

## 2024-04-18 NOTE — PROGRESS NOTES
Velvet from Lab called with critical result of sodium 116 at 1700. Critical lab result read back to Velvet.   Dr. Velarde notified of critical lab result at 0926.  Critical lab result read back by Dr. Velarde.

## 2024-04-18 NOTE — PROGRESS NOTES
89y F admitted with psychosis, found to have neurosyphilis.  Transferred to [facility] for hypoNa.    Romel Alvarez MD  Critical Care Medicine  6:53 PM

## 2024-04-18 NOTE — PROGRESS NOTES
4 Eyes Skin Assessment Completed by Derrick Ridley, RN and KENYA Pineda.    Head WDL  Ears WDL  Nose WDL  Mouth WDL  Neck WDL  Breast/Chest WDL  Shoulder Blades WDL  Spine WDL  (R) Arm/Elbow/Hand WDL  (L) Arm/Elbow/Hand WDL  Abdomen WDL  Groin WDL  Scrotum/Coccyx/Buttocks Redness and Blanching  (R) Leg WDL  (L) Leg WDL  (R) Heel/Foot/Toe Blanching and Boggy  (L) Heel/Foot/Toe Blanching and Boggy          Devices In Places Tele Box, Blood Pressure Cuff, Pulse Ox, and Nasal Cannula      Interventions In Place Gray Ear Foams, TAP System, Pillows, Q2 Turns, and Low Air Loss Mattress    Possible Skin Injury Yes    Pictures Uploaded Into Epic N/A  Wound Consult Placed N/A  RN Wound Prevention Protocol Ordered No

## 2024-04-18 NOTE — CONSULTS
"PSYCHIATRIC FOLLOW-UP: (established)  Reason for admission:   Confusion/AMS and paranoid delusions  Reason for consult: psychosis  Legal Hold Status:          N/A  Chart reviewed.          Interval hx:     Unable to conduct a meaningful interview with the patient as the patient is still confused and is perseverative on various Pentecostal delusions.  She still continues to think that there was something wrong with her  because she still feels that he tried to harm her.  She also states today that she is the \"world's prophet\" and that God has the stone on her special paiz.  She was pressing various numbers on the telephone trying to call pod this afternoon.  She is aware she is at [facility] but is unable to state the reasons for her continued admission.      Nursing staff reports that the patient has not been aggressive.  She did not sleep well last night.  She continues to continuously voice irrational thoughts.  The patient did become agitated this afternoon when her  visited so she was given as needed Ativan 0.5 mg and it was effective.  It did not appear to cause oversedation.          Reviewed nursing noted and spoke with nursing staff. The patient is compliant with medication. The patient has not been aggressive or agitated. The patient has only been consuming part of oral nutritional shakes and <25% of dinner as of flow sheet yesterday. The patient has required psychiatric PRN medications in the last 24 hours (Ativan this afternoon when her  visited).        Medical ROS (as pertinent):     Unable to complete due to patient's disorganization.      Psychiatric Examination:  Vitals:    04/18/24 2000   BP: (!) 176/81   Pulse: 98   Resp: 20   Temp:    SpO2: 100%          General Appearance: Appears state age, appropriate grooming & hygiene, no acute distress  Behavior:               -Appropriate activity, cooperative but easily distractible              -No tremors noted, " "no tics, no dystonias, mild orofacial dyskinesias noted              -No psychomotor agitation or retardation              -Gait not observed  Speech: Appropriate quantity, spontaneous. Regular rate and rhythm. Appropriate volume and intonation. Articulation is clear. Hyperverbal but not pressured  Language: English  Thought processes: tangential, needs frequent redirection  Thought content: No evidence of SI/HI/AVH. Not observed responding to internal stimuli. Grandiose and Confucianism based delusions observed.   Mood: \"I'm okay right now\"  Affect: Congruent with stated mood. She appears content  Judgement and Insight: Limited/limited due to confusion  Cognition:               -is oriented to self, month, year, and place but not situation              -recent memory is poor     New PAST MEDICAL/PSYCH/FAMILY/SOCIAL(as reported by patient):                none                            EK/15/24 NSR      Brain Imaging: pending (MRI brain w/ contrast)     CT head W/o contrast 24  FINDINGS:  No intracranial mass, mass effect, midline shift, hydrocephalus, hemorrhage or CT apparent acute infarct.     Moderate atrophy.     Dystrophic basal ganglia calcifications.     Calvarium is intact.     Paranasal sinuses in the field of view are unremarkable.     Mastoids in the field of view are unremarkable.        IMPRESSION:     1.  No acute intracranial abnormality detected.     EEG:   DESCRIPTION OF THE RECORD:  During the wakefulness, the background showed a symmetrical 8-9  Hz alpha activity posteriorly with amplitude of 70 mV. There was  reactivity to eye closure/opening. A normal anterior-posterior  gradient was noted with faster beta frequencies seen anteriorly.  During drowsiness, increased theta/beta frequencies were seen.    ACTIVATION PROCEDURES:    hyperventilation was not performed    Intermittent Photic stimulation was performed in a stepwise  fashion from 1 to 30 Hz and elicited no photic driving " response.    ICTAL AND/OR INTERICTAL FINDINGS:  No focal or generalized epileptiform activity noted. No regional  slowing was seen during this routine study. No clinical events  or seizures were reported or recorded during the study.    EKG: sampling of the EKG recording demonstrated sinus rhythm.    EVENTS: none    INTERPRETATION:    This is a normal video EEG recording in the awake and drowsy  states.  Note: A normal EEG does not rule out epilepsy.    The study was limited by abundant myogenic and movement artifacts  and maybe repeated if clinically indicated.        Labs personally reviewed:   Lab Results   Component Value Date/Time    SODIUM 114 (LL) 04/18/2024 04:20 AM    POTASSIUM 4.5 04/18/2024 04:20 AM    CHLORIDE 85 (L) 04/18/2024 04:20 AM    CO2 17 (L) 04/18/2024 04:20 AM    GLUCOSE 102 (H) 04/18/2024 04:20 AM    BUN 13 04/18/2024 04:20 AM    CREATININE 0.58 04/18/2024 04:20 AM    CREATININE 1.9 (H) 10/27/2008 05:48 AM    BUNCREATRAT 14 10/23/2014 12:01 PM        B12 (4/14) 287  UA (4/12) trace esterase  4/14 VPA-23.3  4/13 ammonia-19  TSH (4/12)-1.57     Assessment:  89 year old female with past psychiatric history of bipolar disorder who presented with acute confusion on 4/12/24. Psychiatry was consulted for evaluation of possible delusions and medication management.      The patient has been pleasant and largely cooperative with treatment and workup.  Blood work concerning for syphilis and infectious disease evaluated the patient and felt that there were concerns for neurosyphilis which may be contributing to the patient's presentation, ID on board for management.  Additional confounding factors for altered mental status include ongoing hyponatremia.  MRI of the brain is still pending.  She was recently started on IM B12 for treatment of low B12 levels which may be contributing to altered mental status.      Of note the patient still has very poor understanding and recall of the treatments and reasons  for continued hospitalization so it is still this physician's opinion that she does not have the ability to sign out AGAINST MEDICAL ADVICE     Dx:  Neurosyphilis  Hx of Bipolar disorder (rule out psychosis secondary to medical condition)  Delirium   --R/o underlying neurocognitive disorder     Medical :  Per primary team        Plan:  Legal hold: N/A due to delirium  Patient does NOT meet criteria for capacity to sign-out AMA due to her altered level of consciousness  Psychotropic medications  Continue Ativan 0.5 mg twice daily as needed for severe agitation or aggression.  Sodium level still low so will continue holding antipsychotics due to risk of lowering seizure threshold  May use melatonin 5 mg as needed for insomnia  Labs reviewed  EKG reviewed  Discussed the case with: Dr. Freitas  Psychiatry will follow up     Thank you for the consult.

## 2024-04-18 NOTE — PROCEDURES
Vascular Access Team    Date of Insertion: 4/18/24  Arm Circumference: 24  Internal length: 14  External Length: 0  Vein Occupancy %: 36  Reason for Midline: abx  Labs: WBC 8.5 , , BUN 13, Cr 0.58, GFR 84 , INR 1.12    Orders confirmed, vessel patency confirmed with ultrasound. Risks and benefits of procedure explained to patient and education regarding line associated bloodstream infections provided. Questions answered.     Power Midline placed in RUE per licensed provider order with ultrasound guidance. 4  Fr, single lumen Power Midline placed in basilic vein after 1 attempt(s). 2 mL of 1% lidocaine injected intradermally, 21 gauge microintroducer needle was visualized entering the vein and modified Seldinger technique used. 14 cm catheter inserted with good blood return. Secured at 0 cm marker. Internal positioning stylet removed and verified to be intact. Each lumen flushed without resistance with 10 mL 0.9% normal saline. Midline secured with Biopatch and Tegaderm.     Midline placement is confirmed by nurse using ultrasound and ability to flush and draw blood. Midline is appropriate for use at this time.  Patient tolerated procedure well, without complications.  No X-ray is needed for placement confirmation.  Patient condition relayed to unit RN or ordering physician via this post procedure note in the EMR.     Ultrasound images uploaded to PACS and viewable in the EMR - yes  Ultrasound imaged printed and placed in paper chart - no     BARD Power Midline ref # [id number], Lot # [id number], Expiration Date 7/31/25

## 2024-04-18 NOTE — CONSULTS
Patient repeat sodium is 116 and she has been transferred to the [facility] for management. As there is a heightened risk of seizure at this serum sodium level will HOLD scheduled and PRN seroquel. Added PRN ativan 0.5 mg q12H PRN for severe agitation/aggression. Will reassess need for antipsychotic tomorrow. Nursing staff informed that Seroquel has been held. AVOID use of first generation antipsychotics (haldol) if patient is agitated as this can further lower serum sodium

## 2024-04-18 NOTE — PROGRESS NOTES
4 Eyes Skin Assessment Completed by KENYA Mcgowan and KENYA Banda.    Head WDL  Ears WDL  Nose WDL  Mouth WDL- dry  Neck WDL  Breast/Chest WDL  Shoulder Blades WDL  Spine WDL  (R) Arm/Elbow/Hand WDL  (L) Arm/Elbow/Hand WDL  Abdomen WDL  Groin WDL  Scrotum/Coccyx/Buttocks Redness and Blanching  (R) Leg Redness and Blanching - r hip discoloration// trace edema  (L) Leg WDL// trace edema  (R) Heel/Foot/Toe Blanching and Boggy  (L) Heel/Foot/Toe Blanching and Boggy          Devices In Places Tele Box, Blood Pressure Cuff, and Pulse Ox, NC      Interventions In Place Gray Ear Foams, NC W/Ear Foams, Heel Mepilex, TAP System, Pillows, Q2 Turns, and Low Air Loss Mattress    Possible Skin Injury Yes    Pictures Uploaded Into Epic N/A  Wound Consult Placed N/A  RN Wound Prevention Protocol Ordered Yes

## 2024-04-18 NOTE — PROGRESS NOTES
"Infectious Disease Progress Note    Author: Holly Sanchez M.D. Date & Time of service: 2024  7:34 AM    Chief Complaint:  Follow-up for neurosyphilis    Interval History:   patient afebrile, sodium 114.  Patient transferred to [facility] secondary to worsening hyponatremia.  Patient now on IV penicillin. Patient perseverating on need for phone book. Unreliable historian secondary to altered mentation and delusions      Labs Reviewed and Medications Reviewed.    Review of Systems:  Review of Systems   Unable to perform ROS: Mental status change       Hemodynamics:  Temp (24hrs), Av.6 °C (97.9 °F), Min:36.4 °C (97.5 °F), Max:37.2 °C (99 °F)  Temperature: 37.2 °C (99 °F)  Pulse  Av.9  Min: 60  Max: 124   Blood Pressure : (!) 157/77       Physical Exam:  Physical Exam  Vitals and nursing note reviewed.   HENT:      Mouth/Throat:      Mouth: Mucous membranes are moist.   Eyes:      Pupils: Pupils are equal, round, and reactive to light.   Cardiovascular:      Rate and Rhythm: Normal rate.   Pulmonary:      Effort: Pulmonary effort is normal. No respiratory distress.   Neurological:      Mental Status: She is alert.      Comments: Delusions         Meds:    Current Facility-Administered Medications:     3% sodium chloride    penicillin G potassium 18 Million Units in  mL infusion    hydrALAZINE    LORazepam    [Held by provider] enoxaparin (LOVENOX) injection    LORazepam    carvedilol    losartan    polyethylene glycol/lytes    [Held by provider] QUEtiapine    levothyroxine    HYDROcodone-acetaminophen    acetaminophen    Labs:  No results for input(s): \"WBC\", \"RBC\", \"HEMOGLOBIN\", \"HEMATOCRIT\", \"MCV\", \"MCH\", \"RDW\", \"PLATELETCT\", \"MPV\", \"NEUTSPOLYS\", \"LYMPHOCYTES\", \"MONOCYTES\", \"EOSINOPHILS\", \"BASOPHILS\", \"RBCMORPHOLO\" in the last 72 hours.  Recent Labs     24  0750 24  1559 24  2037 24  0050 24  0420   SODIUM 117* 116* 116* 112* 114*   POTASSIUM 4.7 5.0  --   --  4.5 "   CHLORIDE 86* 85*  --   --  85*   CO2 19* 18*  --   --  17*   GLUCOSE 99 136*  --   --  102*   BUN 15 15  --   --  13     Recent Labs     04/17/24  0750 04/17/24  1559 04/18/24  0420   CREATININE 0.67 0.69 0.58       Imaging:  DX-LUMBAR PUNCTURE FOR DIAGNOSIS    Result Date: 4/16/2024 4/16/2024 2:10 PM HISTORY/REASON FOR EXAM:  confusion. Dr. Hanson did attempt LP at the bedside and it was not successful. Altered mental status TECHNIQUE/EXAM DESCRIPTION AND NUMBER OF VIEWS: Fluoroscopic-guided lumbar puncture. 1 fluoroscopic images obtained. Fluoroscopy time: 0.7 minutes Fluoroscopy dose(DAP): 0.960 Gy*cm^2 PROCEDURE:     Informed consent was obtained. A timeout was taken. With the patient in prone position, the lumbar region was prepped and draped in a sterile manner. Following local anesthesia with 1% lidocaine, a 22-gauge spinal needle was advanced into the subarachnoid space at the L2-3 level.  Approximately 15 cc of CSF was collected and the specimens sent to the lab for the studies requested. The patient tolerated the procedure well with no evidence of complication. The procedure was performed by CAROLE Lopez under my direct supervision.     Fluoroscopic-guided lumbar puncture as described above.    CT-HEAD W/O    Result Date: 4/12/2024 4/12/2024 7:22 PM HISTORY/REASON FOR EXAM:  Altered mental status. TECHNIQUE/EXAM DESCRIPTION AND NUMBER OF VIEWS: CT of the head without contrast. The study was performed on a helical multidetector CT scanner. Contiguous axial sections were obtained from the skull base through the vertex. Up to date radiation dose reduction adjustments have been utilized to meet ALARA standards for radiation dose reduction. COMPARISON:  None available FINDINGS: No intracranial mass, mass effect, midline shift, hydrocephalus, hemorrhage or CT apparent acute infarct. Moderate atrophy. Dystrophic basal ganglia calcifications. Calvarium is intact. Paranasal sinuses in the field of view  are unremarkable. Mastoids in the field of view are unremarkable.     1.  No acute intracranial abnormality detected.     DX-CHEST-PORTABLE (1 VIEW)    Result Date: 4/12/2024 4/12/2024 6:57 PM HISTORY/REASON FOR EXAM:  Altered consciousness TECHNIQUE/EXAM DESCRIPTION AND NUMBER OF VIEWS: Single portable view of the chest. COMPARISON: 8/24/2022 FINDINGS: HEART: Stable size. LUNGS: No areas of air space disease are demonstrated. PLEURA: No effusion or pneumothorax.     No acute cardiac or pulmonary abnormalities are identified.      Micro:  Results       Procedure Component Value Units Date/Time    Urinalysis [[id number]]  (Abnormal) Collected: 04/12/24 1702    Order Status: Completed Specimen: Urine Updated: 04/12/24 1740     Color Yellow     Character Clear     Specific Gravity 1.009     Ph 6.5     Glucose Negative mg/dL      Ketones Negative mg/dL      Protein Negative mg/dL      Bilirubin Negative     Urobilinogen, Urine 0.2     Nitrite Negative     Leukocyte Esterase Trace     Occult Blood Negative     Micro Urine Req Microscopic            Assessment:  Active Hospital Problems    Diagnosis     *Psychotic disorder ([facility]) [F29]     Neurosyphilis [A52.3]     Hyponatremia [E87.1]     Vitamin B12 deficiency [E53.8]     Bipolar depression (HCC) [F31.9]     Idiopathic hypothyroidism [E03.9]     Hypertension [I10]      89 y.o. woman with a history of bipolar depression with recent psychosis admitted on 4/12/2024 secondary to confusion.  She underwent a lumbar puncture on 4/15 with elevated WBC and elevated protein highly concerning for neurosyphilis given clinical presentation and positive syphilis antibodies.  Unfortunately, unable to obtain a reliable history regarding prior syphilis testing and/or treatment so reasonable to treat for suspected neurosyphilis.  Patient has other factors that are likely contributing to her altered mentation including underlying psychiatric disorder and hyponatremia.    Pertinent  diagnoses:  Neurosyphilis  Altered mentation with delusions  Bipolar depression  Hyponatremia    Plan:  -Continue IV penicillin 18,000,000 units daily for 2-week course.  Stop date of 4/30/2024  -Follow CSF culture, VDRL from CSF-pending  -Syphilis, treponemal qualitative and MHA-TP reactive  -RPR nonreactive  -Meningitis/encephalitis PCR-negative  -MRI of the brain pending  -Okay with midline  -Management of hyponatremia per primary team    Disposition: Recommend SNF for long-term course of IV antibiotics    Need for midline: Yes    Discussed with internal medicine/Dr. Freitas

## 2024-04-18 NOTE — PROGRESS NOTES
Critical lab value of NA at 111, MD Freitas notified, ordered increase of 3% NA solution to 45/hr.  Critical lab value of NA reported at 1300, 118, MD Freitas notified, ordered to reduce 3% from 45/hr to 30/hr

## 2024-04-18 NOTE — PROGRESS NOTES
Clarified order of 3% nss with Janet PIERCE MD wants a bolus of 100ML of 3% given over 30 minutes, and to recheck sodium levels, within an hour of completion of infusion

## 2024-04-18 NOTE — CARE PLAN
The patient is Watcher - Medium risk of patient condition declining or worsening    Shift Goals  Clinical Goals: q4 hr Na checks  Patient Goals: rest  Family Goals: gabriel    Progress made toward(s) clinical / shift goals:  labs drawn. MD notified of results.      Problem: Pain - Standard  Goal: Alleviation of pain or a reduction in pain to the patient’s comfort goal  Outcome: Progressing     Problem: Fall Risk  Goal: Patient will remain free from falls  Outcome: Progressing

## 2024-04-18 NOTE — CARE PLAN
The patient is Watcher - Medium risk of patient condition declining or worsening    Shift Goals  Clinical Goals: Q4 NA checks  Patient Goals: rest  Family Goals: LAWRENCE      Problem: Pain - Standard  Goal: Alleviation of pain or a reduction in pain to the patient’s comfort goal  Outcome: Progressing     Problem: Knowledge Deficit - Standard  Goal: Patient and family/care givers will demonstrate understanding of plan of care, disease process/condition, diagnostic tests and medications  Outcome: Progressing     Problem: Fall Risk  Goal: Patient will remain free from falls  Outcome: Progressing     Problem: Skin Integrity  Goal: Skin integrity is maintained or improved  Outcome: Progressing

## 2024-04-18 NOTE — PROGRESS NOTES
Little from Lab called with critical result of Na 116 at 2137. Critical lab result read back to Little.   Dr. MAYUR Reeves and Dr. Pete notified of critical lab result at 2143.  Critical lab result read back by Dr. MAYUR Reeves and Dr. Pete .

## 2024-04-19 LAB
ANION GAP SERPL CALC-SCNC: 11 MMOL/L (ref 7–16)
BUN SERPL-MCNC: 13 MG/DL (ref 8–22)
CALCIUM SERPL-MCNC: 7.8 MG/DL (ref 8.5–10.5)
CHLORIDE SERPL-SCNC: 94 MMOL/L (ref 96–112)
CO2 SERPL-SCNC: 16 MMOL/L (ref 20–33)
CREAT SERPL-MCNC: 0.56 MG/DL (ref 0.5–1.4)
EKG IMPRESSION: NORMAL
EKG IMPRESSION: NORMAL
GFR SERPLBLD CREATININE-BSD FMLA CKD-EPI: 85 ML/MIN/1.73 M 2
GLUCOSE SERPL-MCNC: 88 MG/DL (ref 65–99)
MAGNESIUM SERPL-MCNC: 1.5 MG/DL (ref 1.5–2.5)
POTASSIUM SERPL-SCNC: 4.2 MMOL/L (ref 3.6–5.5)
SODIUM SERPL-SCNC: 121 MMOL/L (ref 135–145)
SODIUM SERPL-SCNC: 121 MMOL/L (ref 135–145)
SODIUM SERPL-SCNC: 122 MMOL/L (ref 135–145)
SODIUM SERPL-SCNC: 125 MMOL/L (ref 135–145)
SODIUM SERPL-SCNC: 125 MMOL/L (ref 135–145)

## 2024-04-19 PROCEDURE — 700111 HCHG RX REV CODE 636 W/ 250 OVERRIDE (IP): Performed by: INTERNAL MEDICINE

## 2024-04-19 PROCEDURE — 97530 THERAPEUTIC ACTIVITIES: CPT

## 2024-04-19 PROCEDURE — 80048 BASIC METABOLIC PNL TOTAL CA: CPT

## 2024-04-19 PROCEDURE — 93005 ELECTROCARDIOGRAM TRACING: CPT | Performed by: HOSPITALIST

## 2024-04-19 PROCEDURE — 700105 HCHG RX REV CODE 258: Performed by: STUDENT IN AN ORGANIZED HEALTH CARE EDUCATION/TRAINING PROGRAM

## 2024-04-19 PROCEDURE — 83735 ASSAY OF MAGNESIUM: CPT

## 2024-04-19 PROCEDURE — A9270 NON-COVERED ITEM OR SERVICE: HCPCS | Performed by: INTERNAL MEDICINE

## 2024-04-19 PROCEDURE — 84295 ASSAY OF SERUM SODIUM: CPT | Mod: 91

## 2024-04-19 PROCEDURE — 700102 HCHG RX REV CODE 250 W/ 637 OVERRIDE(OP): Performed by: INTERNAL MEDICINE

## 2024-04-19 PROCEDURE — A9270 NON-COVERED ITEM OR SERVICE: HCPCS | Performed by: EMERGENCY MEDICINE

## 2024-04-19 PROCEDURE — 700102 HCHG RX REV CODE 250 W/ 637 OVERRIDE(OP): Performed by: EMERGENCY MEDICINE

## 2024-04-19 PROCEDURE — 99233 SBSQ HOSP IP/OBS HIGH 50: CPT | Performed by: HOSPITALIST

## 2024-04-19 PROCEDURE — 99233 SBSQ HOSP IP/OBS HIGH 50: CPT | Performed by: INTERNAL MEDICINE

## 2024-04-19 PROCEDURE — 700111 HCHG RX REV CODE 636 W/ 250 OVERRIDE (IP): Performed by: STUDENT IN AN ORGANIZED HEALTH CARE EDUCATION/TRAINING PROGRAM

## 2024-04-19 PROCEDURE — 700105 HCHG RX REV CODE 258: Performed by: INTERNAL MEDICINE

## 2024-04-19 PROCEDURE — 93010 ELECTROCARDIOGRAM REPORT: CPT | Performed by: INTERNAL MEDICINE

## 2024-04-19 PROCEDURE — 770000 HCHG ROOM/CARE - INTERMEDIATE ICU *

## 2024-04-19 PROCEDURE — 700111 HCHG RX REV CODE 636 W/ 250 OVERRIDE (IP): Mod: JZ | Performed by: HOSPITALIST

## 2024-04-19 RX ORDER — ENOXAPARIN SODIUM 100 MG/ML
40 INJECTION SUBCUTANEOUS DAILY
Status: DISCONTINUED | OUTPATIENT
Start: 2024-04-19 | End: 2024-04-25 | Stop reason: HOSPADM

## 2024-04-19 RX ORDER — MAGNESIUM SULFATE HEPTAHYDRATE 40 MG/ML
4 INJECTION, SOLUTION INTRAVENOUS ONCE
Status: COMPLETED | OUTPATIENT
Start: 2024-04-19 | End: 2024-04-19

## 2024-04-19 RX ADMIN — LEVOTHYROXINE SODIUM 175 MCG: 0.17 TABLET ORAL at 05:48

## 2024-04-19 RX ADMIN — SODIUM CHLORIDE: 3 INJECTION, SOLUTION INTRAVENOUS at 04:45

## 2024-04-19 RX ADMIN — CARVEDILOL 12.5 MG: 12.5 TABLET, FILM COATED ORAL at 17:20

## 2024-04-19 RX ADMIN — MAGNESIUM SULFATE HEPTAHYDRATE 4 G: 40 INJECTION, SOLUTION INTRAVENOUS at 08:30

## 2024-04-19 RX ADMIN — LOSARTAN POTASSIUM 100 MG: 50 TABLET, FILM COATED ORAL at 05:48

## 2024-04-19 RX ADMIN — SODIUM CHLORIDE 18 MILLION UNITS: 9 INJECTION, SOLUTION INTRAVENOUS at 10:30

## 2024-04-19 RX ADMIN — CARVEDILOL 12.5 MG: 12.5 TABLET, FILM COATED ORAL at 02:38

## 2024-04-19 RX ADMIN — HYDROCODONE BITARTRATE AND ACETAMINOPHEN 1 TABLET: 5; 325 TABLET ORAL at 16:14

## 2024-04-19 RX ADMIN — ENOXAPARIN SODIUM 40 MG: 100 INJECTION SUBCUTANEOUS at 17:20

## 2024-04-19 RX ADMIN — LORAZEPAM 0.5 MG: 2 INJECTION INTRAMUSCULAR; INTRAVENOUS at 17:20

## 2024-04-19 ASSESSMENT — COGNITIVE AND FUNCTIONAL STATUS - GENERAL
CLIMB 3 TO 5 STEPS WITH RAILING: TOTAL
MOBILITY SCORE: 15
MOVING TO AND FROM BED TO CHAIR: A LITTLE
SUGGESTED CMS G CODE MODIFIER MOBILITY: CK
MOVING FROM LYING ON BACK TO SITTING ON SIDE OF FLAT BED: A LITTLE
STANDING UP FROM CHAIR USING ARMS: A LITTLE
TURNING FROM BACK TO SIDE WHILE IN FLAT BAD: A LITTLE
WALKING IN HOSPITAL ROOM: A LOT

## 2024-04-19 ASSESSMENT — PAIN DESCRIPTION - PAIN TYPE
TYPE: ACUTE PAIN
TYPE: ACUTE PAIN
TYPE: CHRONIC PAIN
TYPE: ACUTE PAIN
TYPE: CHRONIC PAIN
TYPE: ACUTE PAIN

## 2024-04-19 ASSESSMENT — GAIT ASSESSMENTS
GAIT LEVEL OF ASSIST: MODERATE ASSIST
ASSISTIVE DEVICE: FRONT WHEEL WALKER
DEVIATION: DECREASED HEEL STRIKE;DECREASED TOE OFF
DISTANCE (FEET): 3

## 2024-04-19 ASSESSMENT — ENCOUNTER SYMPTOMS
FEVER: 0
ABDOMINAL PAIN: 0
SHORTNESS OF BREATH: 0
DIZZINESS: 0
NERVOUS/ANXIOUS: 0

## 2024-04-19 NOTE — PROGRESS NOTES
~0220- patient HR up to 160's for about 1 minute 4 seconds. EKG ordered per protocol. MD Davis notified. Ordered to give 0730 carvedilol dose early.   ~0237 pt's monitored rhythm showed SVT w/ HR in 170's for about 2 minutes and 36 seconds BP dropped down to 89/57. Charge RN and MD Davis notified.  ~0238 /68 SR-HR 95 Carvedilol given, please see MAR.    0300 pt still in ST /68 .

## 2024-04-19 NOTE — CONSULTS
"  [facility] PSYCHIATRIC FOLLOW-UP:(established)  *Reason for admission:  Patient was at the [facility] lab when staff walked her over to ER due to being confused and asking to go to ER. Per family patients PCP believes her thyroid levels are possible off. Patient A/O x 3. Patient states, \" My son is from the devi and I need to tell all the people. I can not go home because my  is from the devil.\"    *Legal Hold Status: not applicable                S:  she is the same as when seen before by me: doesn't recognize me but feels comfortable. Her  is in the room but she doesn't recognize him. She continues to have the same delusions about him as before.    Per notes: her Na dropped, first depakote stopped and then seroquel as a result.    O: Medical ROS (as pertinent):                      *Psychiatric Examination:   Vitals:   Vitals:    04/19/24 1116 04/19/24 1200 04/19/24 1400 04/19/24 1600   BP: (!) 164/75 (!) 140/85 (!) 149/67 (!) 164/72   Pulse: 80 63 85 71   Resp: (!) 25 (!) 28 (!) 24 16   Temp:  36.6 °C (97.8 °F)  36.6 °C (97.8 °F)   TempSrc:  Temporal  Temporal   SpO2: 99% 94% 92% 100%   Weight:       Height:         General Appearance:  good eye contact, cooperative, and friendly     Abnormal Movements: none   Gait and Posture: not observed  Speech: within normal limits  Thought Process: normal rate  Associations:   linear  Abnormal or Psychotic Thoughts: delusions  Judgement and Insight: impaired   Orientation:  x 3  Recent and Remote Memory:  has some impairments  Attention Span and Concentration: intact  Language:fluent  Fund of Knowledge: not tested  Mood and Affect: euthymic  SI/HI:  suicidal - no and homicidal - no        Current Medications:  Scheduled Medications   Medication Dose Frequency    enoxaparin (LOVENOX) injection  40 mg DAILY AT 1800    penicillin G potassium 18 Million Units in  mL infusion  18 Million Units Continuous Abx    carvedilol  12.5 mg BID WITH " "MEALS    losartan  100 mg DAILY    polyethylene glycol/lytes  1 Packet DAILY    [Held by provider] QUEtiapine  200 mg QHS    levothyroxine  175 mcg AM ES      Latest Reference Range & Units 04/19/24 14:00   Sodium 135 - 145 mmol/L 125 (L)   (L): Data is abnormally low       *ASSESSMENT/RECOMENDATIONS:  1. Psychotic disorder unspc      Hx of bipolar 1 disorder which may be active or in part.  2 Neurocognitive disorder: both her son (leroy) and  have voiced that at baseline her memory is \"good\". 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   2    all antipsychotics atypicals and typicals can cause hyponatremia  Lithium can cause and become a problem with hyponatremia from other causes. This is a difficult case to manage but bipolar disorder pts can improve if they sleep. Pt says she sleeps well.  3  clonidine has been used to manage darin though the studies are inconclusive. In Clinical Psychopharmacology and Neuroscience: \"In two phase 3 trials, two dose strengths of sublingual dexmedetomidine 180 mg and 120 mg were safe and effective in managing " "acute agitation in patients with bipolar disorder or schizophrenia.\" But can cause hypotension.    Will continue to follow with you.          Discharge recommendations: per tx tm    If released from [facility]: Discharge Instructions:  -Reviewed safety plan: 911, ER, PCM, MHC, Suicide crisis line  -Please assist with outpatient Psychiatric/substance use follow up appointments at discharge once medically cleared.         "

## 2024-04-19 NOTE — THERAPY
"Physical Therapy   Daily Treatment     Patient Name: Ricky Junior  Age:  89 y.o., Sex:  female  Medical Record #: [id number]  Today's Date: 4/19/2024     Precautions  Precautions: Fall Risk    Assessment    Pt demonstrated improved sitting balance and activity tolerance. Pt demonstrated improved bed mobility and transfers. Pt's mobility is as detailed below. Pt progressing well with functional mobility but continues to have cognition, balance, and functional strength deficits. Pt perseverated on needing a \"police escort\" to get out of the hospital. needed extensive cues for pursed lip breathing due to SpO2 dropping with activity.  Will continue to follow.    Plan    Treatment Plan Status: Continue Current Treatment Plan  Type of Treatment: Bed Mobility, Equipment, Gait Training, Neuro Re-Education / Balance, Self Care / Home Evaluation, Stair Training, Therapeutic Activities, Therapeutic Exercise  Treatment Frequency: 3 Times per Week  Treatment Duration: Until Therapy Goals Met    DC Equipment Recommendations: Unable to determine at this time  Discharge Recommendations: Recommend post-acute placement for additional physical therapy services prior to discharge home      Subjective    RN cleared pt for visit. Pt received in bed. Pt agreeable to PT.     Objective       04/19/24 1116   Cosignature   Documentation Review Approved with modifications made by preceptor in flowsheet   Charge Group   Charges  Yes   PT Therapeutic Activities (Units) 2   Total Time Spent   PT Total Time Yes   PT Therapeutic Activities Time Spent (Mins) 30   PT Total Time Spent (Calculated) 30    Services   Is patient using  services for this encounter? No   Precautions   Precautions Fall Risk   Vitals   Pulse 80   Patient BP Position Supine   Blood Pressure  (!) 164/75   Respiration (!) 25   Pulse Oximetry 99 %   O2 (LPM) 0   O2 Delivery Device None - Room Air   Vitals Comments BP, HR, SpO2 respectively: 164/75mmHg, " "80bpm, 99% supine at rest; 173/75 mmHg, 90 bpm, 98% EOB at rest; 118/65 mmHg, 102 bpm, 85% standing; 152/63 mmHg, 92 bpm, 90% seated in chair. Of note, pt BP cuff came loose during standing. Pt reported dizziness that resolved after 3 mins. Pt instructed for pursed lip breathing to improve SpO2, improved after 2 min.   Pain 0 - 10 Group   Therapist Pain Assessment Post Activity Pain Same as Prior to Activity;Nurse Notified   Cognition    Cognition / Consciousness X   Orientation Level Not Oriented to Age  (Pt continually reported that she was 52 y.o.)   Level of Consciousness Alert   Ability To Follow Commands 1 Step   Safety Awareness Impaired   New Learning Impaired   Attention Impaired   Initiation Impaired   Comments Pleasant and cooperative. Appreciative of care. However, pt very focused on needing a \"police escort to get out of the hospital because I'm worried my  will get me\". Pt's attention able to be redirected with concise, 1-step commands.   Active ROM Lower Body    Active ROM Lower Body  WDL   Strength Lower Body   Lower Body Strength  X   Gross Strength Generalized Weakness, Equal Bilaterally   Comments BLE grossly 3+/5   Neuro-Muscular Treatments   Neuro-Muscular Treatments Anterior weight shift;Weight Shift Right;Weight Shift Left;Verbal Cuing;Tactile Cuing;Compensatory Strategies;Facilitation;Postural Facilitation;Sequencing  (Verbal/tactile cues to facilitate lateral weight shifts in sitting, sequencing with sit<>stands with FWW, upright posture, hand placement in preparation for transfers, and anterior trunk lean.)   Balance   Sitting Balance (Static) Fair   Sitting Balance (Dynamic) Fair   Standing Balance (Static) Poor +   Standing Balance (Dynamic) Poor   Weight Shift Sitting Poor   Weight Shift Standing Poor   Skilled Intervention Compensatory Strategies;Facilitation;Postural Facilitation;Sequencing;Verbal Cuing;Tactile Cuing   Comments Used FWW. Verbal and tactile cues for hand " placement in preparation for sit<>stands.   Bed Mobility    Supine to Sit Minimal Assist   Sit to Supine   (Pt left up in chair. RN aware.)   Scooting Minimal Assist   Skilled Intervention Compensatory Strategies;Verbal Cuing;Tactile Cuing   Comments HOB elevated. Used L bedrail.   Gait Analysis   Gait Level Of Assist Moderate Assist   Assistive Device Front Wheel Walker   Distance (Feet) 3   # of Times Distance was Traveled 1   Deviation Decreased Heel Strike;Decreased Toe Off  (Forward flexed posture;)   # of Stairs Climbed 0   Weight Bearing Status no restrictions   Vision Deficits Impacting Mobility NT   Skilled Intervention Postural Facilitation;Facilitation;Compensatory Strategies;Verbal Cuing;Tactile Cuing   Functional Mobility   Sit to Stand Minimal Assist   Bed, Chair, Wheelchair Transfer Minimal Assist   Transfer Method Stand Step   Mobility supine > EOB > stand > chair   Skilled Intervention Compensatory Strategies;Postural Facilitation;Verbal Cuing;Tactile Cuing;Sequencing   Comments Used FWW. Pt attempted 3 stands. Pt requiring increased assistance after each subsequent attempt. Min A for first 2 attempts. Mod A for third standing attempt.   6 Clicks Assessment - How much HELP from from another person do you currently need... (If the patient hasn't done an activity recently, how much help from another person do you think he/she would need if he/she tried?)   Turning from your back to your side while in a flat bed without using bedrails? 3   Moving from lying on your back to sitting on the side of a flat bed without using bedrails? 3   Moving to and from a bed to a chair (including a wheelchair)? 3   Standing up from a chair using your arms (e.g., wheelchair, or bedside chair)? 3   Walking in hospital room? 2   Climbing 3-5 steps with a railing? 1   6 clicks Mobility Score 15   Patient / Family Goals    Patient / Family Goal #1 to get 'her head right'   Goal #1 Outcome Goal not met   Short Term Goals     Short Term Goal # 1 Pt will perform supine<>sit from flat HOB/no railing with supervision within 6 visits to ensure independent mobiltiy at home.   Goal Outcome # 1 Progressing as expected   Short Term Goal # 2 Pt will perform sit<>stand with FWW and supervision within 6 visits to progress to independence.   Goal Outcome # 2 Progressing as expected   Short Term Goal # 3 Pt will ambulate x 50ft with FWW and supervision within 6 visits to progress to independence.   Goal Outcome # 3 Goal not met   Education Group   Education Provided Role of Physical Therapist   Role of Physical Therapist Patient Response Patient;Acceptance;Explanation;Verbal Demonstration   Physical Therapy Treatment Plan   Physical Therapy Treatment Plan Continue Current Treatment Plan   Treatment Plan  Bed Mobility;Equipment;Gait Training;Neuro Re-Education / Balance;Self Care / Home Evaluation;Stair Training;Therapeutic Activities;Therapeutic Exercise   Treatment Frequency 3 Times per Week   Duration Until Therapy Goals Met   Anticipated Discharge Equipment and Recommendations   DC Equipment Recommendations Unable to determine at this time   Discharge Recommendations Recommend post-acute placement for additional physical therapy services prior to discharge home   Interdisciplinary Plan of Care Collaboration   IDT Collaboration with  Nursing   Patient Position at End of Therapy Seated;Chair Alarm On;Call Light within Reach;Tray Table within Reach;Phone within Reach   Collaboration Comments RN updated. Handoff to RN.   Session Information   Date / Session Number  4/19 - 2 (2/3, 4/23)

## 2024-04-19 NOTE — CARE PLAN
The patient is Stable - Low risk of patient condition declining or worsening    Shift Goals  Clinical Goals: Q4 NA checks  Patient Goals: rest  Family Goals: LAWRENCE    Progress made toward(s) clinical / shift goals:  q4 neuros completed.      Problem: Pain - Standard  Goal: Alleviation of pain or a reduction in pain to the patient’s comfort goal  Outcome: Progressing     Problem: Knowledge Deficit - Standard  Goal: Patient and family/care givers will demonstrate understanding of plan of care, disease process/condition, diagnostic tests and medications  Outcome: Progressing     Problem: Fall Risk  Goal: Patient will remain free from falls  Outcome: Progressing

## 2024-04-19 NOTE — PROGRESS NOTES
4 Eyes Skin Assessment Completed by KENYA Mcgowan and KENYA Banda.    Head WDL  Ears WDL  Nose WDL  Mouth WDL, dry lips  Neck WDL  Breast/Chest WDL  Shoulder Blades Redness and Blanching  Spine Redness and Blanching  (R) Arm/Elbow/Hand WDL  (L) Arm/Elbow/Hand WDL  Abdomen WDL  Groin WDL  Scrotum/Coccyx/Buttocks Redness, Blanching, and Moisture Fissure    (R) Leg WDL, R hip discoloration  (L) Leg WDL  (R) Heel/Foot/Toe Blanching and Boggy  (L) Heel/Foot/Toe Non-Blanching and Boggy          Devices In Places ECG, Blood Pressure Cuff, Pulse Ox, and Nasal Cannula      Interventions In Place Gray Ear Foams, NC W/Ear Foams, Pillows, Q2 Turns, and Low Air Loss Mattress    Possible Skin Injury Yes    Pictures Uploaded Into Epic N/A  Wound Consult Placed Yes  RN Wound Prevention Protocol Ordered Yes

## 2024-04-19 NOTE — PROGRESS NOTES
Tisha from Lab called with critical result of serum sodium 119 at 2135. Critical lab result read back to KENYA Banda.   Dr. Reeves notified of critical lab result at 2139.  Critical lab result read back by Dr. Reeves.   3% saline IV infusion order updated (see MAR for details).

## 2024-04-19 NOTE — PROGRESS NOTES
4 Eyes Skin Assessment Completed by Derrick Ridley RN and KENYA Veronica.    Head WDL  Ears WDL  Nose WDL  Mouth WDL  Neck WDL  Breast/Chest WDL  Shoulder Blades WDL  Spine WDL  (R) Arm/Elbow/Hand WDL  (L) Arm/Elbow/Hand WDL  Abdomen WDL  Groin WDL  Scrotum/Coccyx/Buttocks Redness and Blanching, moisture fissure  (R) Leg WDL  (L) Leg WDL  (R) Heel/Foot/Toe Blanching and Boggy  (L) Heel/Foot/Toe Blanching and Boggy          Devices In Places ECG, Blood Pressure Cuff, Pulse Ox, and Nasal Cannula      Interventions In Place NC W/Ear Foams, Pillows, Q2 Turns, and Low Air Loss Mattress    Possible Skin Injury Yes    Pictures Uploaded Into Epic N/A  Wound Consult Placed Yes  RN Wound Prevention Protocol Ordered No

## 2024-04-19 NOTE — PROGRESS NOTES
"Infectious Disease Progress Note    Author: Holly Sanchez M.D. Date & Time of service: 2024  7:47 AM    Chief Complaint:  Follow-up for neurosyphilis    Interval History:   patient afebrile, sodium 114.  Patient transferred to [facility] secondary to worsening hyponatremia.  Patient now on IV penicillin. Patient perseverating on need for phone book. Unreliable historian secondary to altered mentation and delusions   patient afebrile, sodium improved to 121.  No changes in mental status.  Midline placed yesterday      Labs Reviewed and Medications Reviewed.    Review of Systems:  Review of Systems   Unable to perform ROS: Mental status change       Hemodynamics:  Temp (24hrs), Av.8 °C (98.2 °F), Min:36.4 °C (97.5 °F), Max:37.1 °C (98.8 °F)  Temperature: 36.4 °C (97.5 °F)  Pulse  Av.1  Min: 60  Max: 124   Blood Pressure : 124/57       Physical Exam:  Physical Exam  Vitals and nursing note reviewed.   HENT:      Mouth/Throat:      Mouth: Mucous membranes are moist.   Eyes:      Pupils: Pupils are equal, round, and reactive to light.   Cardiovascular:      Rate and Rhythm: Normal rate.   Pulmonary:      Effort: Pulmonary effort is normal. No respiratory distress.   Musculoskeletal:      Comments: Left upper extremity midline in place   Neurological:      Mental Status: She is alert.      Comments: Delusions         Meds:    Current Facility-Administered Medications:     magnesium sulfate    3% sodium chloride    melatonin    penicillin G potassium 18 Million Units in  mL infusion    hydrALAZINE    LORazepam    [Held by provider] enoxaparin (LOVENOX) injection    LORazepam    carvedilol    losartan    polyethylene glycol/lytes    [Held by provider] QUEtiapine    levothyroxine    HYDROcodone-acetaminophen    acetaminophen    Labs:  No results for input(s): \"WBC\", \"RBC\", \"HEMOGLOBIN\", \"HEMATOCRIT\", \"MCV\", \"MCH\", \"RDW\", \"PLATELETCT\", \"MPV\", \"NEUTSPOLYS\", \"LYMPHOCYTES\", \"MONOCYTES\", \"EOSINOPHILS\", " "\"BASOPHILS\", \"RBCMORPHOLO\" in the last 72 hours.  Recent Labs     04/17/24  1559 04/17/24 2037 04/18/24 0420 04/18/24  0804 04/18/24 2017 04/19/24  0015 04/19/24  0518   SODIUM 116*   < > 114*   < > 119* 121* 121*   POTASSIUM 5.0  --  4.5  --   --   --  4.2   CHLORIDE 85*  --  85*  --   --   --  94*   CO2 18*  --  17*  --   --   --  16*   GLUCOSE 136*  --  102*  --   --   --  88   BUN 15  --  13  --   --   --  13    < > = values in this interval not displayed.     Recent Labs     04/17/24 1559 04/18/24 0420 04/19/24  0518   CREATININE 0.69 0.58 0.56       Imaging:  DX-LUMBAR PUNCTURE FOR DIAGNOSIS    Result Date: 4/16/2024 4/16/2024 2:10 PM HISTORY/REASON FOR EXAM:  confusion. Dr. Hanson did attempt LP at the bedside and it was not successful. Altered mental status TECHNIQUE/EXAM DESCRIPTION AND NUMBER OF VIEWS: Fluoroscopic-guided lumbar puncture. 1 fluoroscopic images obtained. Fluoroscopy time: 0.7 minutes Fluoroscopy dose(DAP): 0.960 Gy*cm^2 PROCEDURE:     Informed consent was obtained. A timeout was taken. With the patient in prone position, the lumbar region was prepped and draped in a sterile manner. Following local anesthesia with 1% lidocaine, a 22-gauge spinal needle was advanced into the subarachnoid space at the L2-3 level.  Approximately 15 cc of CSF was collected and the specimens sent to the lab for the studies requested. The patient tolerated the procedure well with no evidence of complication. The procedure was performed by CAROLE Lopez under my direct supervision.     Fluoroscopic-guided lumbar puncture as described above.    CT-HEAD W/O    Result Date: 4/12/2024 4/12/2024 7:22 PM HISTORY/REASON FOR EXAM:  Altered mental status. TECHNIQUE/EXAM DESCRIPTION AND NUMBER OF VIEWS: CT of the head without contrast. The study was performed on a helical multidetector CT scanner. Contiguous axial sections were obtained from the skull base through the vertex. Up to date radiation dose reduction " adjustments have been utilized to meet ALARA standards for radiation dose reduction. COMPARISON:  None available FINDINGS: No intracranial mass, mass effect, midline shift, hydrocephalus, hemorrhage or CT apparent acute infarct. Moderate atrophy. Dystrophic basal ganglia calcifications. Calvarium is intact. Paranasal sinuses in the field of view are unremarkable. Mastoids in the field of view are unremarkable.     1.  No acute intracranial abnormality detected.     DX-CHEST-PORTABLE (1 VIEW)    Result Date: 4/12/2024 4/12/2024 6:57 PM HISTORY/REASON FOR EXAM:  Altered consciousness TECHNIQUE/EXAM DESCRIPTION AND NUMBER OF VIEWS: Single portable view of the chest. COMPARISON: 8/24/2022 FINDINGS: HEART: Stable size. LUNGS: No areas of air space disease are demonstrated. PLEURA: No effusion or pneumothorax.     No acute cardiac or pulmonary abnormalities are identified.      Micro:  Results       Procedure Component Value Units Date/Time    GRAM STAIN [[id number]] Collected: 04/16/24 1415    Order Status: Completed Specimen: CSF Updated: 04/18/24 1203     Significant Indicator .     Source CSF     Site TAP     Gram Stain Result Few WBCs.  No organisms seen.      CSF CULTURE [[id number]] Collected: 04/16/24 1415    Order Status: Sent Specimen: CSF from Tap Updated: 04/18/24 1203     Significant Indicator NEG     Source CSF     Site TAP     Culture Result -     Gram Stain Result Few WBCs.  No organisms seen.      Urinalysis [[id number]]  (Abnormal) Collected: 04/12/24 1702    Order Status: Completed Specimen: Urine Updated: 04/12/24 1740     Color Yellow     Character Clear     Specific Gravity 1.009     Ph 6.5     Glucose Negative mg/dL      Ketones Negative mg/dL      Protein Negative mg/dL      Bilirubin Negative     Urobilinogen, Urine 0.2     Nitrite Negative     Leukocyte Esterase Trace     Occult Blood Negative     Micro Urine Req Microscopic            Assessment:  Active Hospital Problems    Diagnosis      *Psychotic disorder ([facility]) [F29]     Neurosyphilis [A52.3]     Hyponatremia [E87.1]     Vitamin B12 deficiency [E53.8]     Bipolar depression ([facility]) [F31.9]     Idiopathic hypothyroidism [E03.9]     Hypertension [I10]      89 y.o. woman with a history of bipolar depression with recent psychosis admitted on 4/12/2024 secondary to confusion.  She underwent a lumbar puncture on 4/15 with elevated WBC and elevated protein highly concerning for neurosyphilis given clinical presentation and positive syphilis antibodies.  Unfortunately, unable to obtain a reliable history regarding prior syphilis testing and/or treatment so reasonable to treat for suspected neurosyphilis.  Patient has other factors that are likely contributing to her altered mentation including underlying psychiatric disorder and hyponatremia.    Pertinent diagnoses:  Neurosyphilis  Altered mentation with delusions  Bipolar depression  Hyponatremia, slowly improving    Plan:  -Continue IV penicillin 18,000,000 units daily for 2-week course.  Stop date of 4/30/2024  -Follow CSF culture-pending, VDRL from CSF-pending.  Will continue to treat for neurosyphilis even if VDRL is negative this is not a very sensitive test  -Syphilis, treponemal qualitative and MHA-TP reactive  -RPR nonreactive  -Meningitis/encephalitis PCR-negative  -MRI of the brain pending  -Management of hyponatremia per primary team    Disposition: Recommend SNF for long-term course of IV antibiotics    Need for midline: Yes, placed on 4/18    Discussed with internal medicine/Dr. Freitas

## 2024-04-19 NOTE — PROGRESS NOTES
Around 0020 patient HR jumped to 170's for about a minute. Patient denies any chest pain or other symptoms, other VSS. Charge RN notified, stat EKG ordered, MD Davis notified. Patient HR now averaging 's, SR-ST. Orders for BMP and Mg received. Patient updated on POC and questions answered.

## 2024-04-19 NOTE — PROGRESS NOTES
Hospital Medicine Daily Progress Note    Date of Service  4/19/2024    Chief Complaint  Altered mental status    Hospital Course  Ricky Junior is a 89 y.o. female chronic back pain with degenerative joint disease, bipolar disorder admitted 4/12/2024 with altered mental status.  She was seen by primary care provider before coming to the emergency room she was alert and oriented x 3 but is having delusions.  She was kept in the ER due to her delusions.  It was noted that her sodium dropped to 122.  Per admission notes she was delusional stating that her  was obsessed with the devil.  It was noted that she has neurosyphilis and a low B12.  Infectious disease has been consulted and initiated patient on IV penicillin.    Interval Problem Update  4/19 serum sodium levels held steady overnight without a significant increase as wanted.  Patient less tangential but still talks ill about her .  Moves all extremities purposely.  Less delusional conversation today.  I encouraged her to eat more today if possible as well as oral intake.  Patient remains on 3% sodium chloride.  Titrating to keep less than the 10 point increase over 24-hour period of time.  Sodium levels held nicely overnight.  She has been increased again on 3% and we will closely monitoring in the IMCU.    I have discussed this patient's plan of care and discharge plan at IDT rounds today with Case Management, Nursing, Nursing leadership, and other members of the IDT team.    Consultants/Specialty  infectious disease    Code Status  Full Code    Disposition  The patient is not medically cleared for discharge to home or a post-acute facility.      I have placed the appropriate orders for post-discharge needs.    Review of Systems  Review of Systems   Unable to perform ROS: Psychiatric disorder   Constitutional:  Negative for fever and malaise/fatigue.   Respiratory:  Negative for shortness of breath.    Cardiovascular:  Negative for leg  swelling.   Gastrointestinal:  Negative for abdominal pain.   Neurological:  Negative for dizziness.   Psychiatric/Behavioral:  The patient is not nervous/anxious.         Physical Exam  Temp:  [36.4 °C (97.5 °F)-36.9 °C (98.4 °F)] 36.6 °C (97.8 °F)  Pulse:  [] 63  Resp:  [10-38] 28  BP: (113-176)/(55-88) 140/85  SpO2:  [94 %-100 %] 94 %    Physical Exam  Vitals reviewed.   Constitutional:       Appearance: Normal appearance. She is underweight. She is not diaphoretic.      Comments: thin   HENT:      Head: Normocephalic and atraumatic.      Nose: Nose normal.      Mouth/Throat:      Mouth: Mucous membranes are moist.      Pharynx: No oropharyngeal exudate.   Eyes:      General:         Right eye: No discharge.         Left eye: No discharge.      Extraocular Movements: Extraocular movements intact.      Conjunctiva/sclera: Conjunctivae normal.   Cardiovascular:      Rate and Rhythm: Regular rhythm. Tachycardia present.      Pulses:           Radial pulses are 2+ on the right side and 2+ on the left side.        Dorsalis pedis pulses are 2+ on the right side and 2+ on the left side.      Heart sounds: No murmur heard.  Pulmonary:      Effort: Pulmonary effort is normal. No respiratory distress.      Breath sounds: Normal breath sounds. No wheezing or rales.   Abdominal:      General: Bowel sounds are normal. There is no distension.      Palpations: Abdomen is soft.      Tenderness: There is no abdominal tenderness. There is no guarding.   Musculoskeletal:         General: No swelling or tenderness.      Cervical back: Normal range of motion and neck supple. No muscular tenderness.      Right lower leg: No edema.      Left lower leg: No edema.   Skin:     Coloration: Skin is not jaundiced or pale.   Neurological:      General: No focal deficit present.      Mental Status: She is alert and oriented to person, place, and time.      Cranial Nerves: No cranial nerve deficit.   Psychiatric:         Mood and Affect:  Mood normal. Mood is not anxious.         Speech: Speech normal.         Behavior: Behavior normal. Behavior is cooperative.         Thought Content: Thought content is delusional.         Cognition and Memory: Cognition is impaired.         Judgment: Judgment is inappropriate.         Fluids    Intake/Output Summary (Last 24 hours) at 4/19/2024 1335  Last data filed at 4/19/2024 0500  Gross per 24 hour   Intake 684.51 ml   Output --   Net 684.51 ml       Laboratory      Recent Labs     04/17/24  1559 04/17/24  2037 04/18/24  0420 04/18/24  0804 04/19/24  0015 04/19/24  0518 04/19/24  0830   SODIUM 116*   < > 114*   < > 121* 121* 122*   POTASSIUM 5.0  --  4.5  --   --  4.2  --    CHLORIDE 85*  --  85*  --   --  94*  --    CO2 18*  --  17*  --   --  16*  --    GLUCOSE 136*  --  102*  --   --  88  --    BUN 15  --  13  --   --  13  --    CREATININE 0.69  --  0.58  --   --  0.56  --    CALCIUM 8.6  --  8.1*  --   --  7.8*  --     < > = values in this interval not displayed.                     Imaging  IR-MIDLINE CATHETER INSERTION WO GUIDANCE > AGE 3   Final Result                  Ultrasound-guided midline placement performed by qualified nursing staff    as above.          DX-LUMBAR PUNCTURE FOR DIAGNOSIS   Final Result      Fluoroscopic-guided lumbar puncture as described above.      CT-HEAD W/O   Final Result      1.  No acute intracranial abnormality detected.         DX-CHEST-PORTABLE (1 VIEW)   Final Result      No acute cardiac or pulmonary abnormalities are identified.      MR-BRAIN-W/O    (Results Pending)        Assessment/Plan  * Psychotic disorder (HCC)- (present on admission)  Assessment & Plan  With delusions and darin  Psychiatry consulted  TSH CT head UA U tox wnl. No clear metabolic etiologies.   Hold Seroquel  Hold on mood stabilizers that may affect sodium such as Depakote for now  MRI brain pending, there is question of some metal in her mouth per RN.  Also she would not be able to hold still  currently unless we sedate her.  If the patient continues to have improved neurologic status would discontinue MRI  EEG is normal  This may be compounded by hyponatremia which is being addressed  B12 on the low side.  Start B12 injections      Neurosyphilis- (present on admission)  Assessment & Plan  New diagnosis by cerebrospinal fluid  Infectious disease consulted  High-dose IV penicillin G continuous infusion  HIV negative    Vitamin B12 deficiency- (present on admission)  Assessment & Plan  Replace  Continue to follow-up with primary care  Monitor for improvement on her mentation/delusional    Hyponatremia- (present on admission)  Assessment & Plan  Question of secondary Seroquel which has been held also Depakote has been held.  Patient was initiated on 3% and checking serum sodiums every 6 hours.  Will attempt to keep sodium levels from rising greater than 10 points.  Now greater than 120.  Trend to get her to and take more oral fluids and diet.  Watch fluid intake urine output  Diet as tolerates  Holding any thiazides    Bipolar depression (HCC)- (present on admission)  Assessment & Plan  Holding antipsychotics for now in face of hyponatremia  Psychiatry consulting.    Idiopathic hypothyroidism- (present on admission)  Assessment & Plan  Continue synthroid 175 mcg daily  Normal TSH and free thyroxine during this admission    Hypertension- (present on admission)  Assessment & Plan  Continue active treatment with losartan and Coreg  Monitoring BP  As needed hydralazine         VTE prophylaxis:    enoxaparin ppx

## 2024-04-20 ENCOUNTER — APPOINTMENT (OUTPATIENT)
Dept: RADIOLOGY | Facility: MEDICAL CENTER | Age: 89
DRG: 056 | End: 2024-04-20
Attending: HOSPITALIST
Payer: MEDICARE

## 2024-04-20 LAB
SODIUM SERPL-SCNC: 124 MMOL/L (ref 135–145)
SODIUM SERPL-SCNC: 125 MMOL/L (ref 135–145)
SODIUM SERPL-SCNC: 126 MMOL/L (ref 135–145)
SODIUM SERPL-SCNC: 129 MMOL/L (ref 135–145)

## 2024-04-20 PROCEDURE — A9270 NON-COVERED ITEM OR SERVICE: HCPCS | Performed by: STUDENT IN AN ORGANIZED HEALTH CARE EDUCATION/TRAINING PROGRAM

## 2024-04-20 PROCEDURE — 770000 HCHG ROOM/CARE - INTERMEDIATE ICU *

## 2024-04-20 PROCEDURE — 700102 HCHG RX REV CODE 250 W/ 637 OVERRIDE(OP): Performed by: STUDENT IN AN ORGANIZED HEALTH CARE EDUCATION/TRAINING PROGRAM

## 2024-04-20 PROCEDURE — A9270 NON-COVERED ITEM OR SERVICE: HCPCS | Performed by: HOSPITALIST

## 2024-04-20 PROCEDURE — 700102 HCHG RX REV CODE 250 W/ 637 OVERRIDE(OP): Performed by: EMERGENCY MEDICINE

## 2024-04-20 PROCEDURE — 700111 HCHG RX REV CODE 636 W/ 250 OVERRIDE (IP): Mod: JZ | Performed by: HOSPITALIST

## 2024-04-20 PROCEDURE — 84295 ASSAY OF SERUM SODIUM: CPT | Mod: 91

## 2024-04-20 PROCEDURE — 70450 CT HEAD/BRAIN W/O DYE: CPT

## 2024-04-20 PROCEDURE — 700105 HCHG RX REV CODE 258: Performed by: HOSPITALIST

## 2024-04-20 PROCEDURE — 700111 HCHG RX REV CODE 636 W/ 250 OVERRIDE (IP): Performed by: HOSPITALIST

## 2024-04-20 PROCEDURE — A9270 NON-COVERED ITEM OR SERVICE: HCPCS | Performed by: EMERGENCY MEDICINE

## 2024-04-20 PROCEDURE — 700102 HCHG RX REV CODE 250 W/ 637 OVERRIDE(OP): Performed by: HOSPITALIST

## 2024-04-20 PROCEDURE — A9270 NON-COVERED ITEM OR SERVICE: HCPCS | Performed by: INTERNAL MEDICINE

## 2024-04-20 PROCEDURE — 99233 SBSQ HOSP IP/OBS HIGH 50: CPT | Performed by: INTERNAL MEDICINE

## 2024-04-20 PROCEDURE — 99233 SBSQ HOSP IP/OBS HIGH 50: CPT | Performed by: HOSPITALIST

## 2024-04-20 PROCEDURE — 700105 HCHG RX REV CODE 258: Performed by: INTERNAL MEDICINE

## 2024-04-20 PROCEDURE — 700102 HCHG RX REV CODE 250 W/ 637 OVERRIDE(OP): Performed by: INTERNAL MEDICINE

## 2024-04-20 PROCEDURE — 700111 HCHG RX REV CODE 636 W/ 250 OVERRIDE (IP): Performed by: INTERNAL MEDICINE

## 2024-04-20 RX ORDER — ENALAPRILAT 1.25 MG/ML
1.25 INJECTION INTRAVENOUS EVERY 6 HOURS PRN
Status: DISCONTINUED | OUTPATIENT
Start: 2024-04-20 | End: 2024-04-25 | Stop reason: HOSPADM

## 2024-04-20 RX ORDER — M-VIT,TX,IRON,MINS/CALC/FOLIC 27MG-0.4MG
1 TABLET ORAL DAILY
Status: DISCONTINUED | OUTPATIENT
Start: 2024-04-20 | End: 2024-04-25 | Stop reason: HOSPADM

## 2024-04-20 RX ORDER — LORAZEPAM 0.5 MG/1
0.5 TABLET ORAL ONCE
Status: COMPLETED | OUTPATIENT
Start: 2024-04-20 | End: 2024-04-20

## 2024-04-20 RX ORDER — SODIUM CHLORIDE 1 G/1
1 TABLET ORAL 2 TIMES DAILY WITH MEALS
Status: DISCONTINUED | OUTPATIENT
Start: 2024-04-20 | End: 2024-04-21

## 2024-04-20 RX ORDER — HYDROCODONE BITARTRATE AND ACETAMINOPHEN 10; 325 MG/1; MG/1
1 TABLET ORAL ONCE
Status: COMPLETED | OUTPATIENT
Start: 2024-04-20 | End: 2024-04-20

## 2024-04-20 RX ORDER — AMLODIPINE BESYLATE 10 MG/1
5 TABLET ORAL
Status: DISCONTINUED | OUTPATIENT
Start: 2024-04-20 | End: 2024-04-22

## 2024-04-20 RX ADMIN — SODIUM CHLORIDE 1 G: 1 TABLET ORAL at 17:45

## 2024-04-20 RX ADMIN — HYDROCODONE BITARTRATE AND ACETAMINOPHEN 1 TABLET: 5; 325 TABLET ORAL at 17:45

## 2024-04-20 RX ADMIN — HYDRALAZINE HYDROCHLORIDE 10 MG: 20 INJECTION, SOLUTION INTRAMUSCULAR; INTRAVENOUS at 10:13

## 2024-04-20 RX ADMIN — SODIUM CHLORIDE 1 G: 1 TABLET ORAL at 11:15

## 2024-04-20 RX ADMIN — POLYETHYLENE GLYCOL 3350 1 PACKET: 17 POWDER, FOR SOLUTION ORAL at 05:38

## 2024-04-20 RX ADMIN — HYDRALAZINE HYDROCHLORIDE 10 MG: 20 INJECTION, SOLUTION INTRAMUSCULAR; INTRAVENOUS at 17:22

## 2024-04-20 RX ADMIN — AMLODIPINE BESYLATE 5 MG: 10 TABLET ORAL at 10:13

## 2024-04-20 RX ADMIN — LOSARTAN POTASSIUM 100 MG: 50 TABLET, FILM COATED ORAL at 05:36

## 2024-04-20 RX ADMIN — HYDROCODONE BITARTRATE AND ACETAMINOPHEN 1 TABLET: 5; 325 TABLET ORAL at 02:41

## 2024-04-20 RX ADMIN — LEVOTHYROXINE SODIUM 175 MCG: 0.17 TABLET ORAL at 05:36

## 2024-04-20 RX ADMIN — SODIUM CHLORIDE: 3 INJECTION, SOLUTION INTRAVENOUS at 21:30

## 2024-04-20 RX ADMIN — Medication 1 TABLET: at 17:45

## 2024-04-20 RX ADMIN — SODIUM CHLORIDE: 3 INJECTION, SOLUTION INTRAVENOUS at 01:59

## 2024-04-20 RX ADMIN — LORAZEPAM 0.5 MG: 0.5 TABLET ORAL at 21:21

## 2024-04-20 RX ADMIN — ENOXAPARIN SODIUM 40 MG: 100 INJECTION SUBCUTANEOUS at 17:46

## 2024-04-20 RX ADMIN — HYDROCODONE BITARTRATE AND ACETAMINOPHEN 1 TABLET: 10; 325 TABLET ORAL at 21:21

## 2024-04-20 RX ADMIN — CARVEDILOL 12.5 MG: 12.5 TABLET, FILM COATED ORAL at 10:13

## 2024-04-20 RX ADMIN — CARVEDILOL 12.5 MG: 12.5 TABLET, FILM COATED ORAL at 17:45

## 2024-04-20 RX ADMIN — SODIUM CHLORIDE 18 MILLION UNITS: 9 INJECTION, SOLUTION INTRAVENOUS at 10:30

## 2024-04-20 ASSESSMENT — PAIN SCALES - PAIN ASSESSMENT IN ADVANCED DEMENTIA (PAINAD)
TOTALSCORE: 6
FACIALEXPRESSION: SMILING OR INEXPRESSIVE
BODYLANGUAGE: RELAXED
TOTALSCORE: 6
BODYLANGUAGE: TENSE, DISTRESSED PACING, FIDGETING
NEGVOCALIZATION: REPEATED TROUBLED CALLING OUT, LOUD MOANING/GROANING, CRYING
BREATHING: NOISY LABORED BREATHING, LONG PERIODS OF HYPERVENTILATION, CHEYNE-STOKES RESPIRATIONS
CONSOLABILITY: DISTRACTED OR REASSURED BY VOICE/TOUCH
FACIALEXPRESSION: SMILING OR INEXPRESSIVE
NEGVOCALIZATION: OCCASIONAL MOAN/GROAN, LOW SPEECH, NEGATIVE/DISAPPROVING QUALITY
CONSOLABILITY: NO NEED TO CONSOLE
FACIALEXPRESSION: SMILING OR INEXPRESSIVE
TOTALSCORE: 1
CONSOLABILITY: DISTRACTED OR REASSURED BY VOICE/TOUCH
BREATHING: NORMAL
NEGVOCALIZATION: REPEATED TROUBLED CALLING OUT, LOUD MOANING/GROANING, CRYING
BREATHING: NOISY LABORED BREATHING, LONG PERIODS OF HYPERVENTILATION, CHEYNE-STOKES RESPIRATIONS
BODYLANGUAGE: TENSE, DISTRESSED PACING, FIDGETING

## 2024-04-20 ASSESSMENT — PAIN DESCRIPTION - PAIN TYPE
TYPE: ACUTE PAIN

## 2024-04-20 ASSESSMENT — ENCOUNTER SYMPTOMS
DIZZINESS: 0
ABDOMINAL PAIN: 0
HEADACHES: 0
NERVOUS/ANXIOUS: 0
SHORTNESS OF BREATH: 0

## 2024-04-20 ASSESSMENT — FIBROSIS 4 INDEX: FIB4 SCORE: 2.3

## 2024-04-20 NOTE — PROGRESS NOTES
"Infectious Disease Progress Note    Author: Holly Sanchez M.D. Date & Time of service: 2024  7:22 AM    Chief Complaint:  Follow-up for neurosyphilis    Interval History:   patient afebrile, sodium 114.  Patient transferred to [facility] secondary to worsening hyponatremia.  Patient now on IV penicillin. Patient perseverating on need for phone book. Unreliable historian secondary to altered mentation and delusions   patient afebrile, sodium improved to 121.  No changes in mental status.  Midline placed yesterday   patient afebrile, sodium improved to 125, CSF cultures negative.  VDRL from CSF still pending.  No changes in mental status, continues to have delusions    Labs Reviewed and Medications Reviewed.    Review of Systems:  Review of Systems   Unable to perform ROS: Mental status change       Hemodynamics:  Temp (24hrs), Av.4 °C (97.6 °F), Min:36 °C (96.8 °F), Max:36.6 °C (97.9 °F)  Temperature: 36.4 °C (97.6 °F)  Pulse  Av.4  Min: 60  Max: 124   Blood Pressure : (!) 200/109       Physical Exam:  Physical Exam  Vitals and nursing note reviewed.   HENT:      Mouth/Throat:      Mouth: Mucous membranes are moist.   Eyes:      Pupils: Pupils are equal, round, and reactive to light.   Cardiovascular:      Rate and Rhythm: Normal rate.   Pulmonary:      Effort: Pulmonary effort is normal. No respiratory distress.   Musculoskeletal:      Comments: Left upper extremity midline in place   Neurological:      Mental Status: She is alert.      Comments: Delusions         Meds:    Current Facility-Administered Medications:     enoxaparin (LOVENOX) injection    3% sodium chloride    melatonin    penicillin G potassium 18 Million Units in  mL infusion    hydrALAZINE    LORazepam    LORazepam    carvedilol    losartan    polyethylene glycol/lytes    [Held by provider] QUEtiapine    levothyroxine    HYDROcodone-acetaminophen    acetaminophen    Labs:  No results for input(s): \"WBC\", \"RBC\", " "\"HEMOGLOBIN\", \"HEMATOCRIT\", \"MCV\", \"MCH\", \"RDW\", \"PLATELETCT\", \"MPV\", \"NEUTSPOLYS\", \"LYMPHOCYTES\", \"MONOCYTES\", \"EOSINOPHILS\", \"BASOPHILS\", \"RBCMORPHOLO\" in the last 72 hours.  Recent Labs     04/17/24  1559 04/17/24 2037 04/18/24  0420 04/18/24  0804 04/19/24  0518 04/19/24  0830 04/19/24  1400 04/19/24 2010 04/20/24  0155   SODIUM 116*   < > 114*   < > 121*   < > 125* 125* 126*   POTASSIUM 5.0  --  4.5  --  4.2  --   --   --   --    CHLORIDE 85*  --  85*  --  94*  --   --   --   --    CO2 18*  --  17*  --  16*  --   --   --   --    GLUCOSE 136*  --  102*  --  88  --   --   --   --    BUN 15  --  13  --  13  --   --   --   --     < > = values in this interval not displayed.     Recent Labs     04/17/24 1559 04/18/24 0420 04/19/24  0518   CREATININE 0.69 0.58 0.56       Imaging:  DX-LUMBAR PUNCTURE FOR DIAGNOSIS    Result Date: 4/16/2024 4/16/2024 2:10 PM HISTORY/REASON FOR EXAM:  confusion. Dr. Hanson did attempt LP at the bedside and it was not successful. Altered mental status TECHNIQUE/EXAM DESCRIPTION AND NUMBER OF VIEWS: Fluoroscopic-guided lumbar puncture. 1 fluoroscopic images obtained. Fluoroscopy time: 0.7 minutes Fluoroscopy dose(DAP): 0.960 Gy*cm^2 PROCEDURE:     Informed consent was obtained. A timeout was taken. With the patient in prone position, the lumbar region was prepped and draped in a sterile manner. Following local anesthesia with 1% lidocaine, a 22-gauge spinal needle was advanced into the subarachnoid space at the L2-3 level.  Approximately 15 cc of CSF was collected and the specimens sent to the lab for the studies requested. The patient tolerated the procedure well with no evidence of complication. The procedure was performed by CAROLE Lopez under my direct supervision.     Fluoroscopic-guided lumbar puncture as described above.    CT-HEAD W/O    Result Date: 4/12/2024 4/12/2024 7:22 PM HISTORY/REASON FOR EXAM:  Altered mental status. TECHNIQUE/EXAM DESCRIPTION AND NUMBER OF " VIEWS: CT of the head without contrast. The study was performed on a helical multidetector CT scanner. Contiguous axial sections were obtained from the skull base through the vertex. Up to date radiation dose reduction adjustments have been utilized to meet ALARA standards for radiation dose reduction. COMPARISON:  None available FINDINGS: No intracranial mass, mass effect, midline shift, hydrocephalus, hemorrhage or CT apparent acute infarct. Moderate atrophy. Dystrophic basal ganglia calcifications. Calvarium is intact. Paranasal sinuses in the field of view are unremarkable. Mastoids in the field of view are unremarkable.     1.  No acute intracranial abnormality detected.     DX-CHEST-PORTABLE (1 VIEW)    Result Date: 4/12/2024 4/12/2024 6:57 PM HISTORY/REASON FOR EXAM:  Altered consciousness TECHNIQUE/EXAM DESCRIPTION AND NUMBER OF VIEWS: Single portable view of the chest. COMPARISON: 8/24/2022 FINDINGS: HEART: Stable size. LUNGS: No areas of air space disease are demonstrated. PLEURA: No effusion or pneumothorax.     No acute cardiac or pulmonary abnormalities are identified.      Micro:  Results       Procedure Component Value Units Date/Time    CSF CULTURE [[id number]] Collected: 04/16/24 1415    Order Status: Completed Specimen: CSF from Tap Updated: 04/19/24 1646     Significant Indicator NEG     Source CSF     Site TAP     Culture Result No growth at 24 hours.     Gram Stain Result Few WBCs.  No organisms seen.      GRAM STAIN [[id number]] Collected: 04/16/24 1415    Order Status: Completed Specimen: CSF Updated: 04/18/24 1203     Significant Indicator .     Source CSF     Site TAP     Gram Stain Result Few WBCs.  No organisms seen.              Assessment:  89 y.o. woman with a history of bipolar depression with recent psychosis admitted on 4/12/2024 secondary to confusion.  She underwent a lumbar puncture on 4/15 with elevated WBC and elevated protein highly concerning for neurosyphilis given clinical  presentation and positive syphilis antibodies.  Unfortunately, unable to obtain a reliable history regarding prior syphilis testing and/or treatment so reasonable to treat for suspected neurosyphilis.  Patient has other factors that are likely contributing to her altered mentation including underlying psychiatric disorder and hyponatremia.    Pertinent diagnoses:  Neurosyphilis  Altered mentation with delusions  Bipolar depression  Hyponatremia, slowly improving    Plan:  -Continue IV penicillin 18,000,000 units daily for 2-week course.  Stop date of 4/30/2024  -Follow CSF culture-neastive, VDRL from CSF-pending.  Will continue to treat for neurosyphilis even if VDRL is negative as this is not a very sensitive test  -Syphilis, treponemal qualitative and MHA-TP reactive  -RPR nonreactive  -Meningitis/encephalitis PCR-negative  -MRI of the brain pending  -Management of hyponatremia per primary team    Disposition: Recommend SNF for long-term course of IV antibiotics    Need for midline: Yes, placed on 4/18      Discussed with internal medicine/Dr. Freitas. ID signing off.  Please reconsult if needed

## 2024-04-20 NOTE — PROGRESS NOTES
"Hospital Medicine Daily Progress Note    Date of Service  4/20/2024    Chief Complaint  Altered mental status    Hospital Course  Ricky Junior is a 89 y.o. female chronic back pain with degenerative joint disease, bipolar disorder admitted 4/12/2024 with altered mental status.  She was seen by primary care provider before coming to the emergency room she was alert and oriented x 3 but is having delusions.  She was kept in the ER due to her delusions.  It was noted that her sodium dropped to 122.  Per admission notes she was delusional stating that her  was obsessed with the devil.  It was noted that she has neurosyphilis and a low B12.  Infectious disease has been consulted and initiated patient on IV penicillin.    Interval Problem Update  4/20:  was at bedside this morning.  I alerted him that the patient has neurosyphilis and that this is potentially transmissible sexual disease and that he should get checked by his primary care provider.  I alerted him that he is not to have any further sexual relations with anybody else as this is potentially splittable.  Patient's  was aware and will have follow-up with his doctor.  Patient this morning remains delusional.  She states \"I am the prophet of the world.\"  Otherwise she appears to be in no distress.  This morning she had some high blood pressures question if there accurate as they were in the 250 range.  Subsequently when nursing gave medications she dropped rather quickly.  Will continue to monitor in the IMCU as we have been titrating her 3% sodium which is slowly increasing.  Patient has poor oral intake but will attempt to give her sodium chloride tablets in hopes to wean off 3% sodium.      I have discussed this patient's plan of care and discharge plan at IDT rounds today with Case Management, Nursing, Nursing leadership, and other members of the IDT team.    Consultants/Specialty  infectious disease    Code Status  Full " Code    Disposition  The patient is not medically cleared for discharge to home or a post-acute facility.      I have placed the appropriate orders for post-discharge needs.    Review of Systems  Review of Systems   Unable to perform ROS: Psychiatric disorder   Constitutional:  Negative for malaise/fatigue.   Respiratory:  Negative for shortness of breath.    Gastrointestinal:  Negative for abdominal pain.   Neurological:  Negative for dizziness and headaches.   Psychiatric/Behavioral:  The patient is not nervous/anxious.         Physical Exam  Temp:  [36 °C (96.8 °F)-36.7 °C (98 °F)] 36.7 °C (98 °F)  Pulse:  [67-95] 82  Resp:  [15-30] 30  BP: ()/() 200/109  SpO2:  [92 %-100 %] 97 %    Physical Exam  Vitals reviewed.   Constitutional:       Appearance: Normal appearance. She is underweight. She is not diaphoretic.      Comments: thin   HENT:      Head: Normocephalic and atraumatic.      Nose: Nose normal.      Mouth/Throat:      Mouth: Mucous membranes are moist.   Eyes:      General:         Right eye: No discharge.         Left eye: No discharge.      Extraocular Movements: Extraocular movements intact.      Conjunctiva/sclera: Conjunctivae normal.   Cardiovascular:      Rate and Rhythm: Regular rhythm. Tachycardia present.      Pulses:           Radial pulses are 2+ on the right side and 2+ on the left side.        Dorsalis pedis pulses are 2+ on the right side and 2+ on the left side.      Heart sounds: No murmur heard.  Pulmonary:      Effort: Pulmonary effort is normal. No respiratory distress.      Breath sounds: Normal breath sounds. No wheezing or rales.   Abdominal:      General: Bowel sounds are normal. There is no distension.      Palpations: Abdomen is soft.      Tenderness: There is no abdominal tenderness. There is no guarding.   Musculoskeletal:         General: No swelling or tenderness.      Cervical back: Normal range of motion. No muscular tenderness.      Right lower leg: No edema.       Left lower leg: No edema.   Skin:     Coloration: Skin is not jaundiced or pale.   Neurological:      General: No focal deficit present.      Mental Status: She is alert and oriented to person, place, and time.      Cranial Nerves: No cranial nerve deficit.   Psychiatric:         Attention and Perception: Attention normal.         Mood and Affect: Mood normal. Mood is not anxious.         Speech: Speech normal.         Behavior: Behavior normal. Behavior is cooperative.         Thought Content: Thought content is delusional.         Cognition and Memory: Cognition is impaired.         Judgment: Judgment is inappropriate.         Fluids    Intake/Output Summary (Last 24 hours) at 4/20/2024 1338  Last data filed at 4/20/2024 0600  Gross per 24 hour   Intake 1172.45 ml   Output 500 ml   Net 672.45 ml       Laboratory      Recent Labs     04/17/24  1559 04/17/24  2037 04/18/24  0420 04/18/24  0804 04/19/24  0518 04/19/24  0830 04/19/24 2010 04/20/24  0155 04/20/24  1018   SODIUM 116*   < > 114*   < > 121*   < > 125* 126* 124*   POTASSIUM 5.0  --  4.5  --  4.2  --   --   --   --    CHLORIDE 85*  --  85*  --  94*  --   --   --   --    CO2 18*  --  17*  --  16*  --   --   --   --    GLUCOSE 136*  --  102*  --  88  --   --   --   --    BUN 15  --  13  --  13  --   --   --   --    CREATININE 0.69  --  0.58  --  0.56  --   --   --   --    CALCIUM 8.6  --  8.1*  --  7.8*  --   --   --   --     < > = values in this interval not displayed.                     Imaging  IR-MIDLINE CATHETER INSERTION WO GUIDANCE > AGE 3   Final Result                  Ultrasound-guided midline placement performed by qualified nursing staff    as above.          DX-LUMBAR PUNCTURE FOR DIAGNOSIS   Final Result      Fluoroscopic-guided lumbar puncture as described above.      CT-HEAD W/O   Final Result      1.  No acute intracranial abnormality detected.         DX-CHEST-PORTABLE (1 VIEW)   Final Result      No acute cardiac or pulmonary  abnormalities are identified.      MR-BRAIN-W/O    (Results Pending)   CT-HEAD W/O    (Results Pending)        Assessment/Plan  * Psychotic disorder (HCC)- (present on admission)  Assessment & Plan  With delusions and darin  Psychiatry consulted  TSH CT head UA U tox wnl. No clear metabolic etiologies.   Hold Seroquel  Hold on mood stabilizers that may affect sodium such as Depakote for now  MRI brain pending, there is question of some metal in her mouth per RN.  Also she would not be able to hold still currently unless we sedate her.  If the patient continues to have improved neurologic status would discontinue MRI  4/20 due to higher BP, I will check CT head to rule out enlarged ventricles.  EEG is normal  This may be compounded by hyponatremia which is being addressed  B12 on the low side.  Start B12 injections      Neurosyphilis- (present on admission)  Assessment & Plan  New diagnosis by cerebrospinal fluid  4/20 Alert  that he should be check for syphilis and treated if positive  Infectious disease consulted  High-dose IV penicillin G continuous infusion  HIV negative    Vitamin B12 deficiency- (present on admission)  Assessment & Plan  Replace  Continue to follow-up with primary care  Monitor for improvement on her mentation/delusional    Hyponatremia- (present on admission)  Assessment & Plan  Question of secondary Seroquel which has been held also Depakote has been held.  Continue on 3% and checking serum sodiums every 6 hours.  4/20 added NaCl tabs in hope to stop 3% NaCl.  She needs improvement in oral intake  Will attempt to keep sodium levels from rising greater than 10 points.  Now greater than 120.  Trend to get her to and take more oral fluids and diet.  Watch fluid intake urine output  Diet as tolerates  Holding any thiazides    Bipolar depression (HCC)- (present on admission)  Assessment & Plan  Holding antipsychotics for now in face of hyponatremia  Psychiatry consulting.    Idiopathic  hypothyroidism- (present on admission)  Assessment & Plan  Continue synthroid 175 mcg daily  Normal TSH and free thyroxine during this admission    Hypertension- (present on admission)  Assessment & Plan  Continue active treatment with losartan and Coreg.  4/20 amlodipine added  Monitoring BP  As needed hydralazine and added prn enalapril.         VTE prophylaxis:    enoxaparin ppx

## 2024-04-20 NOTE — CARE PLAN
The patient is Watcher - Medium risk of patient condition declining or worsening    Shift Goals  Clinical Goals: q4 NA checks  Patient Goals: rest  Family Goals: LAWRENCE      Problem: Pain - Standard  Goal: Alleviation of pain or a reduction in pain to the patient’s comfort goal  Outcome: Progressing     Problem: Knowledge Deficit - Standard  Goal: Patient and family/care givers will demonstrate understanding of plan of care, disease process/condition, diagnostic tests and medications  Outcome: Progressing     Problem: Fall Risk  Goal: Patient will remain free from falls  Outcome: Progressing

## 2024-04-20 NOTE — CONSULTS
"  [facility] PSYCHIATRIC FOLLOW-UP:(established)  *Reason for admission:  Patient was at the [facility] lab when staff walked her over to ER due to being confused and asking to go to ER. Per family patients PCP believes her thyroid levels are possible off. Patient A/O x 3. Patient states, \" My son is from the devil and I need to tell all the people. I can not go home because my  is from the devil.\"    *Legal Hold Status: not applicable    S:  yelling. When asked why she needs \"help\" from the police department, her  doesn't have a key to the safe, and other concerns. She calmed down for a bit then yelled again for \"help\"    O: Medical ROS (as pertinent):                      *Psychiatric Examination:   Vitals:   Vitals:    04/20/24 0500 04/20/24 0530 04/20/24 0600 04/20/24 0933   BP: (!) 179/79 (!) 143/65 (!) 200/109    Pulse: 80 77 82    Resp: (!) 21 19 (!) 30    Temp:   36.4 °C (97.6 °F)    TempSrc:   Temporal    SpO2: 97% 97% 97%    Weight:    57.4 kg (126 lb 8.7 oz)   Height:         General Appearance:  good eye contact, friendly, and yelling but stops  Abnormal Movements: none   Gait and Posture: not observed  Speech: within normal limits  Thought Process: normal rate  Associations: mildly  disorganized  Abnormal or Psychotic Thoughts: delusions  Judgement and Insight: impaired   Orientation: grossly intact  Recent and Remote Memory:  has some impairments  Attention Span and Concentration: intact  Language:fluent  Fund of Knowledge: not tested  Mood and Affect: euthymic despite needing \"help\". Smiles easily  SI/HI:  suicidal - no and homicidal - no     Current Medications:  Scheduled Medications   Medication Dose Frequency    amLODIPine  5 mg Q DAY    enoxaparin (LOVENOX) injection  40 mg DAILY AT 1800    penicillin G potassium 18 Million Units in  mL infusion  18 Million Units Continuous Abx    carvedilol  12.5 mg BID WITH MEALS    losartan  100 mg DAILY    polyethylene " "glycol/lytes  1 Packet DAILY    levothyroxine  175 mcg AM ES          *ASSESSMENT/RECOMENDATIONS:  1. Psychotic disorder unspc      Hx of bipolar 1 disorder which may be active or in part.  2 Neurocognitive disorder: both her son (leroy) and  have voiced that at baseline her memory is \"good\". hx of CVA . 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  2    Syphilis must be reported to public health. Consider testing .    Will continue to follow with you.        Discharge recommendations: TBD and per tx tm    If released from [facility]: Discharge Instructions:  -Reviewed safety plan: 911, ER, PCM, MHC, Suicide crisis line  -Please assist with outpatient Psychiatric/substance use follow up appointments at discharge once medically cleared.         "

## 2024-04-20 NOTE — CARE PLAN
The patient is Watcher - Medium risk of patient condition declining or worsening    Shift Goals  Clinical Goals: Neuro/Na, Safety, Hemodynamics  Patient Goals: Rest  Family Goals: LAWRENCE    Progress made toward(s) clinical / shift goals:    Problem: Pain - Standard  Goal: Alleviation of pain or a reduction in pain to the patient’s comfort goal  Description: Target End Date:  Prior to discharge or change in level of care    Document on Vitals flowsheet    1.  Document pain using the appropriate pain scale per order or unit policy  2.  Educate and implement non-pharmacologic comfort measures (i.e. relaxation, distraction, massage, cold/heat therapy, etc.)  3.  Pain management medications as ordered  4.  Reassess pain after pain med administration per policy  5.  If opiods administered assess patient's response to pain medication is appropriate per POSS sedation scale  6.  Follow pain management plan developed in collaboration with patient and interdisciplinary team (including palliative care or pain specialists if applicable)  Outcome: Progressing  Note: Pain is frequently assessed. Patient endorses some abdominal discomfort. PRN medications available. Non-pharmacologic modalities in place     Problem: Hemodynamics  Goal: Patient's hemodynamics, fluid balance and neurologic status will be stable or improve  Description: Target End Date:  Prior to discharge or change in level of care    Document on Assessment and I/O flowsheet templates    1.  Monitor vital signs, pulse oximetry and cardiac monitor per provider order and/or policy  2.  Maintain blood pressure per provider order  3.  Hemodynamic monitoring per provider order  4.  Manage IV fluids and IV infusions  5.  Monitor intake and output  6.  Daily weights per unit policy or provider order  7.  Assess peripheral pulses and capillary refill  8.  Assess color and body temperature  9.  Position patient for maximum circulation/cardiac output  10. Monitor for  signs/symptoms of excessive bleeding  11. Assess mental status, restlessness and changes in level of consciousness  12. Monitor temperature and report fever or hypothermia to provider immediately. Consideration of targeted temperature management.  Outcome: Progressing  Note: Patient briefly hypotensive, with good response upon arousal. Good UOP. SR, SBP >90. Neurologically A&Ox3

## 2024-04-20 NOTE — PROGRESS NOTES
4 Eyes Skin Assessment Completed by KENYA Haider and KENYA Polo.    Head WDL  Ears WDL  Nose WDL  Mouth WDL  Neck WDL  Breast/Chest WDL  Shoulder Blades WDL  Spine WDL  (R) Arm/Elbow/Hand WDL  (L) Arm/Elbow/Hand WDL  Abdomen WDL  Groin WDL  Scrotum/Coccyx/Buttocks Redness, Blanching, Excoriation, and Moisture Fissure,     (R) Leg Discoloration to hip    (L) Leg Discoloration to hip    (R) Heel/Foot/Toe Redness, Blanching, Discoloration, and Boggy, Discoloration to toe    (L) Heel/Foot/Toe Redness, Blanching, Discoloration, Boggy, Scar, and Scab          Devices In Places ECG, Blood Pressure Cuff, Pulse Ox, SCD's, and Nasal Cannula, Purewick       Interventions In Place Gray Ear Foams, Heel Mepilex, TAP System, Pillows, Q2 Turns, Barrier Cream, and Heels Loaded W/Pillows    Possible Skin Injury Yes    Pictures Uploaded Into Epic Yes  Wound Consult Placed Yes  RN Wound Prevention Protocol Ordered Yes

## 2024-04-21 LAB
ANION GAP SERPL CALC-SCNC: 10 MMOL/L (ref 7–16)
BACTERIA CSF CULT: NORMAL
BUN SERPL-MCNC: 13 MG/DL (ref 8–22)
CALCIUM SERPL-MCNC: 8.1 MG/DL (ref 8.5–10.5)
CHLORIDE SERPL-SCNC: 103 MMOL/L (ref 96–112)
CO2 SERPL-SCNC: 17 MMOL/L (ref 20–33)
CREAT SERPL-MCNC: 0.59 MG/DL (ref 0.5–1.4)
ERYTHROCYTE [DISTWIDTH] IN BLOOD BY AUTOMATED COUNT: 41.8 FL (ref 35.9–50)
GFR SERPLBLD CREATININE-BSD FMLA CKD-EPI: 84 ML/MIN/1.73 M 2
GLUCOSE SERPL-MCNC: 88 MG/DL (ref 65–99)
GRAM STN SPEC: NORMAL
HCT VFR BLD AUTO: 24.4 % (ref 37–47)
HGB BLD-MCNC: 8.3 G/DL (ref 12–16)
MCH RBC QN AUTO: 31.1 PG (ref 27–33)
MCHC RBC AUTO-ENTMCNC: 34 G/DL (ref 32.2–35.5)
MCV RBC AUTO: 91.4 FL (ref 81.4–97.8)
PLATELET # BLD AUTO: 362 K/UL (ref 164–446)
PMV BLD AUTO: 9.6 FL (ref 9–12.9)
POTASSIUM SERPL-SCNC: 4.3 MMOL/L (ref 3.6–5.5)
RBC # BLD AUTO: 2.67 M/UL (ref 4.2–5.4)
SIGNIFICANT IND 70042: NORMAL
SITE SITE: NORMAL
SODIUM SERPL-SCNC: 128 MMOL/L (ref 135–145)
SODIUM SERPL-SCNC: 130 MMOL/L (ref 135–145)
SODIUM SERPL-SCNC: 132 MMOL/L (ref 135–145)
SOURCE SOURCE: NORMAL
T PALLIDUM AB CSF QL IF: NON REACTIVE
VDRL CSF QL: NON REACTIVE
WBC # BLD AUTO: 7.5 K/UL (ref 4.8–10.8)

## 2024-04-21 PROCEDURE — 700102 HCHG RX REV CODE 250 W/ 637 OVERRIDE(OP): Performed by: HOSPITALIST

## 2024-04-21 PROCEDURE — A9270 NON-COVERED ITEM OR SERVICE: HCPCS | Performed by: INTERNAL MEDICINE

## 2024-04-21 PROCEDURE — 99233 SBSQ HOSP IP/OBS HIGH 50: CPT | Performed by: HOSPITALIST

## 2024-04-21 PROCEDURE — 700102 HCHG RX REV CODE 250 W/ 637 OVERRIDE(OP): Performed by: EMERGENCY MEDICINE

## 2024-04-21 PROCEDURE — 700105 HCHG RX REV CODE 258: Performed by: HOSPITALIST

## 2024-04-21 PROCEDURE — 700102 HCHG RX REV CODE 250 W/ 637 OVERRIDE(OP): Performed by: INTERNAL MEDICINE

## 2024-04-21 PROCEDURE — 97602 WOUND(S) CARE NON-SELECTIVE: CPT

## 2024-04-21 PROCEDURE — A9270 NON-COVERED ITEM OR SERVICE: HCPCS | Performed by: HOSPITALIST

## 2024-04-21 PROCEDURE — 700105 HCHG RX REV CODE 258: Performed by: INTERNAL MEDICINE

## 2024-04-21 PROCEDURE — 84295 ASSAY OF SERUM SODIUM: CPT | Mod: 91

## 2024-04-21 PROCEDURE — 770000 HCHG ROOM/CARE - INTERMEDIATE ICU *

## 2024-04-21 PROCEDURE — A9270 NON-COVERED ITEM OR SERVICE: HCPCS | Performed by: EMERGENCY MEDICINE

## 2024-04-21 PROCEDURE — 80048 BASIC METABOLIC PNL TOTAL CA: CPT

## 2024-04-21 PROCEDURE — 85027 COMPLETE CBC AUTOMATED: CPT

## 2024-04-21 PROCEDURE — 700111 HCHG RX REV CODE 636 W/ 250 OVERRIDE (IP): Mod: JZ | Performed by: HOSPITALIST

## 2024-04-21 PROCEDURE — 700111 HCHG RX REV CODE 636 W/ 250 OVERRIDE (IP): Performed by: INTERNAL MEDICINE

## 2024-04-21 RX ORDER — SODIUM CHLORIDE 1 G/1
1 TABLET ORAL
Status: DISCONTINUED | OUTPATIENT
Start: 2024-04-21 | End: 2024-04-25 | Stop reason: HOSPADM

## 2024-04-21 RX ADMIN — ENOXAPARIN SODIUM 40 MG: 100 INJECTION SUBCUTANEOUS at 17:24

## 2024-04-21 RX ADMIN — CARVEDILOL 12.5 MG: 12.5 TABLET, FILM COATED ORAL at 17:24

## 2024-04-21 RX ADMIN — SODIUM CHLORIDE 18 MILLION UNITS: 9 INJECTION, SOLUTION INTRAVENOUS at 11:12

## 2024-04-21 RX ADMIN — SODIUM CHLORIDE: 3 INJECTION, SOLUTION INTRAVENOUS at 13:42

## 2024-04-21 RX ADMIN — CARVEDILOL 12.5 MG: 12.5 TABLET, FILM COATED ORAL at 09:36

## 2024-04-21 RX ADMIN — LOSARTAN POTASSIUM 100 MG: 50 TABLET, FILM COATED ORAL at 05:21

## 2024-04-21 RX ADMIN — SODIUM CHLORIDE 1 G: 1 TABLET ORAL at 09:36

## 2024-04-21 RX ADMIN — LEVOTHYROXINE SODIUM 175 MCG: 0.17 TABLET ORAL at 05:21

## 2024-04-21 RX ADMIN — SODIUM CHLORIDE 1 G: 1 TABLET ORAL at 17:24

## 2024-04-21 RX ADMIN — Medication 1 TABLET: at 05:21

## 2024-04-21 RX ADMIN — AMLODIPINE BESYLATE 5 MG: 10 TABLET ORAL at 05:22

## 2024-04-21 RX ADMIN — POLYETHYLENE GLYCOL 3350 1 PACKET: 17 POWDER, FOR SOLUTION ORAL at 05:21

## 2024-04-21 ASSESSMENT — ENCOUNTER SYMPTOMS
ABDOMINAL PAIN: 0
NERVOUS/ANXIOUS: 0
FEVER: 0
SHORTNESS OF BREATH: 0

## 2024-04-21 ASSESSMENT — PAIN DESCRIPTION - PAIN TYPE: TYPE: ACUTE PAIN

## 2024-04-21 NOTE — PROGRESS NOTES
4 Eyes Skin Assessment Completed by Alejandra ZAMBRANO and KENYA Lozano.     Head WDL  Ears WDL  Nose WDL  Mouth WDL  Neck WDL  Breast/Chest WDL  Shoulder Blades WDL  Spine WDL  (R) Arm/Elbow/Hand WDL  (L) Arm/Elbow/Hand WDL  Abdomen WDL  Groin WDL  Scrotum/Coccyx/Buttocks Redness, Blanching, Excoriation, and Moisture Fissure,     (R) Leg Discoloration to hip    (L) Leg Discoloration to hip    (R) Heel/Foot/Toe Redness, Blanching, Discoloration, and Boggy, Discoloration to toe    (L) Heel/Foot/Toe Redness, Blanching, Discoloration, Boggy, Scar, and Scab             Devices In Places ECG, Blood Pressure Cuff, Pulse Ox, SCD's, and Nasal Cannula, Purewick          Interventions In Place Gray Ear Foams, Heel Mepilex, TAP System, Pillows, Q2 Turns, Barrier Cream, and Heels Loaded W/Pillows     Possible Skin Injury Yes     Pictures Uploaded Into Epic Yes  Wound Consult Placed Yes  RN Wound Prevention Protocol Ordered Yes

## 2024-04-21 NOTE — CARE PLAN
"The patient is Stable - Low risk of patient condition declining or worsening    Shift Goals  Clinical Goals: Improvement in mentation, hemodynamic stability  Patient Goals: Tell everyone \"I'm the world prophet\"  Family Goals: LAWRENCE    Progress made toward(s) clinical / shift goals:    Problem: Pain - Standard  Goal: Alleviation of pain or a reduction in pain to the patient’s comfort goal  Description: Target End Date:  Prior to discharge or change in level of care    Document on Vitals flowsheet    1.  Document pain using the appropriate pain scale per order or unit policy  2.  Educate and implement non-pharmacologic comfort measures (i.e. relaxation, distraction, massage, cold/heat therapy, etc.)  3.  Pain management medications as ordered  4.  Reassess pain after pain med administration per policy  5.  If opiods administered assess patient's response to pain medication is appropriate per POSS sedation scale  6.  Follow pain management plan developed in collaboration with patient and interdisciplinary team (including palliative care or pain specialists if applicable)  Outcome: Progressing     Problem: Knowledge Deficit - Standard  Goal: Patient and family/care givers will demonstrate understanding of plan of care, disease process/condition, diagnostic tests and medications  Description: Target End Date:  1-3 days or as soon as patient condition allows    Document in Patient Education    1.  Patient and family/caregiver oriented to unit, equipment, visitation policy and means for communicating concern  2.  Complete/review Learning Assessment  3.  Assess knowledge level of disease process/condition, treatment plan, diagnostic tests and medications  4.  Explain disease process/condition, treatment plan, diagnostic tests and medications  Outcome: Progressing     Problem: Fall Risk  Goal: Patient will remain free from falls  Description: Target End Date:  Prior to discharge or change in level of care    Document " interventions on the [place] Sa[place]l Fall Risk Assessment    1.  Assess for fall risk factors  2.  Implement fall precautions  Outcome: Progressing     Problem: Skin Integrity  Goal: Skin integrity is maintained or improved  Description: Target End Date:  Prior to discharge or change in level of care    Document interventions on Skin Risk/Alvarado flowsheet groups and corresponding LDA    1.  Assess and monitor skin integrity, appearance and/or temperature  2.  Assess risk factors for impaired skin integrity and/or pressures ulcers  3.  Implement precautions to protect skin integrity in collaboration with interdisciplinary team  4.  Implement pressure ulcer prevention protocol if at risk for skin breakdown  5.  Confirm wound care consult if at risk for skin breakdown  6.  Ensure patient use of pressure relieving devices  (Low air loss bed, waffle overlay, heel protectors, ROHO cushion, etc)  Outcome: Progressing     Problem: Psychosocial  Goal: Patient's level of anxiety will decrease  Description: Target End Date:  1-3 days or as soon as patient condition allows    1.  Collaborate with patient and family/caregiver to identify triggers and develop strategies to cope with anxiety  2.  Implement stimuli reduction, calming techniques  3.  Pharmacologic management per provider order  4.  Encourage patient/family/care giver participation  5.  Collaborate with interdisciplinary team including Psychologist or Behavioral Health Team as needed  Outcome: Progressing     Problem: Hemodynamics  Goal: Patient's hemodynamics, fluid balance and neurologic status will be stable or improve  Description: Target End Date:  Prior to discharge or change in level of care    Document on Assessment and I/O flowsheet templates    1.  Monitor vital signs, pulse oximetry and cardiac monitor per provider order and/or policy  2.  Maintain blood pressure per provider order  3.  Hemodynamic monitoring per provider order  4.  Manage IV fluids and IV  infusions  5.  Monitor intake and output  6.  Daily weights per unit policy or provider order  7.  Assess peripheral pulses and capillary refill  8.  Assess color and body temperature  9.  Position patient for maximum circulation/cardiac output  10. Monitor for signs/symptoms of excessive bleeding  11. Assess mental status, restlessness and changes in level of consciousness  12. Monitor temperature and report fever or hypothermia to provider immediately. Consideration of targeted temperature management.  Outcome: Progressing     Problem: Respiratory  Goal: Patient will achieve/maintain optimum respiratory ventilation and gas exchange  Description: Target End Date:  Prior to discharge or change in level of care    Document on Assessment flowsheet    1.  Assess and monitor rate, rhythm, depth and effort of respiration  2.  Breath sounds assessed qshift and/or as needed  3.  Assess O2 saturation, administer/titrate oxygen as ordered  4.  Position patient for maximum ventilatory efficiency  5.  Turn, cough, and deep breath with splinting to improve effectiveness  6.  Collaborate with RT to administer medication/treatments per order  7.  Encourage use of incentive spirometer and encourage patient to cough after use and utilize splinting techniques if applicable  8.  Airway suctioning  9.  Monitor sputum production for changes in color, consistency and frequency  10. Perform frequent oral hygiene  11. Alternate physical activity with rest periods  Outcome: Progressing       Patient is not progressing towards the following goals:

## 2024-04-21 NOTE — WOUND TEAM
"[facility]  Inpatient Services  Initial Wound and Skin Care Evaluation    Admission Date: 4/12/2024     Last order of IP CONSULT TO WOUND CARE was found on 4/19/2024 from Hospital Encounter on 4/12/2024     HPI, PMH, SH: Reviewed    Past Surgical History:   Procedure Laterality Date    COLONOSCOPY  2007    [place] EXCIS UTERINE FIBROID,VAG APPRCH       Social History     Tobacco Use    Smoking status: Never    Smokeless tobacco: Never   Substance Use Topics    Alcohol use: No     Alcohol/week: 0.0 oz     Chief Complaint   Patient presents with    ALOC     Patient was at the [facility] lab when staff walked her over to ER due to being confused and asking to go to ER. Per family patients PCP believes her thyroid levels are possible off. Patient A/O x 3. Patient states, \" My son is from the devil and I need to tell all the people. I can not go home because my  is from the devil.\"     Diagnosis: Hyponatremia [E87.1]    Unit where seen by Wound Team: [id number]     WOUND CONSULT RELATED TO:  sacral/coccyx area    WOUND TEAM PLAN OF CARE - Frequency of Follow-up:   Nursing to follow dressing orders written for wound care. Contact wound team if area fails to progress, deteriorates or with any questions/concerns if something comes up before next scheduled follow up (See below as to whether wound is following and frequency of wound follow up)   Not following, consult as needed  - sacrum    WOUND HISTORY:   89 y.o. 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 She saw her primary doctor prior to coming to ED. She was alert and oriented x3, however she has some delusions. She was kept in ED observation due to delusion. Then Na dropped to 122        WOUND ASSESSMENT/LDA  Wound 04/19/24 Partial Thickness Wound Sacrum;Coccyx moisture fissure (Active)   Date First Assessed/Time First Assessed: 04/19/24 0200   Present on Original " Admission: No  Hand Hygiene Completed: Yes  Primary Wound Type: Partial Thickness Wound  Location: Sacrum;Coccyx  Wound Description (Comments): moisture fissure      Assessments 4/21/2024  3:00 PM   Wound Image       Site Assessment Pink;Red   Periwound Assessment Intact   Margins Attached edges   Closure None;Open to air   Drainage Amount Scant   Drainage Description Serosanguineous   Treatments Cleansed;Site care;Offloading   Wound Cleansing Foam Cleanser/Washcloth   Periwound Protectant Barrier Paste   Dressing Status Open to Air   NEXT Weekly Photo (Inpatient Only) 04/28/24   Wound Team Following Not following   Non-staged Wound Description Partial thickness        Vascular:    MIRLANDE:   No results found.    Lab Values:    Lab Results   Component Value Date/Time    WBC 7.5 04/21/2024 05:25 AM    RBC 2.67 (L) 04/21/2024 05:25 AM    HEMOGLOBIN 8.3 (L) 04/21/2024 05:25 AM    HEMATOCRIT 24.4 (L) 04/21/2024 05:25 AM    SEDRATEWES 36 (H) 03/12/2006 09:56 AM         Culture Results show:  No results found for this or any previous visit (from the past 720 hour(s)).    Pain Level/Medicated:  None, Tolerated without pain medication       INTERVENTIONS BY WOUND TEAM:  Chart and images reviewed. Discussed with bedside RN. All areas of concern (based on picture review, LDA review and discussion with bedside RN) have been thoroughly assessed. Documentation of areas based on significant findings. This RN in to assess patient. Performed standard wound care which includes appropriate positioning, dressing removal and non-selective debridement. Pictures and measurements obtained weekly if/when required.    Wound:  coccyx/buttock moisture fissure  Cleansed/Non-selectively Debrided with:  Perineal Wipes (Barrier wipes)  Open to air with barrier paste    Advanced Wound Care Discharge Planning  Number of Clinicians necessary to complete wound care: 1   Is patient requiring IV pain medications for dressing changes:  No   Length of time  for dressing change 15 min. (This does not include chart review, pre-medication time, set up, clean up or time spent charting.)    Interdisciplinary consultation: Patient, Bedside RN, N/A.  Pressure injury and staging reviewed with N/A.    EVALUATION / RATIONALE FOR TREATMENT:     Date:  04/21/24  Wound Status:  Initial evaluation    Small moisture fissure to sacral/coccyx area.  Continue with barrier paste and turns.  No advanced wound care needs.         Goals: Steady decrease in wound area and depth weekly.    NURSING PLAN OF CARE ORDERS:  No new orders this visit    NUTRITION RECOMMENDATIONS   Wound Team Recommendations:  N/A    DIET ORDERS (From admission to next 24h)       Start     Ordered    04/19/24 1027  Diet Order Diet: Level 6 - Soft and Bite Sized; Liquid level: Level 0 - Thin; Fluid modifications: (optional): 1200 ml Fluid Restriction  ALL MEALS        Question Answer Comment   Diet: Level 6 - Soft and Bite Sized    Liquid level Level 0 - Thin    Fluid modifications: (optional) 1200 ml Fluid Restriction        04/19/24 1027                    PREVENTATIVE INTERVENTIONS:    Q shift Alvarado - performed per nursing policy  Q shift pressure point assessments - performed per nursing policy    Surface/Positioning  ICU Low Airloss - Currently in Place  Reposition q 2 hours - Currently in Place    Offloading/Redistribution  Heel offloading dressing (Silicone dressing) - Currently in Place  Float Heels off Bed with Pillows - Currently in Place           Respiratory  N/A    Containment/Moisture Prevention    Dri-sofy pad - Currently in Place  Barrier wipes - Currently in Place  Barrier paste - Currently in Place    Mobilization      Not Mobilizing      Anticipated discharge plans:  TBD        Vac Discharge Needs:  Vac Discharge plan is purely a recommendation from wound team and not a requirement for discharge unless otherwise stated by physician.  Not Applicable Pt not on a wound vac

## 2024-04-21 NOTE — PROGRESS NOTES
"Hospital Medicine Daily Progress Note    Date of Service  4/21/2024    Chief Complaint  Altered mental status    Hospital Course  Ricky Junior is a 89 y.o. female chronic back pain with degenerative joint disease, bipolar disorder admitted 4/12/2024 with altered mental status.  She was seen by primary care provider before coming to the emergency room she was alert and oriented x 3 but is having delusions.  She was kept in the ER due to her delusions.  It was noted that her sodium dropped to 122.  Per admission notes she was delusional stating that her  was obsessed with the devil.  It was noted that she has neurosyphilis and a low B12.  Infectious disease has been consulted and initiated patient on IV penicillin. The 4/16 VRRL was non-reactive but ID suggest to treat regardless.    Interval Problem Update  4/21: Na:130.  Poor oral intake \"I don't have my partial dentures.\"  She has been requesting pudding.  Speech clear.  Less tangential and delusional but nursing state she still has delusional thoughts.        I have discussed this patient's plan of care and discharge plan at IDT rounds today with Case Management, Nursing, Nursing leadership, and other members of the IDT team.    Consultants/Specialty  infectious disease    Code Status  Full Code    Disposition  The patient is not medically cleared for discharge to home or a post-acute facility.      I have placed the appropriate orders for post-discharge needs.    Review of Systems  Review of Systems   Unable to perform ROS: Psychiatric disorder   Constitutional:  Negative for fever and malaise/fatigue.   Respiratory:  Negative for shortness of breath.    Cardiovascular:  Negative for leg swelling.   Gastrointestinal:  Negative for abdominal pain.   Psychiatric/Behavioral:  The patient is not nervous/anxious.         Physical Exam  Temp:  [36.2 °C (97.2 °F)-36.8 °C (98.3 °F)] 36.8 °C (98.2 °F)  Pulse:  [] 76  Resp:  [15-28] 25  BP: " (101-179)/() 173/78  SpO2:  [96 %-99 %] 97 %    Physical Exam  Vitals reviewed.   Constitutional:       Appearance: Normal appearance. She is underweight. She is not diaphoretic.      Comments: thin   HENT:      Head: Normocephalic and atraumatic.      Nose: Nose normal.      Mouth/Throat:      Mouth: Mucous membranes are moist.   Eyes:      General:         Right eye: No discharge.         Left eye: No discharge.      Extraocular Movements: Extraocular movements intact.      Conjunctiva/sclera: Conjunctivae normal.   Cardiovascular:      Rate and Rhythm: Regular rhythm. Tachycardia present.      Pulses:           Radial pulses are 2+ on the right side and 2+ on the left side.        Dorsalis pedis pulses are 2+ on the right side and 2+ on the left side.      Heart sounds: No murmur heard.  Pulmonary:      Effort: Pulmonary effort is normal. No respiratory distress.      Breath sounds: Normal breath sounds. No wheezing or rales.   Abdominal:      General: Bowel sounds are normal. There is no distension.      Palpations: Abdomen is soft.      Tenderness: There is no abdominal tenderness. There is no guarding.   Musculoskeletal:         General: No swelling or tenderness.      Cervical back: Normal range of motion and neck supple. No muscular tenderness.      Right lower leg: No edema.      Left lower leg: No edema.   Skin:     Coloration: Skin is not jaundiced or pale.   Neurological:      General: No focal deficit present.      Mental Status: She is alert and oriented to person, place, and time.      Cranial Nerves: No cranial nerve deficit.   Psychiatric:         Attention and Perception: Attention normal.         Mood and Affect: Mood normal. Mood is not anxious.         Speech: Speech normal.         Behavior: Behavior normal. Behavior is cooperative.         Thought Content: Thought content is delusional.         Cognition and Memory: Cognition is impaired.         Judgment: Judgment is inappropriate.          Fluids    Intake/Output Summary (Last 24 hours) at 4/21/2024 0951  Last data filed at 4/21/2024 0800  Gross per 24 hour   Intake 1352.87 ml   Output 200 ml   Net 1152.87 ml       Laboratory  Recent Labs     04/21/24  0525   WBC 7.5   RBC 2.67*   HEMOGLOBIN 8.3*   HEMATOCRIT 24.4*   MCV 91.4   MCH 31.1   MCHC 34.0   RDW 41.8   PLATELETCT 362   MPV 9.6     Recent Labs     04/19/24  0518 04/19/24  0830 04/20/24  1710 04/20/24  2313 04/21/24  0525   SODIUM 121*   < > 125* 129* 130*   POTASSIUM 4.2  --   --   --  4.3   CHLORIDE 94*  --   --   --  103   CO2 16*  --   --   --  17*   GLUCOSE 88  --   --   --  88   BUN 13  --   --   --  13   CREATININE 0.56  --   --   --  0.59   CALCIUM 7.8*  --   --   --  8.1*    < > = values in this interval not displayed.                     Imaging  CT-HEAD W/O   Final Result         1. No acute intracranial abnormality. No evidence of acute intracranial hemorrhage or mass lesion.                     IR-MIDLINE CATHETER INSERTION WO GUIDANCE > AGE 3   Final Result                  Ultrasound-guided midline placement performed by qualified nursing staff    as above.          DX-LUMBAR PUNCTURE FOR DIAGNOSIS   Final Result      Fluoroscopic-guided lumbar puncture as described above.      CT-HEAD W/O   Final Result      1.  No acute intracranial abnormality detected.         DX-CHEST-PORTABLE (1 VIEW)   Final Result      No acute cardiac or pulmonary abnormalities are identified.      MR-BRAIN-W/O    (Results Pending)        Assessment/Plan  * Psychotic disorder (HCC)- (present on admission)  Assessment & Plan  With delusions and darin  Psychiatry consulted  TSH CT head UA U tox wnl. No clear metabolic etiologies.   Hold Seroquel  Hold on mood stabilizers that may affect sodium such as Depakote for now  MRI brain pending, there is question of some metal in her mouth per RN.  Also she would not be able to hold still currently unless we sedate her.  If the patient continues to have  improved neurologic status would discontinue MRI  4/20 due to higher BP, I will check CT head to rule out enlarged ventricles.  EEG is normal  This may be compounded by hyponatremia which is being addressed  B12 on the low side.  Start B12 injections      Neurosyphilis- (present on admission)  Assessment & Plan  New diagnosis by cerebrospinal fluid  4/20 Alert  that he should be check for syphilis and treated if positive  Infectious disease consulted  High-dose IV penicillin G continuous infusion  HIV negative    Vitamin B12 deficiency- (present on admission)  Assessment & Plan  Replace  Continue to follow-up with primary care  Monitor for improvement on her mentation/delusional    Hyponatremia- (present on admission)  Assessment & Plan  Question of secondary Seroquel which has been held also Depakote has been held.  Continue on 3% and checking serum sodiums every 6 hours.  4/21 Increase NaCl tabs.  Wean 3%NaCl drip  She needs improvement in oral intake  We are trying to get her to and take more oral fluids and diet.  Watch fluid intake urine output  Diet as tolerates  Holding any thiazides    Bipolar depression (HCC)- (present on admission)  Assessment & Plan  Holding antipsychotics for now in face of hyponatremia  Psychiatry consulting.    Idiopathic hypothyroidism- (present on admission)  Assessment & Plan  Continue synthroid 175 mcg daily  Normal TSH and free thyroxine during this admission    Hypertension- (present on admission)  Assessment & Plan  Continue active treatment with losartan and Coreg.  4/20 amlodipine added  Monitoring BP  As needed hydralazine and added prn enalapril.         VTE prophylaxis:    enoxaparin ppx

## 2024-04-22 PROBLEM — G93.40 ACUTE ENCEPHALOPATHY: Status: ACTIVE | Noted: 2024-04-22

## 2024-04-22 LAB
ALBUMIN SERPL BCP-MCNC: 2.8 G/DL (ref 3.2–4.9)
ALBUMIN/GLOB SERPL: 1.1 G/DL
ALP SERPL-CCNC: 45 U/L (ref 30–99)
ALT SERPL-CCNC: 14 U/L (ref 2–50)
ANION GAP SERPL CALC-SCNC: 10 MMOL/L (ref 7–16)
AST SERPL-CCNC: 21 U/L (ref 12–45)
BILIRUB SERPL-MCNC: <0.2 MG/DL (ref 0.1–1.5)
BUN SERPL-MCNC: 12 MG/DL (ref 8–22)
CALCIUM ALBUM COR SERPL-MCNC: 8.9 MG/DL (ref 8.5–10.5)
CALCIUM SERPL-MCNC: 7.9 MG/DL (ref 8.5–10.5)
CHLORIDE SERPL-SCNC: 110 MMOL/L (ref 96–112)
CO2 SERPL-SCNC: 16 MMOL/L (ref 20–33)
CREAT SERPL-MCNC: 0.59 MG/DL (ref 0.5–1.4)
EKG IMPRESSION: NORMAL
EKG IMPRESSION: NORMAL
ERYTHROCYTE [DISTWIDTH] IN BLOOD BY AUTOMATED COUNT: 44.2 FL (ref 35.9–50)
GFR SERPLBLD CREATININE-BSD FMLA CKD-EPI: 84 ML/MIN/1.73 M 2
GLOBULIN SER CALC-MCNC: 2.5 G/DL (ref 1.9–3.5)
GLUCOSE SERPL-MCNC: 95 MG/DL (ref 65–99)
HCT VFR BLD AUTO: 25.1 % (ref 37–47)
HGB BLD-MCNC: 8.6 G/DL (ref 12–16)
MCH RBC QN AUTO: 31.9 PG (ref 27–33)
MCHC RBC AUTO-ENTMCNC: 34.3 G/DL (ref 32.2–35.5)
MCV RBC AUTO: 93 FL (ref 81.4–97.8)
PLATELET # BLD AUTO: 397 K/UL (ref 164–446)
PMV BLD AUTO: 9.3 FL (ref 9–12.9)
POTASSIUM SERPL-SCNC: 3.8 MMOL/L (ref 3.6–5.5)
PROT SERPL-MCNC: 5.3 G/DL (ref 6–8.2)
RBC # BLD AUTO: 2.7 M/UL (ref 4.2–5.4)
SODIUM SERPL-SCNC: 136 MMOL/L (ref 135–145)
WBC # BLD AUTO: 8.3 K/UL (ref 4.8–10.8)

## 2024-04-22 PROCEDURE — A9270 NON-COVERED ITEM OR SERVICE: HCPCS | Performed by: EMERGENCY MEDICINE

## 2024-04-22 PROCEDURE — 80053 COMPREHEN METABOLIC PANEL: CPT

## 2024-04-22 PROCEDURE — 36415 COLL VENOUS BLD VENIPUNCTURE: CPT

## 2024-04-22 PROCEDURE — 93005 ELECTROCARDIOGRAM TRACING: CPT | Performed by: HOSPITALIST

## 2024-04-22 PROCEDURE — 85027 COMPLETE CBC AUTOMATED: CPT

## 2024-04-22 PROCEDURE — 99231 SBSQ HOSP IP/OBS SF/LOW 25: CPT | Mod: GC | Performed by: STUDENT IN AN ORGANIZED HEALTH CARE EDUCATION/TRAINING PROGRAM

## 2024-04-22 PROCEDURE — 700102 HCHG RX REV CODE 250 W/ 637 OVERRIDE(OP): Performed by: INTERNAL MEDICINE

## 2024-04-22 PROCEDURE — 700105 HCHG RX REV CODE 258: Performed by: INTERNAL MEDICINE

## 2024-04-22 PROCEDURE — 700111 HCHG RX REV CODE 636 W/ 250 OVERRIDE (IP): Performed by: INTERNAL MEDICINE

## 2024-04-22 PROCEDURE — 700102 HCHG RX REV CODE 250 W/ 637 OVERRIDE(OP): Performed by: EMERGENCY MEDICINE

## 2024-04-22 PROCEDURE — 93010 ELECTROCARDIOGRAM REPORT: CPT | Performed by: INTERNAL MEDICINE

## 2024-04-22 PROCEDURE — 93005 ELECTROCARDIOGRAM TRACING: CPT | Performed by: STUDENT IN AN ORGANIZED HEALTH CARE EDUCATION/TRAINING PROGRAM

## 2024-04-22 PROCEDURE — 700102 HCHG RX REV CODE 250 W/ 637 OVERRIDE(OP): Performed by: STUDENT IN AN ORGANIZED HEALTH CARE EDUCATION/TRAINING PROGRAM

## 2024-04-22 PROCEDURE — 93010 ELECTROCARDIOGRAM REPORT: CPT | Mod: 77 | Performed by: INTERNAL MEDICINE

## 2024-04-22 PROCEDURE — A9270 NON-COVERED ITEM OR SERVICE: HCPCS | Performed by: INTERNAL MEDICINE

## 2024-04-22 PROCEDURE — 700111 HCHG RX REV CODE 636 W/ 250 OVERRIDE (IP): Mod: JZ | Performed by: HOSPITALIST

## 2024-04-22 PROCEDURE — 770006 HCHG ROOM/CARE - MED/SURG/GYN SEMI*

## 2024-04-22 PROCEDURE — 99233 SBSQ HOSP IP/OBS HIGH 50: CPT | Performed by: HOSPITALIST

## 2024-04-22 PROCEDURE — 700111 HCHG RX REV CODE 636 W/ 250 OVERRIDE (IP): Performed by: HOSPITALIST

## 2024-04-22 PROCEDURE — A9270 NON-COVERED ITEM OR SERVICE: HCPCS | Performed by: HOSPITALIST

## 2024-04-22 PROCEDURE — 700102 HCHG RX REV CODE 250 W/ 637 OVERRIDE(OP): Performed by: HOSPITALIST

## 2024-04-22 PROCEDURE — A9270 NON-COVERED ITEM OR SERVICE: HCPCS | Performed by: STUDENT IN AN ORGANIZED HEALTH CARE EDUCATION/TRAINING PROGRAM

## 2024-04-22 RX ORDER — AMLODIPINE BESYLATE 5 MG/1
10 TABLET ORAL
Status: DISCONTINUED | OUTPATIENT
Start: 2024-04-23 | End: 2024-04-25 | Stop reason: HOSPADM

## 2024-04-22 RX ADMIN — HYDRALAZINE HYDROCHLORIDE 10 MG: 20 INJECTION, SOLUTION INTRAMUSCULAR; INTRAVENOUS at 02:06

## 2024-04-22 RX ADMIN — SODIUM CHLORIDE 1 G: 1 TABLET ORAL at 16:47

## 2024-04-22 RX ADMIN — CARVEDILOL 12.5 MG: 12.5 TABLET, FILM COATED ORAL at 16:47

## 2024-04-22 RX ADMIN — HYDROCODONE BITARTRATE AND ACETAMINOPHEN 1 TABLET: 5; 325 TABLET ORAL at 22:46

## 2024-04-22 RX ADMIN — Medication 5 MG: at 22:45

## 2024-04-22 RX ADMIN — POLYETHYLENE GLYCOL 3350 1 PACKET: 17 POWDER, FOR SOLUTION ORAL at 05:13

## 2024-04-22 RX ADMIN — SODIUM CHLORIDE 18 MILLION UNITS: 9 INJECTION, SOLUTION INTRAVENOUS at 11:13

## 2024-04-22 RX ADMIN — AMLODIPINE BESYLATE 5 MG: 10 TABLET ORAL at 05:14

## 2024-04-22 RX ADMIN — LOSARTAN POTASSIUM 100 MG: 50 TABLET, FILM COATED ORAL at 05:13

## 2024-04-22 RX ADMIN — SODIUM CHLORIDE 1 G: 1 TABLET ORAL at 11:14

## 2024-04-22 RX ADMIN — SODIUM CHLORIDE 1 G: 1 TABLET ORAL at 08:06

## 2024-04-22 RX ADMIN — LEVOTHYROXINE SODIUM 175 MCG: 0.17 TABLET ORAL at 05:14

## 2024-04-22 RX ADMIN — CARVEDILOL 12.5 MG: 12.5 TABLET, FILM COATED ORAL at 08:06

## 2024-04-22 RX ADMIN — Medication 1 TABLET: at 05:16

## 2024-04-22 RX ADMIN — HYDRALAZINE HYDROCHLORIDE 10 MG: 20 INJECTION, SOLUTION INTRAMUSCULAR; INTRAVENOUS at 08:05

## 2024-04-22 RX ADMIN — ENOXAPARIN SODIUM 40 MG: 100 INJECTION SUBCUTANEOUS at 16:47

## 2024-04-22 ASSESSMENT — PAIN DESCRIPTION - PAIN TYPE
TYPE: ACUTE PAIN

## 2024-04-22 ASSESSMENT — ENCOUNTER SYMPTOMS
SHORTNESS OF BREATH: 0
EYES NEGATIVE: 1
ABDOMINAL PAIN: 1
PALPITATIONS: 0
ABDOMINAL PAIN: 0
HEADACHES: 0
DIARRHEA: 0
SPEECH CHANGE: 0
RESPIRATORY NEGATIVE: 1
NAUSEA: 0
CARDIOVASCULAR NEGATIVE: 1
CONSTITUTIONAL NEGATIVE: 1
NERVOUS/ANXIOUS: 0
CONSTIPATION: 0

## 2024-04-22 ASSESSMENT — FIBROSIS 4 INDEX: FIB4 SCORE: 1.3

## 2024-04-22 NOTE — CARE PLAN
The patient is Stable - Low risk of patient condition declining or worsening    Shift Goals  Clinical Goals: stable serum sodium labs, hemodynamic stability  Patient Goals: sleep, pray  Family Goals: LAWRENCE    Progress made toward(s) clinical / shift goals:    Problem: Knowledge Deficit - Standard  Goal: Patient and family/care givers will demonstrate understanding of plan of care, disease process/condition, diagnostic tests and medications  Outcome: Progressing  Note: Pt educated regarding plan of care and medications. All questions answered. Patient requires reminders       Problem: Skin Integrity  Goal: Skin integrity is maintained or improved  Outcome: Progressing  Note: Patient q2hr turns provided as needed,      Problem: Hemodynamics  Goal: Patient's hemodynamics, fluid balance and neurologic status will be stable or improve  Outcome: Progressing       Patient is not progressing towards the following goals:

## 2024-04-22 NOTE — PROGRESS NOTES
2 RN Skin Assessment Completed by Jenni RN and KENYA Miles.     Head: WDL  Ears: WDL  Nose: WDL  Mouth: WDL  Neck: WDL  Breasts/Chest: WDL  Shoulder Blades: WDL  Spine: WDL  (R) Arm/Elbow/hand: WDL  (L) Arm/Elbow/hand: WDL  Abdomen:WDL  Groin: WDL  Sacrum/Coccyx/Buttocks: Left hip bruise, blanching. Scab on sacrum, healing and intact.   (R) Leg: WDL  (L) Leg: WDL  (R) Heel/Foot/Toe: blanching  (L) Heel/Foot/Toe: blanching              Devices in place: BP Cuff and Pulse Ox     Interventions in place: Pillows and tele monitor     Possible skin injury found: No     Pictures uploaded into Epic: N/A  Wound Consult Placed: N/A

## 2024-04-22 NOTE — PROGRESS NOTES
4 Eyes Skin Assessment Completed by KENYA Banda and KENYA Christopher.    Head WDL  Ears WDL  Nose WDL  Mouth WDL  Neck WDL  Breast/Chest WDL  Shoulder Blades WDL  Spine WDL  (R) Arm/Elbow/Hand Bruising  (L) Arm/Elbow/Hand Bruising  Abdomen WDL  Groin WDL  Scrotum/Coccyx/Buttocks Blanching, Excoriation, and Moisture Fissure, pink  (R) Leg WDL right hip discoloration  (L) Leg WDL  (R) Heel/Foot/Toe Redness, Blanching, and Boggy  (L) Heel/Foot/Toe Redness, Blanching, and Boggy          Devices In Places ECG, Blood Pressure Cuff, Pulse Ox, and SCD's      Interventions In Place Heel Mepilex, TAP System, Pillows, Q2 Turns, Low Air Loss Mattress, Barrier Cream, Dri-Yoni Pads, and Heels Loaded W/Pillows    Possible Skin Injury No    Pictures Uploaded Into Epic N/A  Wound Consult Placed N/A  RN Wound Prevention Protocol Ordered No

## 2024-04-22 NOTE — CONSULTS
"PSYCHIATRIC FOLLOW-UP: (established)  Reason for admission:   confusion/AMS and paranoid delusions  Reason for consult: psychosis  Legal Hold Status:     N/A  Chart reviewed.         Interval hx:          Patient evaluated at bedside with attending present. She appeared euthymic and did not demonstrate spontaneous paranoid or grandiose delusions today. She was oriented to person, place, month, year, and location. No recent occurrences of agitation requiring PRN medications over the weekend or today. She appeared to have a mild bilateral upper extremity tremor but it may be present at patient's baseline. She did not know what she was being treated for. She perseverated on several stories from her past.    Medical ROS (as pertinent):     Review of Systems   Constitutional: Negative.    HENT: Negative.     Eyes: Negative.    Respiratory: Negative.     Cardiovascular: Negative.    Gastrointestinal:  Positive for abdominal pain. Negative for constipation.        Reports lower abdominal/suprapubic pain. Reports last BM was this morning.   Skin: Negative.            Psychiatric Examination:  Vitals:   Vitals:    04/22/24 1322   BP: 125/78   Pulse: 90   Resp: (!) 28   Temp: 36.2 °C (97.2 °F)   SpO2: 94%        General Appearance: Appears state age, appropriate grooming & hygiene, no acute distress  Behavior:    -Appropriate activity, appropriate eye contact, pleasant & cooperative   -No tremors noted   -Gait not observed  Speech: Appropriate quantity, spontaneous. Regular rate and rhythm. Appropriate volume and intonation. Articulation is clear  Language: English  Thought processes: Coherent, linear, logical and goal-directed. No evidence of loose associations  Thought content: No evidence of SI/HI/AVH. Not observed responding to internal stimuli. No evidence of delusions or paranoia  Mood: \"good\"  Affect: Congruent with stated mood. Full range, euthymic, appropriately reactive to conversation. No evidence of lability, " darin, or agitation  Judgement and Insight: Fair/Fair  Cognition:    -Alert & oriented x 3 (Person, place and month)   -Attention & concentration grossly intact   -Immediate/delayed memory not formally tested but grossly intact   -Age-appropriate fund of knowledge      New PAST MEDICAL/PSYCH/FAMILY/SOCIAL(as reported by patient):       None         Labs personally reviewed:   Recent Results (from the past 24 hour(s))   SODIUM SERUM (NA)    Collection Time: 04/21/24  5:30 PM   Result Value Ref Range    Sodium 132 (L) 135 - 145 mmol/L   CBC WITHOUT DIFFERENTIAL    Collection Time: 04/22/24  1:40 AM   Result Value Ref Range    WBC 8.3 4.8 - 10.8 K/uL    RBC 2.70 (L) 4.20 - 5.40 M/uL    Hemoglobin 8.6 (L) 12.0 - 16.0 g/dL    Hematocrit 25.1 (L) 37.0 - 47.0 %    MCV 93.0 81.4 - 97.8 fL    MCH 31.9 27.0 - 33.0 pg    MCHC 34.3 32.2 - 35.5 g/dL    RDW 44.2 35.9 - 50.0 fL    Platelet Count 397 164 - 446 K/uL    MPV 9.3 9.0 - 12.9 fL   Comp Metabolic Panel    Collection Time: 04/22/24  2:56 AM   Result Value Ref Range    Sodium 136 135 - 145 mmol/L    Potassium 3.8 3.6 - 5.5 mmol/L    Chloride 110 96 - 112 mmol/L    Co2 16 (L) 20 - 33 mmol/L    Anion Gap 10.0 7.0 - 16.0    Glucose 95 65 - 99 mg/dL    Bun 12 8 - 22 mg/dL    Creatinine 0.59 0.50 - 1.40 mg/dL    Calcium 7.9 (L) 8.5 - 10.5 mg/dL    Correct Calcium 8.9 8.5 - 10.5 mg/dL    AST(SGOT) 21 12 - 45 U/L    ALT(SGPT) 14 2 - 50 U/L    Alkaline Phosphatase 45 30 - 99 U/L    Total Bilirubin <0.2 0.1 - 1.5 mg/dL    Albumin 2.8 (L) 3.2 - 4.9 g/dL    Total Protein 5.3 (L) 6.0 - 8.2 g/dL    Globulin 2.5 1.9 - 3.5 g/dL    A-G Ratio 1.1 g/dL   ESTIMATED GFR    Collection Time: 04/22/24  2:56 AM   Result Value Ref Range    GFR (CKD-EPI) 84 >60 mL/min/1.73 m 2   EKG    Collection Time: 04/22/24  3:46 AM   Result Value Ref Range    Report       [facility]    Test Date:  2024-04-22  Pt Name:    JERRY URIAS                Department: [facility]  MRN:        [id number]                       [place]       [facility] [place]  Gender:     Female                       Technician: LYLA  :        [id number]                   Requested By:LUIS GARCÍA  Order #:    [id number]                    Reading MD: Tobin Richardson MD    Measurements  Intervals                                Axis  Rate:       81                           P:          57  PA:         56                           QRS:        40  QRSD:       84                           T:          -90  QT:         393  QTc:        457    Interpretive Statements  Sinus or ectopic atrial rhythm, significant baseline wander  Short PA interval  Abnormal R-wave progression, early transition  Unable to accurately evaluate ST segments  Electronically Signed On 2024 07:06:13 PDT by Tobin Richardson MD     EKG    Collection Time: 24  6:44 AM   Result Value Ref Range    Report       [facility]    Test Date:  2024  Pt Name:    JERRY URIAS                Department: [facility]  MRN:        [id number]                      [place]       [facility]  Gender:     Female                       Technician: LYLA  :        [id number]                   Requested By:JOSE M GILL  Order #:    [id number]                    Reading MD: Tobin Richardson MD    Measurements  Intervals                                Axis  Rate:       85                           P:          103  PA:         156                          QRS:        21  QRSD:       81                           T:          -40  QT:         383  QTc:        456    Interpretive Statements  Sinus rhythm or ectopic atrial rhythm, baseline artifact  Abnormal R-wave progression, early transition  Nonspecific ST-T wave changes  Electronically Signed On 2024 07:16:14 PDT by Tobin Richardson MD           Assessment:  89 year old female with past psychiatric history of bipolar disorder who presented with acute confusion on 24. Psychiatry was consulted for evaluation of possible delusions and medication  management. The patient has been pleasant and largely cooperative with treatment and workup.  Psychosis now resolved after ongoing treatment of neurosyphilis, hyponatremia, and B12 deficiency which may have all been contributing to altered mentation. Recommend to continue holding scheduled antipsychotics as psychosis appears to be resolving.  Psychiatry will sign off at this time.       Dx:  Neurosyphilis  Hx of Bipolar disorder (rule out psychosis secondary to medical condition)  Delirium   --R/o underlying neurocognitive disorder     Medical :  Per primary team        Plan:  Legal hold: N/A  Psychotropic medications  May use melatonin 5 mg as needed for insomnia  Labs reviewed  EKG reviewed  Discussed the case with: Dr. Freitas  Psychiatry will signing off.     Thank you for the consult.

## 2024-04-22 NOTE — PROGRESS NOTES
Pt transferred to [facility]- with all belongings, son and  aware of the room change.

## 2024-04-22 NOTE — PROGRESS NOTES
Around 0620, monitor check called. Pt converted to SVT, HR up to 180s, sustained for several minutes, other VSS, pt oriented x3 (disoriented to situation, baseline).     Dr. Gonzales, charge RN, Jairo, CICU charge arrived at bedside. Pt performed vagal maneuver and converted to SR.     Zoll pads placed on pt. Q30 minute BP monitoring in place. Pt updated on plan of care.

## 2024-04-22 NOTE — CARE PLAN
The patient is Stable - Low risk of patient condition declining or worsening    Shift Goals  Clinical Goals: Hemodynamic stability  Patient Goals: Change her locks  Family Goals: LAWRENCE    Progress made toward(s) clinical / shift goals:    Problem: Pain - Standard  Goal: Alleviation of pain or a reduction in pain to the patient’s comfort goal  Outcome: Progressing     Problem: Knowledge Deficit - Standard  Goal: Patient and family/care givers will demonstrate understanding of plan of care, disease process/condition, diagnostic tests and medications  Outcome: Progressing     Problem: Fall Risk  Goal: Patient will remain free from falls  Outcome: Progressing     Problem: Skin Integrity  Goal: Skin integrity is maintained or improved  Outcome: Progressing     Problem: Hemodynamics  Goal: Patient's hemodynamics, fluid balance and neurologic status will be stable or improve  Outcome: Progressing       Patient is not progressing towards the following goals:

## 2024-04-23 LAB
ANION GAP SERPL CALC-SCNC: 12 MMOL/L (ref 7–16)
BUN SERPL-MCNC: 11 MG/DL (ref 8–22)
CALCIUM SERPL-MCNC: 8.3 MG/DL (ref 8.5–10.5)
CHLORIDE SERPL-SCNC: 102 MMOL/L (ref 96–112)
CO2 SERPL-SCNC: 17 MMOL/L (ref 20–33)
CREAT SERPL-MCNC: 0.62 MG/DL (ref 0.5–1.4)
ERYTHROCYTE [DISTWIDTH] IN BLOOD BY AUTOMATED COUNT: 43.5 FL (ref 35.9–50)
GFR SERPLBLD CREATININE-BSD FMLA CKD-EPI: 83 ML/MIN/1.73 M 2
GLUCOSE SERPL-MCNC: 104 MG/DL (ref 65–99)
HCT VFR BLD AUTO: 26 % (ref 37–47)
HGB BLD-MCNC: 8.9 G/DL (ref 12–16)
MCH RBC QN AUTO: 31.3 PG (ref 27–33)
MCHC RBC AUTO-ENTMCNC: 34.2 G/DL (ref 32.2–35.5)
MCV RBC AUTO: 91.5 FL (ref 81.4–97.8)
PLATELET # BLD AUTO: 437 K/UL (ref 164–446)
PMV BLD AUTO: 9 FL (ref 9–12.9)
POTASSIUM SERPL-SCNC: 4.3 MMOL/L (ref 3.6–5.5)
RBC # BLD AUTO: 2.84 M/UL (ref 4.2–5.4)
SODIUM SERPL-SCNC: 131 MMOL/L (ref 135–145)
WBC # BLD AUTO: 9.5 K/UL (ref 4.8–10.8)

## 2024-04-23 PROCEDURE — A9270 NON-COVERED ITEM OR SERVICE: HCPCS | Performed by: STUDENT IN AN ORGANIZED HEALTH CARE EDUCATION/TRAINING PROGRAM

## 2024-04-23 PROCEDURE — 700111 HCHG RX REV CODE 636 W/ 250 OVERRIDE (IP): Performed by: INTERNAL MEDICINE

## 2024-04-23 PROCEDURE — 36415 COLL VENOUS BLD VENIPUNCTURE: CPT

## 2024-04-23 PROCEDURE — 700102 HCHG RX REV CODE 250 W/ 637 OVERRIDE(OP): Performed by: EMERGENCY MEDICINE

## 2024-04-23 PROCEDURE — A9270 NON-COVERED ITEM OR SERVICE: HCPCS | Performed by: HOSPITALIST

## 2024-04-23 PROCEDURE — 700105 HCHG RX REV CODE 258: Performed by: INTERNAL MEDICINE

## 2024-04-23 PROCEDURE — 700111 HCHG RX REV CODE 636 W/ 250 OVERRIDE (IP): Mod: JZ | Performed by: HOSPITALIST

## 2024-04-23 PROCEDURE — 99233 SBSQ HOSP IP/OBS HIGH 50: CPT | Performed by: INTERNAL MEDICINE

## 2024-04-23 PROCEDURE — 700102 HCHG RX REV CODE 250 W/ 637 OVERRIDE(OP): Performed by: INTERNAL MEDICINE

## 2024-04-23 PROCEDURE — A9270 NON-COVERED ITEM OR SERVICE: HCPCS | Performed by: EMERGENCY MEDICINE

## 2024-04-23 PROCEDURE — A9270 NON-COVERED ITEM OR SERVICE: HCPCS | Performed by: INTERNAL MEDICINE

## 2024-04-23 PROCEDURE — 700102 HCHG RX REV CODE 250 W/ 637 OVERRIDE(OP): Performed by: HOSPITALIST

## 2024-04-23 PROCEDURE — 700102 HCHG RX REV CODE 250 W/ 637 OVERRIDE(OP): Performed by: STUDENT IN AN ORGANIZED HEALTH CARE EDUCATION/TRAINING PROGRAM

## 2024-04-23 PROCEDURE — 85027 COMPLETE CBC AUTOMATED: CPT

## 2024-04-23 PROCEDURE — 770020 HCHG ROOM/CARE - TELE (206)

## 2024-04-23 PROCEDURE — 80048 BASIC METABOLIC PNL TOTAL CA: CPT

## 2024-04-23 RX ORDER — TORSEMIDE 5 MG/1
5 TABLET ORAL
Status: DISCONTINUED | OUTPATIENT
Start: 2024-04-23 | End: 2024-04-25 | Stop reason: HOSPADM

## 2024-04-23 RX ADMIN — Medication 1 TABLET: at 06:17

## 2024-04-23 RX ADMIN — Medication 5 MG: at 22:03

## 2024-04-23 RX ADMIN — SODIUM CHLORIDE 18 MILLION UNITS: 9 INJECTION, SOLUTION INTRAVENOUS at 10:38

## 2024-04-23 RX ADMIN — CARVEDILOL 12.5 MG: 12.5 TABLET, FILM COATED ORAL at 07:35

## 2024-04-23 RX ADMIN — LEVOTHYROXINE SODIUM 175 MCG: 0.17 TABLET ORAL at 06:17

## 2024-04-23 RX ADMIN — SODIUM CHLORIDE 1 G: 1 TABLET ORAL at 07:35

## 2024-04-23 RX ADMIN — POLYETHYLENE GLYCOL 3350 1 PACKET: 17 POWDER, FOR SOLUTION ORAL at 06:17

## 2024-04-23 RX ADMIN — TORSEMIDE 5 MG: 5 TABLET ORAL at 17:21

## 2024-04-23 RX ADMIN — ENOXAPARIN SODIUM 40 MG: 100 INJECTION SUBCUTANEOUS at 16:02

## 2024-04-23 RX ADMIN — SODIUM CHLORIDE 1 G: 1 TABLET ORAL at 16:02

## 2024-04-23 RX ADMIN — AMLODIPINE BESYLATE 10 MG: 5 TABLET ORAL at 06:17

## 2024-04-23 RX ADMIN — LOSARTAN POTASSIUM 100 MG: 50 TABLET, FILM COATED ORAL at 06:16

## 2024-04-23 RX ADMIN — SODIUM CHLORIDE 1 G: 1 TABLET ORAL at 11:09

## 2024-04-23 RX ADMIN — CARVEDILOL 12.5 MG: 12.5 TABLET, FILM COATED ORAL at 16:01

## 2024-04-23 ASSESSMENT — ENCOUNTER SYMPTOMS
NERVOUS/ANXIOUS: 0
ABDOMINAL PAIN: 0
SHORTNESS OF BREATH: 0
NAUSEA: 0
DIARRHEA: 0
HEADACHES: 0
SPEECH CHANGE: 0
PALPITATIONS: 0

## 2024-04-23 ASSESSMENT — FIBROSIS 4 INDEX: FIB4 SCORE: 1.18157601687598149

## 2024-04-23 ASSESSMENT — PAIN DESCRIPTION - PAIN TYPE
TYPE: ACUTE PAIN

## 2024-04-23 NOTE — PROGRESS NOTES
Monitor Summary: SR 75-84, NV 0.16, QRS 0.08, QT 0.35, with rare PVCs per strip from monitor room.

## 2024-04-23 NOTE — PROGRESS NOTES
Hospital Medicine Daily Progress Note    Date of Service  4/23/2024    Chief Complaint  Altered mental status    Hospital Course  Ricky Junior is a 89 y.o. female chronic back pain with degenerative joint disease, bipolar disorder admitted 4/12/2024 with altered mental status.  She was seen by primary care provider before coming to the emergency room she was alert and oriented x 3 but is having delusions.  She was kept in the ER due to her delusions.  It was noted that her sodium dropped to 122.  Per admission notes she was delusional stating that her  was obsessed with the devil.  It was noted that she has neurosyphilis and a low B12.  Infectious disease has been consulted and initiated patient on IV penicillin. The 4/16 VDRL was non-reactive but ID suggest to treat regardless.  Right upper extremity midline was placed.  Patient will complete 14-day course of antibiotics to and 4/30.    Hyponatremia improved with salt tabs and fluid restriction.    Mentation did improve with supplementation with B12, treatment for neurosyphilis and hyponatremia.  Psychiatry was following and antipsychotics were held to include her outpatient quetiapine and the divalproex.  As noted above her hyponatremia and mentation continued to improve with these help.    Interval Problem Update  4/22: Na:136.  Off 3% NaCl. Less tangential on grandiose ideas/delusions. Encouraged oral diet. Discussed with case management for need of SNF vs LTAC for IV PCN completion and PT/OT.    Patient was seen and examined at bedside.  I have personally reviewed and interpreted vitals, labs, and imaging.    4/23.  Afebrile.  Tachypnea is improved.  Intermittent hypertension.  On room air.  Sodium 131.  Patient's mentation seems to be improving.  Less agitated.  Having less delusions.  After injury to myself she consistently name is Dr. Ronal bal and I am in disguise.  Otherwise patient is very agreeable and reports no needs.  Continue  continuous penicillin.  Plan for SNF placement.    I have discussed this patient's plan of care and discharge plan at IDT rounds today with Case Management, Nursing, Nursing leadership, and other members of the IDT team.    Consultants/Specialty  infectious disease    Code Status  Full Code    Disposition  The patient is not medically cleared for discharge to home or a post-acute facility.  Anticipate discharge to: skilled nursing facility    I have placed the appropriate orders for post-discharge needs.    Review of Systems  Review of Systems   Unable to perform ROS: Psychiatric disorder   Constitutional:  Negative for malaise/fatigue.   HENT:  Negative for congestion.    Respiratory:  Negative for shortness of breath.    Cardiovascular:  Negative for palpitations and leg swelling.   Gastrointestinal:  Negative for abdominal pain, diarrhea and nausea.   Neurological:  Negative for speech change and headaches.   Psychiatric/Behavioral:  The patient is not nervous/anxious.         Physical Exam  Temp:  [36.1 °C (97 °F)-36.4 °C (97.6 °F)] 36.2 °C (97.2 °F)  Pulse:  [76-90] 87  Resp:  [16-28] 16  BP: (113-163)/() 114/66  SpO2:  [93 %-99 %] 98 %    Physical Exam  Vitals reviewed.   Constitutional:       Appearance: Normal appearance. She is underweight. She is not diaphoretic.      Comments: thin   HENT:      Head: Normocephalic and atraumatic.      Nose: Nose normal.   Eyes:      General:         Right eye: No discharge.         Left eye: No discharge.      Extraocular Movements: Extraocular movements intact.      Conjunctiva/sclera: Conjunctivae normal.   Cardiovascular:      Rate and Rhythm: Normal rate and regular rhythm.      Pulses:           Radial pulses are 2+ on the right side and 2+ on the left side.        Dorsalis pedis pulses are 2+ on the right side and 2+ on the left side.      Heart sounds: No murmur heard.  Pulmonary:      Effort: Pulmonary effort is normal. No respiratory distress.      Breath  sounds: Normal breath sounds. No wheezing or rales.   Abdominal:      General: Bowel sounds are normal. There is no distension.      Palpations: Abdomen is soft.      Tenderness: There is no abdominal tenderness. There is no guarding.   Musculoskeletal:         General: No swelling or tenderness.      Cervical back: Normal range of motion. No muscular tenderness.      Right lower leg: No edema.      Left lower leg: No edema.   Skin:     Coloration: Skin is not jaundiced or pale.   Neurological:      General: No focal deficit present.      Mental Status: She is alert and oriented to person, place, and time.      Cranial Nerves: No cranial nerve deficit.   Psychiatric:         Attention and Perception: Attention normal.         Mood and Affect: Mood normal. Mood is not anxious.         Speech: Speech normal.         Behavior: Behavior normal. Behavior is cooperative.         Fluids    Intake/Output Summary (Last 24 hours) at 4/23/2024 1318  Last data filed at 4/23/2024 0938  Gross per 24 hour   Intake 1058 ml   Output 600 ml   Net 458 ml       Laboratory  Recent Labs     04/21/24  0525 04/22/24  0140 04/23/24  0244   WBC 7.5 8.3 9.5   RBC 2.67* 2.70* 2.84*   HEMOGLOBIN 8.3* 8.6* 8.9*   HEMATOCRIT 24.4* 25.1* 26.0*   MCV 91.4 93.0 91.5   MCH 31.1 31.9 31.3   MCHC 34.0 34.3 34.2   RDW 41.8 44.2 43.5   PLATELETCT 362 397 437   MPV 9.6 9.3 9.0     Recent Labs     04/21/24  0525 04/21/24  1015 04/21/24  1730 04/22/24  0256 04/23/24  0244   SODIUM 130*   < > 132* 136 131*   POTASSIUM 4.3  --   --  3.8 4.3   CHLORIDE 103  --   --  110 102   CO2 17*  --   --  16* 17*   GLUCOSE 88  --   --  95 104*   BUN 13  --   --  12 11   CREATININE 0.59  --   --  0.59 0.62   CALCIUM 8.1*  --   --  7.9* 8.3*    < > = values in this interval not displayed.                     Imaging  CT-HEAD W/O   Final Result         1. No acute intracranial abnormality. No evidence of acute intracranial hemorrhage or mass lesion.                      IR-MIDLINE CATHETER INSERTION WO GUIDANCE > AGE 3   Final Result                  Ultrasound-guided midline placement performed by qualified nursing staff    as above.          DX-LUMBAR PUNCTURE FOR DIAGNOSIS   Final Result      Fluoroscopic-guided lumbar puncture as described above.      CT-HEAD W/O   Final Result      1.  No acute intracranial abnormality detected.         DX-CHEST-PORTABLE (1 VIEW)   Final Result      No acute cardiac or pulmonary abnormalities are identified.           Assessment/Plan  * Psychotic disorder (HCC)- (present on admission)  Assessment & Plan  4/23/2024  With delusions and darin  Psychiatry consulted  TSH CT head UA U tox wnl. No clear metabolic etiologies.   Hold Seroquel  Hold on mood stabilizers that may affect sodium such as Depakote for now  4/22 less delusions and will dc the MRI brain.  4/20 due to higher BP, I will check CT head to rule out enlarged ventricles.  EEG is normal  This may be compounded by hyponatremia which is being addressed  B12 on the low side.  Start B12 injections     Neurosyphilis- (present on admission)  Assessment & Plan  4/23/2024  New diagnosis by cerebrospinal fluid  4/20 Alert  that he should be check for syphilis and treated if positive  Infectious disease consulted  High-dose IV penicillin G continuous infusion  HIV negative    Vitamin B12 deficiency- (present on admission)  Assessment & Plan  4/23/2024  Replace  Continue to follow-up with primary care  Monitor for improvement on her mentation/delusional    Hyponatremia- (present on admission)  Assessment & Plan  4/23/2024  Question of secondary Seroquel which has been held also Depakote has been held.  I have stopped 3% NaCl fluids  4/21 Increase NaCl tabs.   Encourage oral intake but states she doesn't have her partial dentures and it is difficult.  We are trying to get her to and take more oral fluids and diet.  Watch fluid intake urine output  Diet as tolerates  Holding any  thiazides    Bipolar depression (HCC)- (present on admission)  Assessment & Plan  4/23/2024  Holding antipsychotics for now in face of hyponatremia  Psychiatry consulting but did not feel need of further medications as delusions seem to be clearing.    Idiopathic hypothyroidism- (present on admission)  Assessment & Plan  4/23/2024  Continue synthroid 175 mcg daily  Normal TSH and free thyroxine during this admission    Hypertension- (present on admission)  Assessment & Plan  4/23/2024  Continue active treatment with losartan and Coreg.  4/20 amlodipine added  Monitoring BP  As needed hydralazine and added prn enalapril.         VTE prophylaxis: Lovenox    Greater than 51 minutes spent prepping to see patient (e.g. review of tests) obtaining and/or reviewing separately obtained history. Performing a medically appropriate examination and/ evaluation.  Counseling and educating the patient/family/caregiver.  Ordering medications, tests, or procedures.  Referring and communicating with other health care professionals.  Documenting clinical information in EPIC.  Independently interpreting results and communicating results to patient/family/caregiver.  Care coordination.

## 2024-04-23 NOTE — CARE PLAN
"The patient is Stable - Low risk of patient condition declining or worsening    Shift Goals  Clinical Goals: stable/improved neuro status, safety, IV abx  Patient Goals: \"go home\"  Family Goals: gabriel    Progress made toward(s) clinical / shift goals:    Problem: Pain - Standard  Goal: Alleviation of pain or a reduction in pain to the patient’s comfort goal  Outcome: Progressing     Problem: Knowledge Deficit - Standard  Goal: Patient and family/care givers will demonstrate understanding of plan of care, disease process/condition, diagnostic tests and medications  Outcome: Progressing     Problem: Fall Risk  Goal: Patient will remain free from falls  Outcome: Progressing     Problem: Hemodynamics  Goal: Patient's hemodynamics, fluid balance and neurologic status will be stable or improve  Outcome: Progressing       Patient is not progressing towards the following goals:      "

## 2024-04-23 NOTE — DIETARY
Nutrition Services: Brief Update    Pt is in RD list for LOS and eating mainly <25% of meals on soft and bite-sized diet with thin liquids, 1200 ml fluid restriction with 1:1 feeds. Weight is stable. Partial thickness wound of sacrum noted, confirmed by wound team on 4/21. No edema noted. Last BM: 4/18.     Will order Ensure Compact BID since pt is on fluid restriction to encourage PO intake.     RD following.

## 2024-04-23 NOTE — CARE PLAN
The patient is Stable - Low risk of patient condition declining or worsening    Shift Goals  Clinical Goals: Stable neuro staus, safety  Patient Goals: Rest  Family Goals: LAWRENCE    Progress made toward(s) clinical / shift goals:    Problem: Neuro Status  Goal: Neuro status will remain stable or improve  Outcome: Progressing    Q4H neuro checks in place. Pt's neurological status (A/Ox3) remains unchanged throughout shift. Bed alarm is on, call light within reach.     Problem: Fall Risk  Goal: Patient will remain free from falls  Outcome: Progressing    Bed alarm in place. Pt calls for assistance and is provided appropriate assistive devices when needed. Treaded socks used when ambulating.      Patient is not progressing towards the following goals:

## 2024-04-24 PROBLEM — G93.40 ACUTE ENCEPHALOPATHY: Status: RESOLVED | Noted: 2024-04-22 | Resolved: 2024-04-24

## 2024-04-24 LAB
ANION GAP SERPL CALC-SCNC: 11 MMOL/L (ref 7–16)
BASE EXCESS BLDV CALC-SCNC: -5 MMOL/L
BASE EXCESS BLDV CALC-SCNC: NORMAL MMOL/L
BODY TEMPERATURE: 36.5 CENTIGRADE
BODY TEMPERATURE: NORMAL CENTIGRADE
BUN SERPL-MCNC: 11 MG/DL (ref 8–22)
C DIFF DNA SPEC QL NAA+PROBE: NEGATIVE
C DIFF TOX GENS STL QL NAA+PROBE: NEGATIVE
CALCIUM SERPL-MCNC: 8.4 MG/DL (ref 8.5–10.5)
CHLORIDE SERPL-SCNC: 99 MMOL/L (ref 96–112)
CO2 SERPL-SCNC: 19 MMOL/L (ref 20–33)
CREAT SERPL-MCNC: 0.67 MG/DL (ref 0.5–1.4)
ERYTHROCYTE [DISTWIDTH] IN BLOOD BY AUTOMATED COUNT: 43 FL (ref 35.9–50)
EXTRA TUBE BLU BLU: NORMAL
GFR SERPLBLD CREATININE-BSD FMLA CKD-EPI: 82 ML/MIN/1.73 M 2
GLUCOSE SERPL-MCNC: 109 MG/DL (ref 65–99)
HCO3 BLDV-SCNC: 19 MMOL/L (ref 24–28)
HCO3 BLDV-SCNC: NORMAL MMOL/L (ref 24–28)
HCT VFR BLD AUTO: 27.3 % (ref 37–47)
HGB BLD-MCNC: 9.5 G/DL (ref 12–16)
INHALED O2 FLOW RATE: ABNORMAL L/MIN
INHALED O2 FLOW RATE: NORMAL L/MIN
MAGNESIUM SERPL-MCNC: 1.8 MG/DL (ref 1.5–2.5)
MCH RBC QN AUTO: 31.7 PG (ref 27–33)
MCHC RBC AUTO-ENTMCNC: 34.8 G/DL (ref 32.2–35.5)
MCV RBC AUTO: 91 FL (ref 81.4–97.8)
PCO2 BLDV: 29.1 MMHG (ref 41–51)
PCO2 BLDV: NORMAL MMHG (ref 41–51)
PCO2 TEMP ADJ BLDV: 28.5 MMHG (ref 41–51)
PCO2 TEMP ADJ BLDV: NORMAL MMHG (ref 41–51)
PH BLDV: 7.42 [PH] (ref 7.31–7.45)
PH BLDV: NORMAL [PH] (ref 7.31–7.45)
PH TEMP ADJ BLDV: 7.43 [PH] (ref 7.31–7.45)
PH TEMP ADJ BLDV: NORMAL [PH] (ref 7.31–7.45)
PHOSPHATE SERPL-MCNC: 2.6 MG/DL (ref 2.5–4.5)
PLATELET # BLD AUTO: 475 K/UL (ref 164–446)
PMV BLD AUTO: 8.8 FL (ref 9–12.9)
PO2 BLDV: 19.8 MMHG (ref 25–40)
PO2 BLDV: NORMAL MMHG (ref 25–40)
PO2 TEMP ADJ BLDV: 19.1 MMHG (ref 25–40)
PO2 TEMP ADJ BLDV: NORMAL MMHG (ref 25–40)
POTASSIUM SERPL-SCNC: 4.1 MMOL/L (ref 3.6–5.5)
RBC # BLD AUTO: 3 M/UL (ref 4.2–5.4)
SAO2 % BLDV: 28.5 %
SAO2 % BLDV: NORMAL %
SODIUM SERPL-SCNC: 129 MMOL/L (ref 135–145)
WBC # BLD AUTO: 9.8 K/UL (ref 4.8–10.8)

## 2024-04-24 PROCEDURE — 97530 THERAPEUTIC ACTIVITIES: CPT

## 2024-04-24 PROCEDURE — 84100 ASSAY OF PHOSPHORUS: CPT

## 2024-04-24 PROCEDURE — 80048 BASIC METABOLIC PNL TOTAL CA: CPT

## 2024-04-24 PROCEDURE — 700102 HCHG RX REV CODE 250 W/ 637 OVERRIDE(OP): Performed by: EMERGENCY MEDICINE

## 2024-04-24 PROCEDURE — 700102 HCHG RX REV CODE 250 W/ 637 OVERRIDE(OP): Performed by: INTERNAL MEDICINE

## 2024-04-24 PROCEDURE — 700102 HCHG RX REV CODE 250 W/ 637 OVERRIDE(OP): Performed by: HOSPITALIST

## 2024-04-24 PROCEDURE — 36415 COLL VENOUS BLD VENIPUNCTURE: CPT

## 2024-04-24 PROCEDURE — A9270 NON-COVERED ITEM OR SERVICE: HCPCS | Performed by: HOSPITALIST

## 2024-04-24 PROCEDURE — 87493 C DIFF AMPLIFIED PROBE: CPT

## 2024-04-24 PROCEDURE — A9270 NON-COVERED ITEM OR SERVICE: HCPCS | Performed by: EMERGENCY MEDICINE

## 2024-04-24 PROCEDURE — 700111 HCHG RX REV CODE 636 W/ 250 OVERRIDE (IP): Performed by: INTERNAL MEDICINE

## 2024-04-24 PROCEDURE — A9270 NON-COVERED ITEM OR SERVICE: HCPCS | Performed by: INTERNAL MEDICINE

## 2024-04-24 PROCEDURE — 700105 HCHG RX REV CODE 258: Performed by: INTERNAL MEDICINE

## 2024-04-24 PROCEDURE — 85027 COMPLETE CBC AUTOMATED: CPT

## 2024-04-24 PROCEDURE — 99233 SBSQ HOSP IP/OBS HIGH 50: CPT | Performed by: INTERNAL MEDICINE

## 2024-04-24 PROCEDURE — 770020 HCHG ROOM/CARE - TELE (206)

## 2024-04-24 PROCEDURE — 83735 ASSAY OF MAGNESIUM: CPT

## 2024-04-24 PROCEDURE — 82803 BLOOD GASES ANY COMBINATION: CPT | Mod: 91

## 2024-04-24 PROCEDURE — 700111 HCHG RX REV CODE 636 W/ 250 OVERRIDE (IP): Performed by: HOSPITALIST

## 2024-04-24 PROCEDURE — 700111 HCHG RX REV CODE 636 W/ 250 OVERRIDE (IP): Mod: JZ | Performed by: HOSPITALIST

## 2024-04-24 RX ORDER — CARVEDILOL 6.25 MG/1
25 TABLET ORAL 2 TIMES DAILY WITH MEALS
Status: DISCONTINUED | OUTPATIENT
Start: 2024-04-24 | End: 2024-04-25 | Stop reason: HOSPADM

## 2024-04-24 RX ORDER — LANOLIN ALCOHOL/MO/W.PET/CERES
400 CREAM (GRAM) TOPICAL 2 TIMES DAILY
Status: COMPLETED | OUTPATIENT
Start: 2024-04-24 | End: 2024-04-24

## 2024-04-24 RX ORDER — SODIUM CHLORIDE 1 G/1
1 TABLET ORAL
Status: SHIPPED
Start: 2024-04-24

## 2024-04-24 RX ORDER — M-VIT,TX,IRON,MINS/CALC/FOLIC 27MG-0.4MG
1 TABLET ORAL DAILY
Status: SHIPPED
Start: 2024-04-25

## 2024-04-24 RX ORDER — HYDROCODONE BITARTRATE AND ACETAMINOPHEN 5; 325 MG/1; MG/1
1 TABLET ORAL EVERY 6 HOURS PRN
Qty: 10 TABLET | Refills: 0 | Status: SHIPPED | OUTPATIENT
Start: 2024-04-24 | End: 2024-04-29

## 2024-04-24 RX ORDER — CARVEDILOL 25 MG/1
25 TABLET ORAL 2 TIMES DAILY WITH MEALS
Status: SHIPPED
Start: 2024-04-24

## 2024-04-24 RX ORDER — LOPERAMIDE HYDROCHLORIDE 2 MG/1
2 CAPSULE ORAL 4 TIMES DAILY PRN
Status: DISCONTINUED | OUTPATIENT
Start: 2024-04-24 | End: 2024-04-25 | Stop reason: HOSPADM

## 2024-04-24 RX ORDER — UREA 10 %
5 LOTION (ML) TOPICAL NIGHTLY PRN
Status: SHIPPED
Start: 2024-04-24

## 2024-04-24 RX ORDER — AMLODIPINE BESYLATE 10 MG/1
10 TABLET ORAL DAILY
Status: SHIPPED
Start: 2024-04-25

## 2024-04-24 RX ADMIN — Medication 1 TABLET: at 05:54

## 2024-04-24 RX ADMIN — Medication 400 MG: at 10:05

## 2024-04-24 RX ADMIN — CARVEDILOL 25 MG: 6.25 TABLET, FILM COATED ORAL at 07:53

## 2024-04-24 RX ADMIN — SODIUM CHLORIDE 1 G: 1 TABLET ORAL at 18:44

## 2024-04-24 RX ADMIN — LOSARTAN POTASSIUM 100 MG: 50 TABLET, FILM COATED ORAL at 05:55

## 2024-04-24 RX ADMIN — CARVEDILOL 25 MG: 6.25 TABLET, FILM COATED ORAL at 18:44

## 2024-04-24 RX ADMIN — AMLODIPINE BESYLATE 10 MG: 5 TABLET ORAL at 05:54

## 2024-04-24 RX ADMIN — HYDRALAZINE HYDROCHLORIDE 10 MG: 20 INJECTION, SOLUTION INTRAMUSCULAR; INTRAVENOUS at 00:27

## 2024-04-24 RX ADMIN — SODIUM CHLORIDE 1 G: 1 TABLET ORAL at 07:53

## 2024-04-24 RX ADMIN — LEVOTHYROXINE SODIUM 175 MCG: 0.17 TABLET ORAL at 05:54

## 2024-04-24 RX ADMIN — ENOXAPARIN SODIUM 40 MG: 100 INJECTION SUBCUTANEOUS at 18:44

## 2024-04-24 RX ADMIN — SODIUM CHLORIDE 18 MILLION UNITS: 9 INJECTION, SOLUTION INTRAVENOUS at 11:01

## 2024-04-24 RX ADMIN — Medication 400 MG: at 18:44

## 2024-04-24 RX ADMIN — TORSEMIDE 5 MG: 5 TABLET ORAL at 05:54

## 2024-04-24 RX ADMIN — DIBASIC SODIUM PHOSPHATE, MONOBASIC POTASSIUM PHOSPHATE AND MONOBASIC SODIUM PHOSPHATE 500 MG: 852; 155; 130 TABLET ORAL at 11:03

## 2024-04-24 RX ADMIN — DIBASIC SODIUM PHOSPHATE, MONOBASIC POTASSIUM PHOSPHATE AND MONOBASIC SODIUM PHOSPHATE 500 MG: 852; 155; 130 TABLET ORAL at 18:00

## 2024-04-24 RX ADMIN — SODIUM CHLORIDE 1 G: 1 TABLET ORAL at 11:00

## 2024-04-24 ASSESSMENT — COGNITIVE AND FUNCTIONAL STATUS - GENERAL
TURNING FROM BACK TO SIDE WHILE IN FLAT BAD: A LITTLE
WALKING IN HOSPITAL ROOM: A LOT
SUGGESTED CMS G CODE MODIFIER MOBILITY: CL
STANDING UP FROM CHAIR USING ARMS: A LOT
MOVING TO AND FROM BED TO CHAIR: A LOT
MOVING FROM LYING ON BACK TO SITTING ON SIDE OF FLAT BED: A LOT
MOBILITY SCORE: 12
CLIMB 3 TO 5 STEPS WITH RAILING: TOTAL

## 2024-04-24 ASSESSMENT — ENCOUNTER SYMPTOMS
PALPITATIONS: 0
NERVOUS/ANXIOUS: 0
DIARRHEA: 0
NAUSEA: 0
HEADACHES: 0
SHORTNESS OF BREATH: 0
SPEECH CHANGE: 0
ABDOMINAL PAIN: 0

## 2024-04-24 ASSESSMENT — GAIT ASSESSMENTS: GAIT LEVEL OF ASSIST: REFUSED

## 2024-04-24 ASSESSMENT — PAIN DESCRIPTION - PAIN TYPE: TYPE: ACUTE PAIN

## 2024-04-24 NOTE — CARE PLAN
The patient is Stable - Low risk of patient condition declining or worsening    Shift Goals  Clinical Goals: Stable neuro status, Safety  Patient Goals: Rest, pain management  Family Goals: LAWRENCE    Progress made toward(s) clinical / shift goals:    Problem: Neuro Status  Goal: Neuro status will remain stable or improve  Outcome: Progressing    Q4H neuro checks in place. Pt's neurological status (A/Ox1-2) remains unchanged throughout shift. Bed alarm is on, call light within reach.     Problem: Fall Risk  Goal: Patient will remain free from falls  Outcome: Progressing    Bed alarm in place. Pt calls for assistance and is provided appropriate assistive devices when needed. Treaded socks used when ambulating.      Patient is not progressing towards the following goals:

## 2024-04-24 NOTE — THERAPY
Physical Therapy   Daily Treatment     Patient Name: Ricky Junior  Age:  89 y.o., Sex:  female  Medical Record #: [id number]  Today's Date: 4/24/2024     Precautions  Precautions: Fall Risk;Swallow Precautions;Other (See Comments) (seizure precautions)    Assessment  Pt was agreeable to the treatment session detailed below. Pt limited in today's session due to cognitive impairments including confusion, fatigue, and pain. Pt required ModA physical assistance and Max v/c during bed>chair stand pivot transfer for hand placement. Pt was unable to take a step during transfer. Pt will benefit from post-acute physical therapy services to increased overall functional mobility and return to baseline function. Will continue to follow during admit.       Plan    Treatment Plan Status: Continue Current Treatment Plan  Type of Treatment: Bed Mobility, Equipment, Gait Training, Neuro Re-Education / Balance, Self Care / Home Evaluation, Stair Training, Therapeutic Activities, Therapeutic Exercise  Treatment Frequency: 3 Times per Week  Treatment Duration: Until Therapy Goals Met    DC Equipment Recommendations: Unable to determine at this time  Discharge Recommendations: Recommend post-acute placement for additional physical therapy services prior to discharge home     Objective     04/24/24 0834    Services   Is patient using  services for this encounter? No   Precautions   Precautions Fall Risk;Swallow Precautions;Other (See Comments)  (seizure precautions)   Vitals   O2 Delivery Device None - Room Air   Pain   Pain Scales 0 to 10 Scale    Intervention Rest   Pain 0 - 10 Group   Location Abdomen;Back   Therapist Pain Assessment During Activity  (c/o abdominal pain when supine in bed, c/o back pain while sitting at EOB)   Cognition    Cognition / Consciousness X   Level of Consciousness Alert   Ability To Follow Commands 1 Step   Safety Awareness Impaired   New Learning Impaired   Attention Impaired  "  Initiation Impaired   Comments pt pleasant and cooperative; pt confused and made several statements w/ inappropriate content including: \"I'm 40 weeks pregnant\", \"When I sit on the edge of the bed, I'll be in [place]\"; pt presents w/ BUE tremor throughout session   Other Treatments   Other Treatments Provided Education provided on the importance of OOB activity to decrease atrophy   Balance   Sitting Balance (Static) Fair   Sitting Balance (Dynamic) Fair   Standing Balance (Static) Poor +   Standing Balance (Dynamic) Poor   Weight Shift Sitting Poor   Weight Shift Standing Poor   Skilled Intervention Facilitation;Postural Facilitation;Verbal Cuing   Comments seated at EOB, standing w/ HHA   Bed Mobility    Supine to Sit Moderate Assist   Sit to Supine   (pt up in chair post session)   Scooting Minimal Assist   Rolling Minimum Assist to Lt.   Skilled Intervention Compensatory Strategies;Postural Facilitation;Sequencing;Verbal Cuing   Comments HOB flat w/ use of railing; increased difficulty w/ sidelying to sitting transition   Gait Analysis   Gait Level Of Assist Refused   Comments d/t fatigue during standing and confusion   Functional Mobility   Sit to Stand Moderate Assist   Bed, Chair, Wheelchair Transfer Moderate Assist   Transfer Method Stand Pivot   Mobility supine>EOB>chair   Skilled Intervention Compensatory Strategies;Postural Facilitation;Sequencing;Verbal Cuing   Comments STS x2 times at EOB; v/c for hand placement; difficulty w/ achieving full hip extension   6 Clicks Assessment - How much HELP from from another person do you currently need... (If the patient hasn't done an activity recently, how much help from another person do you think he/she would need if he/she tried?)   Turning from your back to your side while in a flat bed without using bedrails? 3   Moving from lying on your back to sitting on the side of a flat bed without using bedrails? 2   Moving to and from a bed to a chair (including a " wheelchair)? 2   Standing up from a chair using your arms (e.g., wheelchair, or bedside chair)? 2   Walking in hospital room? 2   Climbing 3-5 steps with a railing? 1   6 clicks Mobility Score 12   Activity Tolerance   Sitting in Chair post session   Sitting Edge of Bed 10 minutes   Standing 1 minute total   Patient / Family Goals    Patient / Family Goal #1 to get 'her head right'   Goal #1 Outcome Goal not met   Short Term Goals    Short Term Goal # 1 Pt will perform supine<>sit from flat HOB/no railing with supervision within 6 visits to ensure independent mobiltiy at home.   Goal Outcome # 1 Progressing as expected   Short Term Goal # 2 Pt will perform sit<>stand with FWW and supervision within 6 visits to progress to independence.   Goal Outcome # 2 Progressing slower than expected   Short Term Goal # 3 Pt will ambulate x 50ft with FWW and supervision within 6 visits to progress to independence.   Goal Outcome # 3 Goal not met   Education Group   Education Provided Role of Physical Therapist   Role of Physical Therapist Patient Response Patient;Eager;Explanation;Demonstration;Verbal Demonstration;Action Demonstration   Physical Therapy Treatment Plan   Physical Therapy Treatment Plan Continue Current Treatment Plan   Anticipated Discharge Equipment and Recommendations   DC Equipment Recommendations Unable to determine at this time   Discharge Recommendations Recommend post-acute placement for additional physical therapy services prior to discharge home   Interdisciplinary Plan of Care Collaboration   IDT Collaboration with  Nursing   Patient Position at End of Therapy Seated;Chair Alarm On;Call Light within Reach;Tray Table within Reach;Phone within Reach   Collaboration Comments RN notified   Session Information   Date / Session Number  4/24- 3(1/3, 4/30)

## 2024-04-24 NOTE — DISCHARGE INSTRUCTIONS
Discharge Instructions per TING SmithOMaricruz    DIET: Diet Order Diet: Level 6 - Soft and Bite Sized; Liquid level: Level 0 - Thin; Fluid modifications: (optional): 1200 ml Fluid Restriction; Tray Modifications (optional): SLP - 1:1 Supervision by Nursing  Supplements    ACTIVITY: As tolerated    A proper diet that is low in grease, fat, and salt, along with 30 minutes of exercise per day will lead to weight loss, and better controlled blood sugar and blood pressure.    DIAGNOSIS: Psychotic disorder (HCC)    Follow up with your Primary Care Provider Luke Escobar M.D. as scheduled or sooner if your symptoms persist or worsen.  Return to Emergency Room for sever chest pain, shortness of breath, signs of a stroke, or any other emergencies.  Discharge Instructions    Discharged to [facility] home by medical transportation with escort. Discharged via ambulance, hospital escort: Yes.  Special equipment needed: Not Applicable    Be sure to schedule a follow-up appointment with your primary care doctor or any specialists as instructed.     Discharge Plan:   Diet Plan: Discussed  Activity Level: Discussed  Confirmed Follow up Appointment: Patient to Call and Schedule Appointment  Confirmed Symptoms Management: Discussed  Medication Reconciliation Updated: Yes    I understand that a diet low in cholesterol, fat, and sodium is recommended for good health. Unless I have been given specific instructions below for another diet, I accept this instruction as my diet prescription.   Other diet: Soft and bite sized    Special Instructions: None    -Is this patient being discharged with medication to prevent blood clots?  No    Is patient discharged on Warfarin / Coumadin?   No

## 2024-04-24 NOTE — PROGRESS NOTES
Hospital Medicine Daily Progress Note    Date of Service  4/24/2024    Chief Complaint  Altered mental status    Hospital Course  Ricky Junior is a 89 y.o. female chronic back pain with degenerative joint disease, bipolar disorder admitted 4/12/2024 with altered mental status.  She was seen by primary care provider before coming to the emergency room she was alert and oriented x 3 but is having delusions.  She was kept in the ER due to her delusions.  It was noted that her sodium dropped to 122.  Per admission notes she was delusional stating that her  was obsessed with the devil.  It was noted that she has neurosyphilis and a low B12.  Infectious disease has been consulted and initiated patient on IV penicillin. The 4/16 VDRL was non-reactive but ID suggest to treat regardless.  Right upper extremity midline was placed.  Patient will complete 14-day course of antibiotics to and 4/30.    Hyponatremia improved with salt tabs and fluid restriction.    Mentation did improve with supplementation with B12, treatment for neurosyphilis and hyponatremia.  Psychiatry was following and antipsychotics were held to include her outpatient quetiapine and the divalproex.  As noted above her hyponatremia and mentation continued to improve with these help.    Interval Problem Update  4/22: Na:136.  Off 3% NaCl. Less tangential on grandiose ideas/delusions. Encouraged oral diet. Discussed with case management for need of SNF vs LTAC for IV PCN completion and PT/OT.    Patient was seen and examined at bedside.  I have personally reviewed and interpreted vitals, labs, and imaging.    4/23.  Afebrile.  Tachypnea is improved.  Intermittent hypertension.  On room air.  Sodium 131.  Patient's mentation seems to be improving.  Less agitated.  Having less delusions.  After injury to myself she consistently name is Dr. Ronal bal and I am in disguise.  Otherwise patient is very agreeable and reports no needs.  Continue  continuous penicillin.  Plan for SNF placement.  4/24.  Afebrile.  Intermittent hypertension.  On room air.  This morning patient sitting up in chair having breakfast.  Will continue she is very agreeable and pleasant this morning.  Noted that her sodium dropped to 129.  Currently she is drinking Gatorade.  Later she told the nurse that she was pregnant and wanted somebody to wash her new baby.  Family did come to bedside this afternoon.  Patient initially presented from primary care appointment on 4/12.  Apparently she was sent to the ER from [facility].  Per family she is much more confused now than she was initially.  Patient does wax and wane throughout the day.  Yesterday family reports is one of the best they have seen patient.  She was not having delusions when I saw her this morning.  Counseled about plan of care with IV penicillin for neurosyphilis.  Continue fluid striction, salt tabs, diuresis with torsemide for hyponatremia.  If this does not start to improve may need to restart hypertonic saline or consult nephrology.  Patient has been accepted at SNF but developed diarrhea and there was concern for C. difficile.  Rule out C. difficile.  Monitor mental status closely.  She does seem to have intermittent delirium, but at least per chart review she is a lot less agitated than prior and more agreeable to care    I have discussed this patient's plan of care and discharge plan at IDT rounds today with Case Management, Nursing, Nursing leadership, and other members of the IDT team.    Consultants/Specialty  infectious disease    Code Status  Full Code    Disposition  The patient is not medically cleared for discharge to home or a post-acute facility.  Anticipate discharge to: skilled nursing facility    I have placed the appropriate orders for post-discharge needs.    Review of Systems  Review of Systems   Unable to perform ROS: Psychiatric disorder   Constitutional:  Negative for malaise/fatigue.    HENT:  Negative for congestion.    Respiratory:  Negative for shortness of breath.    Cardiovascular:  Negative for palpitations and leg swelling.   Gastrointestinal:  Negative for abdominal pain, diarrhea and nausea.   Neurological:  Negative for speech change and headaches.   Psychiatric/Behavioral:  The patient is not nervous/anxious.         Physical Exam  Temp:  [36.2 °C (97.2 °F)-37.3 °C (99.1 °F)] 36.4 °C (97.6 °F)  Pulse:  [73-87] 78  Resp:  [15-16] 15  BP: (103-187)/() 157/57  SpO2:  [96 %-98 %] 98 %    Physical Exam  Vitals reviewed.   Constitutional:       Appearance: Normal appearance. She is underweight. She is not diaphoretic.      Comments: thin   HENT:      Head: Normocephalic and atraumatic.      Nose: Nose normal.   Eyes:      General:         Right eye: No discharge.         Left eye: No discharge.      Extraocular Movements: Extraocular movements intact.      Conjunctiva/sclera: Conjunctivae normal.   Cardiovascular:      Rate and Rhythm: Normal rate and regular rhythm.      Pulses:           Radial pulses are 2+ on the right side and 2+ on the left side.        Dorsalis pedis pulses are 2+ on the right side and 2+ on the left side.      Heart sounds: No murmur heard.  Pulmonary:      Effort: Pulmonary effort is normal. No respiratory distress.      Breath sounds: Normal breath sounds. No wheezing or rales.   Abdominal:      General: Bowel sounds are normal. There is no distension.      Palpations: Abdomen is soft.      Tenderness: There is no abdominal tenderness. There is no guarding.   Musculoskeletal:         General: No swelling or tenderness.      Cervical back: Normal range of motion. No muscular tenderness.      Right lower leg: No edema.      Left lower leg: No edema.   Skin:     Coloration: Skin is not jaundiced or pale.   Neurological:      General: No focal deficit present.      Mental Status: She is alert and oriented to person, place, and time.      Cranial Nerves: No  cranial nerve deficit.   Psychiatric:         Attention and Perception: Attention normal.         Mood and Affect: Mood normal. Mood is not anxious.         Speech: Speech normal.         Behavior: Behavior normal. Behavior is cooperative.         Fluids    Intake/Output Summary (Last 24 hours) at 4/24/2024 0520  Last data filed at 4/24/2024 0019  Gross per 24 hour   Intake 798 ml   Output 600 ml   Net 198 ml       Laboratory  Recent Labs     04/21/24  0525 04/22/24  0140 04/23/24  0244   WBC 7.5 8.3 9.5   RBC 2.67* 2.70* 2.84*   HEMOGLOBIN 8.3* 8.6* 8.9*   HEMATOCRIT 24.4* 25.1* 26.0*   MCV 91.4 93.0 91.5   MCH 31.1 31.9 31.3   MCHC 34.0 34.3 34.2   RDW 41.8 44.2 43.5   PLATELETCT 362 397 437   MPV 9.6 9.3 9.0     Recent Labs     04/21/24  0525 04/21/24  1015 04/21/24  1730 04/22/24  0256 04/23/24  0244   SODIUM 130*   < > 132* 136 131*   POTASSIUM 4.3  --   --  3.8 4.3   CHLORIDE 103  --   --  110 102   CO2 17*  --   --  16* 17*   GLUCOSE 88  --   --  95 104*   BUN 13  --   --  12 11   CREATININE 0.59  --   --  0.59 0.62   CALCIUM 8.1*  --   --  7.9* 8.3*    < > = values in this interval not displayed.                     Imaging  CT-HEAD W/O   Final Result         1. No acute intracranial abnormality. No evidence of acute intracranial hemorrhage or mass lesion.                     IR-MIDLINE CATHETER INSERTION WO GUIDANCE > AGE 3   Final Result                  Ultrasound-guided midline placement performed by qualified nursing staff    as above.          DX-LUMBAR PUNCTURE FOR DIAGNOSIS   Final Result      Fluoroscopic-guided lumbar puncture as described above.      CT-HEAD W/O   Final Result      1.  No acute intracranial abnormality detected.         DX-CHEST-PORTABLE (1 VIEW)   Final Result      No acute cardiac or pulmonary abnormalities are identified.           Assessment/Plan  * Psychotic disorder (HCC)- (present on admission)  Assessment & Plan  4/24/2024  With delusions and darin  Psychiatry  consulted  TSH CT head UA U tox wnl. No clear metabolic etiologies.   Hold Seroquel  Hold on mood stabilizers that may affect sodium such as Depakote for now  4/22 less delusions and will dc the MRI brain.  4/20 due to higher BP, I will check CT head to rule out enlarged ventricles.  EEG is normal  This may be compounded by hyponatremia which is being addressed  B12 on the low side.  Start B12 injections     Neurosyphilis- (present on admission)  Assessment & Plan  4/24/2024  New diagnosis by cerebrospinal fluid  4/20 Alert  that he should be check for syphilis and treated if positive  Infectious disease consulted  High-dose IV penicillin G continuous infusion  HIV negative    Vitamin B12 deficiency- (present on admission)  Assessment & Plan  4/24/2024  Replace  Continue to follow-up with primary care  Monitor for improvement on her mentation/delusional    Hyponatremia- (present on admission)  Assessment & Plan  4/24/2024  Question of secondary Seroquel which has been held also Depakote has been held.  I have stopped 3% NaCl fluids  4/21 Increase NaCl tabs.   Encourage oral intake but states she doesn't have her partial dentures and it is difficult.  We are trying to get her to and take more oral fluids and diet.  Watch fluid intake urine output  Diet as tolerates  Holding any thiazides    Bipolar depression (HCC)- (present on admission)  Assessment & Plan  4/24/2024  Holding antipsychotics for now in face of hyponatremia  Psychiatry consulting but did not feel need of further medications as delusions seem to be clearing.    Idiopathic hypothyroidism- (present on admission)  Assessment & Plan  4/24/2024  Continue synthroid 175 mcg daily  Normal TSH and free thyroxine during this admission    Hypertension- (present on admission)  Assessment & Plan  4/24/2024  Continue active treatment with losartan and Coreg.  4/20 amlodipine added  Monitoring BP  As needed hydralazine and added prn enalapril.         VTE  prophylaxis: Lovenox    Greater than 52 minutes spent prepping to see patient (e.g. review of tests) obtaining and/or reviewing separately obtained history. Performing a medically appropriate examination and/ evaluation.  Counseling and educating the patient/family/caregiver.  Ordering medications, tests, or procedures.  Referring and communicating with other health care professionals.  Documenting clinical information in EPIC.  Independently interpreting results and communicating results to patient/family/caregiver.  Care coordination.

## 2024-04-24 NOTE — PROGRESS NOTES
Monitor Summary: SR 69-82, CO .0.11, QRS .0.08, QT .0.34 with rare PVCs per strip from monitor room

## 2024-04-24 NOTE — DOCUMENTATION QUERY
"                                                                         [facility]                                                                       Query Response Note      PATIENT:               JERRY URIAS  ACCT #:                  [id number]  MRN:                     [id number]  :                      [id number]  ADMIT DATE:       2024 6:38 PM  DISCH DATE:          RESPONDING  PROVIDER #:        [id number]           QUERY TEXT:    Delirium, psychosis, and R/O encephalopathy is documented in the Medical Record. Can clarification of findings be determined?    The patient's Clinical Indicators include:  89yo with dx of hyponatremia, neurosyphilis, HTN, bipolar disorder, brief psychotic disorder, delirium      ED note \"Altered mental status/confusion\"   Janet-Homa Consult \"R/O encephalopathy\"   Yanci Consult \"Psychosis now resolved after ongoing treatment of neurosyphilis, hyponatremia, and B12 deficiency which may have all been contributing to altered mentation\"     Risk factors: advanced age, hyponatremia, neurosyphilis, brief psychotic disorder, delirium   Treatments: labwork, electrolyte balance, imaging, medication, specialty consultation, monitoring    Contact me with questions.     Thank you,  KENY Wilkins, CDI  [email or web address]  Options provided:   -- Metabolic encephalopathy   -- Encephalopathy ruled out   -- Other explanation, (please specify other explanation)   -- Unable to determine      Query created by: Siobhan Vaca on 2024 5:06 AM    RESPONSE TEXT:    Metabolic encephalopathy          Electronically signed by:  MI JIMENEZ MD 2024 3:17 PM              "

## 2024-04-24 NOTE — DISCHARGE PLANNING
Case Management Discharge Planning    Admission Date: 4/12/2024  GMLOS: 2.6  ALOS: 9    6-Clicks ADL Score: 18  6-Clicks Mobility Score: 12  PT and/or OT Eval ordered: Yes  Post-acute Referrals Ordered: Yes  Post-acute Choice Obtained: Yes  Has referral(s) been sent to post-acute provider:  Yes      Anticipated Discharge Dispo: Discharge Disposition: D/T to SNF with Medicare cert in anticipation of skilled care (03)    DME Needed: No    Action(s) Taken: Updated Provider/Nurse on Discharge Plan, Patient discussed during IDT rounds with medical team.    Escalations Completed: None    Medically Clear: No    Next Steps: Case Management will continue to follow for discharge planning needs.    Barriers to Discharge: Medical clearance and Pending Placement    Is the patient up for discharge tomorrow: No    RN Case Manager met with patient at bedside and obtained the information used in this assessment. Patient verified accuracy of facesheet; patient lives in a single story home with her spouse.  Prior to current hospitalization, patient was assisted with ADLS/IADLS. Patient has a ride and is able to attend necessary MD appointments. Patient's PCP is Luke Escobar and prefers to use [facility] pharmacy. Patient has no financial concerns. Patient has support from spouse. Denies any history of substance use and denies any diagnosis of mental illness.    Care Transition Team Assessment    Information Source: Patient  Orientation Level: Oriented to place  Information Given By: Patient  Who is responsible for making decisions for patient? : Patient    Readmission Evaluation  Is this a readmission?: No    Elopement Risk  Legal Hold: No  Ambulatory or Self Mobile in Wheelchair: No-Not an Elopement Risk  Disoriented: Place-At Risk for Elopement, Time-At Risk for Elopement, Situation-At Risk for Elopement  Psychiatric Symptoms: None  History of Wandering: No  Elopement this Admit: No  Vocalizing Wanting to Leave: No  Displays  Behaviors, Body Language Wanting to Leave: No-Not at Risk for Elopement  Elopement Risk: Not at Risk for Elopement  Wanderguard On: Unavailable  Personal Belongings: Hospital Clothing Only  Picture of Patient on Inside Chart Front Cover: Yes  Purple Armband on Patient: No (See Comments)    Interdisciplinary Discharge Planning  Does Admitting Nurse Feel This Could be a Complex Discharge?: Yes  Primary Care Physician: Luke Richards MD  Lives with - Patient's Self Care Capacity: Spouse  Support Systems: Spouse / Significant Other  Housing / Facility: 1 Story House  Able to Return to Previous ADL's: Future Time w/Therapy  Mobility Issues: Yes  Prior Services: None  Patient Prefers to be Discharged to:: Home  Assistance Needed: Yes  Durable Medical Equipment: Walker (Wheelchair)    Discharge Preparedness  What is your plan after discharge?: Home with help  What are your discharge supports?: Spouse  Prior Functional Level: Needs Assist with Activities of Daily Living, Uses Wheelchair, Uses Walker  Difficulity with ADLs: None  Difficulity with IADLs: Driving    Functional Assesment  Prior Functional Level: Needs Assist with Activities of Daily Living, Uses Wheelchair, Uses Walker    Finances  Financial Barriers to Discharge: No  Prescription Coverage: Yes    Vision / Hearing Impairment  Vision Impairment : Yes  Right Eye Vision: Impaired, Wears Glasses  Left Eye Vision: Impaired, Wears Glasses  Hearing Impairment : No    Values / Beliefs / Concerns  Values / Beliefs Concerns : No    Advance Directive  Advance Directive?: None  Advance Directive offered?: AD Booklet refused    Domestic Abuse  Have you ever been the victim of abuse or violence?: No  Physical Abuse or Sexual Abuse: No  Verbal Abuse or Emotional Abuse: No  Possible Abuse/Neglect Reported to:: Not Applicable    Psychological Assessment  History of Substance Abuse: None  History of Psychiatric Problems: No    Discharge Risks or Barriers  Discharge risks or  barriers?: Mental health  Patient risk factors: Cognitive / sensory / physical deficit    Anticipated Discharge Information  Discharge Disposition: D/T to SNF with Medicare cert in anticipation of skilled care (03)

## 2024-04-25 VITALS
OXYGEN SATURATION: 96 % | HEIGHT: 67 IN | TEMPERATURE: 97.7 F | WEIGHT: 129.41 LBS | DIASTOLIC BLOOD PRESSURE: 76 MMHG | RESPIRATION RATE: 18 BRPM | BODY MASS INDEX: 20.31 KG/M2 | HEART RATE: 76 BPM | SYSTOLIC BLOOD PRESSURE: 115 MMHG

## 2024-04-25 LAB
ANION GAP SERPL CALC-SCNC: 12 MMOL/L (ref 7–16)
BUN SERPL-MCNC: 14 MG/DL (ref 8–22)
CALCIUM SERPL-MCNC: 8.5 MG/DL (ref 8.5–10.5)
CHLORIDE SERPL-SCNC: 99 MMOL/L (ref 96–112)
CO2 SERPL-SCNC: 18 MMOL/L (ref 20–33)
CREAT SERPL-MCNC: 0.66 MG/DL (ref 0.5–1.4)
ERYTHROCYTE [DISTWIDTH] IN BLOOD BY AUTOMATED COUNT: 43.2 FL (ref 35.9–50)
GFR SERPLBLD CREATININE-BSD FMLA CKD-EPI: 82 ML/MIN/1.73 M 2
GLUCOSE SERPL-MCNC: 96 MG/DL (ref 65–99)
HCT VFR BLD AUTO: 27.5 % (ref 37–47)
HGB BLD-MCNC: 9.5 G/DL (ref 12–16)
MAGNESIUM SERPL-MCNC: 1.8 MG/DL (ref 1.5–2.5)
MCH RBC QN AUTO: 31.3 PG (ref 27–33)
MCHC RBC AUTO-ENTMCNC: 34.5 G/DL (ref 32.2–35.5)
MCV RBC AUTO: 90.5 FL (ref 81.4–97.8)
NT-PROBNP SERPL IA-MCNC: 2298 PG/ML (ref 0–125)
OSMOLALITY SERPL: 271 MOSM/KG H2O (ref 278–298)
PHOSPHATE SERPL-MCNC: 2.7 MG/DL (ref 2.5–4.5)
PLATELET # BLD AUTO: 487 K/UL (ref 164–446)
PMV BLD AUTO: 8.9 FL (ref 9–12.9)
POTASSIUM SERPL-SCNC: 4.1 MMOL/L (ref 3.6–5.5)
RBC # BLD AUTO: 3.04 M/UL (ref 4.2–5.4)
SODIUM SERPL-SCNC: 129 MMOL/L (ref 135–145)
WBC # BLD AUTO: 11.2 K/UL (ref 4.8–10.8)

## 2024-04-25 PROCEDURE — 700105 HCHG RX REV CODE 258: Performed by: INTERNAL MEDICINE

## 2024-04-25 PROCEDURE — A9270 NON-COVERED ITEM OR SERVICE: HCPCS | Performed by: INTERNAL MEDICINE

## 2024-04-25 PROCEDURE — 80048 BASIC METABOLIC PNL TOTAL CA: CPT

## 2024-04-25 PROCEDURE — 36415 COLL VENOUS BLD VENIPUNCTURE: CPT

## 2024-04-25 PROCEDURE — 83880 ASSAY OF NATRIURETIC PEPTIDE: CPT

## 2024-04-25 PROCEDURE — 700102 HCHG RX REV CODE 250 W/ 637 OVERRIDE(OP): Performed by: EMERGENCY MEDICINE

## 2024-04-25 PROCEDURE — 84100 ASSAY OF PHOSPHORUS: CPT

## 2024-04-25 PROCEDURE — A9270 NON-COVERED ITEM OR SERVICE: HCPCS | Performed by: HOSPITALIST

## 2024-04-25 PROCEDURE — A9270 NON-COVERED ITEM OR SERVICE: HCPCS | Performed by: EMERGENCY MEDICINE

## 2024-04-25 PROCEDURE — 700111 HCHG RX REV CODE 636 W/ 250 OVERRIDE (IP): Mod: JZ

## 2024-04-25 PROCEDURE — 700102 HCHG RX REV CODE 250 W/ 637 OVERRIDE(OP): Performed by: HOSPITALIST

## 2024-04-25 PROCEDURE — 700111 HCHG RX REV CODE 636 W/ 250 OVERRIDE (IP): Performed by: INTERNAL MEDICINE

## 2024-04-25 PROCEDURE — 83930 ASSAY OF BLOOD OSMOLALITY: CPT

## 2024-04-25 PROCEDURE — 700102 HCHG RX REV CODE 250 W/ 637 OVERRIDE(OP): Performed by: INTERNAL MEDICINE

## 2024-04-25 PROCEDURE — 83735 ASSAY OF MAGNESIUM: CPT

## 2024-04-25 PROCEDURE — 97535 SELF CARE MNGMENT TRAINING: CPT

## 2024-04-25 PROCEDURE — 85027 COMPLETE CBC AUTOMATED: CPT

## 2024-04-25 PROCEDURE — 99239 HOSP IP/OBS DSCHRG MGMT >30: CPT | Performed by: HOSPITALIST

## 2024-04-25 RX ORDER — MAGNESIUM SULFATE HEPTAHYDRATE 40 MG/ML
2 INJECTION, SOLUTION INTRAVENOUS ONCE
Status: COMPLETED | OUTPATIENT
Start: 2024-04-25 | End: 2024-04-25

## 2024-04-25 RX ORDER — LOPERAMIDE HYDROCHLORIDE 2 MG/1
2 CAPSULE ORAL 4 TIMES DAILY PRN
Status: SHIPPED
Start: 2024-04-25

## 2024-04-25 RX ADMIN — LEVOTHYROXINE SODIUM 175 MCG: 0.17 TABLET ORAL at 06:14

## 2024-04-25 RX ADMIN — SODIUM CHLORIDE 1 G: 1 TABLET ORAL at 07:47

## 2024-04-25 RX ADMIN — MAGNESIUM SULFATE HEPTAHYDRATE 2 G: 2 INJECTION, SOLUTION INTRAVENOUS at 09:06

## 2024-04-25 RX ADMIN — LOSARTAN POTASSIUM 100 MG: 50 TABLET, FILM COATED ORAL at 06:12

## 2024-04-25 RX ADMIN — CARVEDILOL 25 MG: 6.25 TABLET, FILM COATED ORAL at 07:47

## 2024-04-25 RX ADMIN — Medication 1 TABLET: at 06:10

## 2024-04-25 RX ADMIN — AMLODIPINE BESYLATE 10 MG: 5 TABLET ORAL at 06:11

## 2024-04-25 RX ADMIN — SODIUM CHLORIDE 1 G: 1 TABLET ORAL at 11:15

## 2024-04-25 RX ADMIN — TORSEMIDE 5 MG: 5 TABLET ORAL at 06:12

## 2024-04-25 RX ADMIN — SODIUM CHLORIDE 18 MILLION UNITS: 9 INJECTION, SOLUTION INTRAVENOUS at 10:30

## 2024-04-25 ASSESSMENT — COGNITIVE AND FUNCTIONAL STATUS - GENERAL
EATING MEALS: A LITTLE
DRESSING REGULAR LOWER BODY CLOTHING: A LOT
SUGGESTED CMS G CODE MODIFIER DAILY ACTIVITY: CL
DRESSING REGULAR UPPER BODY CLOTHING: A LOT
TOILETING: A LOT
DAILY ACTIVITIY SCORE: 13
HELP NEEDED FOR BATHING: A LOT
PERSONAL GROOMING: A LOT

## 2024-04-25 ASSESSMENT — PAIN DESCRIPTION - PAIN TYPE: TYPE: ACUTE PAIN

## 2024-04-25 NOTE — DISCHARGE PLANNING
DC Transport Scheduled    Transport Company Scheduled: [employer]  Spoke with Raine at [facility] to schedule transport.      Scheduled Date: 4/25/2024  Scheduled Time: 1630    [facility], Destination address: [address] [place] [address] [address], [place] [id number]      Notified care team of scheduled transport via [employer].     If there are any changes needed to the DC transportation scheduled, please contact [facility] at ext. [phone] between the hours of 4384-4840 Mon-Fri. If outside those hours, contact the ED Case Manager at ext. [phone].

## 2024-04-25 NOTE — PROGRESS NOTES
Monitor Summary: SR 79-96, AK 0.16, QRS 0.08, QT 0.36, with rare PVCS and 11 bts pf PSVT per strip from monitor room.

## 2024-04-25 NOTE — PROGRESS NOTES
NOC HOSPITALIST CROSS COVER    Notified by RN regarding patient having a 13 second run of PSVT. The patient was asymptomatic.       Vitals:    04/25/24 0405   BP: (!) 128/102   Pulse: 85   Resp: 18   Temp: 37.2 °C (99 °F)   SpO2: 98%            Plan:  #PSVT  -Mag sulfate 2 g IV      -----------------------------------------------------------------------------------------------------------    Electronically signed by:  Dahiana Doss, MARIELA, APRN, EUGENIO-BC  Hospitalist Services

## 2024-04-25 NOTE — PROGRESS NOTES
Pt discharged to [facility] facility by [facility] medical transportation. All patient belongings accounted for. Antibiotics and midline transported with patient. VSS. Discharge instructions and COBRA paperwork given to [facility].

## 2024-04-25 NOTE — DISCHARGE PLANNING
Case Management Discharge Planning    Admission Date: 4/12/2024  GMLOS: 3.6  ALOS: 10    6-Clicks ADL Score: 18  6-Clicks Mobility Score: 12  PT and/or OT Eval ordered: Yes  Post-acute Referrals Ordered: Yes  Post-acute Choice Obtained: Yes  Has referral(s) been sent to post-acute provider:  Yes      Anticipated Discharge Dispo: Discharge Disposition: Disch to a long term care facility (63)    DME Needed: No    Action(s) Taken: Updated Provider/Nurse on Discharge Plan  Pt was discussed in morning rounds with the team. Per Dr. Noe, Pt is medically cleared. Pt has been accepted at [facility], RN CM called [facility] and spoke to Pari, per Pari, they cannot take Pt with continuous IV antibiotics.     Referral sent to [facility].    1440- Pt has been accepted at [facility] and they have available bed for the Pt today. Pt's son informed. Transport requested at 1630 via rideline. Bedside nurse and Dr. Noe updated.    1448- Transport confirmed at 1630 via [facility]. Pt's son informed. COBRA packet completed. Given to bedside nurse.      Escalations Completed: Provider    Medically Clear: Yes    Next Steps: RN CM to continue to assist in Pt's discharge needs.     Barriers to Discharge: Pending Placement

## 2024-04-25 NOTE — CARE PLAN
Problem: Pain - Standard  Goal: Alleviation of pain or a reduction in pain to the patient’s comfort goal  Outcome: Progressing     Problem: Knowledge Deficit - Standard  Goal: Patient and family/care givers will demonstrate understanding of plan of care, disease process/condition, diagnostic tests and medications  Outcome: Progressing     Problem: Fall Risk  Goal: Patient will remain free from falls  Outcome: Progressing     Problem: Skin Integrity  Goal: Skin integrity is maintained or improved  Outcome: Progressing     Problem: Psychosocial  Goal: Patient's level of anxiety will decrease  Outcome: Progressing  Goal: Patient's ability to verbalize feelings about condition will improve  Outcome: Progressing  Goal: Patient's ability to re-evaluate and adapt role responsibilities will improve  Outcome: Progressing  Goal: Patient and family will demonstrate ability to cope with life altering diagnosis and/or procedure  Outcome: Progressing  Goal: Spiritual and cultural needs incorporated into hospitalization  Outcome: Progressing     Problem: Hemodynamics  Goal: Patient's hemodynamics, fluid balance and neurologic status will be stable or improve  Outcome: Progressing     Problem: Risk for Aspiration  Goal: Patient's risk for aspiration will be absent or decrease  Outcome: Progressing     Problem: Urinary - Renal Perfusion  Goal: Ability to achieve and maintain adequate renal perfusion and functioning will improve  Outcome: Progressing     Problem: Venous Thromboembolism (VTE) Prevention  Goal: The patient will remain free from venous thromboembolism (VTE)  Outcome: Progressing     Problem: Nutrition  Goal: Patient's nutritional and fluid intake will be adequate or improve  Outcome: Progressing  Goal: Enteral nutrition will be maintained or improve  Outcome: Progressing  Goal: Enteral nutrition will be maintained or improve  Outcome: Progressing     Problem: Urinary Elimination  Goal: Establish and maintain regular  urinary output  Outcome: Progressing     Problem: Neuro Status  Goal: Neuro status will remain stable or improve  Outcome: Progressing   The patient is Stable - Low risk of patient condition declining or worsening    Shift Goals  Clinical Goals: Stable neuro status, Safety  Patient Goals: Rest, pain management  Family Goals: LAWRENCE    Progress made toward(s) clinical / shift goals:    Pt got up to chair. Pt's fall precautions remain in place. Pt has call light within reach. Pt has bed alarm in place.    Patient is not progressing towards the following goals  Pt is still having lots of liquid diarrhea. Pt is still very confused.

## 2024-04-25 NOTE — DISCHARGE SUMMARY
"Discharge Summary    CHIEF COMPLAINT ON ADMISSION  Chief Complaint   Patient presents with    ALOC     [facility] when staff walked her over to ER due to being confused and asking to go to ER. Per family patients PCP believes her thyroid levels are possible off. Patient A/O x 3. Patient states, \" My son is from the devil and I need to tell all the people. I can not go home because my  is from the devil.\"       Reason for Admission  Psych eval    Admission Date  4/12/2024     CODE STATUS  Full Code    HPI & HOSPITAL COURSE  This is a 89 y.o. female here with altered mental status.  Patient went to see her primary care physician initially who sent her to the emergency room because of abnormal behavior and delusions.  She was also hyponatremic at the time and was admitted because of the hyponatremia.  Patient was initially diagnosed with neurosyphilis and infectious disease consultation was requested.  Patient was placed on a penicillin infusion.  Over time the patient's sodium level has become corrected.  The mental status has improved considerably she has become quite pleasant and charming.  At this point the patient needs continued infusion of penicillin and because of this the patient will need to go to an LTAC.  LTAC has accepted the patient and the patient will be transferred there this afternoon.      Therefore, she is discharged in fair and stable condition to a long-term acute care hospital.    The patient met 2-midnight criteria for an inpatient stay at the time of discharge.      FOLLOW UP ITEMS POST DISCHARGE  Follow-up with the primary care physician after discharge  Follow-up with infectious disease after discharge    DISCHARGE DIAGNOSES  Principal Problem:    Psychotic disorder (HCC) (POA: Yes)  Active Problems:    Hypertension (POA: Yes)    Idiopathic hypothyroidism (POA: Yes)    Bipolar depression (HCC) (POA: Yes)    Hyponatremia (POA: Yes)    Vitamin B12 deficiency (POA: " Yes)    Neurosyphilis (POA: Yes)  Resolved Problems:    Acute encephalopathy (POA: Yes)      FOLLOW UP  Future Appointments   Date Time Provider Department Center   4/29/2024  1:00 PM Luke Escobar M.D. [address]   5/8/2024  3:00 PM Giorgi Griffin M.D. [facility]   7/15/2024  1:40 PM Luke Escobar M.D. [address]obar M.D.  [address] [facility] [address]  [address]  [facility] [address]  [phone]    Follow up        MEDICATIONS ON DISCHARGE     Medication List        START taking these medications        Instructions   amLODIPine 10 MG Tabs  Commonly known as: Norvasc   Take 1 Tablet by mouth every day.  Dose: 10 mg     HYDROcodone-acetaminophen 5-325 MG Tabs per tablet  Commonly known as: Norco  Replaces: HYDROcodone-acetaminophen 7.5-325 MG tab   Take 1 Tablet by mouth every 6 hours as needed (severe pain) for up to 5 days.  Dose: 1 Tablet     loperamide 2 MG Caps  Commonly known as: Imodium   Take 1 Capsule by mouth 4 times a day as needed for Diarrhea.  Dose: 2 mg     melatonin 5 mg Tabs   Take 1 Tablet by mouth at bedtime as needed (insomnia).  Dose: 5 mg     NS SOLN 500 mL with penicillin G potassium [id number] UNIT SOLR 18 Million Units   Infuse 18 Million Units into a venous catheter continuous for 6 days.  Dose: 18 Million Units     sodium chloride 1 GM Tabs  Commonly known as: Salt   Take 1 Tablet by mouth 3 times a day with meals.  Dose: 1 g     therapeutic multivitamin-minerals Tabs   Take 1 Tablet by mouth every day.  Dose: 1 Tablet            CHANGE how you take these medications        Instructions   carvedilol 25 MG Tabs  What changed:   medication strength  how much to take  Commonly known as: Coreg   Take 1 Tablet by mouth 2 times a day with meals.  Dose: 25 mg     polyethylene glycol 3350 17 GM/SCOOP Powd  What changed:   how much to take  how to take this  when to take this  Commonly known as: Miralax   MIX 1 CAPFUL WITH WATER AND DRINK BY MOUTH EVERY DAY             CONTINUE taking these medications        Instructions   ascorbic acid 500 MG tablet  Commonly known as: Vitamin C   Take 500 mg by mouth every day.  Dose: 500 mg     losartan 100 MG Tabs  Commonly known as: Cozaar   Take 1 Tablet by mouth every day.  Dose: 100 mg     Synthroid 175 MCG Tabs  Generic drug: levothyroxine   Doctor's comments: Brand is medically necessary  Take 1 Tablet by mouth every morning on an empty stomach.  Dose: 175 mcg     torsemide 5 MG Tabs  Commonly known as: Demadex   Take 1 Tablet by mouth every morning.  Dose: 5 mg            STOP taking these medications      divalproex 500 MG Tbec  Commonly known as: Depakote     HYDROcodone-acetaminophen 7.5-325 MG tab  Commonly known as: Norco  Replaced by: HYDROcodone-acetaminophen 5-325 MG Tabs per tablet     QUEtiapine 200 MG Tabs  Commonly known as: SEROquel              Allergies  No Known Allergies    DIET  Orders Placed This Encounter   Procedures    Diet Order Diet: Level 6 - Soft and Bite Sized; Liquid level: Level 0 - Thin; Fluid modifications: (optional): 1200 ml Fluid Restriction; Tray Modifications (optional): SLP - 1:1 Supervision by Nursing     Standing Status:   Standing     Number of Occurrences:   1     Order Specific Question:   Diet:     Answer:   Level 6 - Soft and Bite Sized [23]     Order Specific Question:   Liquid level     Answer:   Level 0 - Thin     Order Specific Question:   Fluid modifications: (optional)     Answer:   1200 ml Fluid Restriction [8]     Order Specific Question:   Tray Modifications (optional)     Answer:   SLP - 1:1 Supervision by Nursing       ACTIVITY  As tolerated.  Weight bearing as tolerated    LINES, DRAINS, AND WOUNDS  This is an automated list. Peripheral IVs will be removed prior to discharge.  Midline IV 04/18/24 Anterior;Right Upper arm (Active)   Site Assessment Clean;Dry;Intact 04/25/24 0800   Dressing Type Transparent 04/25/24 0800   Line Status Flushed 04/25/24 0800   Dressing Status  Clean;Dry;Intact 04/25/24 0800   Dressing Intervention N/A 04/25/24 0800   Dressing Change Due 04/27/24 04/23/24 0800   Infiltration Grading ([facility]) 0 04/25/24 0800   [facility]lebitis Scale ([facility]) 0 04/25/24 0800     External Urinary Catheter (Female Wick) (Active)   Collection Container Suction container 04/24/24 1101   Output (mL) 400 mL 04/23/24 0400      Wound 04/19/24 Partial Thickness Wound Sacrum;Coccyx moisture fissure (Active)   Wound Image     04/21/24 1500   Site Assessment Red;[place] 04/25/24 0800   Periwound Assessment Clean;Intact 04/25/24 0800   Margins Defined edges 04/25/24 0800   Closure Open to air 04/25/24 0800   Drainage Amount None 04/24/24 0900   Drainage Description Serosanguineous 04/21/24 1500   Treatments Cleansed;Offloading;Site care 04/24/24 0900   Wound Cleansing Soap and Water 04/25/24 0800   Periwound Protectant Barrier Paste 04/25/24 0800   Dressing Status Open to Air 04/25/24 0800   NEXT Weekly Photo (Inpatient Only) 04/28/24 04/21/24 1500   Wound Team Following Not following 04/21/24 1500   Non-staged Wound Description Partial thickness 04/21/24 1500                  MENTAL STATUS ON TRANSFER             CONSULTATIONS  Infectious disease  Psychiatry    PROCEDURES  Lumbar puncture done on 4/15/2024  EEG done on 4/16/2024  Midline placement done on 4/18/2024    LABORATORY  Lab Results   Component Value Date    SODIUM 129 (L) 04/25/2024    POTASSIUM 4.1 04/25/2024    CHLORIDE 99 04/25/2024    CO2 18 (L) 04/25/2024    GLUCOSE 96 04/25/2024    BUN 14 04/25/2024    CREATININE 0.66 04/25/2024    CREATININE 1.9 (H) 10/27/2008        Lab Results   Component Value Date    WBC 11.2 (H) 04/25/2024    HEMOGLOBIN 9.5 (L) 04/25/2024    HEMATOCRIT 27.5 (L) 04/25/2024    PLATELETCT 487 (H) 04/25/2024        Total time of the discharge process exceeds 40 minutes.

## 2024-04-25 NOTE — PROGRESS NOTES
Notified provider pt. Had a 13 sec run of PSVT. Provider acknowledged, stated new orders are entered for pt. This RN acknowledged.

## 2024-04-25 NOTE — CARE PLAN
The patient is Stable - Low risk of patient condition declining or worsening    Shift Goals  Clinical Goals: Q2hr turn, Safety  Patient Goals: LAWRENCE  Family Goals: LAWRENCE    Progress made toward(s) clinical / shift goals:    Problem: Pain - Standard  Goal: Alleviation of pain or a reduction in pain to the patient’s comfort goal  Description: Target End Date:  Prior to discharge or change in level of care    Document on Vitals flowsheet    1.  Document pain using the appropriate pain scale per order or unit policy  2.  Educate and implement non-pharmacologic comfort measures (i.e. relaxation, distraction, massage, cold/heat therapy, etc.)  3.  Pain management medications as ordered  4.  Reassess pain after pain med administration per policy  5.  If opiods administered assess patient's response to pain medication is appropriate per POSS sedation scale  6.  Follow pain management plan developed in collaboration with patient and interdisciplinary team (including palliative care or pain specialists if applicable)  Outcome: Progressing     Problem: Fall Risk  Goal: Patient will remain free from falls  Description: Target End Date:  Prior to discharge or change in level of care    Document interventions on the [place] Fall Risk Assessment    1.  Assess for fall risk factors  2.  Implement fall precautions  Outcome: Progressing       Patient is not progressing towards the following goals:

## 2024-04-25 NOTE — PROGRESS NOTES
Monitor summary: SR 79-86, ND 0.12, QRS 0.09, QT 0.36, with rare PVC, per strip from monitor room.

## 2024-04-25 NOTE — THERAPY
Occupational Therapy  Daily Treatment     Patient Name: Ricky Junior  Age:  89 y.o., Sex:  female  Medical Record #: [id number]  Today's Date: 4/25/2024     Precautions  Precautions: Fall Risk, Swallow Precautions  Comments: seizure precautions; Neurosyphilis    Assessment    Pt seen for OT session. Pt progressing with activity tolerance and functional mobility, but severely limited participation/command following/safety d/t poor cognition. Continues to be limited by decreased functional mobility, activity tolerance, cognition, strength, balance, and pain which are currently affecting pt's ability to complete ADLs/IADLs at baseline. Will continue to follow.     Plan    Treatment Plan Status: Modify Current Treatment Plan  Type of Treatment: Self Care / Activities of Daily Living, Therapeutic Activity  Treatment Frequency: 2 Times per Week  Treatment Duration: Until Therapy Goals Met    DC Equipment Recommendations: Unable to determine at this time  Discharge Recommendations: Recommend post-acute placement for additional occupational therapy services prior to discharge home     Objective     04/25/24 1156   Precautions   Precautions Fall Risk;Swallow Precautions   Comments seizure precautions; Neurosyphilis   Vitals   O2 Delivery Device None - Room Air   Pain 0 - 10 Group   Therapist Pain Assessment Post Activity Pain Same as Prior to Activity;During Activity;Nurse Notified  (no c/o pain)   Cognition    Cognition / Consciousness X   Speech/ Communication Delayed Responses;Slurred   Level of Consciousness Alert   Ability To Follow Commands 1 Step   Safety Awareness Impaired   New Learning Impaired   Attention Impaired   Sequencing Impaired   Initiation Impaired   Comments pleasant, but confused and tangential req max v/cs for redirection and perseverative (very difficult to redirect), no retention of education, no insight into deficits, no safety awareness, and saying incoherent and off topic things   Passive ROM  "Upper Body   Passive ROM Upper Body WDL   Active ROM Upper Body   Active ROM Upper Body  WDL   Strength Upper Body   Upper Body Strength  X   Gross Strength Generalized Weakness, Equal Bilaterally.    Comments reports she is weaker than baseline   Neuro-Muscular Treatments   Neuro-Muscular Treatments Anterior weight shift;Weight Shift Left;Weight Shift Right;Verbal Cuing;Tactile Cuing;Sequencing;Facilitation;Postural Changes;Postural Facilitation   Comments STSs and steps w/4ww; unable to follow instructions   Balance   Sitting Balance (Static) Fair   Sitting Balance (Dynamic) Fair -   Standing Balance (Static) Fair -   Standing Balance (Dynamic) Poor +   Weight Shift Sitting Fair   Weight Shift Standing Poor   Skilled Intervention Verbal Cuing;Tactile Cuing;Compensatory Strategies;Facilitation;Postural Facilitation;Sequencing   Comments w/4ww, min A for safety/balance   Bed Mobility    Supine to Sit Minimal Assist   Sit to Supine Moderate Assist   Scooting Minimal Assist   Skilled Intervention Tactile Cuing;Verbal Cuing;Facilitation;Compensatory Strategies   Comments HOB elevated   Activities of Daily Living   Eating   (declined; reports already ate, but plate untouched)   Grooming Moderate Assist   Lower Body Dressing Maximal Assist   Toileting Total Assist  (BM in standing on floor req cleanup)   Skilled Intervention Verbal Cuing;Compensatory Strategies;Facilitation;Tactile Cuing;Sequencing   Comments poor cooperation   Functional Mobility   Sit to Stand Minimal Assist   Bed, Chair, Wheelchair Transfer Moderate Assist   Transfer Method Stand Step   Mobility w/4ww; STSx4>steps toward chair>BTB (attempted to walk toward chair to \"tidy it\" x3 before fatigued and sat down impulsively/unsafely   Skilled Intervention Verbal Cuing;Tactile Cuing;Sequencing;Facilitation;Compensatory Strategies;Postural Facilitation   Activity Tolerance   Comments limited by cognition, fatigue, and weakness   Patient / Family Goals "   Patient / Family Goal #1 none stated   Short Term Goals   Short Term Goal # 1 supervised with UB dressing   Goal Outcome # 1 Goal not met   Short Term Goal # 2 min A with LB dressing   Goal Outcome # 2 Progressing slower than expected   Short Term Goal # 3 min A with ADL txfs   Goal Outcome # 3 Progressing slower than expected   Education Group   Education Provided Role of Occupational Therapist   Role of Occupational Therapist Patient Response Patient;Acceptance;Explanation;Reinforcement Needed;No Learning Evidence

## 2024-04-25 NOTE — CARE PLAN
The patient is Watcher - Medium risk of patient condition declining or worsening    Shift Goals  Clinical Goals: Q2 hr turn  Patient Goals: Maintain pt. free of falls, and injury  Family Goals: LAWRENCE    Progress made toward(s) clinical / shift goals:        Problem: Pain - Standard  Goal: Alleviation of pain or a reduction in pain to the patient’s comfort goal  Description: Target End Date:  Prior to discharge or change in level of care    Document on Vitals flowsheet    1.  Document pain using the appropriate pain scale per order or unit policy  2.  Educate and implement non-pharmacologic comfort measures (i.e. relaxation, distraction, massage, cold/heat therapy, etc.)  3.  Pain management medications as ordered  4.  Reassess pain after pain med administration per policy  5.  If opiods administered assess patient's response to pain medication is appropriate per POSS sedation scale  6.  Follow pain management plan developed in collaboration with patient and interdisciplinary team (including palliative care or pain specialists if applicable)  Outcome: Progressing     Problem: Knowledge Deficit - Standard  Goal: Patient and family/care givers will demonstrate understanding of plan of care, disease process/condition, diagnostic tests and medications  Description: Target End Date:  1-3 days or as soon as patient condition allows    Document in Patient Education    1.  Patient and family/caregiver oriented to unit, equipment, visitation policy and means for communicating concern  2.  Complete/review Learning Assessment  3.  Assess knowledge level of disease process/condition, treatment plan, diagnostic tests and medications  4.  Explain disease process/condition, treatment plan, diagnostic tests and medications  Outcome: Progressing     Problem: Fall Risk  Goal: Patient will remain free from falls  Description: Target End Date:  Prior to discharge or change in level of care    Document interventions on the [place]  Fall Risk Assessment    1.  Assess for fall risk factors  2.  Implement fall precautions  Outcome: Progressing     Problem: Skin Integrity  Goal: Skin integrity is maintained or improved  Description: Target End Date:  Prior to discharge or change in level of care    Document interventions on Skin Risk/[place] flowsheet groups and corresponding LDA    1.  Assess and monitor skin integrity, appearance and/or temperature  2.  Assess risk factors for impaired skin integrity and/or pressures ulcers  3.  Implement precautions to protect skin integrity in collaboration with interdisciplinary team  4.  Implement pressure ulcer prevention protocol if at risk for skin breakdown  5.  Confirm wound care consult if at risk for skin breakdown  6.  Ensure patient use of pressure relieving devices  (Low air loss bed, waffle overlay, heel protectors, ROHO cushion, etc)  Outcome: Progressing     Problem: Psychosocial  Goal: Patient's level of anxiety will decrease  Description: Target End Date:  1-3 days or as soon as patient condition allows    1.  Collaborate with patient and family/caregiver to identify triggers and develop strategies to cope with anxiety  2.  Implement stimuli reduction, calming techniques  3.  Pharmacologic management per provider order  4.  Encourage patient/family/care giver participation  5.  Collaborate with interdisciplinary team including Psychologist or Behavioral Health Team as needed  Outcome: Progressing  Goal: Patient's ability to verbalize feelings about condition will improve  Description: Target End Date:  Prior to discharge or change in level of care    1.  Discuss coping with medical condition and its effects  2.  Encourage patient participation in care  3.  Encourage acknowledgement of body changes and accompanying emotions  4.  Perform depression screening  Outcome: Progressing  Goal: Patient's ability to re-evaluate and adapt role responsibilities will improve  Description: Target End Date:   Prior to discharge or change in level of care    1.  Assess family support  2.  Encourage support system participation in care  3.  Encouraged verbalization of feelings regarding caregiver responsibilities  4.  Discuss changes in role and responsibilities caused by patient's condition  Outcome: Progressing  Goal: Patient and family will demonstrate ability to cope with life altering diagnosis and/or procedure  Description: Target End Date:  1-3 days or as soon as patient condition allows    1. Expect initial shock and disbelief following diagnosis of cancer and traumatizing procedures (disfiguring surgery, colostomy, amputation).  2.  Assess patient and family/caregiver for stage of grief currently being experienced. Explain process as appropriate.  3.  Provide open, nonjudgmental environment. Use therapeutic communication skills of Active-Listening, acknowledgment, and so on.  4.  Encourage verbalization of thoughts or concerns and accept expressions of sadness, anger, rejection. Acknowledge normality of these feelings  5.  Be aware of mood swings, hostility, and other acting-out behavior. Set limits on inappropriate behavior, redirect negative thinking  6.  Be aware of debilitating depression. Ask patient direct questions about state of mind.  7.  Visit frequently and provide physical contact as appropriate, or provide frequent phone support as appropriate for setting. Arrange for care provider and support person to stay with patient as needed  8.  Reinforce teaching regarding disease process and treatments and provide information as appropriate about dying. Be honest; do not give false hope while providing emotional support  9.  Review past life experiences, role changes, and coping skills. Talk about things that interest the patient  Outcome: Progressing  Goal: Spiritual and cultural needs incorporated into hospitalization  Description: Target End Date:  End of day 1    1.  Encourage verbalization of feelings,  concerns, expectations and needs  2.  Collaborate with Case Management/  3.  Collaborate with Pastoral Care to meet spiritual needs  Outcome: Progressing     Problem: Hemodynamics  Goal: Patient's hemodynamics, fluid balance and neurologic status will be stable or improve  Description: Target End Date:  Prior to discharge or change in level of care    Document on Assessment and I/O flowsheet templates    1.  Monitor vital signs, pulse oximetry and cardiac monitor per provider order and/or policy  2.  Maintain blood pressure per provider order  3.  Hemodynamic monitoring per provider order  4.  Manage IV fluids and IV infusions  5.  Monitor intake and output  6.  Daily weights per unit policy or provider order  7.  Assess peripheral pulses and capillary refill  8.  Assess color and body temperature  9.  Position patient for maximum circulation/cardiac output  10. Monitor for signs/symptoms of excessive bleeding  11. Assess mental status, restlessness and changes in level of consciousness  12. Monitor temperature and report fever or hypothermia to provider immediately. Consideration of targeted temperature management.  Outcome: Progressing     Problem: Respiratory  Goal: Patient will achieve/maintain optimum respiratory ventilation and gas exchange  Description: Target End Date:  Prior to discharge or change in level of care    Document on Assessment flowsheet    1.  Assess and monitor rate, rhythm, depth and effort of respiration  2.  Breath sounds assessed qshift and/or as needed  3.  Assess O2 saturation, administer/titrate oxygen as ordered  4.  Position patient for maximum ventilatory efficiency  5.  Turn, cough, and deep breath with splinting to improve effectiveness  6.  Collaborate with RT to administer medication/treatments per order  7.  Encourage use of incentive spirometer and encourage patient to cough after use and utilize splinting techniques if applicable  8.  Airway suctioning  9.   Monitor sputum production for changes in color, consistency and frequency  10. Perform frequent oral hygiene  11. Alternate physical activity with rest periods  Outcome: Progressing     Problem: Mechanical Ventilation  Goal: Safe management of artificial airway and ventilation  Description: Target End Date:  when vent discontinued    Document on VAP flowsheet and Airway LDA    1.  Daily awakening trials per provider order/policy  2.  Suctioning and care of ET/Trach tube (document on LDA)  3.  Collaborate with RT to administer medications/treatments  4.  Ambu bag at bedside and available for transport  5.  Trach patient - replacement trach at bedside  6.  Provide communication tools if applicable  Outcome: Progressing  Goal: Successful weaning off mechanical ventilator, spontaneously maintains adequate gas exchange  Description: Target End Date:  when vent discontinued    1.  Follow universal weaning protocol for patients on mechanical ventilation per order  2.  Review contraindication list.  Obtain provider order to wean in presence of contraindication.  3.  Obtain Trent Sedation-Agitation Score  4.  Trent Score 1-2:  sedation vacation  5.  Trent Score 3-4:  Collaborate with provider and/or RT to determine readiness for trial  6.  Begin 2 hour trial of weaning following protocol  7.  Evaluate for fatigue parameters per protocol  8.  Fatigue parameters triggered:  Stop wean and return to previous ASV% setting or increase % minute volume to offset work or breathing  Outcome: Progressing  Goal: Patient will be able to express needs and understand communication  Description: Target End Date:  when vent discontinued    1.  Assess ability to communicate and understand  2.  Provide communication tools  3.  Collaborate with Speech Therapy for [facility]  Outcome: Progressing     Problem: Risk for Aspiration  Goal: Patient's risk for aspiration will be absent or decrease  Description: Target End Date:  Prior to discharge or change  in level of care    1.   Complete dysphagia screening on admission  2.   NPO until dysphagia screening complete or medically cleared  3.   Collaborate with Speech Therapy, Clinical Dietitian and interdisciplinary team  4.   Implement aspiration precautions  5.   Assist patient up to chair for meals  6.   Elevate head of bed 90 degrees if patient is unable to get out of bed  7.   Encourage small bites  8.   Ensure foods/liquids are of appropriate consistency  9.   Assess for any signs/symptoms of aspiration  10. Assess breath sounds and vital signs after oral intake  Outcome: Progressing     Problem: Urinary - Renal Perfusion  Goal: Ability to achieve and maintain adequate renal perfusion and functioning will improve  Description: Target End Date:  Prior to discharge or change in level of care    Document on I/O and Assessment flowsheet    1.  Urine output will remain greater than 0.5ml/Kg/HR  2.  Monitor amount and/or characteristics of urine per order/policy. Specific gravity per order/policy  3.  Assess signs and symptoms of renal dysfunction  Outcome: Progressing     Problem: Venous Thromboembolism (VTE) Prevention  Goal: The patient will remain free from venous thromboembolism (VTE)  Description: Target End Date:  Prior to discharge or change in level of care    1.  VTE prophylaxis assessed and initiated by day 1 or 2 of hospital admission (pharmacologic or mechanical). Contraindication documented by provider.  2.  Ensure patient wears mechanical prophylaxis when in bed or chair if ordered  3.  Remove mechanical prophylaxis at least once per shift for skin checks  4.  Encourage patient to perform ankle flex, foot rotation and knee exercises in addition to other prophylactic measures every hour while awake  5.  Encourage ambulation/mobilization at level directed by Physical Therapy in collaboration with interdisciplinary team  6.  Administer prophylactic medication per order  Outcome: Progressing     Problem:  Nutrition  Goal: Patient's nutritional and fluid intake will be adequate or improve  Description: Target End Date:  Prior to discharge or change in level of care    Document on I/O flowsheet    1.  Monitor nutritional intake  2.  Monitor weight per provider order  3.  Assess patient's ability to take oral nutrition  4.  Collaborate with Speech Therapy, Dietitian and interdisciplinary team for appropriate feeding and fluid intake  5.  Assist with feeding  Outcome: Progressing  Goal: Enteral nutrition will be maintained or improve  Description: Target End Date:  Prior to discharge or change in level of care    1. Enteral access to be obtained or modified  2. Advance to goal rate per protocol  3. Collaborate with Clinical Dietitian for signs/symptoms of intolerance  4. Weights per provider order  5. Consider Speech Therapy consult for swallowing difficulties  Outcome: Progressing  Goal: Enteral nutrition will be maintained or improve  Description: Target End Date:  Prior to discharge or change in level of care    1.  Fingerstick glucose every 8 hours  2.  Notify provider for fever or elevated glucose  3.  TPN tubing and port changes every 24 hours.  Turn TPN through dedicated line. (Document on LDA)  4.  TPN dressing changes 24 hours after insertion and then every Saturday  (Document on LDA)  5.  Daily weights  6.  Monitor TPN lab results  7.  Collaborate with Clinical Dietician  8.  Collaborate with Pharmacist  Outcome: Progressing     Problem: Urinary Elimination  Goal: Establish and maintain regular urinary output  Description: Target End Date:  Prior to discharge or change in level of care    Document on I/O and Assessment flowsheets    1.  Evaluate need to continue indwelling catheter every shift  2.  Assess signs and symptoms of urinary retention  3.  Assess post-void residual volumes  4.  Implement bladder training program  5.  Encourage scheduled voidings  6.  Assist patient to sit on bedside commode or toilet  for voiding  7.  Educate patient and family/caregiver on use and purpose of urine collection devices (document in Patient Education)  Outcome: Progressing     Problem: Bowel Elimination  Goal: Establish and maintain regular bowel function  Description: Target End Date:  Prior to discharge or change in level of care    1.   Note date of last BM  2.   Educate about diet, fluid intake, medication and activity to promote bowel function  3.   Educate signs and symptoms of constipation and interventions to implement  4.   Pharmacologic bowel management per provider order  5.   Regular toileting schedule  6.   Upright position for toileting  7.   High fiber diet  8.   Encourage hydration  9.   Collaborate with Clinical Dietician  10. Care and maintenance of ostomy if applicable  Outcome: Progressing     Problem: Neuro Status  Goal: Neuro status will remain stable or improve  Description: Target End Date:  Prior to discharge or change in level of care    Document on Neuro assessment in the Assessment flowsheet    1.  Assess and monitor neurologic status per provider order/protocol/unit policy  2.  Assess level of consciousness and orientation  3.  Assess for speech, dysarthria, dysphagia, facial symmetry  4.  Assess visual field, eye movements, gaze preference, pupil reaction and size  5.  Assess muscle strength and motor response in all four extremities  6.  Assess for sensation (numbness and tingling)  7.  Assess basic neuro reflexes (cough, gag, corneal)  8.  Identify changes in neuro status and report to provider for testing/treatment orders  Outcome: Progressing       Patient is not progressing towards the following goals:

## 2024-04-26 NOTE — DISCHARGE PLANNING
RN CM received a call from [facility] stating that Pt came without her personal belongings ( White shoes, white purse with brown wallet, Black wig). RN CM reached out to charge nurse and per charge nurse there were no belongings in their lost and found and the nurse who discharge the Pt is not working today. Unit leadership informed.

## 2024-04-27 NOTE — PROGRESS NOTES
Chief Complaint   Patient presents with    Medication Refill       Subjective:     HPI:   Ricky Junior presents today with the followin. Lumbar and sacral arthritis  Patient is having increased pain from her lumbar and sacral arthritis.  She has been very stable on her hydrocodone regimen but she is a frail and elderly person and I feel that increasing her narcotics will give too much of an increased risk of fall.  Adding celebrex 100 mg today.  Denies somnolence or confusion from her current regimen.  The regimen overall brings the pain down from an 8 or so to a 6 and sometimes better.  However, lately the pain has been worse, hence trial of Celebrex.    2. Arthritis of right elbow  Patient has chronic pain from arthritis of both elbows but particularly on the right.  She cannot take standard nonsteroidal anti-inflammatory drugs due to her pulmonary hypertension and ventricular hypertrophy.  We can try Celebrex.  We will recheck her kidney function when she has been on this for 3 months.  Most recent testing kidney function was excellent.    3. Cervical spine arthritis  Patient has multiple level cervical spine arthritis which is also a source of pain and stiffness.    4. Chronic use of opiate for therapeutic purpose  No aberrant or addictive use of medications has been observed.  No adverse events have been reported.  Patient counseled to not add Tylenol to current regimen.  Patient counseled to keep medications locked up or under personal control.  [facility] board of pharmacy interface is reviewed.  No inconsistencies are found.  The patient's maximum MME is 22.5.  This is within CDC guideline for chronic pain prescribing by primary care.    5. Bipolar depression (HCC)  Patient has very stable bipolar depression.  She is taking her medications regularly.  Her  puts them in pillboxes for every 2 weeks.  She has done very well as long as she does not miss her medication.    6. Pulmonary  Reason for Disposition   [1] Caller has URGENT question AND [2] triager unable to answer question    Additional Information   Negative: Sounds like a life-threatening emergency to the triager   Negative: Sounds like a serious complication to the triager   Negative: Patient sounds very sick or weak to the triager   Negative: [1] SEVERE pain (e.g., excruciating, pain scale 8-10) AND [2] not improved after pain medications   Negative: [1] NEW symptom AND [2] could be serious   Negative: New-onset of fever   Negative: Dressing soaked with blood or body fluid (e.g., drainage)   Negative: Taking Coumadin (warfarin) or other strong blood thinner, or known bleeding disorder (e.g., thrombocytopenia)    Protocols used: Recent Medical Visit for Injury Follow-up Call-A-  pt called re seen in ED yest in [place] for back pain. dx with pinched nerve. Pt states ER gave pt meds that dont agree with him. Pt states he was seen 9/30/19 seen for back pain. Flexeril and norco worked. Pt reports meds he was givne dont agree with him. One gave pt heartburn x 10 hours despite Gaviscon. Persistent HA thought to be due to Robaxin. Pt states walking dog and dog hurt paw had to carry dog home - dog weighs 50#. Pt reports the next day woke with knot in back. denies incontinence. No numbness in groin or rectal area. rating pain = on prednisone rated 4. Rec to speak with ED. Pt agree. Pt warm transferred to speak with ED.      hypertension ([facility])  Patient has history of left ventricular hypertrophy and pulmonary hypertension.  Patient does not have symptomatic obstructive sleep apnea.  The pulmonary hypertension is moderate.    7. Aortic atherosclerosis ([facility])  Patient has aortic atherosclerosis.  This is of course expected at 89.  No aneurysm has been seen    8. Cystitis  Patient continues to have urinary discomfort.  She states the Keflex was extremely helpful last time.  We will treat symptomatically today.    9. At risk for falling/Use of cane as ambulatory aid  She is already at risk of fall but is clearly cautious as she walks.  She is using a cane well.  She will continue to use that.        Patient Active Problem List    Diagnosis Date Noted    Use of cane as ambulatory aid 01/11/2023    At risk for falling 01/11/2023    Aortic atherosclerosis ([facility]) 04/13/2022    Arthritis of right elbow 06/30/2021    Primary osteoarthritis of right shoulder 05/02/2018    Chronic use of opiate for therapeutic purpose 09/14/2016    Cervical spine arthritis 07/24/2015    Osteoarthritis of left knee     MEDICAL HOME 02/10/2015    Insomnia due to mental disorder 12/08/2014    Low HDL (under 40)     Lumbar and sacral arthritis     Pulmonary hypertension ([facility]) 09/20/2014    Bipolar depression ([facility]) 06/08/2014    History of iron deficiency anemia 04/06/2013    Idiopathic hypothyroidism 02/22/2013    Essential hypertension 03/28/2012       Current medicines (including changes today)  Current Outpatient Medications   Medication Sig Dispense Refill    cephALEXin (KEFLEX) 500 MG Cap Take 1 Capsule by mouth 3 times a day for 5 days. 15 Capsule 0    [START ON 1/20/2023] HYDROcodone-acetaminophen (NORCO) 7.5-325 MG tab Take 1 Tablet by mouth every 8 hours as needed for Moderate Pain or Severe Pain (arthritis pain, max four per day) for up to 30 days. 90 tablets is a 30 day quantity 90 Tablet 0    [START ON 2/19/2023] HYDROcodone-acetaminophen (NORCO) 7.5-325 MG  "tab Take 1 Tablet by mouth every 8 hours as needed for Moderate Pain or Severe Pain (arthritis pain, max four per day) for up to 30 days. 90 tablets is a 30 day quantity 90 Tablet 0    [START ON 3/21/2023] HYDROcodone-acetaminophen (NORCO) 7.5-325 MG tab Take 1 Tablet by mouth every 8 hours as needed for Moderate Pain or Severe Pain (arthritis pain, max four per day) for up to 30 days. 90 tablets is a 30 day quantity 90 Tablet 0    celecoxib (CELEBREX) 100 MG Cap Take 1 Capsule by mouth every day. 90 Capsule 2    polyethylene glycol 3350 (MIRALAX) 17 GM/SCOOP Powder MIX 1 CAPFUL WITH WATER AND DRINK BY MOUTH EVERY  g 3    hydrochlorothiazide (MICROZIDE) 12.5 MG capsule Take 1 Capsule by mouth every morning as needed (edema). 90 Capsule 3    SYNTHROID 175 MCG Tab TAKE 1 TABLET BY MOUTH EVERY MORNING ON AN EMPTY STOMACH 90 Tablet 3    carvedilol (COREG) 12.5 MG Tab TAKE 1 TABLET BY MOUTH TWICE DAILY WITH MEALS 180 Tablet 3    divalproex (DEPAKOTE) 500 MG Tablet Delayed Response TAKE 1 TABLET BY MOUTH AT BEDTIME 90 Tablet 3    QUEtiapine (SEROQUEL) 200 MG Tab TAKE 1 TABLET BY MOUTH AT BEDTIME 90 Tablet 3    Misc. Devices Misc This is an order for a four wheeled walker with seat  Dx; unsteady gait, arthritis of lower back and knee  ICD-10 codes  M47.817, M17.12           CHEYENNE 99 months   NPI [id number] 1 Each 0    losartan (COZAAR) 100 MG Tab Take 1 Tablet by mouth every day. 90 Tablet 3    aspirin EC (ECOTRIN) 81 MG Tablet Delayed Response Take 1 Tab by mouth every day. 30 Tab 0     No current facility-administered medications for this visit.       No Known Allergies    ROS: As per HPI       Objective:     BP (!) 156/70 (BP Location: Right arm, Patient Position: Sitting, BP Cuff Size: Small adult)   Pulse 74   Temp 36.4 °C (97.6 °F) (Temporal)   Ht 1.702 m (5' 7\")   Wt 62.9 kg (138 lb 9.6 oz)   SpO2 98%  Body mass index is 21.71 kg/m².    Physical Exam:  Constitutional: Well-developed and well-nourished. Not " diaphoretic. No distress. Lucid and fluent.  Patient, physician and staff all wearing masks.  Skin: Skin is warm and dry. No rash noted.  Head: Atraumatic without lesions.  Eyes: Conjunctivae and extraocular motions are normal. Pupils are equal, round, and reactive to light. No scleral icterus.   Ears:  External ears unremarkable.   Neck: Marked cervical kyphosis.  Range of motion is fairly good. No thyromegaly present. No cervical or supraclavicular lymphadenopathy. No JVD or carotid bruits appreciated  Cardiovascular: Regular rate and rhythm.  Normal S1, S2 without murmur appreciated.  Chest: Effort normal. Clear to auscultation throughout. No adventitious sounds.   Abdomen: Soft, non tender, and without distention. Active bowel sounds in all four quadrants. No rebound, guarding, masses or hepatosplenomegaly.  Extremities: No cyanosis, clubbing, erythema, nor edema.  Right elbow stiffness.  No joint effusion today.  Mild crepitus both right shoulder and elbow.  Neurological: Alert and oriented x 3.  Movements are symmetric.  Somewhat slow but definitely competent.  Gait is slow using the cane but very steady.  Psychiatric:  Behavior, mood, and affect are appropriate.       Assessment and Plan:     89 y.o. female with the following issues:    1. Lumbar and sacral arthritis  HYDROcodone-acetaminophen (NORCO) 7.5-325 MG tab    HYDROcodone-acetaminophen (NORCO) 7.5-325 MG tab    HYDROcodone-acetaminophen (NORCO) 7.5-325 MG tab    celecoxib (CELEBREX) 100 MG Cap      2. Arthritis of right elbow  HYDROcodone-acetaminophen (NORCO) 7.5-325 MG tab    HYDROcodone-acetaminophen (NORCO) 7.5-325 MG tab    HYDROcodone-acetaminophen (NORCO) 7.5-325 MG tab    celecoxib (CELEBREX) 100 MG Cap      3. Cervical spine arthritis  HYDROcodone-acetaminophen (NORCO) 7.5-325 MG tab    HYDROcodone-acetaminophen (NORCO) 7.5-325 MG tab    HYDROcodone-acetaminophen (NORCO) 7.5-325 MG tab    celecoxib (CELEBREX) 100 MG Cap      4. Chronic use  of opiate for therapeutic purpose  Consent for Opiate Prescription      5. Bipolar depression (HCC)        6. Pulmonary hypertension (HCC)        7. Aortic atherosclerosis (HCC)        8. Cystitis  cephALEXin (KEFLEX) 500 MG Cap      9. At risk for falling  Patient identified as fall risk.  Appropriate orders and counseling given.      10. Use of cane as ambulatory aid          Chronic pain recheck for: Chronic arthritic and degenerative pain  Last dose of controlled substance: Today  Chronic pain treated with hydrocodone APAP taken 2-3 times a day    She  reports no history of alcohol use.  She  reports no history of drug use.    Interval history: Patient has been stable overall even though her back and leg pain has been increasing.  This may be somewhat weather related.  Any major change in health since last appointment? No    Consequences of Chronic Opiate therapy:  (5 A's)  Analgesia: Compared to no treatment or prior treatment, pain is currently improved  Activity: improved  Adverse Events: She denies constipation, itchy skin, nausea, and sedation  Aberrant Behaviors: She reports she is taking medication as prescribed and is not veering from agreed treatment regimen or provider recommendations. There have been no inappropriate refills or lost/stolen meds reported.  Affect/Mood: Pain is impacting patient's mood.  Patient reports stable depression/anxiety.    Nonnarcotic treatments that are being used: topical agents, heat, and cannot atke NSAIDS much due to cardiovascular diseae .     Last imaging: August last year    Opioid Risk Score: 1    Interpretation of Opioid Risk Score   Score 0-3 = Low risk of abuse. Do UDS at least once per year.  Score 4-7 = Moderate risk of abuse. Do UDS 1-4 times per year.  Score 8+ = High risk of abuse. Refer to specialist.    Last order of CONTROLLED SUBSTANCE TREATMENT AGREEMENT was found on 10/5/2022 from Office Visit on 10/5/2022     UDS Summary            URINE DRUG SCREEN  (Every 360 Days) Next due on 9/1/2023 09/06/2022  URINE DRUG SCREEN    10/06/2021  PAIN MANAGEMENT SCREEN    09/11/2020  Pain Management Screen    06/03/2019  Pain Management Screen    05/23/2018  [facility] PAIN MANAGEMENT SCREEN    Only the first 5 history entries have been loaded, but more history exists.                  Most recent UDS reviewed today and is consistent with prescribed medications.     I have reviewed the medical records, the Prescription Monitoring Program and I have determined that controlled substance treatment is medically indicated.       Followup: Return in about 3 months (around 4/11/2023), or if symptoms worsen or fail to improve.

## 2024-08-19 DIAGNOSIS — I10 ESSENTIAL HYPERTENSION: ICD-10-CM

## 2024-08-19 RX ORDER — CARVEDILOL 12.5 MG/1
12.5 TABLET ORAL 2 TIMES DAILY WITH MEALS
Qty: 180 TABLET | Refills: 3 | Status: SHIPPED | OUTPATIENT
Start: 2024-08-19